# Patient Record
Sex: MALE | Race: WHITE | Employment: OTHER
[De-identification: names, ages, dates, MRNs, and addresses within clinical notes are randomized per-mention and may not be internally consistent; named-entity substitution may affect disease eponyms.]

---

## 2017-01-09 ENCOUNTER — OFFICE VISIT (OUTPATIENT)
Dept: FAMILY MEDICINE CLINIC | Facility: CLINIC | Age: 72
End: 2017-01-09

## 2017-01-09 ENCOUNTER — ANTI-COAG VISIT (OUTPATIENT)
Dept: FAMILY MEDICINE CLINIC | Facility: CLINIC | Age: 72
End: 2017-01-09

## 2017-01-09 VITALS
TEMPERATURE: 97.5 F | HEART RATE: 87 BPM | BODY MASS INDEX: 34.45 KG/M2 | DIASTOLIC BLOOD PRESSURE: 80 MMHG | WEIGHT: 254 LBS | SYSTOLIC BLOOD PRESSURE: 140 MMHG | OXYGEN SATURATION: 98 %

## 2017-01-09 DIAGNOSIS — F41.9 ANXIETY: ICD-10-CM

## 2017-01-09 DIAGNOSIS — M54.50 CHRONIC BILATERAL LOW BACK PAIN WITHOUT SCIATICA: ICD-10-CM

## 2017-01-09 DIAGNOSIS — R73.9 HYPERGLYCEMIA: ICD-10-CM

## 2017-01-09 DIAGNOSIS — G89.29 CHRONIC BILATERAL LOW BACK PAIN WITHOUT SCIATICA: ICD-10-CM

## 2017-01-09 DIAGNOSIS — Z11.59 NEED FOR HEPATITIS C SCREENING TEST: ICD-10-CM

## 2017-01-09 DIAGNOSIS — I10 ESSENTIAL HYPERTENSION: Primary | ICD-10-CM

## 2017-01-09 DIAGNOSIS — Z79.01 ANTICOAGULATED ON COUMADIN: ICD-10-CM

## 2017-01-09 DIAGNOSIS — I48.20 CHRONIC ATRIAL FIBRILLATION (HCC): ICD-10-CM

## 2017-01-09 DIAGNOSIS — Z12.11 COLON CANCER SCREENING: ICD-10-CM

## 2017-01-09 DIAGNOSIS — Z13.31 POSITIVE DEPRESSION SCREENING: ICD-10-CM

## 2017-01-09 DIAGNOSIS — Z00.00 HEALTH CARE MAINTENANCE: ICD-10-CM

## 2017-01-09 PROCEDURE — 96160 PT-FOCUSED HLTH RISK ASSMT: CPT | Performed by: NURSE PRACTITIONER

## 2017-01-09 PROCEDURE — G8431 POS CLIN DEPRES SCRN F/U DOC: HCPCS | Performed by: NURSE PRACTITIONER

## 2017-01-09 PROCEDURE — 99214 OFFICE O/P EST MOD 30 MIN: CPT | Performed by: NURSE PRACTITIONER

## 2017-01-09 RX ORDER — LORAZEPAM 1 MG/1
TABLET ORAL
Qty: 90 TABLET | Refills: 3 | Status: SHIPPED | OUTPATIENT
Start: 2017-01-09 | End: 2017-05-15 | Stop reason: SDUPTHER

## 2017-01-09 RX ORDER — ACETAMINOPHEN AND CODEINE PHOSPHATE 300; 30 MG/1; MG/1
1 TABLET ORAL DAILY PRN
Qty: 30 TABLET | Refills: 0 | Status: SHIPPED | OUTPATIENT
Start: 2017-01-09 | End: 2018-05-14 | Stop reason: ALTCHOICE

## 2017-01-09 RX ORDER — VALSARTAN 320 MG/1
320 TABLET ORAL DAILY
Qty: 30 TABLET | Refills: 5 | Status: SHIPPED | OUTPATIENT
Start: 2017-01-09 | End: 2017-05-15 | Stop reason: SDUPTHER

## 2017-01-09 RX ORDER — WARFARIN SODIUM 6 MG/1
TABLET ORAL
Qty: 30 TABLET | Refills: 5 | Status: SHIPPED | OUTPATIENT
Start: 2017-01-09 | End: 2017-05-15 | Stop reason: SDUPTHER

## 2017-01-09 RX ORDER — DILTIAZEM HYDROCHLORIDE 240 MG/1
CAPSULE, COATED, EXTENDED RELEASE ORAL
Qty: 30 CAPSULE | Refills: 5 | Status: SHIPPED | OUTPATIENT
Start: 2017-01-09 | End: 2017-05-15 | Stop reason: SDUPTHER

## 2017-01-09 ASSESSMENT — PATIENT HEALTH QUESTIONNAIRE - PHQ9
9. THOUGHTS THAT YOU WOULD BE BETTER OFF DEAD, OR OF HURTING YOURSELF: 0
SUM OF ALL RESPONSES TO PHQ QUESTIONS 1-9: 14
7. TROUBLE CONCENTRATING ON THINGS, SUCH AS READING THE NEWSPAPER OR WATCHING TELEVISION: 1
1. LITTLE INTEREST OR PLEASURE IN DOING THINGS: 3
SUM OF ALL RESPONSES TO PHQ9 QUESTIONS 1 & 2: 5
2. FEELING DOWN, DEPRESSED OR HOPELESS: 2
8. MOVING OR SPEAKING SO SLOWLY THAT OTHER PEOPLE COULD HAVE NOTICED. OR THE OPPOSITE, BEING SO FIGETY OR RESTLESS THAT YOU HAVE BEEN MOVING AROUND A LOT MORE THAN USUAL: 0
4. FEELING TIRED OR HAVING LITTLE ENERGY: 2
3. TROUBLE FALLING OR STAYING ASLEEP: 3
10. IF YOU CHECKED OFF ANY PROBLEMS, HOW DIFFICULT HAVE THESE PROBLEMS MADE IT FOR YOU TO DO YOUR WORK, TAKE CARE OF THINGS AT HOME, OR GET ALONG WITH OTHER PEOPLE: 0
5. POOR APPETITE OR OVEREATING: 2
6. FEELING BAD ABOUT YOURSELF - OR THAT YOU ARE A FAILURE OR HAVE LET YOURSELF OR YOUR FAMILY DOWN: 1

## 2017-01-09 ASSESSMENT — ENCOUNTER SYMPTOMS
RHINORRHEA: 0
CONSTIPATION: 0
BLOOD IN STOOL: 0
CHEST TIGHTNESS: 0
ABDOMINAL PAIN: 0
COUGH: 0
GASTROINTESTINAL NEGATIVE: 1
WHEEZING: 0
SHORTNESS OF BREATH: 0
RESPIRATORY NEGATIVE: 1
ALLERGIC/IMMUNOLOGIC NEGATIVE: 1
SORE THROAT: 0
ANAL BLEEDING: 0
EYES NEGATIVE: 1
DIARRHEA: 0

## 2017-02-14 RX ORDER — ACETAMINOPHEN AND CODEINE PHOSPHATE 300; 30 MG/1; MG/1
TABLET ORAL
Qty: 30 TABLET | Refills: 0 | OUTPATIENT
Start: 2017-02-14

## 2017-02-20 ENCOUNTER — TELEPHONE (OUTPATIENT)
Dept: FAMILY MEDICINE CLINIC | Facility: CLINIC | Age: 72
End: 2017-02-20

## 2017-05-15 ENCOUNTER — OFFICE VISIT (OUTPATIENT)
Dept: FAMILY MEDICINE CLINIC | Age: 72
End: 2017-05-15
Payer: COMMERCIAL

## 2017-05-15 VITALS
HEART RATE: 100 BPM | BODY MASS INDEX: 33.05 KG/M2 | SYSTOLIC BLOOD PRESSURE: 142 MMHG | OXYGEN SATURATION: 98 % | HEIGHT: 72 IN | DIASTOLIC BLOOD PRESSURE: 80 MMHG | TEMPERATURE: 97.7 F | WEIGHT: 244 LBS

## 2017-05-15 DIAGNOSIS — K59.09 OTHER CONSTIPATION: ICD-10-CM

## 2017-05-15 DIAGNOSIS — J44.9 CHRONIC OBSTRUCTIVE PULMONARY DISEASE, UNSPECIFIED COPD TYPE (HCC): Primary | ICD-10-CM

## 2017-05-15 DIAGNOSIS — I48.20 CHRONIC ATRIAL FIBRILLATION (HCC): ICD-10-CM

## 2017-05-15 DIAGNOSIS — F41.9 ANXIETY: ICD-10-CM

## 2017-05-15 DIAGNOSIS — I10 ESSENTIAL HYPERTENSION: ICD-10-CM

## 2017-05-15 PROBLEM — K59.00 CONSTIPATION: Status: ACTIVE | Noted: 2017-05-15

## 2017-05-15 LAB
INTERNATIONAL NORMALIZATION RATIO, POC: 2.7
PROTHROMBIN TIME, POC: 32.6

## 2017-05-15 PROCEDURE — 99214 OFFICE O/P EST MOD 30 MIN: CPT | Performed by: NURSE PRACTITIONER

## 2017-05-15 PROCEDURE — 85610 PROTHROMBIN TIME: CPT | Performed by: NURSE PRACTITIONER

## 2017-05-15 RX ORDER — WARFARIN SODIUM 6 MG/1
TABLET ORAL
Qty: 30 TABLET | Refills: 5 | Status: SHIPPED | OUTPATIENT
Start: 2017-05-15 | End: 2017-09-11 | Stop reason: SDUPTHER

## 2017-05-15 RX ORDER — ALBUTEROL SULFATE 90 UG/1
2 AEROSOL, METERED RESPIRATORY (INHALATION) EVERY 6 HOURS PRN
Qty: 1 INHALER | Refills: 5 | Status: SHIPPED | OUTPATIENT
Start: 2017-05-15 | End: 2018-09-17 | Stop reason: SDUPTHER

## 2017-05-15 RX ORDER — LORAZEPAM 1 MG/1
TABLET ORAL
Qty: 90 TABLET | Refills: 3 | Status: SHIPPED | OUTPATIENT
Start: 2017-05-15 | End: 2017-09-11 | Stop reason: SDUPTHER

## 2017-05-15 RX ORDER — DILTIAZEM HYDROCHLORIDE 240 MG/1
CAPSULE, COATED, EXTENDED RELEASE ORAL
Qty: 30 CAPSULE | Refills: 5 | Status: SHIPPED | OUTPATIENT
Start: 2017-05-15 | End: 2017-09-11 | Stop reason: SDUPTHER

## 2017-05-15 RX ORDER — VALSARTAN 320 MG/1
320 TABLET ORAL DAILY
Qty: 30 TABLET | Refills: 5 | Status: SHIPPED | OUTPATIENT
Start: 2017-05-15 | End: 2017-09-11 | Stop reason: SDUPTHER

## 2017-05-15 RX ORDER — NITROGLYCERIN 0.4 MG/1
TABLET SUBLINGUAL
Qty: 25 TABLET | Refills: 3 | Status: SHIPPED | OUTPATIENT
Start: 2017-05-15 | End: 2017-09-11 | Stop reason: SDUPTHER

## 2017-05-15 RX ORDER — DOCUSATE SODIUM 100 MG/1
100 CAPSULE, LIQUID FILLED ORAL 2 TIMES DAILY
Qty: 60 CAPSULE | Refills: 11 | Status: SHIPPED | OUTPATIENT
Start: 2017-05-15 | End: 2017-09-11 | Stop reason: SDUPTHER

## 2017-05-15 RX ORDER — TAMSULOSIN HYDROCHLORIDE 0.4 MG/1
CAPSULE ORAL
Qty: 30 CAPSULE | Refills: 5 | Status: SHIPPED | OUTPATIENT
Start: 2017-05-15 | End: 2018-05-14

## 2017-05-15 ASSESSMENT — ENCOUNTER SYMPTOMS
RESPIRATORY NEGATIVE: 1
WHEEZING: 0
CONSTIPATION: 0
SORE THROAT: 0
EYES NEGATIVE: 1
SHORTNESS OF BREATH: 0
RHINORRHEA: 0
BLOOD IN STOOL: 0
CHEST TIGHTNESS: 0
COUGH: 0
DIARRHEA: 0
ABDOMINAL PAIN: 0
ANAL BLEEDING: 0
ALLERGIC/IMMUNOLOGIC NEGATIVE: 1
GASTROINTESTINAL NEGATIVE: 1

## 2017-06-12 ENCOUNTER — TELEPHONE (OUTPATIENT)
Dept: FAMILY MEDICINE CLINIC | Age: 72
End: 2017-06-12

## 2017-09-11 ENCOUNTER — HOSPITAL ENCOUNTER (OUTPATIENT)
Age: 72
Setting detail: SPECIMEN
Discharge: HOME | End: 2017-09-11
Payer: COMMERCIAL

## 2017-09-11 ENCOUNTER — OFFICE VISIT (OUTPATIENT)
Dept: FAMILY MEDICINE CLINIC | Age: 72
End: 2017-09-11
Payer: COMMERCIAL

## 2017-09-11 ENCOUNTER — TELEPHONE (OUTPATIENT)
Dept: FAMILY MEDICINE CLINIC | Age: 72
End: 2017-09-11

## 2017-09-11 VITALS
OXYGEN SATURATION: 97 % | HEART RATE: 85 BPM | SYSTOLIC BLOOD PRESSURE: 140 MMHG | DIASTOLIC BLOOD PRESSURE: 82 MMHG | BODY MASS INDEX: 33.36 KG/M2 | WEIGHT: 246 LBS | TEMPERATURE: 97.4 F

## 2017-09-11 DIAGNOSIS — Z79.899 HIGH RISK MEDICATION USE: ICD-10-CM

## 2017-09-11 DIAGNOSIS — I48.20 CHRONIC ATRIAL FIBRILLATION (HCC): ICD-10-CM

## 2017-09-11 DIAGNOSIS — I10 ESSENTIAL HYPERTENSION: ICD-10-CM

## 2017-09-11 DIAGNOSIS — I10 ESSENTIAL HYPERTENSION: Primary | ICD-10-CM

## 2017-09-11 DIAGNOSIS — F41.9 ANXIETY: ICD-10-CM

## 2017-09-11 DIAGNOSIS — E78.2 MIXED HYPERLIPIDEMIA: ICD-10-CM

## 2017-09-11 DIAGNOSIS — Z12.5 PROSTATE CANCER SCREENING: ICD-10-CM

## 2017-09-11 DIAGNOSIS — Z79.01 ANTICOAGULATED ON COUMADIN: ICD-10-CM

## 2017-09-11 LAB
ALBUMIN SERPL-MCNC: 4.2 G/DL (ref 3.5–5.2)
ALBUMIN/GLOBULIN RATIO: 1.1 (ref 1–2.5)
ALP BLD-CCNC: 124 U/L (ref 40–129)
ALT SERPL-CCNC: 12 U/L (ref 5–41)
ANION GAP SERPL CALCULATED.3IONS-SCNC: 17 MMOL/L (ref 9–17)
AST SERPL-CCNC: 16 U/L
BILIRUB SERPL-MCNC: 0.19 MG/DL (ref 0.3–1.2)
BUN BLDV-MCNC: 22 MG/DL (ref 8–23)
BUN/CREAT BLD: ABNORMAL (ref 9–20)
CALCIUM SERPL-MCNC: 9.6 MG/DL (ref 8.6–10.4)
CHLORIDE BLD-SCNC: 100 MMOL/L (ref 98–107)
CHOLESTEROL/HDL RATIO: 4.5
CHOLESTEROL: 198 MG/DL
CO2: 25 MMOL/L (ref 20–31)
CREAT SERPL-MCNC: 1.31 MG/DL (ref 0.7–1.2)
GFR AFRICAN AMERICAN: >60 ML/MIN
GFR NON-AFRICAN AMERICAN: 54 ML/MIN
GFR SERPL CREATININE-BSD FRML MDRD: ABNORMAL ML/MIN/{1.73_M2}
GFR SERPL CREATININE-BSD FRML MDRD: ABNORMAL ML/MIN/{1.73_M2}
GLUCOSE BLD-MCNC: 118 MG/DL (ref 70–99)
HCT VFR BLD CALC: 44.5 % (ref 41–53)
HDLC SERPL-MCNC: 44 MG/DL
HEMOGLOBIN: 15 G/DL (ref 13.5–17.5)
INTERNATIONAL NORMALIZATION RATIO, POC: 2.3
LDL CHOLESTEROL: 126 MG/DL (ref 0–130)
MCH RBC QN AUTO: 31.9 PG (ref 26–34)
MCHC RBC AUTO-ENTMCNC: 33.7 G/DL (ref 31–37)
MCV RBC AUTO: 94.8 FL (ref 80–100)
PDW BLD-RTO: 15 % (ref 12.5–15.4)
PLATELET # BLD: 239 K/UL (ref 140–450)
PMV BLD AUTO: 9.6 FL (ref 6–12)
POTASSIUM SERPL-SCNC: 4.3 MMOL/L (ref 3.7–5.3)
PROSTATE SPECIFIC ANTIGEN: 0.84 UG/L
PROTHROMBIN TIME, POC: 27.4
RBC # BLD: 4.7 M/UL (ref 4.5–5.9)
SODIUM BLD-SCNC: 142 MMOL/L (ref 135–144)
TOTAL PROTEIN: 7.9 G/DL (ref 6.4–8.3)
TRIGL SERPL-MCNC: 139 MG/DL
VLDLC SERPL CALC-MCNC: NORMAL MG/DL (ref 1–30)
WBC # BLD: 8.2 K/UL (ref 3.5–11)

## 2017-09-11 PROCEDURE — 36415 COLL VENOUS BLD VENIPUNCTURE: CPT | Performed by: NURSE PRACTITIONER

## 2017-09-11 PROCEDURE — 85610 PROTHROMBIN TIME: CPT | Performed by: NURSE PRACTITIONER

## 2017-09-11 PROCEDURE — 99214 OFFICE O/P EST MOD 30 MIN: CPT | Performed by: NURSE PRACTITIONER

## 2017-09-11 RX ORDER — WARFARIN SODIUM 6 MG/1
TABLET ORAL
Qty: 30 TABLET | Refills: 5 | Status: SHIPPED | OUTPATIENT
Start: 2017-09-11 | End: 2018-01-15 | Stop reason: SDUPTHER

## 2017-09-11 RX ORDER — LORAZEPAM 1 MG/1
TABLET ORAL
Qty: 90 TABLET | Refills: 3 | Status: SHIPPED | OUTPATIENT
Start: 2017-09-11 | End: 2018-01-15 | Stop reason: SDUPTHER

## 2017-09-11 RX ORDER — NITROGLYCERIN 0.4 MG/1
0.4 TABLET SUBLINGUAL EVERY 5 MIN PRN
Qty: 25 TABLET | Refills: 5 | Status: SHIPPED | OUTPATIENT
Start: 2017-09-11 | End: 2018-01-15 | Stop reason: SDUPTHER

## 2017-09-11 RX ORDER — VALSARTAN 320 MG/1
320 TABLET ORAL DAILY
Qty: 30 TABLET | Refills: 5 | Status: SHIPPED | OUTPATIENT
Start: 2017-09-11 | End: 2018-05-07 | Stop reason: SDUPTHER

## 2017-09-11 RX ORDER — DOCUSATE SODIUM 100 MG/1
100 CAPSULE, LIQUID FILLED ORAL 2 TIMES DAILY
Qty: 60 CAPSULE | Refills: 11 | Status: SHIPPED | OUTPATIENT
Start: 2017-09-11 | End: 2018-09-17 | Stop reason: SDUPTHER

## 2017-09-11 RX ORDER — DILTIAZEM HYDROCHLORIDE 240 MG/1
CAPSULE, COATED, EXTENDED RELEASE ORAL
Qty: 30 CAPSULE | Refills: 5 | Status: SHIPPED | OUTPATIENT
Start: 2017-09-11 | End: 2018-01-15 | Stop reason: SDUPTHER

## 2017-09-11 ASSESSMENT — ENCOUNTER SYMPTOMS
RESPIRATORY NEGATIVE: 1
WHEEZING: 0
RHINORRHEA: 0
COUGH: 0
ANAL BLEEDING: 0
DIARRHEA: 0
CHEST TIGHTNESS: 0
EYES NEGATIVE: 1
CONSTIPATION: 0
GASTROINTESTINAL NEGATIVE: 1
SORE THROAT: 0
ABDOMINAL PAIN: 0
ALLERGIC/IMMUNOLOGIC NEGATIVE: 1
BLOOD IN STOOL: 0
SHORTNESS OF BREATH: 0

## 2017-09-13 ENCOUNTER — TELEPHONE (OUTPATIENT)
Dept: FAMILY MEDICINE CLINIC | Age: 72
End: 2017-09-13

## 2017-09-14 LAB

## 2017-11-14 ENCOUNTER — ANTI-COAG VISIT (OUTPATIENT)
Dept: FAMILY MEDICINE CLINIC | Age: 72
End: 2017-11-14

## 2017-11-14 DIAGNOSIS — Z79.01 ANTICOAGULATED ON COUMADIN: ICD-10-CM

## 2018-01-15 ENCOUNTER — OFFICE VISIT (OUTPATIENT)
Dept: FAMILY MEDICINE CLINIC | Age: 73
End: 2018-01-15
Payer: COMMERCIAL

## 2018-01-15 VITALS
HEIGHT: 72 IN | DIASTOLIC BLOOD PRESSURE: 44 MMHG | SYSTOLIC BLOOD PRESSURE: 143 MMHG | HEART RATE: 88 BPM | TEMPERATURE: 97.1 F | OXYGEN SATURATION: 98 % | WEIGHT: 253 LBS | BODY MASS INDEX: 34.27 KG/M2

## 2018-01-15 DIAGNOSIS — I10 ESSENTIAL HYPERTENSION: ICD-10-CM

## 2018-01-15 DIAGNOSIS — R07.9 CHEST PAIN, UNSPECIFIED TYPE: ICD-10-CM

## 2018-01-15 DIAGNOSIS — K59.00 CONSTIPATION, UNSPECIFIED CONSTIPATION TYPE: ICD-10-CM

## 2018-01-15 DIAGNOSIS — Z79.899 MEDICATION MANAGEMENT: ICD-10-CM

## 2018-01-15 DIAGNOSIS — R73.9 HYPERGLYCEMIA: Primary | ICD-10-CM

## 2018-01-15 DIAGNOSIS — I48.20 CHRONIC ATRIAL FIBRILLATION (HCC): ICD-10-CM

## 2018-01-15 DIAGNOSIS — F41.9 ANXIETY: ICD-10-CM

## 2018-01-15 DIAGNOSIS — J44.9 CHRONIC OBSTRUCTIVE PULMONARY DISEASE, UNSPECIFIED COPD TYPE (HCC): ICD-10-CM

## 2018-01-15 DIAGNOSIS — M67.432 GANGLION CYST OF WRIST, LEFT: ICD-10-CM

## 2018-01-15 LAB
6-ACETYLMORPHINE, UR: NORMAL
AMPHETAMINE SCREEN, URINE: NORMAL
BARBITURATE SCREEN, URINE: NORMAL
BENZODIAZEPINE SCREEN, URINE: POSITIVE
CANNABINOID SCREEN URINE: NORMAL
COCAINE METABOLITE, URINE: NORMAL
CREATININE URINE: NORMAL MG/DL
EDDP, URINE: NORMAL
ETHANOL URINE: NORMAL
HBA1C MFR BLD: 6 %
INTERNATIONAL NORMALIZATION RATIO, POC: 2.4
MDMA URINE: NORMAL
METHADONE SCREEN, URINE: NORMAL
METHAMPHETAMINE, URINE: NORMAL
OPIATES, URINE: NORMAL
OXYCODONE: NORMAL
PCP: NORMAL
PH, URINE: NORMAL
PHENCYCLIDINE, URINE: NORMAL
PROPOXYPHENE, URINE: NORMAL
PROTHROMBIN TIME, POC: 28.7
TRICYCLIC ANTIDEPRESSANTS, UR: NORMAL

## 2018-01-15 PROCEDURE — 1036F TOBACCO NON-USER: CPT | Performed by: NURSE PRACTITIONER

## 2018-01-15 PROCEDURE — G8484 FLU IMMUNIZE NO ADMIN: HCPCS | Performed by: NURSE PRACTITIONER

## 2018-01-15 PROCEDURE — 99214 OFFICE O/P EST MOD 30 MIN: CPT | Performed by: NURSE PRACTITIONER

## 2018-01-15 PROCEDURE — G8417 CALC BMI ABV UP PARAM F/U: HCPCS | Performed by: NURSE PRACTITIONER

## 2018-01-15 PROCEDURE — 4040F PNEUMOC VAC/ADMIN/RCVD: CPT | Performed by: NURSE PRACTITIONER

## 2018-01-15 PROCEDURE — G8926 SPIRO NO PERF OR DOC: HCPCS | Performed by: NURSE PRACTITIONER

## 2018-01-15 PROCEDURE — 85610 PROTHROMBIN TIME: CPT | Performed by: NURSE PRACTITIONER

## 2018-01-15 PROCEDURE — 3023F SPIROM DOC REV: CPT | Performed by: NURSE PRACTITIONER

## 2018-01-15 PROCEDURE — 3017F COLORECTAL CA SCREEN DOC REV: CPT | Performed by: NURSE PRACTITIONER

## 2018-01-15 PROCEDURE — 83036 HEMOGLOBIN GLYCOSYLATED A1C: CPT | Performed by: NURSE PRACTITIONER

## 2018-01-15 PROCEDURE — 1123F ACP DISCUSS/DSCN MKR DOCD: CPT | Performed by: NURSE PRACTITIONER

## 2018-01-15 PROCEDURE — G8427 DOCREV CUR MEDS BY ELIG CLIN: HCPCS | Performed by: NURSE PRACTITIONER

## 2018-01-15 RX ORDER — VALSARTAN 320 MG/1
320 TABLET ORAL DAILY
Qty: 90 TABLET | Refills: 1 | Status: CANCELLED | OUTPATIENT
Start: 2018-01-15

## 2018-01-15 RX ORDER — DILTIAZEM HYDROCHLORIDE 240 MG/1
CAPSULE, COATED, EXTENDED RELEASE ORAL
Qty: 30 CAPSULE | Refills: 11 | Status: SHIPPED | OUTPATIENT
Start: 2018-01-15 | End: 2018-09-17 | Stop reason: SDUPTHER

## 2018-01-15 RX ORDER — WARFARIN SODIUM 6 MG/1
TABLET ORAL
Qty: 30 TABLET | Refills: 11 | Status: SHIPPED | OUTPATIENT
Start: 2018-01-15 | End: 2019-01-10 | Stop reason: SDUPTHER

## 2018-01-15 RX ORDER — NITROGLYCERIN 0.4 MG/1
0.4 TABLET SUBLINGUAL EVERY 5 MIN PRN
Qty: 25 TABLET | Refills: 11 | Status: SHIPPED | OUTPATIENT
Start: 2018-01-15 | End: 2019-09-13 | Stop reason: SDUPTHER

## 2018-01-15 RX ORDER — LORAZEPAM 1 MG/1
TABLET ORAL
Qty: 90 TABLET | Refills: 3 | Status: SHIPPED | OUTPATIENT
Start: 2018-01-15 | End: 2018-05-14 | Stop reason: SDUPTHER

## 2018-01-15 ASSESSMENT — ENCOUNTER SYMPTOMS
ALLERGIC/IMMUNOLOGIC NEGATIVE: 1
CONSTIPATION: 1
ANAL BLEEDING: 0
WHEEZING: 0
CHEST TIGHTNESS: 0
DIARRHEA: 0
EYES NEGATIVE: 1
BLOOD IN STOOL: 0
COUGH: 1
SHORTNESS OF BREATH: 1
ABDOMINAL PAIN: 0

## 2018-01-15 NOTE — PROGRESS NOTES
lungs every 6 hours as needed for Wheezing 1 Inhaler 5    tamsulosin (FLOMAX) 0.4 MG capsule take 1 capsule by mouth once daily 30 capsule 5    acetaminophen-codeine (TYLENOL #3) 300-30 MG per tablet Take 1 tablet by mouth daily as needed for Pain 30 tablet 0     No current facility-administered medications for this visit. Allergies   Allergen Reactions    Lisinopril      cough    Neurontin [Gabapentin] Other (See Comments)     Took 3 nights and felt awful    Pcn [Penicillins] Swelling    Statins Support Therapy Other (See Comments)     Leg pains, tried pravachol, crestor, lipitor, simvastatin    Hydrochlorothiazide Rash     Breaks out       Health Maintenance   Topic Date Due    AAA screen  1945    Colon cancer screen colonoscopy  10/25/1996    Flu vaccine (1) 01/15/2019 (Originally 9/1/2017)    Pneumococcal low/med risk (1 of 2 - PCV13) 01/15/2019 (Originally 10/25/2011)    Potassium monitoring  09/11/2018    Creatinine monitoring  09/11/2018    A1C test (Diabetic or Prediabetic)  01/15/2019    Lipid screen  09/11/2022    DTaP/Tdap/Td vaccine (2 - Td) 07/10/2023    Zostavax vaccine  Addressed    Hepatitis C screen  Completed          Review of Systems   Constitutional: Negative. Negative for appetite change and fatigue. HENT: Negative for tinnitus. Eyes: Negative. Respiratory: Positive for cough and shortness of breath. Negative for chest tightness and wheezing. Cardiovascular: Positive for chest pain ( Using nitro PRN). Negative for palpitations and leg swelling. Gastrointestinal: Positive for constipation. Negative for abdominal pain, anal bleeding, blood in stool and diarrhea. Endocrine: Negative. Negative for cold intolerance and heat intolerance. Genitourinary: Negative. Negative for difficulty urinating and urgency. Musculoskeletal: Negative. Negative for arthralgias. Skin: Negative. Negative for pallor and rash. Allergic/Immunologic: Negative. Neurological: Negative. Negative for headaches. Hematological: Negative. Psychiatric/Behavioral: Positive for dysphoric mood. Negative for sleep disturbance. The patient is nervous/anxious. Objective:     BP (!) 143/44 (Site: Right Arm, Position: Sitting, Cuff Size: Large Adult)   Pulse 88   Temp 97.1 °F (36.2 °C) (Oral)   Ht 5' 11.5\" (1.816 m)   Wt 253 lb (114.8 kg)   SpO2 98%   BMI 34.79 kg/m²   Physical Exam   Constitutional: He is oriented to person, place, and time. Vital signs are normal. He appears well-developed and well-nourished. obese   HENT:   Head: Normocephalic and atraumatic. Eyes: Conjunctivae are normal. Right eye exhibits no discharge. Left eye exhibits no discharge. Neck: Normal range of motion. Cardiovascular: Normal rate, regular rhythm and normal heart sounds. Exam reveals no gallop and no friction rub. No murmur heard. Pulmonary/Chest: Effort normal. No respiratory distress. He has wheezes ( Expiratory wheeze RLQ. Cleared with cough. Lungs otherwise clear throughout. ). He has no rales. Musculoskeletal: Normal range of motion. He exhibits no edema. Neurological: He is alert and oriented to person, place, and time. Skin: Skin is warm and dry. No rash noted. No erythema. Psychiatric: He has a normal mood and affect. His behavior is normal. Judgment and thought content normal.   Vitals reviewed. Assessment:        1. Hyperglycemia    2. Chronic atrial fibrillation (HCC)    3. Anxiety    4. Medication management    5. Ganglion cyst of wrist, left    6. Essential hypertension    7. Chronic obstructive pulmonary disease, unspecified COPD type ([address]    8. Chest pain, unspecified type    9. Constipation, unspecified constipation type                     Plan:      1. Hyperglycemia  - POCT glycosylated hemoglobin (Hb A1C)-6.0 well controlled, continue plan of care.     2. Chronic atrial fibrillation (HCC)  - warfarin (COUMADIN) 6 MG tablet; take 1

## 2018-05-07 RX ORDER — VALSARTAN 320 MG/1
TABLET ORAL
Qty: 90 TABLET | Refills: 1 | Status: SHIPPED | OUTPATIENT
Start: 2018-05-07 | End: 2018-11-09 | Stop reason: SDUPTHER

## 2018-05-14 ENCOUNTER — OFFICE VISIT (OUTPATIENT)
Dept: FAMILY MEDICINE CLINIC | Age: 73
End: 2018-05-14
Payer: COMMERCIAL

## 2018-05-14 ENCOUNTER — HOSPITAL ENCOUNTER (OUTPATIENT)
Age: 73
Setting detail: SPECIMEN
Discharge: HOME | End: 2018-05-14
Payer: COMMERCIAL

## 2018-05-14 VITALS
SYSTOLIC BLOOD PRESSURE: 138 MMHG | OXYGEN SATURATION: 99 % | DIASTOLIC BLOOD PRESSURE: 70 MMHG | BODY MASS INDEX: 33.69 KG/M2 | TEMPERATURE: 98.2 F | HEART RATE: 72 BPM | WEIGHT: 245 LBS

## 2018-05-14 DIAGNOSIS — I10 ESSENTIAL HYPERTENSION: Primary | ICD-10-CM

## 2018-05-14 DIAGNOSIS — Z79.899 HIGH RISK MEDICATION USE: ICD-10-CM

## 2018-05-14 DIAGNOSIS — M54.50 CHRONIC MIDLINE LOW BACK PAIN WITHOUT SCIATICA: ICD-10-CM

## 2018-05-14 DIAGNOSIS — F41.9 ANXIETY: ICD-10-CM

## 2018-05-14 DIAGNOSIS — Z79.899 MEDICATION MANAGEMENT: ICD-10-CM

## 2018-05-14 DIAGNOSIS — G89.29 CHRONIC MIDLINE LOW BACK PAIN WITHOUT SCIATICA: ICD-10-CM

## 2018-05-14 DIAGNOSIS — K59.00 CONSTIPATION, UNSPECIFIED CONSTIPATION TYPE: ICD-10-CM

## 2018-05-14 DIAGNOSIS — J41.1 MUCOPURULENT CHRONIC BRONCHITIS (HCC): ICD-10-CM

## 2018-05-14 LAB
INTERNATIONAL NORMALIZATION RATIO, POC: 3.4
PROTHROMBIN TIME, POC: 41

## 2018-05-14 PROCEDURE — 3023F SPIROM DOC REV: CPT | Performed by: NURSE PRACTITIONER

## 2018-05-14 PROCEDURE — G8417 CALC BMI ABV UP PARAM F/U: HCPCS | Performed by: NURSE PRACTITIONER

## 2018-05-14 PROCEDURE — 4040F PNEUMOC VAC/ADMIN/RCVD: CPT | Performed by: NURSE PRACTITIONER

## 2018-05-14 PROCEDURE — G8926 SPIRO NO PERF OR DOC: HCPCS | Performed by: NURSE PRACTITIONER

## 2018-05-14 PROCEDURE — 99214 OFFICE O/P EST MOD 30 MIN: CPT | Performed by: NURSE PRACTITIONER

## 2018-05-14 PROCEDURE — 85610 PROTHROMBIN TIME: CPT | Performed by: NURSE PRACTITIONER

## 2018-05-14 PROCEDURE — 3017F COLORECTAL CA SCREEN DOC REV: CPT | Performed by: NURSE PRACTITIONER

## 2018-05-14 PROCEDURE — 1123F ACP DISCUSS/DSCN MKR DOCD: CPT | Performed by: NURSE PRACTITIONER

## 2018-05-14 PROCEDURE — G8427 DOCREV CUR MEDS BY ELIG CLIN: HCPCS | Performed by: NURSE PRACTITIONER

## 2018-05-14 PROCEDURE — 1036F TOBACCO NON-USER: CPT | Performed by: NURSE PRACTITIONER

## 2018-05-14 RX ORDER — IBUPROFEN 800 MG/1
800 TABLET ORAL EVERY 12 HOURS PRN
Qty: 60 TABLET | Refills: 1 | Status: SHIPPED | OUTPATIENT
Start: 2018-05-14 | End: 2018-07-11 | Stop reason: SDUPTHER

## 2018-05-14 RX ORDER — LORAZEPAM 1 MG/1
TABLET ORAL
Qty: 90 TABLET | Refills: 3 | Status: SHIPPED | OUTPATIENT
Start: 2018-05-14 | End: 2018-09-17 | Stop reason: SDUPTHER

## 2018-05-14 ASSESSMENT — ENCOUNTER SYMPTOMS
CHEST TIGHTNESS: 0
CONSTIPATION: 1
SHORTNESS OF BREATH: 1
COUGH: 1

## 2018-05-14 ASSESSMENT — PATIENT HEALTH QUESTIONNAIRE - PHQ9
SUM OF ALL RESPONSES TO PHQ9 QUESTIONS 1 & 2: 0
2. FEELING DOWN, DEPRESSED OR HOPELESS: 0
SUM OF ALL RESPONSES TO PHQ QUESTIONS 1-9: 0
1. LITTLE INTEREST OR PLEASURE IN DOING THINGS: 0

## 2018-05-16 LAB

## 2018-07-11 DIAGNOSIS — M54.50 CHRONIC MIDLINE LOW BACK PAIN WITHOUT SCIATICA: ICD-10-CM

## 2018-07-11 DIAGNOSIS — G89.29 CHRONIC MIDLINE LOW BACK PAIN WITHOUT SCIATICA: ICD-10-CM

## 2018-07-11 RX ORDER — IBUPROFEN 800 MG/1
TABLET ORAL
Qty: 60 TABLET | Refills: 1 | Status: SHIPPED | OUTPATIENT
Start: 2018-07-11 | End: 2018-09-17 | Stop reason: SDUPTHER

## 2018-09-17 ENCOUNTER — HOSPITAL ENCOUNTER (OUTPATIENT)
Age: 73
Setting detail: SPECIMEN
Discharge: HOME | End: 2018-09-17
Payer: COMMERCIAL

## 2018-09-17 ENCOUNTER — OFFICE VISIT (OUTPATIENT)
Dept: FAMILY MEDICINE CLINIC | Age: 73
End: 2018-09-17
Payer: COMMERCIAL

## 2018-09-17 VITALS
BODY MASS INDEX: 32.87 KG/M2 | OXYGEN SATURATION: 98 % | DIASTOLIC BLOOD PRESSURE: 80 MMHG | SYSTOLIC BLOOD PRESSURE: 126 MMHG | HEART RATE: 71 BPM | TEMPERATURE: 97.9 F | WEIGHT: 239 LBS

## 2018-09-17 DIAGNOSIS — E55.9 VITAMIN D DEFICIENCY: ICD-10-CM

## 2018-09-17 DIAGNOSIS — G89.29 CHRONIC MIDLINE LOW BACK PAIN WITHOUT SCIATICA: ICD-10-CM

## 2018-09-17 DIAGNOSIS — E78.2 MIXED HYPERLIPIDEMIA: ICD-10-CM

## 2018-09-17 DIAGNOSIS — F41.9 ANXIETY: ICD-10-CM

## 2018-09-17 DIAGNOSIS — Z12.5 PROSTATE CANCER SCREENING: ICD-10-CM

## 2018-09-17 DIAGNOSIS — I10 ESSENTIAL HYPERTENSION: Primary | ICD-10-CM

## 2018-09-17 DIAGNOSIS — M54.50 CHRONIC MIDLINE LOW BACK PAIN WITHOUT SCIATICA: ICD-10-CM

## 2018-09-17 DIAGNOSIS — I10 ESSENTIAL HYPERTENSION: ICD-10-CM

## 2018-09-17 DIAGNOSIS — Z79.899 MEDICATION MANAGEMENT: ICD-10-CM

## 2018-09-17 DIAGNOSIS — I48.20 CHRONIC ATRIAL FIBRILLATION (HCC): ICD-10-CM

## 2018-09-17 DIAGNOSIS — K59.00 CONSTIPATION, UNSPECIFIED CONSTIPATION TYPE: ICD-10-CM

## 2018-09-17 LAB
ALBUMIN SERPL-MCNC: 4.3 G/DL (ref 3.5–5.2)
ALBUMIN/GLOBULIN RATIO: 1.1 (ref 1–2.5)
ALP BLD-CCNC: 138 U/L (ref 40–129)
ALT SERPL-CCNC: 23 U/L (ref 5–41)
ANION GAP SERPL CALCULATED.3IONS-SCNC: 16 MMOL/L (ref 9–17)
AST SERPL-CCNC: 24 U/L
BILIRUB SERPL-MCNC: 0.31 MG/DL (ref 0.3–1.2)
BUN BLDV-MCNC: 20 MG/DL (ref 8–23)
BUN/CREAT BLD: ABNORMAL (ref 9–20)
CALCIUM SERPL-MCNC: 9.9 MG/DL (ref 8.6–10.4)
CHLORIDE BLD-SCNC: 102 MMOL/L (ref 98–107)
CHOLESTEROL/HDL RATIO: 3.4
CHOLESTEROL: 162 MG/DL
CO2: 25 MMOL/L (ref 20–31)
CREAT SERPL-MCNC: 1.04 MG/DL (ref 0.7–1.2)
GFR AFRICAN AMERICAN: >60 ML/MIN
GFR NON-AFRICAN AMERICAN: >60 ML/MIN
GFR SERPL CREATININE-BSD FRML MDRD: ABNORMAL ML/MIN/{1.73_M2}
GFR SERPL CREATININE-BSD FRML MDRD: ABNORMAL ML/MIN/{1.73_M2}
GLUCOSE BLD-MCNC: 79 MG/DL (ref 70–99)
HCT VFR BLD CALC: 48.8 % (ref 40.7–50.3)
HDLC SERPL-MCNC: 48 MG/DL
HEMOGLOBIN: 15.2 G/DL (ref 13–17)
LDL CHOLESTEROL: 98 MG/DL (ref 0–130)
MCH RBC QN AUTO: 30.4 PG (ref 25.2–33.5)
MCHC RBC AUTO-ENTMCNC: 31.1 G/DL (ref 28.4–34.8)
MCV RBC AUTO: 97.6 FL (ref 82.6–102.9)
NRBC AUTOMATED: 0 PER 100 WBC
PDW BLD-RTO: 14.5 % (ref 11.8–14.4)
PLATELET # BLD: 208 K/UL (ref 138–453)
PMV BLD AUTO: 11.6 FL (ref 8.1–13.5)
POTASSIUM SERPL-SCNC: 4.2 MMOL/L (ref 3.7–5.3)
PROSTATE SPECIFIC ANTIGEN: 0.88 UG/L
RBC # BLD: 5 M/UL (ref 4.21–5.77)
SODIUM BLD-SCNC: 143 MMOL/L (ref 135–144)
TOTAL PROTEIN: 8.1 G/DL (ref 6.4–8.3)
TRIGL SERPL-MCNC: 80 MG/DL
VITAMIN D 25-HYDROXY: 17.3 NG/ML (ref 30–100)
VLDLC SERPL CALC-MCNC: NORMAL MG/DL (ref 1–30)
WBC # BLD: 7.3 K/UL (ref 3.5–11.3)

## 2018-09-17 PROCEDURE — 1123F ACP DISCUSS/DSCN MKR DOCD: CPT | Performed by: NURSE PRACTITIONER

## 2018-09-17 PROCEDURE — 4040F PNEUMOC VAC/ADMIN/RCVD: CPT | Performed by: NURSE PRACTITIONER

## 2018-09-17 PROCEDURE — G8417 CALC BMI ABV UP PARAM F/U: HCPCS | Performed by: NURSE PRACTITIONER

## 2018-09-17 PROCEDURE — 1036F TOBACCO NON-USER: CPT | Performed by: NURSE PRACTITIONER

## 2018-09-17 PROCEDURE — 3017F COLORECTAL CA SCREEN DOC REV: CPT | Performed by: NURSE PRACTITIONER

## 2018-09-17 PROCEDURE — G8427 DOCREV CUR MEDS BY ELIG CLIN: HCPCS | Performed by: NURSE PRACTITIONER

## 2018-09-17 PROCEDURE — 1101F PT FALLS ASSESS-DOCD LE1/YR: CPT | Performed by: NURSE PRACTITIONER

## 2018-09-17 PROCEDURE — 99214 OFFICE O/P EST MOD 30 MIN: CPT | Performed by: NURSE PRACTITIONER

## 2018-09-17 RX ORDER — LORAZEPAM 1 MG/1
TABLET ORAL
Qty: 90 TABLET | Refills: 3 | Status: SHIPPED | OUTPATIENT
Start: 2018-09-17 | End: 2019-01-03 | Stop reason: SDUPTHER

## 2018-09-17 RX ORDER — DOCUSATE SODIUM 100 MG/1
100 CAPSULE, LIQUID FILLED ORAL 2 TIMES DAILY
Qty: 60 CAPSULE | Refills: 11 | Status: SHIPPED | OUTPATIENT
Start: 2018-09-17 | End: 2019-01-03 | Stop reason: SDUPTHER

## 2018-09-17 RX ORDER — ALBUTEROL SULFATE 90 UG/1
2 AEROSOL, METERED RESPIRATORY (INHALATION) EVERY 6 HOURS PRN
Qty: 1 INHALER | Refills: 5 | Status: SHIPPED | OUTPATIENT
Start: 2018-09-17 | End: 2019-01-03 | Stop reason: SDUPTHER

## 2018-09-17 RX ORDER — IBUPROFEN 800 MG/1
TABLET ORAL
Qty: 60 TABLET | Refills: 1 | Status: SHIPPED | OUTPATIENT
Start: 2018-09-17 | End: 2019-01-10 | Stop reason: SINTOL

## 2018-09-17 RX ORDER — LACTOBACIL 2/BIFIDO 1/S.THERMO 450B CELL
1 PACKET (EA) ORAL 2 TIMES DAILY PRN
Qty: 60 CAPSULE | Refills: 11 | Status: SHIPPED | OUTPATIENT
Start: 2018-09-17 | End: 2018-10-01

## 2018-09-17 RX ORDER — DILTIAZEM HYDROCHLORIDE 240 MG/1
CAPSULE, COATED, EXTENDED RELEASE ORAL
Qty: 30 CAPSULE | Refills: 3 | Status: SHIPPED | OUTPATIENT
Start: 2018-09-17 | End: 2019-01-03 | Stop reason: SDUPTHER

## 2018-09-17 ASSESSMENT — ENCOUNTER SYMPTOMS
CONSTIPATION: 1
COUGH: 1
SHORTNESS OF BREATH: 1
CHEST TIGHTNESS: 0

## 2018-09-17 ASSESSMENT — PATIENT HEALTH QUESTIONNAIRE - PHQ9
1. LITTLE INTEREST OR PLEASURE IN DOING THINGS: 1
SUM OF ALL RESPONSES TO PHQ9 QUESTIONS 1 & 2: 2
SUM OF ALL RESPONSES TO PHQ QUESTIONS 1-9: 2
SUM OF ALL RESPONSES TO PHQ QUESTIONS 1-9: 2
2. FEELING DOWN, DEPRESSED OR HOPELESS: 1

## 2018-09-17 NOTE — PROGRESS NOTES
[address] [facility] [address]  Dept: [phone]  Dept Fax: [phone]      Rusty Moore is a 71 y.o. male who presents today for his medical conditions/complaints as noted below. Rusty Moore is c/o of Hypertension            HPI:     HPI:  Pt came to office for follow up for HTN and anxiety. HTN- BP is well controlled today. Compliant with taking medications. Denies chest pain, dyspnea, edema, or HA. Pt states having a pacemaker makes him anxious. He is using lorazapam as needed to control anxiety. Pt states if he gets chest pain he will take nitro SL and pain is alleviated. Pt states exercise can sometimes cause pain. Pt has no pain at this moment. Pt comes in every 4 months. Constipation-he is struggling with worsening constipation. He is using his stool softener and metamucil. He is using this regularly. He will have the urge to go and will go and sit and not be able to have a BM. Hemoglobin A1C (%)   Date Value   01/15/2018 6.0   01/09/2017 5.8   03/16/2015 6.2 (H)             ( goal A1C is < 7)   Microalb/Crt.  Ratio (mcg/mg creat)   Date Value   08/15/2013 6     LDL Cholesterol (mg/dL)   Date Value   09/11/2017 126   09/06/2016 125   03/16/2015 143 (H)       (goal LDL is <100)   AST (U/L)   Date Value   09/11/2017 16     ALT (U/L)   Date Value   09/11/2017 12     BUN (mg/dL)   Date Value   09/11/2017 22     BP Readings from Last 3 Encounters:   09/17/18 126/80   05/14/18 138/70   01/15/18 (!) 143/44          (goal 120/80)    Past Medical History:   Diagnosis Date    A-fib ([address]     Anticoagulated on Coumadin     for A-Fib, monitoring by PCP    Anxiety     Back pain     COPD (chronic obstructive pulmonary disease) ([address]     HTN (hypertension)     Hyperglycemia     Hyperlipidemia     can't take statins, due to muscle aches    Insomnia     Lung nodule     Pacemaker     cardiologist, dr. Fran Gutierrez exhibits no discharge. Left eye exhibits no discharge. Neck: Normal range of motion. Cardiovascular: Normal rate, regular rhythm and normal heart sounds. Exam reveals no gallop and no friction rub. No murmur heard. Pulmonary/Chest: Effort normal. No respiratory distress. He has no rales. Abdominal: He exhibits distension. Bloated feeling and distention. No guarding    Musculoskeletal: Normal range of motion. He exhibits no edema. Neurological: He is alert and oriented to person, place, and time. Skin: Skin is warm and dry. No rash noted. No erythema. Psychiatric: He has a normal mood and affect. His behavior is normal. Judgment and thought content normal.   Vitals reviewed. Assessment:      1. Essential hypertension    2. Anxiety    3. Vitamin D deficiency    4. Prostate cancer screening    5. Chronic midline low back pain without sciatica    6. Medication management    7. Constipation, unspecified constipation type    8. Mixed hyperlipidemia             Plan:      Orders Placed This Encounter   Procedures    Comprehensive Metabolic Panel     Standing Status:   Future     Standing Expiration Date:   9/17/2019    Lipid Panel     Standing Status:   Future     Standing Expiration Date:   9/17/2019     Order Specific Question:   Is Patient Fasting?/# of Hours     Answer:   8/10    CBC     Standing Status:   Future     Standing Expiration Date:   9/17/2019    PSA Screening     Standing Status:   Future     Standing Expiration Date:   9/17/2019    Vitamin D 25 Hydroxy     Standing Status:   Future     Standing Expiration Date:   9/17/2019     1. Essential hypertension    - Comprehensive Metabolic Panel; Future  - CBC; Future    2. Anxiety  6. Medication management  - LORazepam (ATIVAN) 1 MG tablet; TID as needed. .  Dispense: 90 tablet; Refill: 3    3. Vitamin D deficiency    - Vitamin D 25 Hydroxy; Future  - ergocalciferol (DRISDOL) 8000 UNIT/ML drops;  Take 1 mL by mouth daily  Dispense: 30

## 2018-10-01 ENCOUNTER — TELEPHONE (OUTPATIENT)
Dept: FAMILY MEDICINE CLINIC | Age: 73
End: 2018-10-01

## 2018-10-01 RX ORDER — CHOLECALCIFEROL (VITAMIN D3) 125 MCG
1 CAPSULE ORAL 2 TIMES DAILY
Qty: 60 CAPSULE | Refills: 5 | Status: SHIPPED | OUTPATIENT
Start: 2018-10-01 | End: 2019-01-03 | Stop reason: SDUPTHER

## 2018-11-12 RX ORDER — VALSARTAN 320 MG/1
TABLET ORAL
Qty: 90 TABLET | Refills: 1 | Status: SHIPPED | OUTPATIENT
Start: 2018-11-12 | End: 2019-02-05 | Stop reason: SDUPTHER

## 2019-01-03 DIAGNOSIS — F41.9 ANXIETY: ICD-10-CM

## 2019-01-03 DIAGNOSIS — E55.9 VITAMIN D DEFICIENCY: ICD-10-CM

## 2019-01-03 DIAGNOSIS — J41.1 MUCOPURULENT CHRONIC BRONCHITIS (HCC): ICD-10-CM

## 2019-01-03 DIAGNOSIS — Z79.899 MEDICATION MANAGEMENT: ICD-10-CM

## 2019-01-03 DIAGNOSIS — I10 ESSENTIAL HYPERTENSION: ICD-10-CM

## 2019-01-03 DIAGNOSIS — K59.00 CONSTIPATION, UNSPECIFIED CONSTIPATION TYPE: ICD-10-CM

## 2019-01-03 DIAGNOSIS — M54.50 CHRONIC MIDLINE LOW BACK PAIN WITHOUT SCIATICA: ICD-10-CM

## 2019-01-03 DIAGNOSIS — I48.20 CHRONIC ATRIAL FIBRILLATION (HCC): ICD-10-CM

## 2019-01-03 DIAGNOSIS — G89.29 CHRONIC MIDLINE LOW BACK PAIN WITHOUT SCIATICA: ICD-10-CM

## 2019-01-04 RX ORDER — IBUPROFEN 800 MG/1
TABLET ORAL
Qty: 60 TABLET | Refills: 1 | OUTPATIENT
Start: 2019-01-04

## 2019-01-04 RX ORDER — CHOLECALCIFEROL (VITAMIN D3) 125 MCG
1 CAPSULE ORAL 2 TIMES DAILY
Qty: 180 CAPSULE | Refills: 1 | Status: SHIPPED | OUTPATIENT
Start: 2019-01-04 | End: 2019-04-29 | Stop reason: SDUPTHER

## 2019-01-04 RX ORDER — DILTIAZEM HYDROCHLORIDE 240 MG/1
CAPSULE, COATED, EXTENDED RELEASE ORAL
Qty: 90 CAPSULE | Refills: 1 | Status: SHIPPED | OUTPATIENT
Start: 2019-01-04 | End: 2019-04-29 | Stop reason: SDUPTHER

## 2019-01-04 RX ORDER — DOCUSATE SODIUM 100 MG/1
100 CAPSULE, LIQUID FILLED ORAL 2 TIMES DAILY
Qty: 60 CAPSULE | Refills: 11 | Status: SHIPPED | OUTPATIENT
Start: 2019-01-04 | End: 2019-04-29 | Stop reason: SDUPTHER

## 2019-01-04 RX ORDER — ALBUTEROL SULFATE 90 UG/1
2 AEROSOL, METERED RESPIRATORY (INHALATION) EVERY 6 HOURS PRN
Qty: 1 INHALER | Refills: 5 | Status: SHIPPED | OUTPATIENT
Start: 2019-01-04 | End: 2021-03-23 | Stop reason: SDUPTHER

## 2019-01-04 RX ORDER — LORAZEPAM 1 MG/1
TABLET ORAL
Qty: 90 TABLET | Refills: 1 | Status: SHIPPED | OUTPATIENT
Start: 2019-01-04 | End: 2019-02-05

## 2019-01-10 DIAGNOSIS — I48.20 CHRONIC ATRIAL FIBRILLATION (HCC): ICD-10-CM

## 2019-01-10 RX ORDER — WARFARIN SODIUM 6 MG/1
TABLET ORAL
Qty: 30 TABLET | Refills: 0 | Status: SHIPPED | OUTPATIENT
Start: 2019-01-10 | End: 2019-02-06

## 2019-02-05 ENCOUNTER — HOSPITAL ENCOUNTER (OUTPATIENT)
Age: 74
Setting detail: SPECIMEN
Discharge: HOME | End: 2019-02-05
Payer: COMMERCIAL

## 2019-02-05 ENCOUNTER — OFFICE VISIT (OUTPATIENT)
Dept: FAMILY MEDICINE CLINIC | Age: 74
End: 2019-02-05
Payer: COMMERCIAL

## 2019-02-05 VITALS
HEIGHT: 72 IN | TEMPERATURE: 97.5 F | OXYGEN SATURATION: 96 % | HEART RATE: 71 BPM | WEIGHT: 241 LBS | BODY MASS INDEX: 32.64 KG/M2 | DIASTOLIC BLOOD PRESSURE: 70 MMHG | SYSTOLIC BLOOD PRESSURE: 120 MMHG

## 2019-02-05 DIAGNOSIS — I10 ESSENTIAL HYPERTENSION: ICD-10-CM

## 2019-02-05 DIAGNOSIS — F41.9 ANXIETY: ICD-10-CM

## 2019-02-05 DIAGNOSIS — Z79.899 HIGH RISK MEDICATION USE: ICD-10-CM

## 2019-02-05 DIAGNOSIS — Z79.01 CHRONIC ANTICOAGULATION: ICD-10-CM

## 2019-02-05 DIAGNOSIS — Z79.899 MEDICATION MANAGEMENT: ICD-10-CM

## 2019-02-05 DIAGNOSIS — I48.20 CHRONIC ATRIAL FIBRILLATION (HCC): Primary | ICD-10-CM

## 2019-02-05 DIAGNOSIS — E11.9 DIET-CONTROLLED DIABETES MELLITUS (HCC): ICD-10-CM

## 2019-02-05 DIAGNOSIS — I48.20 CHRONIC ATRIAL FIBRILLATION (HCC): ICD-10-CM

## 2019-02-05 DIAGNOSIS — R73.9 HYPERGLYCEMIA: ICD-10-CM

## 2019-02-05 LAB
HBA1C MFR BLD: 6 %
INR BLD: 3.9
PROTHROMBIN TIME: 37.1 SEC (ref 9–12)

## 2019-02-05 PROCEDURE — G8427 DOCREV CUR MEDS BY ELIG CLIN: HCPCS | Performed by: NURSE PRACTITIONER

## 2019-02-05 PROCEDURE — 83036 HEMOGLOBIN GLYCOSYLATED A1C: CPT | Performed by: NURSE PRACTITIONER

## 2019-02-05 PROCEDURE — 1123F ACP DISCUSS/DSCN MKR DOCD: CPT | Performed by: NURSE PRACTITIONER

## 2019-02-05 PROCEDURE — 3017F COLORECTAL CA SCREEN DOC REV: CPT | Performed by: NURSE PRACTITIONER

## 2019-02-05 PROCEDURE — 1036F TOBACCO NON-USER: CPT | Performed by: NURSE PRACTITIONER

## 2019-02-05 PROCEDURE — G8484 FLU IMMUNIZE NO ADMIN: HCPCS | Performed by: NURSE PRACTITIONER

## 2019-02-05 PROCEDURE — 2022F DILAT RTA XM EVC RTNOPTHY: CPT | Performed by: NURSE PRACTITIONER

## 2019-02-05 PROCEDURE — 1101F PT FALLS ASSESS-DOCD LE1/YR: CPT | Performed by: NURSE PRACTITIONER

## 2019-02-05 PROCEDURE — 4040F PNEUMOC VAC/ADMIN/RCVD: CPT | Performed by: NURSE PRACTITIONER

## 2019-02-05 PROCEDURE — 3044F HG A1C LEVEL LT 7.0%: CPT | Performed by: NURSE PRACTITIONER

## 2019-02-05 PROCEDURE — 99214 OFFICE O/P EST MOD 30 MIN: CPT | Performed by: NURSE PRACTITIONER

## 2019-02-05 PROCEDURE — G8417 CALC BMI ABV UP PARAM F/U: HCPCS | Performed by: NURSE PRACTITIONER

## 2019-02-05 RX ORDER — VALSARTAN 320 MG/1
TABLET ORAL
Qty: 90 TABLET | Refills: 1 | Status: SHIPPED | OUTPATIENT
Start: 2019-02-05 | End: 2019-04-29 | Stop reason: SDUPTHER

## 2019-02-05 RX ORDER — WARFARIN SODIUM 6 MG/1
TABLET ORAL
Qty: 90 TABLET | Refills: 1 | Status: CANCELLED | OUTPATIENT
Start: 2019-02-05

## 2019-02-05 RX ORDER — LORAZEPAM 1 MG/1
TABLET ORAL
Qty: 90 TABLET | Refills: 2 | Status: SHIPPED | OUTPATIENT
Start: 2019-02-05 | End: 2019-04-29 | Stop reason: SDUPTHER

## 2019-02-05 ASSESSMENT — PATIENT HEALTH QUESTIONNAIRE - PHQ9
1. LITTLE INTEREST OR PLEASURE IN DOING THINGS: 0
SUM OF ALL RESPONSES TO PHQ9 QUESTIONS 1 & 2: 0
2. FEELING DOWN, DEPRESSED OR HOPELESS: 0
SUM OF ALL RESPONSES TO PHQ QUESTIONS 1-9: 0
SUM OF ALL RESPONSES TO PHQ QUESTIONS 1-9: 0

## 2019-02-05 ASSESSMENT — ENCOUNTER SYMPTOMS
COUGH: 1
CONSTIPATION: 1
CHEST TIGHTNESS: 0
SHORTNESS OF BREATH: 0

## 2019-02-09 LAB

## 2019-04-29 ENCOUNTER — OFFICE VISIT (OUTPATIENT)
Dept: FAMILY MEDICINE CLINIC | Age: 74
End: 2019-04-29
Payer: COMMERCIAL

## 2019-04-29 VITALS
DIASTOLIC BLOOD PRESSURE: 82 MMHG | OXYGEN SATURATION: 98 % | HEART RATE: 69 BPM | BODY MASS INDEX: 32.26 KG/M2 | SYSTOLIC BLOOD PRESSURE: 136 MMHG | WEIGHT: 234.6 LBS

## 2019-04-29 DIAGNOSIS — F41.9 ANXIETY: ICD-10-CM

## 2019-04-29 DIAGNOSIS — I48.20 CHRONIC ATRIAL FIBRILLATION (HCC): ICD-10-CM

## 2019-04-29 DIAGNOSIS — K59.00 CONSTIPATION, UNSPECIFIED CONSTIPATION TYPE: ICD-10-CM

## 2019-04-29 DIAGNOSIS — Z79.899 MEDICATION MANAGEMENT: ICD-10-CM

## 2019-04-29 DIAGNOSIS — E55.9 VITAMIN D DEFICIENCY: ICD-10-CM

## 2019-04-29 DIAGNOSIS — I10 ESSENTIAL HYPERTENSION: Primary | ICD-10-CM

## 2019-04-29 LAB
INTERNATIONAL NORMALIZATION RATIO, POC: 1.9
PROTHROMBIN TIME, POC: 21.4

## 2019-04-29 PROCEDURE — 99215 OFFICE O/P EST HI 40 MIN: CPT | Performed by: NURSE PRACTITIONER

## 2019-04-29 PROCEDURE — 4040F PNEUMOC VAC/ADMIN/RCVD: CPT | Performed by: NURSE PRACTITIONER

## 2019-04-29 PROCEDURE — 3017F COLORECTAL CA SCREEN DOC REV: CPT | Performed by: NURSE PRACTITIONER

## 2019-04-29 PROCEDURE — 1036F TOBACCO NON-USER: CPT | Performed by: NURSE PRACTITIONER

## 2019-04-29 PROCEDURE — G8417 CALC BMI ABV UP PARAM F/U: HCPCS | Performed by: NURSE PRACTITIONER

## 2019-04-29 PROCEDURE — G8427 DOCREV CUR MEDS BY ELIG CLIN: HCPCS | Performed by: NURSE PRACTITIONER

## 2019-04-29 PROCEDURE — 1123F ACP DISCUSS/DSCN MKR DOCD: CPT | Performed by: NURSE PRACTITIONER

## 2019-04-29 PROCEDURE — 85610 PROTHROMBIN TIME: CPT | Performed by: NURSE PRACTITIONER

## 2019-04-29 RX ORDER — VALSARTAN 320 MG/1
TABLET ORAL
Qty: 90 TABLET | Refills: 1 | Status: SHIPPED | OUTPATIENT
Start: 2019-04-29 | End: 2019-11-15 | Stop reason: SDUPTHER

## 2019-04-29 RX ORDER — DOCUSATE SODIUM 100 MG/1
100 CAPSULE, LIQUID FILLED ORAL 2 TIMES DAILY
Qty: 60 CAPSULE | Refills: 11 | Status: SHIPPED | OUTPATIENT
Start: 2019-04-29 | End: 2020-04-15

## 2019-04-29 RX ORDER — DILTIAZEM HYDROCHLORIDE 240 MG/1
CAPSULE, COATED, EXTENDED RELEASE ORAL
Qty: 90 CAPSULE | Refills: 1 | Status: SHIPPED | OUTPATIENT
Start: 2019-04-29 | End: 2019-11-15 | Stop reason: SDUPTHER

## 2019-04-29 RX ORDER — ERGOCALCIFEROL 1.25 MG/1
50000 CAPSULE ORAL WEEKLY
Qty: 12 CAPSULE | Refills: 1 | Status: SHIPPED | OUTPATIENT
Start: 2019-04-29 | End: 2019-10-20 | Stop reason: SDUPTHER

## 2019-04-29 RX ORDER — CHOLECALCIFEROL (VITAMIN D3) 125 MCG
1 CAPSULE ORAL 2 TIMES DAILY
Qty: 180 CAPSULE | Refills: 1 | Status: SHIPPED | OUTPATIENT
Start: 2019-04-29 | End: 2020-02-18

## 2019-04-29 RX ORDER — WARFARIN SODIUM 5 MG/1
5 TABLET ORAL DAILY
Qty: 30 TABLET | Refills: 3 | Status: SHIPPED | OUTPATIENT
Start: 2019-04-29 | End: 2019-09-24 | Stop reason: SDUPTHER

## 2019-04-29 RX ORDER — LORAZEPAM 1 MG/1
TABLET ORAL
Qty: 90 TABLET | Refills: 2 | Status: SHIPPED | OUTPATIENT
Start: 2019-04-29 | End: 2019-08-02 | Stop reason: SDUPTHER

## 2019-04-29 ASSESSMENT — ENCOUNTER SYMPTOMS
CHEST TIGHTNESS: 0
SHORTNESS OF BREATH: 0
COUGH: 1
CONSTIPATION: 1

## 2019-04-29 NOTE — PROGRESS NOTES
Pt here for med refills     Visit Information      Have you changed or started any medications since your last visit including any over-the-counter medicines, vitamins, or herbal medicines? no   Have you stopped taking any of your medications? Is so, why? -  no  Are you having any side effects from any of your medications? - no    Have you seen any other physician or provider since your last visit?  no   Have you had any other diagnostic tests since your last visit?  no   Have you been seen in the emergency room and/or had an admission in a hospital since we last saw you?  no   Have you had your routine dental cleaning in the past 6 months?  no     Do you have an active MyChart account? If no, what is the barrier?   Yes    Patient Care Team:  CARMITA Meredith CNP as PCP - General (Nurse Practitioner)  CARMITA Meredith CNP as PCP - S Attributed Provider  Caryle Najjar, MD as Consulting Physician (Cardiology)    Medical History Review  Past Medical, Family, and Social History reviewed and does not contribute to the patient presenting condition    Health Maintenance   Topic Date Due    Pneumococcal 65+ years Vaccine (1 of 2 - PCV13) 10/25/2011    Shingles Vaccine (1 of 2) 05/14/2019 (Originally 10/25/1996)    Colon cancer screen colonoscopy  05/14/2019 (Originally 10/25/1996)    Flu vaccine (Season Ended) 02/05/2020 (Originally 9/1/2019)    Potassium monitoring  09/17/2019    Creatinine monitoring  09/17/2019    A1C test (Diabetic or Prediabetic)  02/05/2020    DTaP/Tdap/Td vaccine (2 - Td) 07/10/2023    Lipid screen  09/17/2023    Hepatitis C screen  Completed    AAA screen  Discontinued

## 2019-04-29 NOTE — PROGRESS NOTES
[address] [facility] [address]  Dept: [phone]  Dept Fax: [phone]      Abigail Johnson is a 68 y.o. male who presents today for hismedical conditions/complaints as noted below. Abigail Johnson is c/o of Medication Refill            HPI:      MARIAM Chan is here today for medication refills. He is doing well. HTN- BP is well controlled today. Compliant with taking medications. Denies chest pain, dyspnea, edema, or HA. Mood-is good. He is using lorazepam for his anxiety. This is effective for him. No issues. COPD-breathing is good. using inhalers as needed. No shortness of breath or coughing. Hemoglobin A1C (%)   Date Value   02/05/2019 6.0   01/15/2018 6.0   01/09/2017 5.8             ( goal A1Cis < 7)   Microalb/Crt.  Ratio (mcg/mg creat)   Date Value   08/15/2013 6     LDL Cholesterol (mg/dL)   Date Value   09/17/2018 98   09/11/2017 126   09/06/2016 125       (goal LDL is <100)   AST (U/L)   Date Value   09/17/2018 24     ALT (U/L)   Date Value   09/17/2018 23     BUN (mg/dL)   Date Value   09/17/2018 20     BP Readings from Last 3 Encounters:   04/29/19 136/82   02/05/19 120/70   09/17/18 126/80          (goal 120/80)    Past Medical History:   Diagnosis Date    A-[facility]     Anticoagulated on Coumadin     for A-Fib, monitoring by PCP    Anxiety     Back pain     COPD (chronic obstructive pulmonary disease) ([facility]     HTN (hypertension)     Hyperglycemia     Hyperlipidemia     can't take statins, due to muscle aches    Insomnia     Lung nodule     Pacemaker     cardiologist, dr. Rehana Stanley      Past Surgical History:   Procedure Laterality Date    PACEMAKER PLACEMENT         Family History   Problem Relation Age of Onset    Heart Disease Mother     Heart Disease Father           Social History     Tobacco Use    Smoking status: Former Smoker     Packs/day: 0.50     Years: 25.00     Pack years: 12.50 Types: Cigarettes     Last attempt to quit: 2000     Years since quittin.5    Smokeless tobacco: Never Used   Substance Use Topics    Alcohol use: No     Alcohol/week: 0.0 oz         Current Outpatient Medications   Medication Sig Dispense Refill    diltiazem (CARTIA XT) 240 MG extended release capsule take 1 capsule by mouth once daily 90 capsule 1    docusate sodium (COLACE) 100 MG capsule Take 1 capsule by mouth 2 times daily 60 capsule 11    Lactobacillus (PROBIOTIC ACIDOPHILUS) CAPS Take 1 capsule by mouth 2 times daily 180 capsule 1    LORazepam (ATIVAN) 1 MG tablet TID as needed. 90 tablet 2    valsartan (DIOVAN) 320 MG tablet take 1 tablet by mouth once daily 90 tablet 1    warfarin (COUMADIN) 5 MG tablet Take 1 tablet by mouth daily 30 tablet 3    vitamin D (ERGOCALCIFEROL) 21018 units CAPS capsule Take 1 capsule by mouth once a week 12 capsule 1    linaclotide (LINZESS) 145 MCG capsule Take 1 capsule by mouth every morning (before breakfast) 90 capsule 1    albuterol sulfate HFA (VENTOLIN HFA) 108 (90 Base) MCG/ACT inhaler Inhale 2 puffs into the lungs every 6 hours as needed for Wheezing 1 Inhaler 5    nitroGLYCERIN (NITROSTAT) 0.4 MG SL tablet Place 1 tablet under the tongue every 5 minutes as needed for Chest pain 3 doses CALL PRESCRIBER  25 tablet 11     No current facility-administered medications for this visit.       Allergies   Allergen Reactions    Lisinopril      cough    Neurontin [Gabapentin] Other (See Comments)     Took 3 nights and felt awful    Pcn [Penicillins] Swelling    Statins Support Therapy Other (See Comments)     Leg pains, tried pravachol, crestor, lipitor, simvastatin    Hydrochlorothiazide Rash     Breaks out       Health Maintenance   Topic Date Due    Pneumococcal 65+ years Vaccine (1 of 2 - PCV13) 10/25/2011    Shingles Vaccine (1 of 2) 2019 (Originally 10/25/1996)    Colon cancer screen colonoscopy  2019 (Originally 10/25/1996)  Flu vaccine (Season Ended) 02/05/2020 (Originally 9/1/2019)    Potassium monitoring  09/17/2019    Creatinine monitoring  09/17/2019    A1C test (Diabetic or Prediabetic)  02/05/2020    DTaP/Tdap/Td vaccine (2 - Td) 07/10/2023    Lipid screen  09/17/2023    Hepatitis C screen  Completed    AAA screen  Discontinued          Review of Systems   Constitutional: Negative. Negative for appetite change and fatigue. HENT: Negative for tinnitus. Respiratory: Positive for cough. Negative for chest tightness and shortness of breath. Cardiovascular: Negative for chest pain and leg swelling. Gastrointestinal: Positive for constipation (improving minimally probiotic ). Genitourinary: Negative. Negative for difficulty urinating and urgency. Musculoskeletal: Negative. Negative for arthralgias. Skin: Negative. Negative for pallor and rash. Neurological: Negative. Negative for headaches. Psychiatric/Behavioral: Positive for dysphoric mood. Negative for sleep disturbance. The patient is nervous/anxious. Objective:     /82 (Site: Left Upper Arm, Position: Sitting, Cuff Size: Medium Adult)   Pulse 69   Wt 234 lb 9.6 oz (106.4 kg)   SpO2 98%   BMI 32.26 kg/m²   Physical Exam   Constitutional: He is oriented to person, place, and time. Vital signs are normal. He appears well-developed and well-nourished. obese   HENT:   Head: Normocephalic and atraumatic. Eyes: Conjunctivae are normal. Right eye exhibits no discharge. Left eye exhibits no discharge. Neck: Normal range of motion. Cardiovascular: Normal rate and normal heart sounds. An irregularly irregular rhythm present. Exam reveals no gallop and no friction rub. No murmur heard. Pulmonary/Chest: Effort normal. No respiratory distress. He has no rales. Abdominal: Soft. Bowel sounds are normal. He exhibits no distension. Musculoskeletal: Normal range of motion. He exhibits no edema.    Neurological: He is alert and oriented to person, place, and time. Skin: Skin is warm and dry. No rash noted. No erythema. Psychiatric: He has a normal mood and affect. His behavior is normal. Judgment and thought content normal.   Vitals reviewed. Assessment:      1. Essential hypertension    2. Anxiety    3. Chronic atrial fibrillation ([address]    4. Vitamin D deficiency    5. Medication management    6. Constipation, unspecified constipation type          1. Essential hypertension    - diltiazem (CARTIA XT) 240 MG extended release capsule; take 1 capsule by mouth once daily  Dispense: 90 capsule; Refill: 1  - valsartan (DIOVAN) 320 MG tablet; take 1 tablet by mouth once daily  Dispense: 90 tablet; Refill: 1    2. Anxiety    - LORazepam (ATIVAN) 1 MG tablet; TID as needed. Dispense: 90 tablet; Refill: 2  Controlled Substances Monitoring:     RX Monitoring 4/29/2019   Attestation The Prescription Monitoring Report for this patient was reviewed today. Chronic Pain Routine Monitoring No signs of potential drug abuse or diversion identified: otherwise, see note documentation   Chronic Pain > 80 MEDD Obtained or confirmed a written medication contract was on file. 3. Chronic atrial fibrillation (HCC)    - diltiazem (CARTIA XT) 240 MG extended release capsule; take 1 capsule by mouth once daily  Dispense: 90 capsule; Refill: 1  - warfarin (COUMADIN) 5 MG tablet; Take 1 tablet by mouth daily  Dispense: 30 tablet; Refill: 3    INR-1.9, will take 10 mg once weekly and 6 mg on all other days. 4. Vitamin D deficiency    - vitamin D (ERGOCALCIFEROL) 30747 units CAPS capsule; Take 1 capsule by mouth once a week  Dispense: 12 capsule; Refill: 1    6. Constipation, unspecified constipation type  Continue with probiotic and add:  - linaclotide (LINZESS) 145 MCG capsule; Take 1 capsule by mouth every morning (before breakfast)  Dispense: 90 capsule;  Refill: 1          Quality & Risk Score Accuracy    Last edited 04/29/19 10:54 EDT by [facility] CARMITA COCHRAN CNP           Plan:      No orders of the defined types were placed in this encounter. Return in about 3 months (around 7/29/2019). Patient given educational materials - see patientinstructions. Discussed use, benefit, and side effects of prescribed medications. All patient questions answered. Pt voiced understanding. Reviewed health maintenance. Instructed to continue current medications, diet and exercise. Patient agreedwith treatment plan. Follow up as directed.      Electronically signed by CARMITA Diaz CNP on 4/29/2019

## 2019-07-31 DIAGNOSIS — F41.9 ANXIETY: ICD-10-CM

## 2019-07-31 DIAGNOSIS — Z79.899 MEDICATION MANAGEMENT: ICD-10-CM

## 2019-07-31 RX ORDER — LORAZEPAM 1 MG/1
TABLET ORAL
Qty: 90 TABLET | Refills: 2 | OUTPATIENT
Start: 2019-07-31 | End: 2020-07-31

## 2019-08-01 DIAGNOSIS — Z79.899 MEDICATION MANAGEMENT: ICD-10-CM

## 2019-08-01 DIAGNOSIS — F41.9 ANXIETY: ICD-10-CM

## 2019-08-01 RX ORDER — LORAZEPAM 1 MG/1
TABLET ORAL
Qty: 90 TABLET | Refills: 2 | OUTPATIENT
Start: 2019-08-01

## 2019-08-02 ENCOUNTER — OFFICE VISIT (OUTPATIENT)
Dept: FAMILY MEDICINE CLINIC | Age: 74
End: 2019-08-02
Payer: COMMERCIAL

## 2019-08-02 ENCOUNTER — TELEPHONE (OUTPATIENT)
Dept: FAMILY MEDICINE CLINIC | Age: 74
End: 2019-08-02

## 2019-08-02 VITALS
OXYGEN SATURATION: 98 % | SYSTOLIC BLOOD PRESSURE: 138 MMHG | HEART RATE: 78 BPM | WEIGHT: 234 LBS | DIASTOLIC BLOOD PRESSURE: 84 MMHG | BODY MASS INDEX: 32.76 KG/M2 | HEIGHT: 71 IN

## 2019-08-02 DIAGNOSIS — Z79.899 MEDICATION MANAGEMENT: ICD-10-CM

## 2019-08-02 DIAGNOSIS — E78.2 MIXED HYPERLIPIDEMIA: ICD-10-CM

## 2019-08-02 DIAGNOSIS — Z79.01 LONG TERM CURRENT USE OF ANTICOAGULANTS WITH INR GOAL OF 2.0-3.0: ICD-10-CM

## 2019-08-02 DIAGNOSIS — E11.9 DIET-CONTROLLED DIABETES MELLITUS (HCC): ICD-10-CM

## 2019-08-02 DIAGNOSIS — H43.392 VITREOUS FLOATERS OF LEFT EYE: Primary | ICD-10-CM

## 2019-08-02 DIAGNOSIS — Z12.5 SCREENING FOR MALIGNANT NEOPLASM OF PROSTATE: ICD-10-CM

## 2019-08-02 DIAGNOSIS — K59.00 CONSTIPATION, UNSPECIFIED CONSTIPATION TYPE: ICD-10-CM

## 2019-08-02 DIAGNOSIS — F41.9 ANXIETY: ICD-10-CM

## 2019-08-02 DIAGNOSIS — I48.20 CHRONIC ATRIAL FIBRILLATION (HCC): ICD-10-CM

## 2019-08-02 DIAGNOSIS — J44.9 CHRONIC OBSTRUCTIVE PULMONARY DISEASE, UNSPECIFIED COPD TYPE (HCC): Primary | ICD-10-CM

## 2019-08-02 LAB
HBA1C MFR BLD: 6 %
INTERNATIONAL NORMALIZATION RATIO, POC: 2.6
PROTHROMBIN TIME, POC: 31.6

## 2019-08-02 PROCEDURE — 2022F DILAT RTA XM EVC RTNOPTHY: CPT | Performed by: INTERNAL MEDICINE

## 2019-08-02 PROCEDURE — G8417 CALC BMI ABV UP PARAM F/U: HCPCS | Performed by: INTERNAL MEDICINE

## 2019-08-02 PROCEDURE — G8926 SPIRO NO PERF OR DOC: HCPCS | Performed by: INTERNAL MEDICINE

## 2019-08-02 PROCEDURE — 1036F TOBACCO NON-USER: CPT | Performed by: INTERNAL MEDICINE

## 2019-08-02 PROCEDURE — 85610 PROTHROMBIN TIME: CPT | Performed by: INTERNAL MEDICINE

## 2019-08-02 PROCEDURE — 1123F ACP DISCUSS/DSCN MKR DOCD: CPT | Performed by: INTERNAL MEDICINE

## 2019-08-02 PROCEDURE — G8427 DOCREV CUR MEDS BY ELIG CLIN: HCPCS | Performed by: INTERNAL MEDICINE

## 2019-08-02 PROCEDURE — 99214 OFFICE O/P EST MOD 30 MIN: CPT | Performed by: INTERNAL MEDICINE

## 2019-08-02 PROCEDURE — 3023F SPIROM DOC REV: CPT | Performed by: INTERNAL MEDICINE

## 2019-08-02 PROCEDURE — 3044F HG A1C LEVEL LT 7.0%: CPT | Performed by: INTERNAL MEDICINE

## 2019-08-02 PROCEDURE — 4040F PNEUMOC VAC/ADMIN/RCVD: CPT | Performed by: INTERNAL MEDICINE

## 2019-08-02 PROCEDURE — 3017F COLORECTAL CA SCREEN DOC REV: CPT | Performed by: INTERNAL MEDICINE

## 2019-08-02 PROCEDURE — 83036 HEMOGLOBIN GLYCOSYLATED A1C: CPT | Performed by: INTERNAL MEDICINE

## 2019-08-02 RX ORDER — LORAZEPAM 1 MG/1
1 TABLET ORAL EVERY 8 HOURS PRN
Qty: 90 TABLET | Refills: 1 | Status: SHIPPED | OUTPATIENT
Start: 2019-08-02 | End: 2019-09-24 | Stop reason: SDUPTHER

## 2019-08-02 NOTE — PROGRESS NOTES
HYPERTENSION visit     BP Readings from Last 3 Encounters:   04/29/19 136/82   02/05/19 120/70   09/17/18 126/80       LDL Cholesterol (mg/dL)   Date Value   09/17/2018 98     HDL (mg/dL)   Date Value   09/17/2018 48     BUN (mg/dL)   Date Value   09/17/2018 20     CREATININE (mg/dL)   Date Value   09/17/2018 1.04     Glucose (mg/dL)   Date Value   09/17/2018 79   02/13/2012 98              Have you changed or started any medications since your last visit including any over-the-counter medicines, vitamins, or herbal medicines? no   Have you stopped taking any of your medications? Is so, why? -  no  Are you having any side effects from any of your medications? - no  How often do you miss doses of your medication? rare      Have you seen any other physician or provider since your last visit?  no   Have you had any other diagnostic tests since your last visit?  no   Have you been seen in the emergency room and/or had an admission in a hospital since we last saw you?  no   Have you had your routine dental cleaning in the past 6 months?  no     Do you have an active [employer] account? If no, what is the barrier?   Yes    Patient Care Team:  CARMITA Franco CNP as PCP - General (Nurse Practitioner)  CARMITA Franco CNP as PCP - [facility]  Jarrod Martinez MD as Consulting Physician (Cardiology)    Medical History Review  Past Medical, Family, and Social History reviewed and does contribute to the patient presenting condition    Health Maintenance   Topic Date Due    Diabetic foot exam  10/25/1955    Diabetic retinal exam  10/25/1956    Shingles Vaccine (1 of 2) 10/25/1996    Colon cancer screen colonoscopy  10/25/1996    Pneumococcal 65+ years Vaccine (1 of 2 - PCV13) 10/25/2011    Diabetic microalbuminuria test  08/15/2014    Flu vaccine (1) 02/05/2020 (Originally 9/1/2019)    Lipid screen  09/17/2019    Potassium monitoring  09/17/2019    Creatinine monitoring  09/17/2019    A1C
been normal without constipation or diarrhea         Objective:        Physical Exam:  /84 (Site: Right Upper Arm, Position: Sitting, Cuff Size: Large Adult)   Pulse 78   Ht 5' 11.5\" (1.816 m)   Wt 234 lb (106.1 kg)   SpO2 98%   BMI 32.19 kg/m²     General: Alert and oriented, in no distress. Patient ambulating with normal gait. Normal body habitus. Chest: clear with no wheezes or rales. No retractions, or use of accessory muscles noted. Cardiovascular: PMI is not displaced, and no thrill noted. Regular rate and rhythm with no rub, murmur or gallop. There is no peripheral edema. Pedal pulses are normal.   Abdomen: Abdomen is soft and nontender. The bowel sounds are normal.   Musculoskeletal: There are no deformities of the the extremities. Patient has all ten fingers intact. The patient has full range of motion on all 4 extremities without pain. Skin: The skin is warm and dry. There are no rashes noted. Prior to Visit Medications    Medication Sig Taking? Authorizing Provider   diltiazem (CARTIA XT) 240 MG extended release capsule take 1 capsule by mouth once daily Yes CARMITA Blank CNP   docusate sodium (COLACE) 100 MG capsule Take 1 capsule by mouth 2 times daily Yes CARMITA Blank - CNP   Lactobacillus (PROBIOTIC ACIDOPHILUS) CAPS Take 1 capsule by mouth 2 times daily Yes CARMITA Blank CNP   LORazepam (ATIVAN) 1 MG tablet TID as needed.  Yes CARMITA Blank CNP   valsartan (DIOVAN) 320 MG tablet take 1 tablet by mouth once daily Yes CARMITA Blank CNP   warfarin (COUMADIN) 5 MG tablet Take 1 tablet by mouth daily Yes CARMITA Blank CNP   vitamin D (ERGOCALCIFEROL) 73158 units CAPS capsule Take 1 capsule by mouth once a week Yes CARMITA Blank - CNP   linaclotide (LINZESS) 145 MCG capsule Take 1 capsule by mouth every morning (before breakfast) Yes CARMITA Blank CNP   albuterol sulfate HFA (VENTOLIN HFA) 108 (90 Base)

## 2019-09-13 DIAGNOSIS — R07.9 CHEST PAIN, UNSPECIFIED TYPE: ICD-10-CM

## 2019-09-13 NOTE — TELEPHONE ENCOUNTER
Hyperlipidemia     Colonoscopy refused     Atrial fibrillation (HCC)     Anxiety     Insomnia     Pacemaker battery depletion     Pacemaker     Bradycardia     Constipation     Diet-controlled diabetes mellitus ([facility] [address]

## 2019-09-16 RX ORDER — NITROGLYCERIN 0.4 MG/1
TABLET SUBLINGUAL
Qty: 25 TABLET | Refills: 11 | Status: ON HOLD | OUTPATIENT
Start: 2019-09-16 | End: 2020-05-20 | Stop reason: SDUPTHER

## 2019-09-24 DIAGNOSIS — I48.20 CHRONIC ATRIAL FIBRILLATION (HCC): ICD-10-CM

## 2019-09-24 RX ORDER — WARFARIN SODIUM 5 MG/1
TABLET ORAL
Qty: 30 TABLET | Refills: 3 | Status: SHIPPED | OUTPATIENT
Start: 2019-09-24 | End: 2020-01-14

## 2019-10-20 DIAGNOSIS — E55.9 VITAMIN D DEFICIENCY: ICD-10-CM

## 2019-10-21 RX ORDER — ERGOCALCIFEROL 1.25 MG/1
CAPSULE ORAL
Qty: 12 CAPSULE | Refills: 1 | Status: SHIPPED | OUTPATIENT
Start: 2019-10-21 | End: 2020-03-18

## 2019-10-22 DIAGNOSIS — K59.00 CONSTIPATION, UNSPECIFIED CONSTIPATION TYPE: ICD-10-CM

## 2019-10-22 RX ORDER — LINACLOTIDE 145 UG/1
CAPSULE, GELATIN COATED ORAL
Qty: 90 CAPSULE | Refills: 1 | Status: SHIPPED | OUTPATIENT
Start: 2019-10-22 | End: 2019-11-15

## 2019-11-15 ENCOUNTER — OFFICE VISIT (OUTPATIENT)
Dept: FAMILY MEDICINE CLINIC | Age: 74
End: 2019-11-15
Payer: COMMERCIAL

## 2019-11-15 VITALS
HEART RATE: 58 BPM | WEIGHT: 235.8 LBS | OXYGEN SATURATION: 99 % | BODY MASS INDEX: 33.01 KG/M2 | TEMPERATURE: 96.7 F | DIASTOLIC BLOOD PRESSURE: 82 MMHG | HEIGHT: 71 IN | SYSTOLIC BLOOD PRESSURE: 124 MMHG

## 2019-11-15 DIAGNOSIS — J44.9 CHRONIC OBSTRUCTIVE PULMONARY DISEASE, UNSPECIFIED COPD TYPE (HCC): ICD-10-CM

## 2019-11-15 DIAGNOSIS — Z12.11 SCREENING FOR COLON CANCER: ICD-10-CM

## 2019-11-15 DIAGNOSIS — F41.9 ANXIETY: ICD-10-CM

## 2019-11-15 DIAGNOSIS — I48.20 CHRONIC ATRIAL FIBRILLATION (HCC): ICD-10-CM

## 2019-11-15 DIAGNOSIS — Z79.01 CHRONIC ANTICOAGULATION: Primary | ICD-10-CM

## 2019-11-15 DIAGNOSIS — I10 ESSENTIAL HYPERTENSION: ICD-10-CM

## 2019-11-15 DIAGNOSIS — Z23 FLU VACCINE NEED: ICD-10-CM

## 2019-11-15 DIAGNOSIS — K59.00 CONSTIPATION, UNSPECIFIED CONSTIPATION TYPE: ICD-10-CM

## 2019-11-15 LAB
INTERNATIONAL NORMALIZATION RATIO, POC: 2.2
PROTHROMBIN TIME, POC: 26.9

## 2019-11-15 PROCEDURE — 1123F ACP DISCUSS/DSCN MKR DOCD: CPT | Performed by: INTERNAL MEDICINE

## 2019-11-15 PROCEDURE — G8417 CALC BMI ABV UP PARAM F/U: HCPCS | Performed by: INTERNAL MEDICINE

## 2019-11-15 PROCEDURE — G8427 DOCREV CUR MEDS BY ELIG CLIN: HCPCS | Performed by: INTERNAL MEDICINE

## 2019-11-15 PROCEDURE — 3017F COLORECTAL CA SCREEN DOC REV: CPT | Performed by: INTERNAL MEDICINE

## 2019-11-15 PROCEDURE — 3023F SPIROM DOC REV: CPT | Performed by: INTERNAL MEDICINE

## 2019-11-15 PROCEDURE — 90471 IMMUNIZATION ADMIN: CPT | Performed by: INTERNAL MEDICINE

## 2019-11-15 PROCEDURE — G8926 SPIRO NO PERF OR DOC: HCPCS | Performed by: INTERNAL MEDICINE

## 2019-11-15 PROCEDURE — 4040F PNEUMOC VAC/ADMIN/RCVD: CPT | Performed by: INTERNAL MEDICINE

## 2019-11-15 PROCEDURE — 1036F TOBACCO NON-USER: CPT | Performed by: INTERNAL MEDICINE

## 2019-11-15 PROCEDURE — 85610 PROTHROMBIN TIME: CPT | Performed by: INTERNAL MEDICINE

## 2019-11-15 PROCEDURE — 99214 OFFICE O/P EST MOD 30 MIN: CPT | Performed by: INTERNAL MEDICINE

## 2019-11-15 PROCEDURE — 90653 IIV ADJUVANT VACCINE IM: CPT | Performed by: INTERNAL MEDICINE

## 2019-11-15 PROCEDURE — G8482 FLU IMMUNIZE ORDER/ADMIN: HCPCS | Performed by: INTERNAL MEDICINE

## 2019-11-15 RX ORDER — DILTIAZEM HYDROCHLORIDE 240 MG/1
CAPSULE, COATED, EXTENDED RELEASE ORAL
Qty: 90 CAPSULE | Refills: 1 | Status: SHIPPED | OUTPATIENT
Start: 2019-11-15 | End: 2020-04-15

## 2019-11-15 RX ORDER — VALSARTAN 320 MG/1
TABLET ORAL
Qty: 90 TABLET | Refills: 1 | Status: SHIPPED | OUTPATIENT
Start: 2019-11-15 | End: 2020-03-17

## 2019-11-15 RX ORDER — LORAZEPAM 1 MG/1
1 TABLET ORAL EVERY 8 HOURS PRN
Qty: 90 TABLET | Refills: 1 | Status: SHIPPED | OUTPATIENT
Start: 2019-11-15 | End: 2020-01-14

## 2020-01-14 RX ORDER — WARFARIN SODIUM 5 MG/1
TABLET ORAL
Qty: 30 TABLET | Refills: 5 | Status: ON HOLD | OUTPATIENT
Start: 2020-01-14 | End: 2020-05-20 | Stop reason: HOSPADM

## 2020-02-17 RX ORDER — LORAZEPAM 1 MG/1
TABLET ORAL
Qty: 90 TABLET | OUTPATIENT
Start: 2020-02-17 | End: 2020-03-18

## 2020-02-18 ENCOUNTER — OFFICE VISIT (OUTPATIENT)
Dept: FAMILY MEDICINE CLINIC | Age: 75
End: 2020-02-18
Payer: COMMERCIAL

## 2020-02-18 ENCOUNTER — HOSPITAL ENCOUNTER (OUTPATIENT)
Age: 75
Setting detail: SPECIMEN
Discharge: HOME | End: 2020-02-18
Payer: COMMERCIAL

## 2020-02-18 VITALS
HEART RATE: 76 BPM | BODY MASS INDEX: 33.01 KG/M2 | SYSTOLIC BLOOD PRESSURE: 150 MMHG | OXYGEN SATURATION: 98 % | DIASTOLIC BLOOD PRESSURE: 86 MMHG | WEIGHT: 240 LBS

## 2020-02-18 LAB
ANION GAP SERPL CALCULATED.3IONS-SCNC: 15 MMOL/L (ref 9–17)
BUN BLDV-MCNC: 17 MG/DL (ref 8–23)
BUN/CREAT BLD: ABNORMAL (ref 9–20)
CALCIUM SERPL-MCNC: 9.8 MG/DL (ref 8.6–10.4)
CHLORIDE BLD-SCNC: 104 MMOL/L (ref 98–107)
CHOLESTEROL, FASTING: 184 MG/DL
CHOLESTEROL/HDL RATIO: 3.8
CO2: 25 MMOL/L (ref 20–31)
CREAT SERPL-MCNC: 1.09 MG/DL (ref 0.7–1.2)
ESTIMATED AVERAGE GLUCOSE: 126 MG/DL
GFR AFRICAN AMERICAN: >60 ML/MIN
GFR NON-AFRICAN AMERICAN: >60 ML/MIN
GFR SERPL CREATININE-BSD FRML MDRD: ABNORMAL ML/MIN/{1.73_M2}
GFR SERPL CREATININE-BSD FRML MDRD: ABNORMAL ML/MIN/{1.73_M2}
GLUCOSE BLD-MCNC: 105 MG/DL (ref 70–99)
HBA1C MFR BLD: 6 % (ref 4–6)
HDLC SERPL-MCNC: 48 MG/DL
INR BLD: 1.6
LDL CHOLESTEROL: 121 MG/DL (ref 0–130)
POTASSIUM SERPL-SCNC: 4.2 MMOL/L (ref 3.7–5.3)
PROTHROMBIN TIME: 15.9 SEC (ref 9–12)
SODIUM BLD-SCNC: 144 MMOL/L (ref 135–144)
TRIGLYCERIDE, FASTING: 73 MG/DL
VLDLC SERPL CALC-MCNC: NORMAL MG/DL (ref 1–30)

## 2020-02-18 PROCEDURE — 2022F DILAT RTA XM EVC RTNOPTHY: CPT | Performed by: NURSE PRACTITIONER

## 2020-02-18 PROCEDURE — 3046F HEMOGLOBIN A1C LEVEL >9.0%: CPT | Performed by: NURSE PRACTITIONER

## 2020-02-18 PROCEDURE — 1036F TOBACCO NON-USER: CPT | Performed by: NURSE PRACTITIONER

## 2020-02-18 PROCEDURE — G8427 DOCREV CUR MEDS BY ELIG CLIN: HCPCS | Performed by: NURSE PRACTITIONER

## 2020-02-18 PROCEDURE — G8482 FLU IMMUNIZE ORDER/ADMIN: HCPCS | Performed by: NURSE PRACTITIONER

## 2020-02-18 PROCEDURE — 4040F PNEUMOC VAC/ADMIN/RCVD: CPT | Performed by: NURSE PRACTITIONER

## 2020-02-18 PROCEDURE — 3017F COLORECTAL CA SCREEN DOC REV: CPT | Performed by: NURSE PRACTITIONER

## 2020-02-18 PROCEDURE — G8417 CALC BMI ABV UP PARAM F/U: HCPCS | Performed by: NURSE PRACTITIONER

## 2020-02-18 PROCEDURE — 99214 OFFICE O/P EST MOD 30 MIN: CPT | Performed by: NURSE PRACTITIONER

## 2020-02-18 PROCEDURE — 1123F ACP DISCUSS/DSCN MKR DOCD: CPT | Performed by: NURSE PRACTITIONER

## 2020-02-18 RX ORDER — LORAZEPAM 1 MG/1
TABLET ORAL
Qty: 90 TABLET | Refills: 2 | Status: ON HOLD | OUTPATIENT
Start: 2020-02-18 | End: 2020-05-20

## 2020-02-18 ASSESSMENT — PATIENT HEALTH QUESTIONNAIRE - PHQ9
SUM OF ALL RESPONSES TO PHQ QUESTIONS 1-9: 0
1. LITTLE INTEREST OR PLEASURE IN DOING THINGS: 0
SUM OF ALL RESPONSES TO PHQ QUESTIONS 1-9: 0
SUM OF ALL RESPONSES TO PHQ9 QUESTIONS 1 & 2: 0
2. FEELING DOWN, DEPRESSED OR HOPELESS: 0

## 2020-02-18 ASSESSMENT — ENCOUNTER SYMPTOMS
RESPIRATORY NEGATIVE: 1
COUGH: 0
SHORTNESS OF BREATH: 0
WHEEZING: 0
CONSTIPATION: 1

## 2020-02-18 NOTE — PROGRESS NOTES
[facility]  [facility] PHYS POINT [address] [facility] [address]  Dept: [phone]  Dept Fax: [phone]      Javy Collier is a 69 y.o. male who presents today for hismedical conditions/complaints as noted below. Javy Collier is c/o of Hypertension and COPD            HPI:      HPI  Chino Mckay is here today for medication refills. HTN- BP is slightly elevated today. Compliant with taking medications. Denies chest pain, dyspnea, edema, or HA. Shoulder strain-Moved his mattress a month ago and has had pain in his shoulder and arm since. Aching pain in the scapular area. Upper arm is sore. He has tried some OTC therapies. The pain is resolving slowly. He has a bruise on his right forearm and is worried that his arm is broken in this area. Feels there is a \"deformity\". Anxiety-using lorazepam for many years. This is working well for his anxiety. He is taking it as prescribed. Will sign new med agreement today. Maribeth Alvarenga in his living situation. Lives with son. Hemoglobin A1C (%)   Date Value   08/02/2019 6.0   02/05/2019 6.0   01/15/2018 6.0             ( goal A1Cis < 7)   Microalb/Crt.  Ratio (mcg/mg creat)   Date Value   08/15/2013 6     LDL Cholesterol (mg/dL)   Date Value   09/17/2018 98   09/11/2017 126   09/06/2016 125       (goal LDL is <100)   AST (U/L)   Date Value   09/17/2018 24     ALT (U/L)   Date Value   09/17/2018 23     BUN (mg/dL)   Date Value   09/17/2018 20     BP Readings from Last 3 Encounters:   02/18/20 (!) 150/86   11/15/19 124/82   08/02/19 138/84          (goal 120/80)    Past Medical History:   Diagnosis Date    A-fib ([address]     Anticoagulated on Coumadin     for A-Fib, monitoring by PCP    Anxiety     Back pain     COPD (chronic obstructive pulmonary disease) (HCC)     HTN (hypertension)     Hyperglycemia     Hyperlipidemia     can't take statins, due to muscle aches    Insomnia     Lung nodule     Pacemaker     cardiologist,  thai an      Past Surgical History:   Procedure Laterality Date    PACEMAKER PLACEMENT         Family History   Problem Relation Age of Onset    Heart Disease Mother     Heart Disease Father           Social History     Tobacco Use    Smoking status: Former Smoker     Packs/day: 0.50     Years: 25.00     Pack years: 12.50     Types: Cigarettes     Last attempt to quit: 2000     Years since quittin.4    Smokeless tobacco: Never Used   Substance Use Topics    Alcohol use: No     Alcohol/week: 0.0 standard drinks         Current Outpatient Medications   Medication Sig Dispense Refill    LORazepam (ATIVAN) 1 MG tablet take 1 tablet by mouth every 8 hours if needed for anxiety 90 tablet 2    warfarin (COUMADIN) 5 MG tablet take 1 tablet by mouth once daily 30 tablet 5    linaclotide (LINZESS) 290 MCG CAPS capsule Take 1 capsule by mouth every morning (before breakfast) 90 capsule 1    Psyllium (METAMUCIL FREE & NATURAL) 43 % POWD Take 1 Dose by mouth 3 times daily 1 Bottle 5    valsartan (DIOVAN) 320 MG tablet take 1 tablet by mouth once daily 90 tablet 1    diltiazem (CARTIA XT) 240 MG extended release capsule take 1 capsule by mouth once daily 90 capsule 1    vitamin D (ERGOCALCIFEROL) 73656 units CAPS capsule take 1 capsule by mouth every week 12 capsule 1    nitroGLYCERIN (NITROSTAT) 0.4 MG SL tablet place 1 tablet under the tongue if needed every 5 minutes for chest pain for 3 doses IF NO RELIEF AFTER FIRST DOSE CALL PRESCRIBER . 25 tablet 11    docusate sodium (COLACE) 100 MG capsule Take 1 capsule by mouth 2 times daily 60 capsule 11    Lactobacillus (PROBIOTIC ACIDOPHILUS) CAPS Take 1 capsule by mouth 2 times daily 180 capsule 1    albuterol sulfate HFA (VENTOLIN HFA) 108 (90 Base) MCG/ACT inhaler Inhale 2 puffs into the lungs every 6 hours as needed for Wheezing 1 Inhaler 5     No current facility-administered medications for this visit.       Allergies   Allergen Reactions 2/17/2021     DIABETES FOOT EXAM     1. Diet-controlled diabetes mellitus ([address]  Wt stable. Not always making good food choices. -  DIABETES FOOT EXAM  - Basic Metabolic Panel; Future  - Hemoglobin A1C; Future    2. Screening for hyperlipidemia    - Lipid, Fasting; Future    3. Chronic anticoagulation    - Protime-INR; Future    4. Anxiety    - LORazepam (ATIVAN) 1 MG tablet; take 1 tablet by mouth every 8 hours if needed for anxiety  Dispense: 90 tablet; Refill: 2    5. Strain of right shoulder, initial encounter  Discussion of PT, pt declines. Return in about 3 months (around 5/18/2020) for MED REFILL. Patient given educational materials - see patientinstructions. Discussed use, benefit, and side effects of prescribed medications. All patient questions answered. Pt voiced understanding. Reviewed health maintenance. Instructed to continue current medications, diet and exercise. Patient agreedwith treatment plan. Follow up as directed.      Electronically signed by CARMITA Gupta CNP on 2/18/2020

## 2020-02-18 NOTE — LETTER
include depressed mood, loss of interest, suicidal thoughts, anxiety, fatigue, appetite changes and agitation. Testosterone replacement therapy:  Potential side effects include increased risk of stroke and heart attack, blood clots, increased blood pressure, increased cholesterol, enlarged prostate, sleep apnea, irritability/aggression and other mood disorders, and decreased fertility. Other:     1. I understand that I have the following responsibilities:  · I will take medications at the dose and frequency prescribed. · I will not increase or change how I take my medications without the approval of the health care provider who signs this Medication Agreement. · I will arrange for refills at the prescribed interval ONLY during regular office hours. I will not ask for refills earlier than agreed, after-hours, on holidays or on weekends. · I will obtain all refills for these medications at  ·  ____________________________________  pharmacy (phone number  ·  ________________________), with full consent for my provider and pharmacist to exchange information in writing or verbally. · I will not request any pain medications or controlled substances from other providers and will inform this provider of all other medications I am taking. · I will inform my other health care providers that I am taking these medications and of the existence of this [id number]. In the event of an emergency, I will provide the same information to the emergency department providers. · I will protect my prescriptions and medications. I understand that lost or misplaced prescriptions will not be replaced. · I will keep medications only for my own use and will not share them with others. I will keep all medications away from children. · I agree to participate in any medical, psychological or psychiatric assessments recommended by my provider.   · I will actively participate in any program designed to improve function, including social, physical, psychological and daily or work activities. 2. I will not use illegal or street drugs or another person's prescription. If I have an addiction problem with drugs or alcohol and my provider asks me to enter a program to address this issue, I agree to follow through. Such programs may include:  · 12-Step program and securing a sponsor  · Individual counseling   · Inpatient or outpatient treatment  · Other:_____________________________________________________________________________________________________________________________________________    If in treatment, I will request that a copy of the programs initial evaluation and treatment recommendations be sent to this provider and will not expect refills until that is received. I will also request written monthly updates be sent to this provider to verify my continuing treatment. 3. I will consent to drug screening upon my providers request to assure I am only taking the prescribed drugs, described in this MEDICATION AGREEMENT. I understand that a drug screen is a laboratory test in which a sample of my urine, blood or saliva is checked to see what drugs I have been taking. 4. I agree that I will treat the providers and staff at this office with respect at all times. I will keep all of my scheduled appointments, but if I need to cancel my appointment, I will do so a minimum of 24 hours before it is scheduled. 5. I understand that this provider may stop prescribing the medications listed if:  · I do not show any improvement in pain, or my activity has not improved. · I develop rapid tolerance or loss of improvement, as described in my treatment plan. · I develop significant side effects from the medication. · My behavior is inconsistent with the responsibilities outlined above, which may also result in my being prevented from receiving further care from this office.   ·

## 2020-02-18 NOTE — TELEPHONE ENCOUNTER
Last visit: 2/18/20  Last Med refill: 4/29/19  Does patient have enough medication for 72 hours: No:     Next Visit Date:  Future Appointments   Date Time Provider Jose Cruz Scruggs   5/19/2020  7:50 AM Rendon Meline, APRN - CNP [facility] [address] Maintenance   Topic Date Due    Diabetic foot exam  10/25/1955    Colon cancer screen colonoscopy  10/25/1996    Lipid screen  09/17/2019    Potassium monitoring  09/17/2019    Creatinine monitoring  09/17/2019    Diabetic microalbuminuria test  05/18/2020 (Originally 8/15/2014)    Diabetic retinal exam  05/18/2020 (Originally 10/25/1955)    Shingles Vaccine (1 of 2) 11/15/2020 (Originally 10/25/1996)    Pneumococcal 65+ years Vaccine (1 of 1 - PPSV23) 11/15/2020 (Originally 10/25/2011)    Hepatitis B vaccine (1 of 3 - Risk 3-dose series) 02/18/2021 (Originally 10/25/1965)    A1C test (Diabetic or Prediabetic)  08/02/2020    DTaP/Tdap/Td vaccine (2 - Td) 07/10/2023    Flu vaccine  Completed    Hepatitis C screen  Completed    Hepatitis A vaccine  Aged Out    Hib vaccine  Aged Out    Meningococcal (ACWY) vaccine  Aged Out    AAA screen  Discontinued       Hemoglobin A1C (%)   Date Value   08/02/2019 6.0   02/05/2019 6.0   01/15/2018 6.0             ( goal A1C is < 7)   Microalb/Crt.  Ratio (mcg/mg creat)   Date Value   08/15/2013 6     LDL Cholesterol (mg/dL)   Date Value   02/18/2020 121   09/17/2018 98       (goal LDL is <100)   AST (U/L)   Date Value   09/17/2018 24     ALT (U/L)   Date Value   09/17/2018 23     BUN (mg/dL)   Date Value   02/18/2020 17     BP Readings from Last 3 Encounters:   02/18/20 (!) 150/86   11/15/19 124/82   08/02/19 138/84          (goal 120/80)    All Future Testing planned in [facility]  Lab Frequency Next Occurrence   POCT FECAL IMMUNOCHEMICAL TEST (FIT) Once 01/17/2020   ENROLLMENT FOR ANTICOAGULATION MONITORING Every 4 Weeks 3/6/2020, 4/3/2020, 5/1/2020, 5/29/2020, 6/26/2020, 7/24/2020, 8/21/2020, 9/18/2020, 10/16/2020               Patient Active Problem List:     Back pain     HTN (hypertension)     COPD (chronic obstructive pulmonary disease) (HCC)     Lung nodule     Procedure refused     Hyperlipidemia     Colonoscopy refused     Atrial fibrillation ([facility] [address]     Anxiety     Insomnia     Pacemaker battery depletion     Pacemaker     Bradycardia     Constipation     Diet-controlled diabetes mellitus ([facility] [address] [facility]

## 2020-02-19 RX ORDER — ACETAMINOPHEN, DEXTROMETHORPHAN HYDROBROMIDE, DOXYLAMINE SUCCINATE, PHENYLEPHRINE HYDROCHLORIDE 650; 20; 12.5; 1 MG/30ML; MG/30ML; MG/30ML; MG/30ML
SOLUTION ORAL
Qty: 180 CAPSULE | Refills: 1 | Status: SHIPPED | OUTPATIENT
Start: 2020-02-19 | End: 2020-09-03

## 2020-03-17 ENCOUNTER — TELEPHONE (OUTPATIENT)
Dept: FAMILY MEDICINE CLINIC | Age: 75
End: 2020-03-17

## 2020-03-17 RX ORDER — CANDESARTAN 32 MG/1
32 TABLET ORAL DAILY
Qty: 30 TABLET | Refills: 5 | Status: ON HOLD | OUTPATIENT
Start: 2020-03-17 | End: 2020-05-20 | Stop reason: HOSPADM

## 2020-03-17 NOTE — TELEPHONE ENCOUNTER
Per RA pharmacist none of the valsartan medications are available at this time.  Will need to send in something different at this time please

## 2020-03-18 RX ORDER — ERGOCALCIFEROL 1.25 MG/1
CAPSULE ORAL
Qty: 12 CAPSULE | Refills: 1 | Status: SHIPPED | OUTPATIENT
Start: 2020-03-18 | End: 2020-09-03 | Stop reason: ALTCHOICE

## 2020-04-09 RX ORDER — ERGOCALCIFEROL 1.25 MG/1
CAPSULE ORAL
Qty: 12 CAPSULE | Refills: 1 | OUTPATIENT
Start: 2020-04-09

## 2020-04-15 RX ORDER — DILTIAZEM HYDROCHLORIDE 240 MG/1
CAPSULE, COATED, EXTENDED RELEASE ORAL
Qty: 90 CAPSULE | Refills: 1 | Status: ON HOLD | OUTPATIENT
Start: 2020-04-15 | End: 2020-05-20 | Stop reason: HOSPADM

## 2020-04-15 RX ORDER — DOCUSATE SODIUM 100 MG/1
CAPSULE, LIQUID FILLED ORAL
Qty: 60 CAPSULE | Refills: 5 | Status: SHIPPED | OUTPATIENT
Start: 2020-04-15 | End: 2020-09-03

## 2020-05-11 ENCOUNTER — HOSPITAL ENCOUNTER (INPATIENT)
Age: 75
LOS: 9 days | Discharge: HOME | DRG: 182 | End: 2020-05-20
Attending: EMERGENCY MEDICINE | Admitting: HOSPITALIST
Payer: COMMERCIAL

## 2020-05-11 ENCOUNTER — APPOINTMENT (OUTPATIENT)
Dept: CT IMAGING | Age: 75
DRG: 182 | End: 2020-05-11
Payer: COMMERCIAL

## 2020-05-11 PROBLEM — I71.43 ANEURYSM OF INFRARENAL ABDOMINAL AORTA (HCC): Status: ACTIVE | Noted: 2020-05-11

## 2020-05-11 LAB
ABO/RH: NORMAL
ABSOLUTE EOS #: 0.18 K/UL (ref 0–0.44)
ABSOLUTE IMMATURE GRANULOCYTE: 0.05 K/UL (ref 0–0.3)
ABSOLUTE LYMPH #: 1.54 K/UL (ref 1.1–3.7)
ABSOLUTE MONO #: 1.04 K/UL (ref 0.1–1.2)
ALBUMIN SERPL-MCNC: 4.1 G/DL (ref 3.5–5.2)
ALBUMIN/GLOBULIN RATIO: 1.1 (ref 1–2.5)
ALP BLD-CCNC: 136 U/L (ref 40–129)
ALT SERPL-CCNC: 21 U/L (ref 5–41)
ANION GAP SERPL CALCULATED.3IONS-SCNC: 15 MMOL/L (ref 9–17)
ANTIBODY SCREEN: NEGATIVE
ARM BAND NUMBER: NORMAL
AST SERPL-CCNC: 23 U/L
BASOPHILS # BLD: 1 % (ref 0–2)
BASOPHILS ABSOLUTE: 0.1 K/UL (ref 0–0.2)
BILIRUB SERPL-MCNC: 0.4 MG/DL (ref 0.3–1.2)
BILIRUBIN URINE: NEGATIVE
BUN BLDV-MCNC: 22 MG/DL (ref 8–23)
BUN/CREAT BLD: ABNORMAL (ref 9–20)
CALCIUM SERPL-MCNC: 9.6 MG/DL (ref 8.6–10.4)
CHLORIDE BLD-SCNC: 104 MMOL/L (ref 98–107)
CO2: 18 MMOL/L (ref 20–31)
COLOR: YELLOW
COMMENT UA: ABNORMAL
CREAT SERPL-MCNC: 1.3 MG/DL (ref 0.7–1.2)
DIFFERENTIAL TYPE: ABNORMAL
EOSINOPHILS RELATIVE PERCENT: 2 % (ref 1–4)
EXPIRATION DATE: NORMAL
GFR AFRICAN AMERICAN: >60 ML/MIN
GFR NON-AFRICAN AMERICAN: 54 ML/MIN
GFR SERPL CREATININE-BSD FRML MDRD: ABNORMAL ML/MIN/{1.73_M2}
GFR SERPL CREATININE-BSD FRML MDRD: ABNORMAL ML/MIN/{1.73_M2}
GLUCOSE BLD-MCNC: 101 MG/DL (ref 70–99)
GLUCOSE URINE: NEGATIVE
HCT VFR BLD CALC: 53.9 % (ref 40.7–50.3)
HEMOGLOBIN: 17.4 G/DL (ref 13–17)
IMMATURE GRANULOCYTES: 1 %
INR BLD: 1.2
KETONES, URINE: NEGATIVE
LEUKOCYTE ESTERASE, URINE: NEGATIVE
LIPASE: 22 U/L (ref 13–60)
LYMPHOCYTES # BLD: 15 % (ref 24–43)
MCH RBC QN AUTO: 31.7 PG (ref 25.2–33.5)
MCHC RBC AUTO-ENTMCNC: 32.3 G/DL (ref 28.4–34.8)
MCV RBC AUTO: 98.2 FL (ref 82.6–102.9)
MONOCYTES # BLD: 10 % (ref 3–12)
NITRITE, URINE: NEGATIVE
NRBC AUTOMATED: 0 PER 100 WBC
PARTIAL THROMBOPLASTIN TIME: 32 SEC (ref 20.5–30.5)
PDW BLD-RTO: 13.1 % (ref 11.8–14.4)
PH UA: 5 (ref 5–8)
PLATELET # BLD: 195 K/UL (ref 138–453)
PLATELET ESTIMATE: ABNORMAL
PMV BLD AUTO: 11.7 FL (ref 8.1–13.5)
POTASSIUM SERPL-SCNC: 4.7 MMOL/L (ref 3.7–5.3)
PROTEIN UA: NEGATIVE
PROTHROMBIN TIME: 13 SEC (ref 9–12)
RBC # BLD: 5.49 M/UL (ref 4.21–5.77)
RBC # BLD: ABNORMAL 10*6/UL
SEG NEUTROPHILS: 71 % (ref 36–65)
SEGMENTED NEUTROPHILS ABSOLUTE COUNT: 7.1 K/UL (ref 1.5–8.1)
SODIUM BLD-SCNC: 137 MMOL/L (ref 135–144)
SPECIFIC GRAVITY UA: 1.06 (ref 1–1.03)
TOTAL PROTEIN: 7.8 G/DL (ref 6.4–8.3)
TROPONIN INTERP: NORMAL
TROPONIN T: NORMAL NG/ML
TROPONIN, HIGH SENSITIVITY: 12 NG/L (ref 0–22)
TURBIDITY: CLEAR
URINE HGB: NEGATIVE
UROBILINOGEN, URINE: NORMAL
WBC # BLD: 10 K/UL (ref 3.5–11.3)
WBC # BLD: ABNORMAL 10*3/UL

## 2020-05-11 PROCEDURE — 6360000004 HC RX CONTRAST MEDICATION: Performed by: EMERGENCY MEDICINE

## 2020-05-11 PROCEDURE — 85025 COMPLETE CBC W/AUTO DIFF WBC: CPT

## 2020-05-11 PROCEDURE — 85730 THROMBOPLASTIN TIME PARTIAL: CPT

## 2020-05-11 PROCEDURE — 81003 URINALYSIS AUTO W/O SCOPE: CPT

## 2020-05-11 PROCEDURE — 71275 CT ANGIOGRAPHY CHEST: CPT

## 2020-05-11 PROCEDURE — 86900 BLOOD TYPING SEROLOGIC ABO: CPT

## 2020-05-11 PROCEDURE — 2000000000 HC ICU R&B

## 2020-05-11 PROCEDURE — 86901 BLOOD TYPING SEROLOGIC RH(D): CPT

## 2020-05-11 PROCEDURE — 99285 EMERGENCY DEPT VISIT HI MDM: CPT

## 2020-05-11 PROCEDURE — 6360000002 HC RX W HCPCS: Performed by: STUDENT IN AN ORGANIZED HEALTH CARE EDUCATION/TRAINING PROGRAM

## 2020-05-11 PROCEDURE — 96374 THER/PROPH/DIAG INJ IV PUSH: CPT

## 2020-05-11 PROCEDURE — 80053 COMPREHEN METABOLIC PANEL: CPT

## 2020-05-11 PROCEDURE — 93005 ELECTROCARDIOGRAM TRACING: CPT | Performed by: STUDENT IN AN ORGANIZED HEALTH CARE EDUCATION/TRAINING PROGRAM

## 2020-05-11 PROCEDURE — 96376 TX/PRO/DX INJ SAME DRUG ADON: CPT

## 2020-05-11 PROCEDURE — 83690 ASSAY OF LIPASE: CPT

## 2020-05-11 PROCEDURE — 84484 ASSAY OF TROPONIN QUANT: CPT

## 2020-05-11 PROCEDURE — 86850 RBC ANTIBODY SCREEN: CPT

## 2020-05-11 PROCEDURE — C9248 INJ, CLEVIDIPINE BUTYRATE: HCPCS | Performed by: STUDENT IN AN ORGANIZED HEALTH CARE EDUCATION/TRAINING PROGRAM

## 2020-05-11 PROCEDURE — 85610 PROTHROMBIN TIME: CPT

## 2020-05-11 PROCEDURE — 74174 CTA ABD&PLVS W/CONTRAST: CPT

## 2020-05-11 RX ORDER — DOCUSATE SODIUM 100 MG/1
CAPSULE, LIQUID FILLED ORAL
Qty: 180 CAPSULE | Refills: 1 | OUTPATIENT
Start: 2020-05-11

## 2020-05-11 RX ORDER — DILTIAZEM HYDROCHLORIDE 240 MG/1
CAPSULE, COATED, EXTENDED RELEASE ORAL
Qty: 90 CAPSULE | Refills: 1 | OUTPATIENT
Start: 2020-05-11

## 2020-05-11 RX ORDER — FENTANYL CITRATE 50 UG/ML
50 INJECTION, SOLUTION INTRAMUSCULAR; INTRAVENOUS ONCE
Status: COMPLETED | OUTPATIENT
Start: 2020-05-11 | End: 2020-05-11

## 2020-05-11 RX ADMIN — FENTANYL CITRATE 50 MCG: 50 INJECTION, SOLUTION INTRAMUSCULAR; INTRAVENOUS at 23:37

## 2020-05-11 RX ADMIN — IOHEXOL 100 ML: 350 INJECTION, SOLUTION INTRAVENOUS at 21:27

## 2020-05-11 RX ADMIN — FENTANYL CITRATE 50 MCG: 50 INJECTION, SOLUTION INTRAMUSCULAR; INTRAVENOUS at 21:18

## 2020-05-11 RX ADMIN — CLEVIPIDINE 4 MG/HR: 0.5 EMULSION INTRAVENOUS at 22:31

## 2020-05-11 RX ADMIN — CLEVIPIDINE 2 MG/HR: 0.5 EMULSION INTRAVENOUS at 22:24

## 2020-05-11 ASSESSMENT — PAIN DESCRIPTION - DESCRIPTORS: DESCRIPTORS: CONSTANT;SHARP;BURNING

## 2020-05-11 ASSESSMENT — PAIN DESCRIPTION - LOCATION: LOCATION: ABDOMEN;BACK

## 2020-05-11 ASSESSMENT — PAIN SCALES - GENERAL
PAINLEVEL_OUTOF10: 7
PAINLEVEL_OUTOF10: 10
PAINLEVEL_OUTOF10: 6

## 2020-05-11 ASSESSMENT — PAIN DESCRIPTION - PAIN TYPE: TYPE: ACUTE PAIN

## 2020-05-11 ASSESSMENT — PAIN DESCRIPTION - FREQUENCY: FREQUENCY: CONTINUOUS

## 2020-05-11 ASSESSMENT — PAIN DESCRIPTION - ORIENTATION: ORIENTATION: RIGHT

## 2020-05-11 ASSESSMENT — PAIN DESCRIPTION - ONSET: ONSET: ON-GOING

## 2020-05-11 ASSESSMENT — PAIN DESCRIPTION - PROGRESSION: CLINICAL_PROGRESSION: GRADUALLY WORSENING

## 2020-05-12 LAB
ABSOLUTE EOS #: 0.1 K/UL (ref 0–0.44)
ABSOLUTE IMMATURE GRANULOCYTE: 0.03 K/UL (ref 0–0.3)
ABSOLUTE LYMPH #: 1.36 K/UL (ref 1.1–3.7)
ABSOLUTE MONO #: 0.96 K/UL (ref 0.1–1.2)
ANION GAP SERPL CALCULATED.3IONS-SCNC: 17 MMOL/L (ref 9–17)
BASOPHILS # BLD: 1 % (ref 0–2)
BASOPHILS ABSOLUTE: 0.08 K/UL (ref 0–0.2)
BUN BLDV-MCNC: 18 MG/DL (ref 8–23)
BUN/CREAT BLD: ABNORMAL (ref 9–20)
CALCIUM SERPL-MCNC: 9 MG/DL (ref 8.6–10.4)
CHLORIDE BLD-SCNC: 101 MMOL/L (ref 98–107)
CO2: 18 MMOL/L (ref 20–31)
CREAT SERPL-MCNC: 1.06 MG/DL (ref 0.7–1.2)
DIFFERENTIAL TYPE: ABNORMAL
EKG ATRIAL RATE: 125 BPM
EKG Q-T INTERVAL: 356 MS
EKG QRS DURATION: 130 MS
EKG QTC CALCULATION (BAZETT): 459 MS
EKG R AXIS: -8 DEGREES
EKG T AXIS: -35 DEGREES
EKG VENTRICULAR RATE: 100 BPM
EOSINOPHILS RELATIVE PERCENT: 1 % (ref 1–4)
GFR AFRICAN AMERICAN: >60 ML/MIN
GFR NON-AFRICAN AMERICAN: >60 ML/MIN
GFR SERPL CREATININE-BSD FRML MDRD: ABNORMAL ML/MIN/{1.73_M2}
GFR SERPL CREATININE-BSD FRML MDRD: ABNORMAL ML/MIN/{1.73_M2}
GLUCOSE BLD-MCNC: 105 MG/DL (ref 75–110)
GLUCOSE BLD-MCNC: 106 MG/DL (ref 75–110)
GLUCOSE BLD-MCNC: 108 MG/DL (ref 70–99)
GLUCOSE BLD-MCNC: 114 MG/DL (ref 75–110)
GLUCOSE BLD-MCNC: 116 MG/DL (ref 75–110)
HCT VFR BLD CALC: 49.6 % (ref 40.7–50.3)
HEMOGLOBIN: 16.4 G/DL (ref 13–17)
IMMATURE GRANULOCYTES: 0 %
INR BLD: 1.3
LYMPHOCYTES # BLD: 15 % (ref 24–43)
MCH RBC QN AUTO: 32.3 PG (ref 25.2–33.5)
MCHC RBC AUTO-ENTMCNC: 33.1 G/DL (ref 28.4–34.8)
MCV RBC AUTO: 97.6 FL (ref 82.6–102.9)
MONOCYTES # BLD: 10 % (ref 3–12)
MRSA, DNA, NASAL: NORMAL
NRBC AUTOMATED: 0 PER 100 WBC
PARTIAL THROMBOPLASTIN TIME: 31.1 SEC (ref 20.5–30.5)
PARTIAL THROMBOPLASTIN TIME: 58.3 SEC (ref 20.5–30.5)
PARTIAL THROMBOPLASTIN TIME: 58.6 SEC (ref 20.5–30.5)
PDW BLD-RTO: 13.3 % (ref 11.8–14.4)
PLATELET # BLD: 192 K/UL (ref 138–453)
PLATELET ESTIMATE: ABNORMAL
PMV BLD AUTO: 11.9 FL (ref 8.1–13.5)
POTASSIUM SERPL-SCNC: 4.8 MMOL/L (ref 3.7–5.3)
POTASSIUM SERPL-SCNC: 5.2 MMOL/L (ref 3.7–5.3)
PROTHROMBIN TIME: 13.3 SEC (ref 9–12)
RBC # BLD: 5.08 M/UL (ref 4.21–5.77)
RBC # BLD: ABNORMAL 10*6/UL
SARS-COV-2, PCR: NORMAL
SARS-COV-2, RAPID: NOT DETECTED
SARS-COV-2: NORMAL
SEG NEUTROPHILS: 73 % (ref 36–65)
SEGMENTED NEUTROPHILS ABSOLUTE COUNT: 6.77 K/UL (ref 1.5–8.1)
SODIUM BLD-SCNC: 136 MMOL/L (ref 135–144)
SOURCE: NORMAL
SPECIMEN DESCRIPTION: NORMAL
TROPONIN INTERP: NORMAL
TROPONIN T: NORMAL NG/ML
TROPONIN, HIGH SENSITIVITY: 11 NG/L (ref 0–22)
TROPONIN, HIGH SENSITIVITY: 12 NG/L (ref 0–22)
TROPONIN, HIGH SENSITIVITY: 12 NG/L (ref 0–22)
WBC # BLD: 9.3 K/UL (ref 3.5–11.3)
WBC # BLD: ABNORMAL 10*3/UL

## 2020-05-12 PROCEDURE — 84484 ASSAY OF TROPONIN QUANT: CPT

## 2020-05-12 PROCEDURE — 80048 BASIC METABOLIC PNL TOTAL CA: CPT

## 2020-05-12 PROCEDURE — 6360000002 HC RX W HCPCS: Performed by: STUDENT IN AN ORGANIZED HEALTH CARE EDUCATION/TRAINING PROGRAM

## 2020-05-12 PROCEDURE — 87641 MR-STAPH DNA AMP PROBE: CPT

## 2020-05-12 PROCEDURE — 99291 CRITICAL CARE FIRST HOUR: CPT | Performed by: INTERNAL MEDICINE

## 2020-05-12 PROCEDURE — 85025 COMPLETE CBC W/AUTO DIFF WBC: CPT

## 2020-05-12 PROCEDURE — 2580000003 HC RX 258: Performed by: STUDENT IN AN ORGANIZED HEALTH CARE EDUCATION/TRAINING PROGRAM

## 2020-05-12 PROCEDURE — U0002 COVID-19 LAB TEST NON-CDC: HCPCS

## 2020-05-12 PROCEDURE — 6370000000 HC RX 637 (ALT 250 FOR IP): Performed by: STUDENT IN AN ORGANIZED HEALTH CARE EDUCATION/TRAINING PROGRAM

## 2020-05-12 PROCEDURE — 2000000000 HC ICU R&B

## 2020-05-12 PROCEDURE — 85730 THROMBOPLASTIN TIME PARTIAL: CPT

## 2020-05-12 PROCEDURE — 84132 ASSAY OF SERUM POTASSIUM: CPT

## 2020-05-12 PROCEDURE — 82947 ASSAY GLUCOSE BLOOD QUANT: CPT

## 2020-05-12 PROCEDURE — 36415 COLL VENOUS BLD VENIPUNCTURE: CPT

## 2020-05-12 PROCEDURE — 85610 PROTHROMBIN TIME: CPT

## 2020-05-12 RX ORDER — OXYCODONE HYDROCHLORIDE 5 MG/1
10 TABLET ORAL EVERY 4 HOURS PRN
Status: DISCONTINUED | OUTPATIENT
Start: 2020-05-12 | End: 2020-05-20 | Stop reason: HOSPADM

## 2020-05-12 RX ORDER — LORAZEPAM 1 MG/1
1 TABLET ORAL EVERY 8 HOURS PRN
Status: DISCONTINUED | OUTPATIENT
Start: 2020-05-12 | End: 2020-05-20 | Stop reason: HOSPADM

## 2020-05-12 RX ORDER — SODIUM CHLORIDE 9 MG/ML
INJECTION, SOLUTION INTRAVENOUS CONTINUOUS
Status: DISCONTINUED | OUTPATIENT
Start: 2020-05-12 | End: 2020-05-16

## 2020-05-12 RX ORDER — HEPARIN SODIUM 1000 [USP'U]/ML
4000 INJECTION, SOLUTION INTRAVENOUS; SUBCUTANEOUS PRN
Status: DISCONTINUED | OUTPATIENT
Start: 2020-05-12 | End: 2020-05-20

## 2020-05-12 RX ORDER — SODIUM CHLORIDE 0.9 % (FLUSH) 0.9 %
10 SYRINGE (ML) INJECTION EVERY 12 HOURS SCHEDULED
Status: DISCONTINUED | OUTPATIENT
Start: 2020-05-12 | End: 2020-05-20 | Stop reason: HOSPADM

## 2020-05-12 RX ORDER — HEPARIN SODIUM 1000 [USP'U]/ML
4000 INJECTION, SOLUTION INTRAVENOUS; SUBCUTANEOUS ONCE
Status: COMPLETED | OUTPATIENT
Start: 2020-05-12 | End: 2020-05-12

## 2020-05-12 RX ORDER — OXYCODONE HYDROCHLORIDE 5 MG/1
5 TABLET ORAL EVERY 4 HOURS PRN
Status: DISCONTINUED | OUTPATIENT
Start: 2020-05-12 | End: 2020-05-20 | Stop reason: HOSPADM

## 2020-05-12 RX ORDER — ONDANSETRON 2 MG/ML
4 INJECTION INTRAMUSCULAR; INTRAVENOUS EVERY 6 HOURS PRN
Status: DISCONTINUED | OUTPATIENT
Start: 2020-05-12 | End: 2020-05-20 | Stop reason: HOSPADM

## 2020-05-12 RX ORDER — CANDESARTAN 16 MG/1
32 TABLET ORAL DAILY
Status: DISCONTINUED | OUTPATIENT
Start: 2020-05-12 | End: 2020-05-15

## 2020-05-12 RX ORDER — DEXTROSE MONOHYDRATE 25 G/50ML
12.5 INJECTION, SOLUTION INTRAVENOUS PRN
Status: DISCONTINUED | OUTPATIENT
Start: 2020-05-12 | End: 2020-05-20 | Stop reason: HOSPADM

## 2020-05-12 RX ORDER — LACTOBACILLUS ACIDOPHILUS 500MM CELL
1 CAPSULE ORAL 2 TIMES DAILY
COMMUNITY
Start: 2019-04-29 | End: 2020-09-03

## 2020-05-12 RX ORDER — DILTIAZEM HYDROCHLORIDE 240 MG/1
240 CAPSULE, COATED, EXTENDED RELEASE ORAL DAILY
Status: DISCONTINUED | OUTPATIENT
Start: 2020-05-12 | End: 2020-05-14

## 2020-05-12 RX ORDER — ALBUTEROL SULFATE 90 UG/1
2 AEROSOL, METERED RESPIRATORY (INHALATION) EVERY 6 HOURS PRN
Status: DISCONTINUED | OUTPATIENT
Start: 2020-05-12 | End: 2020-05-15

## 2020-05-12 RX ORDER — ACETAMINOPHEN 325 MG/1
650 TABLET ORAL EVERY 6 HOURS PRN
Status: DISCONTINUED | OUTPATIENT
Start: 2020-05-12 | End: 2020-05-20 | Stop reason: HOSPADM

## 2020-05-12 RX ORDER — SODIUM CHLORIDE 0.9 % (FLUSH) 0.9 %
10 SYRINGE (ML) INJECTION PRN
Status: DISCONTINUED | OUTPATIENT
Start: 2020-05-12 | End: 2020-05-20 | Stop reason: HOSPADM

## 2020-05-12 RX ORDER — ALBUTEROL SULFATE 2.5 MG/3ML
2.5 SOLUTION RESPIRATORY (INHALATION) EVERY 6 HOURS PRN
Status: DISCONTINUED | OUTPATIENT
Start: 2020-05-12 | End: 2020-05-20 | Stop reason: HOSPADM

## 2020-05-12 RX ORDER — POLYETHYLENE GLYCOL 3350 17 G/17G
17 POWDER, FOR SOLUTION ORAL DAILY PRN
Status: DISCONTINUED | OUTPATIENT
Start: 2020-05-12 | End: 2020-05-20 | Stop reason: HOSPADM

## 2020-05-12 RX ORDER — HEPARIN SODIUM 10000 [USP'U]/100ML
9.4 INJECTION, SOLUTION INTRAVENOUS CONTINUOUS
Status: DISCONTINUED | OUTPATIENT
Start: 2020-05-12 | End: 2020-05-19

## 2020-05-12 RX ORDER — MORPHINE SULFATE 4 MG/ML
4 INJECTION, SOLUTION INTRAMUSCULAR; INTRAVENOUS ONCE
Status: COMPLETED | OUTPATIENT
Start: 2020-05-12 | End: 2020-05-12

## 2020-05-12 RX ORDER — DEXTROSE MONOHYDRATE 50 MG/ML
100 INJECTION, SOLUTION INTRAVENOUS PRN
Status: DISCONTINUED | OUTPATIENT
Start: 2020-05-12 | End: 2020-05-20 | Stop reason: HOSPADM

## 2020-05-12 RX ORDER — ACETAMINOPHEN 650 MG/1
650 SUPPOSITORY RECTAL EVERY 6 HOURS PRN
Status: DISCONTINUED | OUTPATIENT
Start: 2020-05-12 | End: 2020-05-20 | Stop reason: HOSPADM

## 2020-05-12 RX ORDER — LORAZEPAM 1 MG/1
1 TABLET ORAL DAILY
Status: DISCONTINUED | OUTPATIENT
Start: 2020-05-12 | End: 2020-05-12

## 2020-05-12 RX ORDER — PROMETHAZINE HYDROCHLORIDE 25 MG/1
12.5 TABLET ORAL EVERY 6 HOURS PRN
Status: DISCONTINUED | OUTPATIENT
Start: 2020-05-12 | End: 2020-05-20 | Stop reason: HOSPADM

## 2020-05-12 RX ORDER — HEPARIN SODIUM 1000 [USP'U]/ML
2000 INJECTION, SOLUTION INTRAVENOUS; SUBCUTANEOUS PRN
Status: DISCONTINUED | OUTPATIENT
Start: 2020-05-12 | End: 2020-05-20

## 2020-05-12 RX ADMIN — OXYCODONE HYDROCHLORIDE 5 MG: 5 TABLET ORAL at 03:28

## 2020-05-12 RX ADMIN — SODIUM CHLORIDE: 9 INJECTION, SOLUTION INTRAVENOUS at 22:17

## 2020-05-12 RX ADMIN — DILTIAZEM HYDROCHLORIDE 240 MG: 240 CAPSULE, COATED, EXTENDED RELEASE ORAL at 08:27

## 2020-05-12 RX ADMIN — LORAZEPAM 1 MG: 1 TABLET ORAL at 20:46

## 2020-05-12 RX ADMIN — ACETAMINOPHEN 650 MG: 325 TABLET ORAL at 05:22

## 2020-05-12 RX ADMIN — HEPARIN SODIUM 4000 UNITS: 1000 INJECTION INTRAVENOUS; SUBCUTANEOUS at 03:29

## 2020-05-12 RX ADMIN — OXYCODONE HYDROCHLORIDE 10 MG: 5 TABLET ORAL at 20:47

## 2020-05-12 RX ADMIN — OXYCODONE HYDROCHLORIDE 10 MG: 5 TABLET ORAL at 16:58

## 2020-05-12 RX ADMIN — LORAZEPAM 1 MG: 1 TABLET ORAL at 12:57

## 2020-05-12 RX ADMIN — MORPHINE SULFATE 4 MG: 4 INJECTION INTRAVENOUS at 23:20

## 2020-05-12 RX ADMIN — CANDESARTAN CILEXETIL 32 MG: 16 TABLET ORAL at 08:27

## 2020-05-12 RX ADMIN — OXYCODONE HYDROCHLORIDE 10 MG: 5 TABLET ORAL at 07:23

## 2020-05-12 RX ADMIN — Medication 10 ML: at 20:13

## 2020-05-12 RX ADMIN — SODIUM CHLORIDE: 9 INJECTION, SOLUTION INTRAVENOUS at 17:09

## 2020-05-12 RX ADMIN — LORAZEPAM 1 MG: 1 TABLET ORAL at 02:37

## 2020-05-12 RX ADMIN — HEPARIN SODIUM 9.4 UNITS/KG/HR: 10000 INJECTION, SOLUTION INTRAVENOUS at 03:28

## 2020-05-12 ASSESSMENT — ENCOUNTER SYMPTOMS
NAUSEA: 1
SORE THROAT: 0
CONSTIPATION: 0
RHINORRHEA: 0
DIARRHEA: 0
ABDOMINAL PAIN: 1
SHORTNESS OF BREATH: 0
BACK PAIN: 1
VOMITING: 0

## 2020-05-12 ASSESSMENT — PAIN DESCRIPTION - LOCATION
LOCATION: ABDOMEN;BACK

## 2020-05-12 ASSESSMENT — PAIN DESCRIPTION - ORIENTATION
ORIENTATION: RIGHT

## 2020-05-12 ASSESSMENT — PAIN DESCRIPTION - PAIN TYPE
TYPE: ACUTE PAIN

## 2020-05-12 ASSESSMENT — PAIN SCALES - GENERAL
PAINLEVEL_OUTOF10: 8
PAINLEVEL_OUTOF10: 6
PAINLEVEL_OUTOF10: 9
PAINLEVEL_OUTOF10: 5
PAINLEVEL_OUTOF10: 8
PAINLEVEL_OUTOF10: 8

## 2020-05-12 ASSESSMENT — PAIN DESCRIPTION - DESCRIPTORS
DESCRIPTORS: CONSTANT;SORE
DESCRIPTORS: CONSTANT;SHARP
DESCRIPTORS: CONSTANT;DISCOMFORT

## 2020-05-12 ASSESSMENT — PAIN DESCRIPTION - FREQUENCY
FREQUENCY: CONTINUOUS
FREQUENCY: CONTINUOUS

## 2020-05-12 ASSESSMENT — PAIN DESCRIPTION - PROGRESSION: CLINICAL_PROGRESSION: GRADUALLY WORSENING

## 2020-05-12 ASSESSMENT — PAIN DESCRIPTION - ONSET
ONSET: ON-GOING

## 2020-05-12 NOTE — ED PROVIDER NOTES
[facility]     Emergency Department     Faculty Attestation    I performed a history and physical examination of the patient and discussed management with the resident. I reviewed the residents note and agree with the documented findings including all diagnostic interpretations and plan of care. Any areas of disagreement are noted on the chart. I was personally present for the key portions of any procedures. I have documented in the chart those procedures where I was not present during the key portions. I have reviewed the emergency nurses triage note. I agree with the chief complaint, past medical history, past surgical history, allergies, medications, social and family history as documented unless otherwise noted below. Documentation of the HPI, Physical Exam and Medical Decision Making performed by scribes is based on my personal performance of the HPI, PE and MDM. For Physician Assistant/ Nurse Practitioner cases/documentation I have personally evaluated this patient and have completed at least one if not all key elements of the E/M (history, physical exam, and MDM). Additional findings are as noted. This patient was evaluated in the Emergency Department for symptoms described in the history of present illness. He/she was evaluated in the context of the global COVID-19 pandemic, which necessitated consideration that the patient might be at risk for infection with the SARS-CoV-2 virus that causes COVID-19. Institutional protocols and algorithms that pertain to the evaluation of patients at risk for COVID-19 are in a state of rapid change based on information released by regulatory bodies including the CDC and federal and state organizations. These policies and algorithms were followed during the patient's care in the ED. Primary Care Physician: Doyal Meckel, APRN - CNP    History:  This is a 69 y.o. male who presents to the Emergency Department with complaint of back pain, right lower quadrant abdominal pain. Worsening over the past several days. Decrease in appetite. No fevers. No vomiting. History of atrial fibrillation, pacemaker in place, on Coumadin    Physical:     weight is 239 lb (108.4 kg). His oral temperature is 98.2 °F (36.8 °C). His blood pressure is 160/126 (abnormal) and his pulse is 108. His respiration is 21.    69 y.o. male appears uncomfortable but not acutely distressed, cardiac exam borderline tachycardic, pulmonary clear bilaterally abdomen soft, tender between right lower quadrant and suprapubic region there is no obvious mass to palpation. Bedside ultrasound performed by resident under my direct supervision shows concern for 7 cm AAA. Intact femoral pulses    Impression: abdom/back pain. Concern for AAA    Plan: Stat CTA C/A/P, abdom labs, EKG, type and screen, INR. Likely admit    EKG Interpretation  EKG Interpretation    Interpreted by emergency department physician    Rhythm: atrial fibrillation - controlled  Rate: 100  Axis: normal  Ectopy: none  Conduction: right bundle branch block (complete)  ST Segments: no acute change  T Waves: inversion in  anterior leads - secondary to RBBB? Q Waves: none    EKG  Impression: Atrial fibrillation, abnormal T waves, not seen in prior EKG however last EKG on record is 2010, may be secondary to right bundle branch block    Reshma Coburn MD      Interpreted by me      CRITICAL CARE: There was a high probability of clinically significant/life threatening deterioration in this patient's condition which required my urgent intervention. Total critical care time was 33 minutes. This excludes any time for separately reportable procedures.      Ana Connors MD, [facility]  Attending Emergency Physician         Reshma Coburn MD  05/11/20 [id number]

## 2020-05-12 NOTE — CONSULTS
[address]    Patient's Name/ Date of Birth/ Gender: Theresa Judge / [birth year] (69 y.o.) / male     Referring Physician: Lucio Roper MD    Consulting Physician: Dr. Radha Bowman     History of present Illness: Pt is a 69 y.o. male with a history of atrial fibrillation on coumadin, pacemaker, COPD, previous smoker, HTN who presents with a 4-5 day history of low back and bilateral posterior hip pain and bilateral lower quadrant abdominal pain (R>L). Patient states he has had episodes of a similar pain in the past, but this episode is more severe in nature and did not resolve in 1-2 days like it has in the past. Patient also admits to decreased appetite, nausea, and a feeling of fatigue. Denies fevers, chills, changes in bowel habits. Denies issues with leg pain with exercise. On arrival to the ED, patient was hypertensive and tachycardic and in afib. On exam, patient is in no acute distress. Abdomen is minimally tender bilateral lower quadrants. 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 Patient was unaware that he had a AAA. Denies family history of vascular diseases, denies family history of AAA. Patient is not a current smoker. He states he quit smoking approximately 15-20 years ago. Past Medical History:  has a past medical history of A-fib ([address] Anticoagulated on Coumadin, Anxiety, Back pain, COPD (chronic obstructive pulmonary disease) ([address] HTN (hypertension), Hyperglycemia, Hyperlipidemia, Insomnia, Lung nodule, and Pacemaker. Past Surgical History:   Past Surgical History:   Procedure Laterality Date    PACEMAKER PLACEMENT         Social History:  reports that he quit smoking about 19 years ago. His smoking use included cigarettes. He has a 12.50 pack-year smoking history. He has never used smokeless tobacco. He reports that he does not drink alcohol or use drugs.     Family History: family CAPS capsule, Take 1 capsule by mouth every morning (before breakfast), Disp: 90 capsule, Rfl: 1    Psyllium (METAMUCIL FREE & NATURAL) 43 % POWD, Take 1 Dose by mouth 3 times daily, Disp: 1 Bottle, Rfl: 5    nitroGLYCERIN (NITROSTAT) 0.4 MG SL tablet, place 1 tablet under the tongue if needed every 5 minutes for chest pain for 3 doses IF NO RELIEF AFTER FIRST DOSE CALL PRESCRIBER ., Disp: 25 tablet, Rfl: 11    albuterol sulfate HFA (VENTOLIN HFA) 108 (90 Base) MCG/ACT inhaler, Inhale 2 puffs into the lungs every 6 hours as needed for Wheezing, Disp: 1 Inhaler, Rfl: 5    Vital Signs:  Vitals:    05/11/20 2156   BP: (!) 146/108   Pulse: 86   Resp: 20   Temp:    SpO2: 97%     Physical Exam:  Gen:  A&Ox3, NAD  HEENT: PERRLA, EOMI, no scleral icterus, oral mucosa moist  Neck: Supple  Chest: Symmetric rise with inhalation, no evidence of trauma  CVS:  Irregular rate and irregular rhythm (afib)  Resp: Effort normal, no respiratory distress, no accessory muscle use   Abd: soft, nondistended, minimal TTP periumbilical and bilateral lower quadrants, no guarding or peritoneal signs, no palpable masses  Ext: No clubbing, cyanosis, palpable dp pulses bilaterally, bilateral pt signals   CNS: Moves all extremities, no gross focal motor deficits  Skin: No erythema or ulcerations     Labs:   Lab Results   Component Value Date    WBC 10.0 05/11/2020    HGB 17.4 05/11/2020    HCT 53.9 05/11/2020    MCV 98.2 05/11/2020     05/11/2020     02/13/2012     Lab Results   Component Value Date     05/11/2020    K 4.7 05/11/2020     05/11/2020    CO2 18 05/11/2020    BUN 22 05/11/2020    CREATININE 1.30 05/11/2020    GLUCOSE 101 05/11/2020    GLUCOSE 98 02/13/2012    CALCIUM 9.6 05/11/2020     Lab Results   Component Value Date    INR 1.2 05/11/2020       Imaging:  CTA chest, a/p   Aorta: No evidence of thoracic aortic aneurysm or dissection.  No acute   abnormality of the aorta.       Mediastinum: There is

## 2020-05-12 NOTE — PROGRESS NOTES
Physical Therapy  DATE: 2020    NAME: Robbie Thompson  MRN: [id number]   : 1945    Patient not seen this date for Physical Therapy due to:  [] Blood transfusion in progress  [] Hemodialysis  []  Patient Declined  [] Spine Precautions   [x] Strict Bedrest per RN d/t AAA requiring surgery. Will see post op.  [] Surgery/ Procedure  [] Testing      [] Other        [] PT being discontinued at this time. Patient independent. No further needs. [] PT being discontinued at this time as the patient has been transferred to palliative care. No further needs.     Vickie Jimenez, PT

## 2020-05-12 NOTE — H&P
Critical Care - History and Physical Examination    Patient's name:  Theresa Judge  Medical Record Number: [id number]  Patient's account/billing number: [de-identified]  Patient's YOB: 1945  Age: 69 y.o. Date of Admission: 5/11/2020  8:26 PM  Date of History and Physical Examination: 5/12/2020      Primary Care Physician: CARMITA Samuel CNP  Attending Physician: Dr. Trista Cuello    Code Status: Prior    Chief complaint: Lower right abdominal pain    HISTORY OF PRESENT ILLNESS:   History was obtained from chart review and the patient. Theresa Judge is a 69 y.o., COPD, hypertension, hyperlipidemia, atrial fibrillation on Coumadin at home, who presented with right-sided abdominal pain. The patient reports 3 to 5 days of lower abdominal pain. He denies any nausea/vomiting, chest pain or shortness of breath. The patient reports compliance with his medications at home. He last took his warfarin last night. The patient reports a history of smoking 1 pack/day for 20 years and he stopped in 2000. Denies any other drug use or alcohol use. In the emergency department, the patient had a CT chest abdomen that showed 8.2 cm infrarenal AAA. He was hypertensive to the 170s and started on a Cleviprex drip. Vascular surgery evaluated the patient. His INR was 1.2. Past Medical History:        Diagnosis Date    A-fib ([address]     Anticoagulated on Coumadin     for A-Fib, monitoring by PCP    Anxiety     Back pain     [facility]ch[facility] obstructive [facility] disease) [facility] [address] [facility]     HTN (hypertension)     Hyperglycemia     Hyperlipidemia     can't take statins, due to muscle aches    Insomnia     Lung nodule     Pacemaker     cardiologist, dr. Sylvia Alvarez       Past Surgical History:        Procedure Laterality Date    PACEMAKER PLACEMENT         Allergies:     Allergies   Allergen Reactions    Lisinopril      cough    Neurontin [Gabapentin] Other (See Comments)     Took 3 nights and felt awful    Pcn [Penicillins] Swelling    Statins Support Therapy Other (See Comments)     Leg pains, tried pravachol, crestor, lipitor, simvastatin    Hydrochlorothiazide Rash     Breaks out         Home Meds:   Prior to Admission medications    Medication Sig Start Date End Date Taking?  Authorizing Provider   Acidophilus Lactobacillus CAPS Take 1 capsule by mouth 2 times daily 4/29/19  Yes Historical Provider, MD    MG capsule take 1 capsule by mouth twice a day 4/15/20   Noris Rash, APRN - CNP   dilTIAZem (CARTIA XT) 240 MG extended release capsule take 1 capsule by mouth once daily 4/15/20   [facility], APRN - CNP   vitamin D (ERGOCALCIFEROL) 1.25 MG (21218 UT) CAPS capsule take 1 capsule by mouth every week 3/18/20   Noris Rash, APRN - CNP   candesartan (ATACAND) 32 MG tablet Take 1 tablet by mouth daily 3/17/20   Noris Rash, APRN - CNP   Probiotic Product (RA PROBIOTIC COMPLEX) CAPS take 1 capsule by mouth twice a day 2/19/20   Noris Govea, APRN - CNP   LORazepam (ATIVAN) 1 MG tablet take 1 tablet by mouth every 8 hours if needed for anxiety 2/18/20 5/18/20  [facility], APRN - CNP   warfarin (COUMADIN) 5 MG tablet take 1 tablet by mouth once daily 1/14/20   [facility], APRN - CNP   linaclotide (LINZESS) 290 MCG CAPS capsule Take 1 capsule by mouth every morning (before breakfast) 11/15/19   Jaylene Fontana MD   Psyllium (METAMUCIL FREE & NATURAL) 43 % POWD Take 1 Dose by mouth 3 times daily 11/15/19   Jaylene Fontana MD   nitroGLYCERIN (NITROSTAT) 0.4 MG SL tablet place 1 tablet under the tongue if needed every 5 minutes for chest pain for 3 doses IF NO RELIEF AFTER FIRST DOSE CALL PRESCRIBER . 9/16/19   CARMITA Judge CNP   albuterol sulfate HFA (VENTOLIN HFA) 108 (90 Base) MCG/ACT inhaler Inhale 2 puffs into the lungs every 6 hours as needed for Wheezing 1/4/19   Jaylene Fontana MD       Social History:   TOBACCO:   reports that he quit smoking about 19 years ago. His smoking use included cigarettes. He has a 12.50 pack-year smoking history. He has never used smokeless tobacco.  ETOH:   reports no history of alcohol use. DRUGS:  reports no history of drug use. OCCUPATION:  Unknown     Family History:       Problem Relation Age of Onset    Heart Disease Mother     Heart Disease Father        REVIEW OF SYSTEMS (ROS):  Review of Systems   General ROS: negative  Psychological ROS: negative  Ophthalmic ROS: negative  ENT ROS: negative  Allergy and Immunology ROS: negative  Hematological and Lymphatic ROS: negative  Endocrine ROS: negative  Breast ROS: negative  Respiratory ROS: no cough, shortness of breath, or wheezing  Cardiovascular ROS: no chest pain or dyspnea on exertion  Gastrointestinal ROS:negative  Genito-Urinary ROS: negative  Musculoskeletal ROS: negative  Neurological ROS: negative  Dermatological ROS: negative      Physical Exam:    Vitals: /63   Pulse 98   Temp 98.1 °F (36.7 °C) (Oral)   Resp 18   Ht 5' 11\" (1.803 m)   Wt 235 lb 3.2 oz (106.7 kg)   SpO2 97%   BMI 32.80 kg/m²     Last Body weight:   Wt Readings from Last 3 Encounters:   05/12/20 235 lb 3.2 oz (106.7 kg)   02/18/20 240 lb (108.9 kg)   11/15/19 235 lb 12.8 oz (107 kg)       Body Mass Index : Body mass index is 32.8 kg/m².         PHYSICAL EXAMINATION :  General appearance - alert, well appearing, and in no distress  Mental status - alert, oriented to person, place, and time    Chest - clear to auscultation, no wheezes, rales or rhonchi, symmetric air entry  Heart - normal rate, irregular rhythm, normal S1, S2, no murmurs, rubs, clicks or gallops  Abdomen - soft, mild tenderness to palpation, nondistended, no masses or organomegaly  Neurological - alert, oriented, normal speech, no focal findings or movement disorder noted  Extremities - peripheral pulses normal, no pedal edema, no clubbing or cyanosis  Skin - normal coloration and turgor, no rashes, no suspicious skin lesions with the resident. I have reviewed the key elements of all parts of the encounter with the resident. I have seen and examined the patient with the resident. I agree with the assessment and plan and status of the problem list as documented. I reviewed the chart, events noted, I have reviewed the imaging studies including CT scan of the abdomen, key findings confirmed. History of hypertension, chronic atrial fibrillation, diabetes mellitus, on chronic anticoagulation for atrial fibrillation, on Cardizem and Atacand presented with right-sided abdominal pain and flank pain, on presentation to emergency room CT scan of the abdomen showed 8 cm infrarenal aortic aneurysm, seen by vascular surgery he was hypertensive started on clevidipine drip in the emergency room and no emergent surgery per vascular surgery admitted to medical ICU, cardiology was also contacted by vascular surgery. His heart rate is under control around 100 and he started on calcium channel blocker and also on Atacand, this morning when I saw him he was off clevidipine drip. Although he is on Coumadin follow-up is not known he had not seen his cardiologist for more than 5 years his INR is 1.2, he also has a history of pacemaker. He does have history of COPD he is on not on home oxygen he use Ventolin as needed he stopped smoking 16 years ago and apparently history of smoking more than 20 years. Labs shows potassium of 5.2 initial creatinine was 1.30 which is 1.06 today. CT scan of the chest shows left lower lobe nodule there was a CT scan of the chest done in 2007 as compared to 2007 this nodule has enlarged but it is 13 years, according to patient he is known to have left lung nodule but had not had any follow-up. Continue to keep off clevidipine drip. Continue with calcium channel blocker and Atacand. Monitor urine output renal function. Heparin drip was started and will continue as okay with vascular surgery.   Bronchodilator to be used

## 2020-05-12 NOTE — ED PROVIDER NOTES
[facility]  Emergency Department Encounter  EmergencyMedicine Resident     Pt Vishal Chavez  MRN: [id number]  [place] 1945  Date of evaluation: 5/11/20  PCP:  CARMITA Murillo - [facility] [id number]       Chief Complaint   Patient presents with    Back Pain    Abdominal Pain       HISTORY OF PRESENT ILLNESS  (Location/Symptom, Timing/Onset, Context/Setting, Quality, Duration, Modifying Factors, Severity.)      Warden Chapa is a 69 y.o. male who presents with right hip and right lower quadrant abdominal pain which has been ongoing for the last 3 to 4 days. States that he used aspirin without significant improvement of symptoms. He has a past history of A. fib, COPD, hypertension, hyperlipidemia. He has a pacemaker in place. He is anticoagulated with Coumadin. He admits to some nausea but denies vomiting. He denies any shortness of breath, chest pain, changes in bowel or bladder habits, leg weakness or numbness, or saddle anesthesia. He admits to intermittent back pain but states it has never radiated to his abdomen. PAST MEDICAL / SURGICAL / SOCIAL / FAMILY HISTORY      has a past medical history of A-fib ([facility] [address] Anticoagulated on Coumadin, Anxiety, Back pain, COPD (chronic obstructive pulmonary disease) ([facility] [address] HTN (hypertension), Hyperglycemia, Hyperlipidemia, Insomnia, Lung nodule, and Pacemaker. has a past surgical history that includes pacemaker placement.     Social History     Socioeconomic History    Marital status:      Spouse name: Not on file    Number of children: Not on file    Years of education: Not on file    Highest education level: Not on file   Occupational History    Not on file   Social Needs    Financial resource strain: Not on file    Food insecurity     Worry: Not on file     Inability: Not on file    Transportation needs     Medical: Not on file     Non-medical: Not on file   Tobacco Use    Smoking status: Former Smoker     Packs/day: There are descending and sigmoid colon diverticula without evidence for diverticulitis. Pelvis: The bladder is grossly negative. Peritoneum/Retroperitoneum: No adenopathy, mesenteric stranding or free fluid. An infrarenal aortic aneurysm has a maximal dimension of 8.2 cm. Incidentally noted are 2 left and 2 right renal arteries. 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 Bones/Soft Tissues: No acute findings. There are bilateral L5 pars defects with anterolisthesis, disc space narrowing and vacuum disc phenomenon at L5-S1.     1. Infrarenal aortic aneurysm measures up to 8.2 cm without evidence for rupture. Vascular surgery consultation is recommended. 2. 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 3. Enlarging left lower lobe pulmonary nodule. PET-CT or biopsy is recommended. 4. Clustered middle lobe pulmonary nodules are probably infectious or inflammatory. 5. Indeterminate low-density lesion in the liver. 6. Intrarenal calculus on the left without obstructive uropathy. 7. Diverticulosis without scan evidence for diverticulitis. Cta Abdomen Pelvis W Contrast    Result Date: 5/11/2020  EXAMINATION: CTA OF THE CHEST WITH AND WITHOUT CONTRAST; CTA OF THE ABDOMEN AND PELVIS WITH CONTRAST 5/11/2020 9:10 pm TECHNIQUE: CTA of the chest was performed before and after the administration of intravenous contrast.  Multiplanar reformatted images are provided for review. MIP images are provided for review. Dose modulation, iterative reconstruction, and/or weight based adjustment of the mA/kV was utilized to reduce the radiation dose to as low as reasonably achievable.; CTA of the abdomen and pelvis was performed with the administration of intravenous contrast. Multiplanar reformatted images are provided for review. MIP images are provided for review.  Dose modulation, iterative reconstruction, and/or weight based adjustment of the mA/kV was utilized to reduce the radiation dose to as low as reasonably achievable. COMPARISON: 05/13/2010 HISTORY: ORDERING SYSTEM PROVIDED HISTORY: abdominal pain, concern for AAA on bedside ultrasound TECHNOLOGIST PROVIDED HISTORY: abdominal pain, concern for AAA on bedside ultrasound Reason for Exam: Concern for AAA on bedside US Acuity: Acute Type of Exam: Initial FINDINGS: Aorta: No evidence of thoracic aortic aneurysm or dissection. No acute abnormality of the aorta. Mediastinum: There is mild stenosis at the origin of the left subclavian artery. Scattered mediastinal lymph nodes are unchanged, likely reactive. The heart and pericardium demonstrate no acute abnormality. Coronary artery calcifications are noted. There is no evidence for pulmonary embolus. Lungs/Pleura: Stenosis at the origin of the middle lobe bronchus is again seen. There are secretions in the central left upper lobe airways. There is no pneumothorax or pleural effusion. Emphysema is noted. There is a cluster of tiny centrilobular nodules in the middle lobe. Dominant nodule in the posterior left lower lobe has enlarged, now measuring 1.7 x 2.8 cm, previously 1.1 x 1.8 cm. Medial to this nodule is a tubular branching structure which likely represents impacted mucus in a peripheral airway. Organs: Ill-defined 1 cm low-density lesion is seen in the lateral segment of the left hepatic lobe. This is not identified on the prior exam.  The spleen, pancreas, gallbladder and adrenal glands are unremarkable. There are cysts in both kidneys. There is a calculus in the lower pole of the left kidney without hydronephrosis. GI/Bowel: Small bowel caliber is normal.  The appendix is normal.  There are descending and sigmoid colon diverticula without evidence for diverticulitis. Pelvis: The bladder is grossly negative. Peritoneum/Retroperitoneum: No adenopathy, mesenteric stranding or free fluid.   An infrarenal aortic aneurysm has a maximal dimension of 8.2 cm. Incidentally noted are 2 left and 2 right renal arteries. 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 Bones/Soft Tissues: No acute findings. There are bilateral L5 pars defects with anterolisthesis, disc space narrowing and vacuum disc phenomenon at L5-S1.     1. Infrarenal aortic aneurysm measures up to 8.2 cm without evidence for rupture. Vascular surgery consultation is recommended. 2. 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 3. Enlarging left lower lobe pulmonary nodule. PET-CT or biopsy is recommended. 4. Clustered middle lobe pulmonary nodules are probably infectious or inflammatory. 5. Indeterminate low-density lesion in the liver. 6. Intrarenal calculus on the left without obstructive uropathy. 7. Diverticulosis without scan evidence for diverticulitis. EKG  EKG Interpretation    Interpreted by me    Rhythm: Atrial fibrillation  Rate: Tachycardic, 100  Axis: normal  Ectopy: none  Conduction: Right bundle branch block, , QTc 459  ST Segments: no acute change  T Waves: no acute change  Q Waves: none    Clinical Impression: Nonspecific EKG    All EKG's are interpreted by the Emergency Department Physician who either signs or Co-signs this chart in the absence of a cardiologist.    EMERGENCY DEPARTMENT COURSE:  Patient alert and oriented, appears uncomfortable. Hypertensive. Tachycardic with irregularly irregular rhythm. Physical exam is grossly unremarkable with the exception of suprapubic and right-sided paraspinal tenderness. 9:05 PM-bedside ultrasound performed with attending present, concern for approximately 7 cm AAA. Will obtain abdominal labs as well as CTA abdomen/pelvis    9:27 PM EDT-accompanied patient to CT along with attending, apparent infrarenal AAA    9:37 PM EDT-patient discussed with vascular surgery, will plan to evaluate.   Recommends blood pressure control

## 2020-05-12 NOTE — CONSULTS
Subcutaneous, TID WC  insulin lispro (HUMALOG) injection vial 0-6 Units, 0-6 Units, Subcutaneous, Nightly  dextrose 50 % IV solution, 12.5 g, Intravenous, PRN  glucagon (rDNA) injection 1 mg, 1 mg, Intramuscular, PRN  dextrose 5 % solution, 100 mL/hr, Intravenous, PRN  psyllium (METAMUCIL) 58.12 % packet 1 packet, 1 packet, Oral, TID  albuterol (PROVENTIL) nebulizer solution 2.5 mg, 2.5 mg, Nebulization, Q6H PRN  albuterol sulfate  (90 Base) MCG/ACT inhaler 2 puff, 2 puff, Inhalation, Q6H PRN  clevidipine (CLEVIPREX) infusion, 2 mg/hr, Intravenous, Continuous    Allergies:  Lisinopril; Neurontin [gabapentin]; Pcn [penicillins]; Statins support therapy; and Hydrochlorothiazide    Social History:   reports that he quit smoking about 19 years ago. His smoking use included cigarettes. He has a 12.50 pack-year smoking history. He has never used smokeless tobacco. He reports that he does not drink alcohol or use drugs. Family History: family history includes Heart Disease in his father and mother. No h/o sudden cardiac death. No for premature CAD    REVIEW OF SYSTEMS:    · Constitutional: tired  · Eyes: No visual changes or diplopia. No scleral icterus. · ENT: No Headaches  · Cardiovascular: mentioned above  · Respiratory: No previous pulmonary problems, No cough  · Gastrointestinal: No abdominal pain. No change in bowel or bladder habits. · Genitourinary: No dysuria, trouble voiding, or hematuria. · Musculoskeletal:  No gait disturbance, No weakness or joint complaints. · Integumentary: No rash or pruritis. · Neurological: No headache, diplopia, change in muscle strength, numbness or tingling. No change in gait, balance, coordination, mood, affect, memory, mentation, behavior. · Psychiatric: No anxiety, or depression. · Endocrine: No temperature intolerance. No excessive thirst, fluid intake, or urination. No tremor.   · Hematologic/Lymphatic: No abnormal bruising or bleeding, blood clots or swollen

## 2020-05-13 LAB
ABSOLUTE EOS #: 0.14 K/UL (ref 0–0.44)
ABSOLUTE IMMATURE GRANULOCYTE: 0.03 K/UL (ref 0–0.3)
ABSOLUTE LYMPH #: 1.24 K/UL (ref 1.1–3.7)
ABSOLUTE MONO #: 0.85 K/UL (ref 0.1–1.2)
ANION GAP SERPL CALCULATED.3IONS-SCNC: 13 MMOL/L (ref 9–17)
BASOPHILS # BLD: 1 % (ref 0–2)
BASOPHILS ABSOLUTE: 0.06 K/UL (ref 0–0.2)
BUN BLDV-MCNC: 18 MG/DL (ref 8–23)
BUN/CREAT BLD: ABNORMAL (ref 9–20)
CALCIUM SERPL-MCNC: 8.8 MG/DL (ref 8.6–10.4)
CHLORIDE BLD-SCNC: 106 MMOL/L (ref 98–107)
CO2: 19 MMOL/L (ref 20–31)
CREAT SERPL-MCNC: 1.1 MG/DL (ref 0.7–1.2)
DIFFERENTIAL TYPE: ABNORMAL
EOSINOPHILS RELATIVE PERCENT: 2 % (ref 1–4)
GFR AFRICAN AMERICAN: >60 ML/MIN
GFR NON-AFRICAN AMERICAN: >60 ML/MIN
GFR SERPL CREATININE-BSD FRML MDRD: ABNORMAL ML/MIN/{1.73_M2}
GFR SERPL CREATININE-BSD FRML MDRD: ABNORMAL ML/MIN/{1.73_M2}
GLUCOSE BLD-MCNC: 107 MG/DL (ref 70–99)
GLUCOSE BLD-MCNC: 108 MG/DL (ref 75–110)
GLUCOSE BLD-MCNC: 110 MG/DL (ref 75–110)
GLUCOSE BLD-MCNC: 113 MG/DL (ref 75–110)
GLUCOSE BLD-MCNC: 116 MG/DL (ref 75–110)
HCT VFR BLD CALC: 50.4 % (ref 40.7–50.3)
HEMOGLOBIN: 15.7 G/DL (ref 13–17)
IMMATURE GRANULOCYTES: 0 %
LV EF: 33 %
LVEF MODALITY: NORMAL
LYMPHOCYTES # BLD: 16 % (ref 24–43)
MCH RBC QN AUTO: 32.8 PG (ref 25.2–33.5)
MCHC RBC AUTO-ENTMCNC: 31.2 G/DL (ref 28.4–34.8)
MCV RBC AUTO: 105.2 FL (ref 82.6–102.9)
MONOCYTES # BLD: 11 % (ref 3–12)
NRBC AUTOMATED: 0 PER 100 WBC
PARTIAL THROMBOPLASTIN TIME: 47.2 SEC (ref 20.5–30.5)
PARTIAL THROMBOPLASTIN TIME: 59.9 SEC (ref 20.5–30.5)
PARTIAL THROMBOPLASTIN TIME: 61.4 SEC (ref 20.5–30.5)
PDW BLD-RTO: 13.2 % (ref 11.8–14.4)
PLATELET # BLD: 189 K/UL (ref 138–453)
PLATELET ESTIMATE: ABNORMAL
PMV BLD AUTO: 11.3 FL (ref 8.1–13.5)
POTASSIUM SERPL-SCNC: 5.1 MMOL/L (ref 3.7–5.3)
RBC # BLD: 4.79 M/UL (ref 4.21–5.77)
RBC # BLD: ABNORMAL 10*6/UL
SEG NEUTROPHILS: 71 % (ref 36–65)
SEGMENTED NEUTROPHILS ABSOLUTE COUNT: 5.6 K/UL (ref 1.5–8.1)
SODIUM BLD-SCNC: 138 MMOL/L (ref 135–144)
WBC # BLD: 7.9 K/UL (ref 3.5–11.3)
WBC # BLD: ABNORMAL 10*3/UL

## 2020-05-13 PROCEDURE — 85025 COMPLETE CBC W/AUTO DIFF WBC: CPT

## 2020-05-13 PROCEDURE — 82947 ASSAY GLUCOSE BLOOD QUANT: CPT

## 2020-05-13 PROCEDURE — 2580000003 HC RX 258: Performed by: STUDENT IN AN ORGANIZED HEALTH CARE EDUCATION/TRAINING PROGRAM

## 2020-05-13 PROCEDURE — 6370000000 HC RX 637 (ALT 250 FOR IP): Performed by: STUDENT IN AN ORGANIZED HEALTH CARE EDUCATION/TRAINING PROGRAM

## 2020-05-13 PROCEDURE — 6360000002 HC RX W HCPCS: Performed by: STUDENT IN AN ORGANIZED HEALTH CARE EDUCATION/TRAINING PROGRAM

## 2020-05-13 PROCEDURE — 80048 BASIC METABOLIC PNL TOTAL CA: CPT

## 2020-05-13 PROCEDURE — 2000000000 HC ICU R&B

## 2020-05-13 PROCEDURE — 99291 CRITICAL CARE FIRST HOUR: CPT | Performed by: INTERNAL MEDICINE

## 2020-05-13 PROCEDURE — 6370000000 HC RX 637 (ALT 250 FOR IP): Performed by: INTERNAL MEDICINE

## 2020-05-13 PROCEDURE — 36415 COLL VENOUS BLD VENIPUNCTURE: CPT

## 2020-05-13 PROCEDURE — 85730 THROMBOPLASTIN TIME PARTIAL: CPT

## 2020-05-13 RX ORDER — CALCIUM CARBONATE 200(500)MG
500 TABLET,CHEWABLE ORAL ONCE
Status: COMPLETED | OUTPATIENT
Start: 2020-05-13 | End: 2020-05-13

## 2020-05-13 RX ORDER — MORPHINE SULFATE 2 MG/ML
2 INJECTION, SOLUTION INTRAMUSCULAR; INTRAVENOUS EVERY 4 HOURS PRN
Status: DISCONTINUED | OUTPATIENT
Start: 2020-05-13 | End: 2020-05-20 | Stop reason: HOSPADM

## 2020-05-13 RX ORDER — CARVEDILOL 6.25 MG/1
6.25 TABLET ORAL 2 TIMES DAILY WITH MEALS
Status: DISCONTINUED | OUTPATIENT
Start: 2020-05-13 | End: 2020-05-14

## 2020-05-13 RX ADMIN — CARVEDILOL 6.25 MG: 6.25 TABLET, FILM COATED ORAL at 18:00

## 2020-05-13 RX ADMIN — CANDESARTAN CILEXETIL 32 MG: 16 TABLET ORAL at 09:40

## 2020-05-13 RX ADMIN — OXYCODONE HYDROCHLORIDE 10 MG: 5 TABLET ORAL at 06:03

## 2020-05-13 RX ADMIN — SODIUM CHLORIDE: 9 INJECTION, SOLUTION INTRAVENOUS at 11:02

## 2020-05-13 RX ADMIN — OXYCODONE HYDROCHLORIDE 10 MG: 5 TABLET ORAL at 14:41

## 2020-05-13 RX ADMIN — LORAZEPAM 1 MG: 1 TABLET ORAL at 06:03

## 2020-05-13 RX ADMIN — LORAZEPAM 1 MG: 1 TABLET ORAL at 18:00

## 2020-05-13 RX ADMIN — OXYCODONE HYDROCHLORIDE 10 MG: 5 TABLET ORAL at 10:34

## 2020-05-13 RX ADMIN — HEPARIN SODIUM 2000 UNITS: 1000 INJECTION INTRAVENOUS; SUBCUTANEOUS at 05:31

## 2020-05-13 RX ADMIN — CARVEDILOL 6.25 MG: 6.25 TABLET, FILM COATED ORAL at 11:02

## 2020-05-13 RX ADMIN — ANTACID TABLETS 500 MG: 500 TABLET, CHEWABLE ORAL at 14:36

## 2020-05-13 RX ADMIN — HEPARIN SODIUM 9.4 UNITS/KG/HR: 10000 INJECTION, SOLUTION INTRAVENOUS at 04:40

## 2020-05-13 RX ADMIN — Medication 10 ML: at 21:02

## 2020-05-13 RX ADMIN — Medication 10 ML: at 09:41

## 2020-05-13 RX ADMIN — DILTIAZEM HYDROCHLORIDE 240 MG: 240 CAPSULE, COATED, EXTENDED RELEASE ORAL at 09:40

## 2020-05-13 RX ADMIN — MORPHINE SULFATE 2 MG: 2 INJECTION, SOLUTION INTRAMUSCULAR; INTRAVENOUS at 11:15

## 2020-05-13 ASSESSMENT — PAIN SCALES - GENERAL
PAINLEVEL_OUTOF10: 7
PAINLEVEL_OUTOF10: 0
PAINLEVEL_OUTOF10: 7

## 2020-05-13 ASSESSMENT — PAIN DESCRIPTION - PAIN TYPE: TYPE: ACUTE PAIN

## 2020-05-13 ASSESSMENT — PAIN DESCRIPTION - ORIENTATION: ORIENTATION: RIGHT

## 2020-05-13 ASSESSMENT — PAIN DESCRIPTION - LOCATION: LOCATION: ABDOMEN;BACK

## 2020-05-13 NOTE — ADT AUTH CERT
Utilization Reviews         covid negative. by Prudence Li, JENNA         Review Status Review Entered   In Primary 5/13/2020 10:43       Criteria Review   The illness suspected to be related to the Coronavirus (COVID-19)? Yes,    Has the member been tested for the COVID-19? Yes    If Yes, what are the results of the COVID-19? Negative    What is the severity of the members condition  Isolation,          SARS-CoV-2, Rapid Not Detected  Not Detected Final 05/12/2020 12:59  [address]           [facility] - Care Day 3 (5/13/2020) by Prudence Li RN         Review Status Review Entered   Completed 5/13/2020 10:31       Criteria Review      Care Day: 3 Care Date: 5/13/2020 Level of Care: ICU    Guideline Day 3    Level Of Care    ( ) Floor to discharge    5/13/2020 10:29 AM EDT by [address] [place] [address], [address] [place] [address] [facility] ICU. Clinical Status    (X) * Renal function at baseline or stable and acceptable    (X) * Pulmonary edema absent or acceptable for next level of care    (X) * Mental status at baseline    5/13/2020 10:29 AM EDT by Boby Isbell      GCS    (X) * Blood pressure under acceptable control    5/13/2020 10:29 AM EDT by [address] [place] [address], [address] [place] [address].    (X) * No evidence of active cerebral or myocardial ischemia    ( ) * Discharge plans and education understood    Activity    (X) * Ambulatory [F]    5/13/2020 10:29 AM EDT by [address] [place] [address], [address] [facility]. Routes    (X) * Oral hydration, medications, and diet    5/13/2020 10:30 AM EDT by Boby Isbell      IV FLUIDS AT 75 ML PER HOUR. IV CLEVEPREX STOPPED ON 5/12    Medications    (X) * Oral antihypertensive regimen established    5/13/2020 10:30 AM EDT by Melvi James  MG PO DAILY   ATACARD 32 MG PO DAILY  IV HEPARIN DRIP.     * Milestone   Additional Notes                                                                      Continued Stay Review Note      20   [facility])   Clinical Case Management Department   Written by: Jaime Ernst RN      Patient Name: Eunice Dowling   Attending Provider: Charisse Johnson MD       Admit Date: 2020   MRN: [id number]                            : 1945      Patient Visit Status:  INPATIENT    Level of Care: ICU       Last set of vitals:   Temp: 97.6 °F (36.4 °C) (20 0800)    Pulse: 97 (20 0930)   Resp: 11 (20 0930)   BP: 128/66 (20 0930)   SpO2: 98 % (20)      TX : No acute events overnight. BP remains controlled off cleviprex. Off Cleviprex since . The pt remains in the ICU with hourly bps.  Patient states he has no pain. No SOB on 3 liters per nc. Rafael Maffucci No edema of lower legs. Abdomen is minimally tender bilateral lower quadrants.  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        Vitals:    20 0830 - 88 14 119/80 98 % - MB    20 0800 97.6 °F (36.4 °C) 85 18 147/86Abnormal 98 % - MB    20 0730 - 87 16 112/89 99 % - MB    20 0715 - 86 16 91/75 98 % - MB    20 0700 - 81 12 118/83 96 % - KR    20 0645 - 84 14 134/77 99 % - MB    20 0630 - 87 18 122/90Abnormal 98 % - MB    20 0615 - 78 17 129/74 98 % - MB    20 0600 - 82 16 124/64                Results/Findings:    Labs:     Lab Results        Component                Value               Date                        NA                       138                 2020                  K                        5.1                 2020                  CL                       106                 2020                  CO2                      19                  2020                  BUN                      18                  2020                  CREATININE               1.10                2020                  GLUCOSE                  107                 2020     with a fib, infrarenal AAA       PLAN   1. Continue medical management per primary   2. Continue BP control with cleviprex as needed   3. Follow up pacer interrogation and echo for Cardiac evaluation   4. Tentative plan for repair of AAA Thursday 5/14       From cardiology consult on 5/12   P A fib( [id number]) 3   2 Newly discovered Infra renal AAA( vascular on board)   3 T 2 DM   4 HTN   RECOMMENDATIONS:   1. Can switch to lovenox if ok with vascular surgery. cardizem 120 mg daily   2. Will do device interrogation for the patient before the surgery   3.  Will recommend 2 D ECHO for the patient          Anticipated Discharge Plan: following       Keyonna Pierre

## 2020-05-13 NOTE — PROGRESS NOTES
Vascular Surgery Progress Note            PATIENT NAME: Mily Mendiola     TODAY'S DATE: 5/13/2020, 8:51 AM    SUBJECTIVE:    Pt seen and examined. No acute events overnight. BP remains controlled off cleviprex. Patient states he has no pain. OBJECTIVE:   Vitals:  /89   Pulse 87   Temp 98.1 °F (36.7 °C) (Oral)   Resp 16   Ht 5' 11\" (1.803 m)   Wt 240 lb 14.4 oz (109.3 kg)   SpO2 99%   BMI 33.60 kg/m²      INTAKE/OUTPUT:      Intake/Output Summary (Last 24 hours) at 5/13/2020 0851  Last data filed at 5/13/2020 0600  Gross per 24 hour   Intake 1172.9 ml   Output 695 ml   Net 477.9 ml                 General: AOx3, NAD  Lungs: Unlabored respirations  Heart: regular rate, a fib  Abdomen: Soft, ND  Extremity: moves all extremities x4, No edema    Data:  CBC with Differential:    Lab Results   Component Value Date    WBC 7.9 05/13/2020    RBC 4.79 05/13/2020    RBC 4.71 02/13/2012    HGB 15.7 05/13/2020    HCT 50.4 05/13/2020     05/13/2020     02/13/2012    .2 05/13/2020    MCH 32.8 05/13/2020    MCHC 31.2 05/13/2020    RDW 13.2 05/13/2020    LYMPHOPCT 16 05/13/2020    MONOPCT 11 05/13/2020    BASOPCT 1 05/13/2020    MONOSABS 0.85 05/13/2020    LYMPHSABS 1.24 05/13/2020    EOSABS 0.14 05/13/2020    BASOSABS 0.06 05/13/2020    DIFFTYPE NOT REPORTED 05/13/2020     BMP:    Lab Results   Component Value Date     05/13/2020    K 5.1 05/13/2020     05/13/2020    CO2 19 05/13/2020    BUN 18 05/13/2020    LABALBU 4.1 05/11/2020    LABALBU 4.5 02/13/2012    CREATININE 1.10 05/13/2020    CALCIUM 8.8 05/13/2020    GFRAA >60 05/13/2020    LABGLOM >60 05/13/2020    GLUCOSE 107 05/13/2020    GLUCOSE 98 02/13/2012         ASSESSMENT   3 69year old male with a fib, infrarenal AAA    PLAN  1. Continue medical management per primary  2. Continue BP control with cleviprex as needed  3. Follow up pacer interrogation and echo for Cardiac evaluation  4.  Tentative plan for repair of AAA Thursday 5/14      Electronically signed by Otto Raya MD  on 5/13/2020 at 8:51 AM

## 2020-05-13 NOTE — PROGRESS NOTES
Insertion:   )     URINE OUTPUT:            [x] Good   [] Low              [] Anuric    Review of Systems:  · Constitutional: Negative for Fever, chills  · Eyes: Negative for visual changes, diplopia  · ENT: Negative for mouth sores, sore throat. · Cardiovascular: Negative for lightheadedness ,chest pain, palpitations   · Respiratory:Negative for Shortness of breath,cough or wheezing. · Gastrointestinal: Positive for abdominal pain. Negative for nausea/vomiting, change in bowel habits  · Genitourinary:Negative for change in bladder habits, dysuria, hematuria.   · Musculoskeletal: Negative for joint pain   · Neurological: Negative for headache, change in muscle strength numbness/tingling    OBJECTIVE:     VITAL SIGNS:  /89   Pulse 87   Temp 98.1 °F (36.7 °C) (Oral)   Resp 16   Ht 5' 11\" (1.803 m)   Wt 240 lb 14.4 oz (109.3 kg)   SpO2 99%   BMI 33.60 kg/m²   Tmax over 24 hours:  Temp (24hrs), Av.8 °F (36.6 °C), Min:97.4 °F (36.3 °C), Max:98.4 °F (36.9 °C)      Patient Vitals for the past 8 hrs:   BP Temp Temp src Pulse Resp SpO2 Weight   20 0730 112/89 -- -- 87 16 99 % --   20 0715 91/75 -- -- 86 16 98 % --   20 0700 118/83 -- -- 81 12 96 % --   20 0645 134/77 -- -- 84 14 99 % --   20 0630 (!) 122/90 -- -- 87 18 98 % --   20 0615 129/74 -- -- 78 17 98 % --   20 0600 124/64 -- -- 82 16 99 % --   20 0500 109/68 -- -- 74 9 97 % --   20 0400 110/61 98.1 °F (36.7 °C) Oral 81 12 96 % 240 lb 14.4 oz (109.3 kg)   20 0300 117/68 -- -- 77 12 91 % --   20 0200 110/75 -- -- 81 10 97 % --   20 0100 115/77 -- -- 82 12 97 % --         Intake/Output Summary (Last 24 hours) at 2020 0829  Last data filed at 2020 0600  Gross per 24 hour   Intake 1172.9 ml   Output 695 ml   Net 477.9 ml     Date 20 0000 - 20 2359   Shift 4575-7260 5640-6927 9047-8879 24 Hour Total   INTAKE   I.V.(mL/kg) 469.3(4.3)   469.3(4.3)   Shift Total(mL/kg) 469.3(4.3)   469.3(4.3)   OUTPUT   Urine(mL/kg/hr) 270(0.3)   270   Shift Total(mL/kg) 270(2.5)   270(2.5)   Weight (kg) 109.3 109.3 109.3 109.3     Wt Readings from Last 3 Encounters:   05/13/20 240 lb 14.4 oz (109.3 kg)   02/18/20 240 lb (108.9 kg)   11/15/19 235 lb 12.8 oz (107 kg)     Body mass index is 33.6 kg/m². PHYSICAL EXAM:  Constitutional: Awake, alert  HEENT: PERRLA, EOMI, sclera clear, anicteric  Respiratory: clear to auscultation, no wheezes or rales and unlabored breathing. Cardiovascular: regular rate and rhythm, normal S1, S2, no murmur noted and 2+ pulses throughout  Abdomen: soft, nontender, nondistended, no masses or organomegaly  NEUROLOGIC: Awake, alert, oriented to name, place and time. Cranial nerves II-XII are grossly intact. Motor is 5 out of 5 bilaterally. Sensory is intact. Extremities:  peripheral pulses normal, no pedal edema,.       Any additional physical findings:      MEDICATIONS:  Scheduled Meds:   dilTIAZem  240 mg Oral Daily    candesartan  32 mg Oral Daily    linaclotide  290 mcg Oral QAM AC    sodium chloride flush  10 mL Intravenous 2 times per day    insulin lispro  0-12 Units Subcutaneous TID WC    insulin lispro  0-6 Units Subcutaneous Nightly    psyllium  1 packet Oral TID     Continuous Infusions:   heparin (porcine) 11.4 Units/kg/hr (05/13/20 0530)    dextrose      sodium chloride 75 mL/hr at 05/12/20 2217    clevidipine Stopped (05/12/20 0823)     PRN Meds:   sodium chloride flush, 10 mL, PRN  acetaminophen, 650 mg, Q6H PRN    Or  acetaminophen, 650 mg, Q6H PRN  polyethylene glycol, 17 g, Daily PRN  promethazine, 12.5 mg, Q6H PRN    Or  ondansetron, 4 mg, Q6H PRN  LORazepam, 1 mg, Q8H PRN  heparin (porcine), 4,000 Units, PRN  heparin (porcine), 2,000 Units, PRN  oxyCODONE, 5 mg, Q4H PRN    Or  oxyCODONE, 10 mg, Q4H PRN  dextrose, 12.5 g, PRN  glucagon (rDNA), 1 mg, PRN  dextrose, 100 mL/hr, PRN  albuterol, 2.5 mg, Q6H PRN  albuterol sulfate HFA, 2 puff, Q6H PRN        SUPPORT DEVICES: [] Ventilator [] BIPAP  [x] Nasal Cannula [] Room Air    VENT SETTINGS (Comprehensive) (if applicable):  Vent Information  SpO2: 99 %  Additional Respiratory  Assessments  Pulse: 87  Resp: 16  SpO2: 99 %    ABGs:     No results found for: PHART, PH, FJA4EGX, PCO2, PO2ART, PO2, TOZ4AHQ, HCO3, BEART, BE, THGBART, THB, YFX7ROF, U5KSOZHW, O2SAT, FIO2  Lactic Acid: No results found for: LACTA      DATA:  Complete Blood Count:   Recent Labs     05/11/20 2111 05/12/20  0325 05/13/20  0450   WBC 10.0 9.3 7.9   HGB 17.4* 16.4 15.7   MCV 98.2 97.6 105.2*    192 189   RBC 5.49 5.08 4.79   HCT 53.9* 49.6 50.4*   MCH 31.7 32.3 32.8   MCHC 32.3 33.1 31.2   RDW 13.1 13.3 13.2   MPV 11.7 11.9 11.3        PT/INR:    Lab Results   Component Value Date    PROTIME 13.3 05/12/2020    PROTIME 26.9 11/15/2019    INR 1.3 05/12/2020     PTT:    Lab Results   Component Value Date    APTT 47.2 05/13/2020       Basal Metabolic Profile:   Recent Labs     05/11/20 2111 05/12/20  0325 05/12/20  1411 05/13/20  0450    136  --  138   K 4.7 5.2 4.8 5.1   BUN 22 18  --  18   CREATININE 1.30* 1.06  --  1.10    101  --  106   CO2 18* 18*  --  19*      Magnesium: No results found for: MG  Phosphorus: No results found for: PHOS  S. Calcium:  Recent Labs     05/13/20  0450   CALCIUM 8.8     S. Ionized Calcium:No results for input(s): IONCA in the last 72 hours.       Urinalysis:   Lab Results   Component Value Date    NITRU NEGATIVE 05/11/2020    COLORU YELLOW 05/11/2020    PHUR 5.0 05/11/2020    WBCUA None 08/06/2012    RBCUA None 08/06/2012    MUCUS 2+ 08/06/2012    TRICHOMONAS NOT REPORTED 08/06/2012    YEAST NOT REPORTED 08/06/2012    BACTERIA FEW 08/06/2012    CLARITYU cloudy 08/21/2013    SPECGRAV 1.058 05/11/2020    LEUKOCYTESUR NEGATIVE 05/11/2020    UROBILINOGEN Normal 05/11/2020    BILIRUBINUR NEGATIVE 05/11/2020    BILIRUBINUR neg 08/21/2013    BILIRUBINUR NEGATIVE

## 2020-05-13 NOTE — PROGRESS NOTES
without obvious abnormality  Neck: no JVD  Lungs: clear to auscultation bilaterally, no basilar rales, no wheezing   Heart: ir regular rate and rhythm, S1, S2 normal, no murmur, click, rub or gallop  Extremities: No LE edema  Neurologic: Mental status: Alert, oriented. Motor and sensory not done. EKG: A fib on tele monitor. Echocardiogram:pending    Assessment:   1 P A fib( [id number]) 3  2 Newly discovered Infra renal AAA 8.2 cm ( vascular on board)  3 T 2 DM  4 HTN    Treatment Plan:   1. Device interrogation showed poor atrial impedence. Switched to VVI mode. Will continue with heparin gtt. Device interrogation is reviewed. Will need VOO mode during the surgery. EP to follow. 2. Will continue with cardizem 240 mg daily. Heparin gtt. 3. Will wait for the 2 D ECHO. Discussed with patient and nursing. Zina Clifton MD  Fellow cardiology    Attending Cardiologist Addendum: I have reviewed and performed the history, physical, subjective, objective, assessment, and plan with the resident/fellow and agree with the note. I performed the history and physical personally. I have made changes to the note above as needed. Device interrogation showed poor atrial lead function- turned off, now in VVI which is reasonable given Perm Afib. Will add coreg for better HR and BP control  Continue cardizem  Await Echo- if LVEF is preserved can proceed with AAA repair at moderate risk  Continue heparin drip perioperatively with plan to change to eliquis on d/c  EP will follow as outpatient regarding atrial lead    Thank you for allowing me to participate in the care of this patient, please do not hesitate to call if you have any questions. Elise Adames DO, [facility], [address] [facility]  [facility]. [employer]  [phone]

## 2020-05-14 LAB
ABSOLUTE EOS #: 0.13 K/UL (ref 0–0.44)
ABSOLUTE IMMATURE GRANULOCYTE: <0.03 K/UL (ref 0–0.3)
ABSOLUTE LYMPH #: 1.02 K/UL (ref 1.1–3.7)
ABSOLUTE MONO #: 0.97 K/UL (ref 0.1–1.2)
ANION GAP SERPL CALCULATED.3IONS-SCNC: 12 MMOL/L (ref 9–17)
BASOPHILS # BLD: 1 % (ref 0–2)
BASOPHILS ABSOLUTE: 0.06 K/UL (ref 0–0.2)
BUN BLDV-MCNC: 18 MG/DL (ref 8–23)
BUN/CREAT BLD: NORMAL (ref 9–20)
CALCIUM SERPL-MCNC: 9.4 MG/DL (ref 8.6–10.4)
CHLORIDE BLD-SCNC: 102 MMOL/L (ref 98–107)
CO2: 23 MMOL/L (ref 20–31)
CREAT SERPL-MCNC: 1.18 MG/DL (ref 0.7–1.2)
DIFFERENTIAL TYPE: ABNORMAL
EOSINOPHILS RELATIVE PERCENT: 2 % (ref 1–4)
GFR AFRICAN AMERICAN: >60 ML/MIN
GFR NON-AFRICAN AMERICAN: >60 ML/MIN
GFR SERPL CREATININE-BSD FRML MDRD: NORMAL ML/MIN/{1.73_M2}
GFR SERPL CREATININE-BSD FRML MDRD: NORMAL ML/MIN/{1.73_M2}
GLUCOSE BLD-MCNC: 79 MG/DL (ref 75–110)
GLUCOSE BLD-MCNC: 91 MG/DL (ref 75–110)
GLUCOSE BLD-MCNC: 94 MG/DL (ref 75–110)
GLUCOSE BLD-MCNC: 96 MG/DL (ref 70–99)
GLUCOSE BLD-MCNC: 99 MG/DL (ref 75–110)
HCT VFR BLD CALC: 51.5 % (ref 40.7–50.3)
HEMOGLOBIN: 15.9 G/DL (ref 13–17)
IMMATURE GRANULOCYTES: 0 %
LYMPHOCYTES # BLD: 13 % (ref 24–43)
MCH RBC QN AUTO: 32.9 PG (ref 25.2–33.5)
MCHC RBC AUTO-ENTMCNC: 30.9 G/DL (ref 28.4–34.8)
MCV RBC AUTO: 106.4 FL (ref 82.6–102.9)
MONOCYTES # BLD: 13 % (ref 3–12)
NRBC AUTOMATED: 0 PER 100 WBC
PARTIAL THROMBOPLASTIN TIME: 47.8 SEC (ref 20.5–30.5)
PARTIAL THROMBOPLASTIN TIME: 55.4 SEC (ref 20.5–30.5)
PDW BLD-RTO: 13.2 % (ref 11.8–14.4)
PLATELET # BLD: 153 K/UL (ref 138–453)
PLATELET ESTIMATE: ABNORMAL
PMV BLD AUTO: 11.4 FL (ref 8.1–13.5)
POTASSIUM SERPL-SCNC: 4.8 MMOL/L (ref 3.7–5.3)
RBC # BLD: 4.84 M/UL (ref 4.21–5.77)
RBC # BLD: ABNORMAL 10*6/UL
SEG NEUTROPHILS: 71 % (ref 36–65)
SEGMENTED NEUTROPHILS ABSOLUTE COUNT: 5.5 K/UL (ref 1.5–8.1)
SODIUM BLD-SCNC: 137 MMOL/L (ref 135–144)
WBC # BLD: 7.7 K/UL (ref 3.5–11.3)
WBC # BLD: ABNORMAL 10*3/UL

## 2020-05-14 PROCEDURE — 2500000003 HC RX 250 WO HCPCS

## 2020-05-14 PROCEDURE — 99233 SBSQ HOSP IP/OBS HIGH 50: CPT | Performed by: INTERNAL MEDICINE

## 2020-05-14 PROCEDURE — 80048 BASIC METABOLIC PNL TOTAL CA: CPT

## 2020-05-14 PROCEDURE — 2580000003 HC RX 258: Performed by: STUDENT IN AN ORGANIZED HEALTH CARE EDUCATION/TRAINING PROGRAM

## 2020-05-14 PROCEDURE — 6360000004 HC RX CONTRAST MEDICATION

## 2020-05-14 PROCEDURE — 85025 COMPLETE CBC W/AUTO DIFF WBC: CPT

## 2020-05-14 PROCEDURE — C1769 GUIDE WIRE: HCPCS

## 2020-05-14 PROCEDURE — 6360000002 HC RX W HCPCS: Performed by: STUDENT IN AN ORGANIZED HEALTH CARE EDUCATION/TRAINING PROGRAM

## 2020-05-14 PROCEDURE — 93458 L HRT ARTERY/VENTRICLE ANGIO: CPT | Performed by: INTERNAL MEDICINE

## 2020-05-14 PROCEDURE — C1894 INTRO/SHEATH, NON-LASER: HCPCS

## 2020-05-14 PROCEDURE — B2111ZZ FLUOROSCOPY OF MULTIPLE CORONARY ARTERIES USING LOW OSMOLAR CONTRAST: ICD-10-PCS | Performed by: INTERNAL MEDICINE

## 2020-05-14 PROCEDURE — 85730 THROMBOPLASTIN TIME PARTIAL: CPT

## 2020-05-14 PROCEDURE — 6370000000 HC RX 637 (ALT 250 FOR IP): Performed by: STUDENT IN AN ORGANIZED HEALTH CARE EDUCATION/TRAINING PROGRAM

## 2020-05-14 PROCEDURE — 2709999900 HC NON-CHARGEABLE SUPPLY

## 2020-05-14 PROCEDURE — 36415 COLL VENOUS BLD VENIPUNCTURE: CPT

## 2020-05-14 PROCEDURE — 6360000002 HC RX W HCPCS

## 2020-05-14 PROCEDURE — 2000000000 HC ICU R&B

## 2020-05-14 PROCEDURE — 82947 ASSAY GLUCOSE BLOOD QUANT: CPT

## 2020-05-14 PROCEDURE — 4A023N7 MEASUREMENT OF CARDIAC SAMPLING AND PRESSURE, LEFT HEART, PERCUTANEOUS APPROACH: ICD-10-PCS | Performed by: INTERNAL MEDICINE

## 2020-05-14 PROCEDURE — B2151ZZ FLUOROSCOPY OF LEFT HEART USING LOW OSMOLAR CONTRAST: ICD-10-PCS | Performed by: INTERNAL MEDICINE

## 2020-05-14 PROCEDURE — C1725 CATH, TRANSLUMIN NON-LASER: HCPCS

## 2020-05-14 PROCEDURE — 6370000000 HC RX 637 (ALT 250 FOR IP): Performed by: INTERNAL MEDICINE

## 2020-05-14 RX ORDER — SODIUM CHLORIDE 0.9 % (FLUSH) 0.9 %
10 SYRINGE (ML) INJECTION PRN
Status: DISCONTINUED | OUTPATIENT
Start: 2020-05-14 | End: 2020-05-20 | Stop reason: HOSPADM

## 2020-05-14 RX ORDER — CARVEDILOL 12.5 MG/1
12.5 TABLET ORAL 2 TIMES DAILY WITH MEALS
Status: DISCONTINUED | OUTPATIENT
Start: 2020-05-14 | End: 2020-05-15

## 2020-05-14 RX ORDER — PANTOPRAZOLE SODIUM 40 MG/1
40 TABLET, DELAYED RELEASE ORAL
Status: DISCONTINUED | OUTPATIENT
Start: 2020-05-14 | End: 2020-05-20 | Stop reason: HOSPADM

## 2020-05-14 RX ORDER — ACETAMINOPHEN 325 MG/1
650 TABLET ORAL EVERY 4 HOURS PRN
Status: DISCONTINUED | OUTPATIENT
Start: 2020-05-14 | End: 2020-05-20 | Stop reason: HOSPADM

## 2020-05-14 RX ORDER — SODIUM CHLORIDE 0.9 % (FLUSH) 0.9 %
10 SYRINGE (ML) INJECTION EVERY 12 HOURS SCHEDULED
Status: DISCONTINUED | OUTPATIENT
Start: 2020-05-14 | End: 2020-05-20 | Stop reason: HOSPADM

## 2020-05-14 RX ORDER — CALCIUM CARBONATE 200(500)MG
500 TABLET,CHEWABLE ORAL 3 TIMES DAILY PRN
Status: DISCONTINUED | OUTPATIENT
Start: 2020-05-14 | End: 2020-05-20 | Stop reason: HOSPADM

## 2020-05-14 RX ADMIN — MORPHINE SULFATE 2 MG: 2 INJECTION, SOLUTION INTRAMUSCULAR; INTRAVENOUS at 09:32

## 2020-05-14 RX ADMIN — LORAZEPAM 1 MG: 1 TABLET ORAL at 20:41

## 2020-05-14 RX ADMIN — OXYCODONE HYDROCHLORIDE 10 MG: 5 TABLET ORAL at 08:07

## 2020-05-14 RX ADMIN — PANTOPRAZOLE SODIUM 40 MG: 40 TABLET, DELAYED RELEASE ORAL at 21:37

## 2020-05-14 RX ADMIN — LORAZEPAM 1 MG: 1 TABLET ORAL at 04:26

## 2020-05-14 RX ADMIN — Medication 10 ML: at 08:13

## 2020-05-14 RX ADMIN — SODIUM CHLORIDE: 9 INJECTION, SOLUTION INTRAVENOUS at 15:15

## 2020-05-14 RX ADMIN — OXYCODONE HYDROCHLORIDE 10 MG: 5 TABLET ORAL at 13:36

## 2020-05-14 RX ADMIN — CANDESARTAN CILEXETIL 32 MG: 16 TABLET ORAL at 08:09

## 2020-05-14 RX ADMIN — CARVEDILOL 6.25 MG: 6.25 TABLET, FILM COATED ORAL at 08:09

## 2020-05-14 RX ADMIN — HEPARIN SODIUM 2000 UNITS: 1000 INJECTION INTRAVENOUS; SUBCUTANEOUS at 05:49

## 2020-05-14 RX ADMIN — HEPARIN SODIUM 13.4 UNITS/KG/HR: 10000 INJECTION, SOLUTION INTRAVENOUS at 05:49

## 2020-05-14 RX ADMIN — Medication 10 ML: at 21:38

## 2020-05-14 RX ADMIN — ANTACID TABLETS 500 MG: 500 TABLET, CHEWABLE ORAL at 21:37

## 2020-05-14 RX ADMIN — SODIUM CHLORIDE: 9 INJECTION, SOLUTION INTRAVENOUS at 02:18

## 2020-05-14 ASSESSMENT — PAIN SCALES - GENERAL
PAINLEVEL_OUTOF10: 9
PAINLEVEL_OUTOF10: 4
PAINLEVEL_OUTOF10: 5
PAINLEVEL_OUTOF10: 9
PAINLEVEL_OUTOF10: 10
PAINLEVEL_OUTOF10: 4
PAINLEVEL_OUTOF10: 8
PAINLEVEL_OUTOF10: 9
PAINLEVEL_OUTOF10: 0

## 2020-05-14 ASSESSMENT — PAIN DESCRIPTION - FREQUENCY
FREQUENCY: CONTINUOUS

## 2020-05-14 ASSESSMENT — PAIN DESCRIPTION - ONSET
ONSET: ON-GOING
ONSET: SUDDEN
ONSET: ON-GOING

## 2020-05-14 ASSESSMENT — PAIN DESCRIPTION - DESCRIPTORS
DESCRIPTORS: CONSTANT;THROBBING
DESCRIPTORS: DULL
DESCRIPTORS: DULL

## 2020-05-14 ASSESSMENT — PAIN DESCRIPTION - LOCATION
LOCATION: BACK;ABDOMEN
LOCATION: BACK;ABDOMEN
LOCATION: BACK

## 2020-05-14 ASSESSMENT — PAIN DESCRIPTION - ORIENTATION
ORIENTATION: RIGHT;LOWER
ORIENTATION: RIGHT;LOWER
ORIENTATION: LOWER;RIGHT

## 2020-05-14 ASSESSMENT — PAIN DESCRIPTION - PAIN TYPE
TYPE: ACUTE PAIN

## 2020-05-14 ASSESSMENT — PAIN DESCRIPTION - DIRECTION: RADIATING_TOWARDS: LEFT BACK

## 2020-05-14 NOTE — FLOWSHEET NOTE
TR Band intact to right wrist.  2 cc of air deflated from device as ordered.  No bleeding noted.  Will continue to monitor.

## 2020-05-14 NOTE — FLOWSHEET NOTE
TR Band removed from right wrist.  No bleeding, hematoma, or ischemia. No complaints of pain at site. Gauze/opsite dressing applied. Will continue to monitor.

## 2020-05-14 NOTE — PROGRESS NOTES
4463-2150 7380-7477 24 Hour Total   INTAKE   I.V.(mL/kg) 638(5.8) 10(0.1)  648(5.9)   Shift Total(mL/kg) 638(5.8) 10(0.1)  648(5.9)   OUTPUT   Urine(mL/kg/hr) 100(0.1)   100   Shift Total(mL/kg) 100(0.9)   100(0.9)   Weight (kg) 109.3 109.3 109.3 109.3     Wt Readings from Last 3 Encounters:   05/13/20 240 lb 14.4 oz (109.3 kg)   02/18/20 240 lb (108.9 kg)   11/15/19 235 lb 12.8 oz (107 kg)     Body mass index is 33.6 kg/m². PHYSICAL EXAM:  Constitutional: Awake, alert  HEENT: PERRLA, EOMI, sclera clear, anicteric  Respiratory: clear to auscultation, no wheezes or rales and unlabored breathing. Cardiovascular: regular rate and rhythm, normal S1, S2, no murmur noted and 2+ pulses throughout  Abdomen: soft, nontender, nondistended, no masses or organomegaly  NEUROLOGIC: Awake, alert, oriented to name, place and time. Cranial nerves II-XII are grossly intact. Motor is 5 out of 5 bilaterally. Sensory is intact. Extremities:  peripheral pulses normal, no pedal edema,.       Any additional physical findings:      MEDICATIONS:  Scheduled Meds:   carvedilol  12.5 mg Oral BID WC    candesartan  32 mg Oral Daily    linaclotide  290 mcg Oral QAM AC    sodium chloride flush  10 mL Intravenous 2 times per day    insulin lispro  0-12 Units Subcutaneous TID     insulin lispro  0-6 Units Subcutaneous Nightly    psyllium  1 packet Oral TID     Continuous Infusions:   heparin (porcine) 13.4 Units/kg/hr (05/14/20 0549)    dextrose      sodium chloride 75 mL/hr at 05/14/20 0218     PRN Meds:   morphine, 2 mg, Q4H PRN  sodium chloride flush, 10 mL, PRN  acetaminophen, 650 mg, Q6H PRN    Or  acetaminophen, 650 mg, Q6H PRN  polyethylene glycol, 17 g, Daily PRN  promethazine, 12.5 mg, Q6H PRN    Or  ondansetron, 4 mg, Q6H PRN  LORazepam, 1 mg, Q8H PRN  heparin (porcine), 4,000 Units, PRN  heparin (porcine), 2,000 Units, PRN  oxyCODONE, 5 mg, Q4H PRN    Or  oxyCODONE, 10 mg, Q4H PRN  dextrose, 12.5 g, PRN  glucagon infusion. Continue Cardizem 240 daily. 3. Hypertension. Blood pressure controlled. Continue candesartan 32 mg daily. Will use clevidipine drip if necessary. 4. Diabetes type 2. Diet controlled. Will start medium dose insulin correction scale. 5. Constipation on home Linzess and psyllium. Patient refuses at this time. We will continue to monitor. 6. Left lower lobe lung nodule. Will need outpatient PET scan/CT-guided needle biopsy once acute issues resolve. 7. DVT prophylaxis. On heparin infusion  8. GI prophylaxis. Not indicated  9. Diet. Carb controlled disposition. Remain in ICU until AAA repair  8. Disposition. Remain in ICU       Ravi Escalante MD  PGY-2, Internal medicine resident  [facility], [place], [place]  5/14/2020 8:51 AM     Attending Physician Statement  I have discussed the care of Brock Neumann, including pertinent history and exam findings with the resident. I have reviewed the key elements of all parts of the encounter with the resident. I have seen and examined the patient with the resident. I agree with the assessment and plan and status of the problem list as documented. I have seen the patient during my round today, events noted chart reviewed. Hemodynamically he remained stable blood pressure is under controlled on Cardizem and Atacand. Echocardiogram showed ejection fraction of 33% and he was seen by cardiology at this time patient is going to go for cardiac catheterization because of low ejection fraction depending upon the cardiac catheterization results likely AAA repair tomorrow. He is on heparin drip for atrial fibrillation remained in atrial fibrillation with rate control. He did not use BiPAP overnight although BiPAP is in the room he remained on nasal cannula maintaining saturation. Please note that this chart was generated using voice recognition Dragon dictation software.  Although every effort was made to ensure the accuracy of this automated transcription, some errors in transcription may have occurred.           Tessa Jenkins MD  5/14/2020 11:43 AM

## 2020-05-14 NOTE — PROGRESS NOTES
Physical Therapy  DATE: 2020    NAME: Aris Valle  MRN: [id number]   : 1945    Patient not seen this date for Physical Therapy due to:  [] Blood transfusion in progress  [] Hemodialysis  []  Patient Declined  [] Spine Precautions   [] Strict Bedrest  [] Surgery/ Procedure  [] Testing      [x] Other: Plan for surgery for AAA 5/15. Ck post op. [] PT being discontinued at this time. Patient independent. No further needs. [] PT being discontinued at this time as the patient has been transferred to palliative care. No further needs.     Kath Rojas, PT

## 2020-05-14 NOTE — FLOWSHEET NOTE
Writer phones patient's son, Sergio Finnegan, & informs him of Cardiac Cath procedure & that the Cardiologist will call him after the procedure. Questions answered.

## 2020-05-15 ENCOUNTER — APPOINTMENT (OUTPATIENT)
Dept: CARDIAC CATH/INVASIVE PROCEDURES | Age: 75
DRG: 182 | End: 2020-05-15
Payer: COMMERCIAL

## 2020-05-15 ENCOUNTER — ANESTHESIA (OUTPATIENT)
Dept: CARDIAC CATH/INVASIVE PROCEDURES | Age: 75
DRG: 182 | End: 2020-05-15
Payer: COMMERCIAL

## 2020-05-15 ENCOUNTER — ANESTHESIA EVENT (OUTPATIENT)
Dept: CARDIAC CATH/INVASIVE PROCEDURES | Age: 75
DRG: 182 | End: 2020-05-15
Payer: COMMERCIAL

## 2020-05-15 VITALS — DIASTOLIC BLOOD PRESSURE: 64 MMHG | TEMPERATURE: 97.6 F | SYSTOLIC BLOOD PRESSURE: 119 MMHG | OXYGEN SATURATION: 95 %

## 2020-05-15 LAB
ABSOLUTE EOS #: 0.22 K/UL (ref 0–0.44)
ABSOLUTE IMMATURE GRANULOCYTE: <0.03 K/UL (ref 0–0.3)
ABSOLUTE LYMPH #: 1.11 K/UL (ref 1.1–3.7)
ABSOLUTE MONO #: 0.74 K/UL (ref 0.1–1.2)
ANION GAP SERPL CALCULATED.3IONS-SCNC: 15 MMOL/L (ref 9–17)
BASOPHILS # BLD: 1 % (ref 0–2)
BASOPHILS ABSOLUTE: 0.07 K/UL (ref 0–0.2)
BUN BLDV-MCNC: 14 MG/DL (ref 8–23)
BUN/CREAT BLD: ABNORMAL (ref 9–20)
CALCIUM SERPL-MCNC: 9.4 MG/DL (ref 8.6–10.4)
CHLORIDE BLD-SCNC: 101 MMOL/L (ref 98–107)
CO2: 21 MMOL/L (ref 20–31)
CREAT SERPL-MCNC: 1.09 MG/DL (ref 0.7–1.2)
DIFFERENTIAL TYPE: ABNORMAL
EOSINOPHILS RELATIVE PERCENT: 4 % (ref 1–4)
GFR AFRICAN AMERICAN: >60 ML/MIN
GFR NON-AFRICAN AMERICAN: >60 ML/MIN
GFR SERPL CREATININE-BSD FRML MDRD: ABNORMAL ML/MIN/{1.73_M2}
GFR SERPL CREATININE-BSD FRML MDRD: ABNORMAL ML/MIN/{1.73_M2}
GLUCOSE BLD-MCNC: 100 MG/DL (ref 70–99)
GLUCOSE BLD-MCNC: 85 MG/DL (ref 75–110)
HCT VFR BLD CALC: 52.6 % (ref 40.7–50.3)
HEMOGLOBIN: 16.5 G/DL (ref 13–17)
IMMATURE GRANULOCYTES: 0 %
LYMPHOCYTES # BLD: 19 % (ref 24–43)
MCH RBC QN AUTO: 31.9 PG (ref 25.2–33.5)
MCHC RBC AUTO-ENTMCNC: 31.4 G/DL (ref 28.4–34.8)
MCV RBC AUTO: 101.7 FL (ref 82.6–102.9)
MONOCYTES # BLD: 13 % (ref 3–12)
NRBC AUTOMATED: 0 PER 100 WBC
PARTIAL THROMBOPLASTIN TIME: 69.7 SEC (ref 20.5–30.5)
PDW BLD-RTO: 13 % (ref 11.8–14.4)
PLATELET # BLD: 166 K/UL (ref 138–453)
PLATELET ESTIMATE: ABNORMAL
PMV BLD AUTO: 11.5 FL (ref 8.1–13.5)
POTASSIUM SERPL-SCNC: 4.5 MMOL/L (ref 3.7–5.3)
RBC # BLD: 5.17 M/UL (ref 4.21–5.77)
RBC # BLD: ABNORMAL 10*6/UL
SEG NEUTROPHILS: 63 % (ref 36–65)
SEGMENTED NEUTROPHILS ABSOLUTE COUNT: 3.56 K/UL (ref 1.5–8.1)
SODIUM BLD-SCNC: 137 MMOL/L (ref 135–144)
WBC # BLD: 5.7 K/UL (ref 3.5–11.3)
WBC # BLD: ABNORMAL 10*3/UL

## 2020-05-15 PROCEDURE — 7100000001 HC PACU RECOVERY - ADDTL 15 MIN

## 2020-05-15 PROCEDURE — 3700000000 HC ANESTHESIA ATTENDED CARE

## 2020-05-15 PROCEDURE — 85730 THROMBOPLASTIN TIME PARTIAL: CPT

## 2020-05-15 PROCEDURE — 2709999900 HC NON-CHARGEABLE SUPPLY

## 2020-05-15 PROCEDURE — 2500000003 HC RX 250 WO HCPCS: Performed by: SPECIALIST

## 2020-05-15 PROCEDURE — 6370000000 HC RX 637 (ALT 250 FOR IP): Performed by: STUDENT IN AN ORGANIZED HEALTH CARE EDUCATION/TRAINING PROGRAM

## 2020-05-15 PROCEDURE — 2500000003 HC RX 250 WO HCPCS

## 2020-05-15 PROCEDURE — 2580000003 HC RX 258: Performed by: STUDENT IN AN ORGANIZED HEALTH CARE EDUCATION/TRAINING PROGRAM

## 2020-05-15 PROCEDURE — 04V03DZ RESTRICTION OF ABDOMINAL AORTA WITH INTRALUMINAL DEVICE, PERCUTANEOUS APPROACH: ICD-10-PCS | Performed by: SURGERY

## 2020-05-15 PROCEDURE — 7100000000 HC PACU RECOVERY - FIRST 15 MIN

## 2020-05-15 PROCEDURE — 3700000001 HC ADD 15 MINUTES (ANESTHESIA)

## 2020-05-15 PROCEDURE — 6360000002 HC RX W HCPCS: Performed by: STUDENT IN AN ORGANIZED HEALTH CARE EDUCATION/TRAINING PROGRAM

## 2020-05-15 PROCEDURE — 6360000002 HC RX W HCPCS: Performed by: ANESTHESIOLOGY

## 2020-05-15 PROCEDURE — 2000000000 HC ICU R&B

## 2020-05-15 PROCEDURE — 82947 ASSAY GLUCOSE BLOOD QUANT: CPT

## 2020-05-15 PROCEDURE — 2580000003 HC RX 258: Performed by: SPECIALIST

## 2020-05-15 PROCEDURE — 6360000002 HC RX W HCPCS

## 2020-05-15 PROCEDURE — 2500000003 HC RX 250 WO HCPCS: Performed by: ANESTHESIOLOGY

## 2020-05-15 PROCEDURE — C1894 INTRO/SHEATH, NON-LASER: HCPCS

## 2020-05-15 PROCEDURE — C1725 CATH, TRANSLUMIN NON-LASER: HCPCS

## 2020-05-15 PROCEDURE — 6360000002 HC RX W HCPCS: Performed by: SPECIALIST

## 2020-05-15 PROCEDURE — 34705 EVAC RPR A-BIILIAC NDGFT: CPT | Performed by: SURGERY

## 2020-05-15 PROCEDURE — 36415 COLL VENOUS BLD VENIPUNCTURE: CPT

## 2020-05-15 PROCEDURE — 80048 BASIC METABOLIC PNL TOTAL CA: CPT

## 2020-05-15 PROCEDURE — C1769 GUIDE WIRE: HCPCS

## 2020-05-15 PROCEDURE — G0269 OCCLUSIVE DEVICE IN VEIN ART: HCPCS | Performed by: SURGERY

## 2020-05-15 PROCEDURE — 85025 COMPLETE CBC W/AUTO DIFF WBC: CPT

## 2020-05-15 PROCEDURE — 99291 CRITICAL CARE FIRST HOUR: CPT | Performed by: INTERNAL MEDICINE

## 2020-05-15 PROCEDURE — 34713 PERQ ACCESS & CLSR FEM ART: CPT | Performed by: SURGERY

## 2020-05-15 PROCEDURE — C1887 CATHETER, GUIDING: HCPCS

## 2020-05-15 PROCEDURE — C1760 CLOSURE DEV, VASC: HCPCS

## 2020-05-15 PROCEDURE — 6360000004 HC RX CONTRAST MEDICATION

## 2020-05-15 PROCEDURE — 2780000010 HC IMPLANT OTHER

## 2020-05-15 RX ORDER — CARVEDILOL 3.12 MG/1
3.12 TABLET ORAL 2 TIMES DAILY WITH MEALS
Status: DISCONTINUED | OUTPATIENT
Start: 2020-05-15 | End: 2020-05-16

## 2020-05-15 RX ORDER — HEPARIN SODIUM 10000 [USP'U]/100ML
9.4 INJECTION, SOLUTION INTRAVENOUS CONTINUOUS
Status: CANCELLED | OUTPATIENT
Start: 2020-05-15

## 2020-05-15 RX ORDER — HYDRALAZINE HYDROCHLORIDE 20 MG/ML
10 INJECTION INTRAMUSCULAR; INTRAVENOUS EVERY 6 HOURS PRN
Status: DISCONTINUED | OUTPATIENT
Start: 2020-05-15 | End: 2020-05-20 | Stop reason: HOSPADM

## 2020-05-15 RX ORDER — FENTANYL CITRATE 50 UG/ML
25 INJECTION, SOLUTION INTRAMUSCULAR; INTRAVENOUS EVERY 5 MIN PRN
Status: COMPLETED | OUTPATIENT
Start: 2020-05-15 | End: 2020-05-15

## 2020-05-15 RX ORDER — LIDOCAINE HYDROCHLORIDE 10 MG/ML
INJECTION, SOLUTION EPIDURAL; INFILTRATION; INTRACAUDAL; PERINEURAL PRN
Status: DISCONTINUED | OUTPATIENT
Start: 2020-05-15 | End: 2020-05-15 | Stop reason: SDUPTHER

## 2020-05-15 RX ORDER — HEPARIN SODIUM 1000 [USP'U]/ML
4000 INJECTION, SOLUTION INTRAVENOUS; SUBCUTANEOUS PRN
Status: CANCELLED | OUTPATIENT
Start: 2020-05-15

## 2020-05-15 RX ORDER — ONDANSETRON 2 MG/ML
4 INJECTION INTRAMUSCULAR; INTRAVENOUS
Status: ACTIVE | OUTPATIENT
Start: 2020-05-15 | End: 2020-05-15

## 2020-05-15 RX ORDER — LABETALOL HYDROCHLORIDE 5 MG/ML
5 INJECTION, SOLUTION INTRAVENOUS EVERY 10 MIN PRN
Status: DISCONTINUED | OUTPATIENT
Start: 2020-05-15 | End: 2020-05-20 | Stop reason: HOSPADM

## 2020-05-15 RX ORDER — HYDROCODONE BITARTRATE AND ACETAMINOPHEN 5; 325 MG/1; MG/1
1 TABLET ORAL EVERY 4 HOURS PRN
Status: CANCELLED | OUTPATIENT
Start: 2020-05-15

## 2020-05-15 RX ORDER — ACETAMINOPHEN 325 MG/1
650 TABLET ORAL EVERY 4 HOURS PRN
Status: CANCELLED | OUTPATIENT
Start: 2020-05-15

## 2020-05-15 RX ORDER — SODIUM CHLORIDE 0.9 % (FLUSH) 0.9 %
10 SYRINGE (ML) INJECTION PRN
Status: CANCELLED | OUTPATIENT
Start: 2020-05-15

## 2020-05-15 RX ORDER — HYDRALAZINE HYDROCHLORIDE 20 MG/ML
INJECTION INTRAMUSCULAR; INTRAVENOUS
Status: COMPLETED
Start: 2020-05-15 | End: 2020-05-15

## 2020-05-15 RX ORDER — HEPARIN SODIUM 1000 [USP'U]/ML
INJECTION, SOLUTION INTRAVENOUS; SUBCUTANEOUS PRN
Status: DISCONTINUED | OUTPATIENT
Start: 2020-05-15 | End: 2020-05-15 | Stop reason: SDUPTHER

## 2020-05-15 RX ORDER — EPHEDRINE SULFATE/0.9% NACL/PF 50 MG/5 ML
SYRINGE (ML) INTRAVENOUS PRN
Status: DISCONTINUED | OUTPATIENT
Start: 2020-05-15 | End: 2020-05-15 | Stop reason: SDUPTHER

## 2020-05-15 RX ORDER — FENTANYL CITRATE 50 UG/ML
25 INJECTION, SOLUTION INTRAMUSCULAR; INTRAVENOUS ONCE
Status: COMPLETED | OUTPATIENT
Start: 2020-05-15 | End: 2020-05-15

## 2020-05-15 RX ORDER — FENTANYL CITRATE 50 UG/ML
25 INJECTION, SOLUTION INTRAMUSCULAR; INTRAVENOUS EVERY 5 MIN PRN
Status: DISCONTINUED | OUTPATIENT
Start: 2020-05-15 | End: 2020-05-15

## 2020-05-15 RX ORDER — MORPHINE SULFATE 2 MG/ML
2 INJECTION, SOLUTION INTRAMUSCULAR; INTRAVENOUS EVERY 5 MIN PRN
Status: DISCONTINUED | OUTPATIENT
Start: 2020-05-15 | End: 2020-05-15

## 2020-05-15 RX ORDER — HYDROCODONE BITARTRATE AND ACETAMINOPHEN 5; 325 MG/1; MG/1
2 TABLET ORAL EVERY 4 HOURS PRN
Status: CANCELLED | OUTPATIENT
Start: 2020-05-15

## 2020-05-15 RX ORDER — OXYCODONE HYDROCHLORIDE AND ACETAMINOPHEN 5; 325 MG/1; MG/1
2 TABLET ORAL PRN
Status: DISCONTINUED | OUTPATIENT
Start: 2020-05-15 | End: 2020-05-15

## 2020-05-15 RX ORDER — DIPHENHYDRAMINE HYDROCHLORIDE 50 MG/ML
12.5 INJECTION INTRAMUSCULAR; INTRAVENOUS
Status: ACTIVE | OUTPATIENT
Start: 2020-05-15 | End: 2020-05-15

## 2020-05-15 RX ORDER — PHENYLEPHRINE HYDROCHLORIDE 10 MG/ML
INJECTION INTRAVENOUS PRN
Status: DISCONTINUED | OUTPATIENT
Start: 2020-05-15 | End: 2020-05-15 | Stop reason: SDUPTHER

## 2020-05-15 RX ORDER — ROCURONIUM BROMIDE 10 MG/ML
INJECTION, SOLUTION INTRAVENOUS PRN
Status: DISCONTINUED | OUTPATIENT
Start: 2020-05-15 | End: 2020-05-15 | Stop reason: SDUPTHER

## 2020-05-15 RX ORDER — SODIUM CHLORIDE 0.9 % (FLUSH) 0.9 %
10 SYRINGE (ML) INJECTION EVERY 12 HOURS SCHEDULED
Status: CANCELLED | OUTPATIENT
Start: 2020-05-15

## 2020-05-15 RX ORDER — FENTANYL CITRATE 50 UG/ML
INJECTION, SOLUTION INTRAMUSCULAR; INTRAVENOUS PRN
Status: DISCONTINUED | OUTPATIENT
Start: 2020-05-15 | End: 2020-05-15 | Stop reason: SDUPTHER

## 2020-05-15 RX ORDER — SODIUM CHLORIDE 9 MG/ML
INJECTION, SOLUTION INTRAVENOUS CONTINUOUS
Status: CANCELLED | OUTPATIENT
Start: 2020-05-15

## 2020-05-15 RX ORDER — OXYCODONE HYDROCHLORIDE AND ACETAMINOPHEN 5; 325 MG/1; MG/1
1 TABLET ORAL PRN
Status: DISCONTINUED | OUTPATIENT
Start: 2020-05-15 | End: 2020-05-15

## 2020-05-15 RX ORDER — PROPOFOL 10 MG/ML
INJECTION, EMULSION INTRAVENOUS PRN
Status: DISCONTINUED | OUTPATIENT
Start: 2020-05-15 | End: 2020-05-15 | Stop reason: SDUPTHER

## 2020-05-15 RX ORDER — CANDESARTAN 4 MG/1
4 TABLET ORAL DAILY
Status: DISCONTINUED | OUTPATIENT
Start: 2020-05-16 | End: 2020-05-15

## 2020-05-15 RX ORDER — CEFAZOLIN SODIUM 2 G/50ML
SOLUTION INTRAVENOUS PRN
Status: DISCONTINUED | OUTPATIENT
Start: 2020-05-15 | End: 2020-05-15 | Stop reason: SDUPTHER

## 2020-05-15 RX ORDER — HEPARIN SODIUM 1000 [USP'U]/ML
2000 INJECTION, SOLUTION INTRAVENOUS; SUBCUTANEOUS PRN
Status: CANCELLED | OUTPATIENT
Start: 2020-05-15

## 2020-05-15 RX ADMIN — FENTANYL CITRATE 50 MCG: 50 INJECTION INTRAMUSCULAR; INTRAVENOUS at 12:56

## 2020-05-15 RX ADMIN — PSYLLIUM HUSK 1 PACKET: 3.4 POWDER ORAL at 20:05

## 2020-05-15 RX ADMIN — FENTANYL CITRATE 25 MCG: 50 INJECTION, SOLUTION INTRAMUSCULAR; INTRAVENOUS at 15:00

## 2020-05-15 RX ADMIN — MORPHINE SULFATE 2 MG: 2 INJECTION, SOLUTION INTRAMUSCULAR; INTRAVENOUS at 01:23

## 2020-05-15 RX ADMIN — FENTANYL CITRATE 25 MCG: 50 INJECTION, SOLUTION INTRAMUSCULAR; INTRAVENOUS at 14:40

## 2020-05-15 RX ADMIN — OXYCODONE HYDROCHLORIDE 10 MG: 5 TABLET ORAL at 09:56

## 2020-05-15 RX ADMIN — PHENYLEPHRINE HYDROCHLORIDE 200 MCG: 10 INJECTION INTRAVENOUS at 12:25

## 2020-05-15 RX ADMIN — LIDOCAINE HYDROCHLORIDE 50 MG: 10 INJECTION, SOLUTION EPIDURAL; INFILTRATION; INTRACAUDAL; PERINEURAL at 12:15

## 2020-05-15 RX ADMIN — Medication 10 ML: at 19:58

## 2020-05-15 RX ADMIN — Medication 5 MG: at 15:55

## 2020-05-15 RX ADMIN — ANTACID TABLETS 500 MG: 500 TABLET, CHEWABLE ORAL at 03:44

## 2020-05-15 RX ADMIN — MORPHINE SULFATE 2 MG: 2 INJECTION, SOLUTION INTRAMUSCULAR; INTRAVENOUS at 08:03

## 2020-05-15 RX ADMIN — HEPARIN SODIUM 13.4 UNITS/KG/HR: 10000 INJECTION, SOLUTION INTRAVENOUS at 21:49

## 2020-05-15 RX ADMIN — SODIUM CHLORIDE: 9 INJECTION, SOLUTION INTRAVENOUS at 02:33

## 2020-05-15 RX ADMIN — CARVEDILOL 12.5 MG: 12.5 TABLET, FILM COATED ORAL at 00:37

## 2020-05-15 RX ADMIN — OXYCODONE HYDROCHLORIDE 10 MG: 5 TABLET ORAL at 02:32

## 2020-05-15 RX ADMIN — Medication 5 MG: at 20:51

## 2020-05-15 RX ADMIN — ROCURONIUM BROMIDE 50 MG: 10 INJECTION INTRAVENOUS at 12:15

## 2020-05-15 RX ADMIN — SODIUM CHLORIDE: 9 INJECTION, SOLUTION INTRAVENOUS at 12:05

## 2020-05-15 RX ADMIN — PROPOFOL 150 MG: 10 INJECTION, EMULSION INTRAVENOUS at 12:15

## 2020-05-15 RX ADMIN — Medication 10 MG: at 12:23

## 2020-05-15 RX ADMIN — FENTANYL CITRATE 25 MCG: 50 INJECTION, SOLUTION INTRAMUSCULAR; INTRAVENOUS at 21:48

## 2020-05-15 RX ADMIN — ROCURONIUM BROMIDE 20 MG: 10 INJECTION INTRAVENOUS at 12:53

## 2020-05-15 RX ADMIN — Medication 10 MG: at 12:29

## 2020-05-15 RX ADMIN — Medication 10 MG: at 12:20

## 2020-05-15 RX ADMIN — HEPARIN SODIUM 7000 UNITS: 1000 INJECTION INTRAVENOUS; SUBCUTANEOUS at 13:01

## 2020-05-15 RX ADMIN — CEFAZOLIN SODIUM 2000 G: 2 SOLUTION INTRAVENOUS at 12:25

## 2020-05-15 RX ADMIN — FENTANYL CITRATE 50 MCG: 50 INJECTION INTRAMUSCULAR; INTRAVENOUS at 12:11

## 2020-05-15 RX ADMIN — OXYCODONE HYDROCHLORIDE 10 MG: 5 TABLET ORAL at 21:17

## 2020-05-15 RX ADMIN — SODIUM CHLORIDE: 9 INJECTION, SOLUTION INTRAVENOUS at 14:13

## 2020-05-15 RX ADMIN — MORPHINE SULFATE 2 MG: 2 INJECTION, SOLUTION INTRAMUSCULAR; INTRAVENOUS at 19:58

## 2020-05-15 RX ADMIN — FENTANYL CITRATE 25 MCG: 50 INJECTION, SOLUTION INTRAMUSCULAR; INTRAVENOUS at 14:45

## 2020-05-15 RX ADMIN — HYDRALAZINE HYDROCHLORIDE 10 MG: 20 INJECTION INTRAMUSCULAR; INTRAVENOUS at 21:47

## 2020-05-15 RX ADMIN — MORPHINE SULFATE 2 MG: 2 INJECTION, SOLUTION INTRAMUSCULAR; INTRAVENOUS at 15:58

## 2020-05-15 RX ADMIN — CARVEDILOL 3.12 MG: 3.12 TABLET, FILM COATED ORAL at 18:02

## 2020-05-15 RX ADMIN — SUGAMMADEX 437 MG: 100 INJECTION, SOLUTION INTRAVENOUS at 13:56

## 2020-05-15 RX ADMIN — PHENYLEPHRINE HYDROCHLORIDE 50 MCG/MIN: 10 INJECTION INTRAVENOUS at 12:50

## 2020-05-15 RX ADMIN — FENTANYL CITRATE 100 MCG: 50 INJECTION, SOLUTION INTRAMUSCULAR; INTRAVENOUS at 15:50

## 2020-05-15 RX ADMIN — HEPARIN SODIUM 13.4 UNITS/KG/HR: 10000 INJECTION, SOLUTION INTRAVENOUS at 01:23

## 2020-05-15 RX ADMIN — LORAZEPAM 1 MG: 1 TABLET ORAL at 15:58

## 2020-05-15 RX ADMIN — LORAZEPAM 1 MG: 1 TABLET ORAL at 08:03

## 2020-05-15 ASSESSMENT — PULMONARY FUNCTION TESTS
PIF_VALUE: 22
PIF_VALUE: 22
PIF_VALUE: 21
PIF_VALUE: 23
PIF_VALUE: 21
PIF_VALUE: 24
PIF_VALUE: 24
PIF_VALUE: 22
PIF_VALUE: 22
PIF_VALUE: 23
PIF_VALUE: 21
PIF_VALUE: 23
PIF_VALUE: 22
PIF_VALUE: 21
PIF_VALUE: 2
PIF_VALUE: 21
PIF_VALUE: 21
PIF_VALUE: 1
PIF_VALUE: 21
PIF_VALUE: 21
PIF_VALUE: 23
PIF_VALUE: 24
PIF_VALUE: 22
PIF_VALUE: 23
PIF_VALUE: 21
PIF_VALUE: 23
PIF_VALUE: 25
PIF_VALUE: 22
PIF_VALUE: 21
PIF_VALUE: 22
PIF_VALUE: 21
PIF_VALUE: 22
PIF_VALUE: 21
PIF_VALUE: 21
PIF_VALUE: 22
PIF_VALUE: 5
PIF_VALUE: 21
PIF_VALUE: 23
PIF_VALUE: 21
PIF_VALUE: 21
PIF_VALUE: 22
PIF_VALUE: 21
PIF_VALUE: 21
PIF_VALUE: 24
PIF_VALUE: 22
PIF_VALUE: 23
PIF_VALUE: 21
PIF_VALUE: 21
PIF_VALUE: 22
PIF_VALUE: 21
PIF_VALUE: 5
PIF_VALUE: 22
PIF_VALUE: 21
PIF_VALUE: 21
PIF_VALUE: 24
PIF_VALUE: 23
PIF_VALUE: 21
PIF_VALUE: 21
PIF_VALUE: 22
PIF_VALUE: 23
PIF_VALUE: 23
PIF_VALUE: 22
PIF_VALUE: 9
PIF_VALUE: 22
PIF_VALUE: 21
PIF_VALUE: 23
PIF_VALUE: 13
PIF_VALUE: 23
PIF_VALUE: 22
PIF_VALUE: 1
PIF_VALUE: 12
PIF_VALUE: 22
PIF_VALUE: 21
PIF_VALUE: 21
PIF_VALUE: 24
PIF_VALUE: 22
PIF_VALUE: 21
PIF_VALUE: 22
PIF_VALUE: 23
PIF_VALUE: 21
PIF_VALUE: 34
PIF_VALUE: 3
PIF_VALUE: 22
PIF_VALUE: 21
PIF_VALUE: 23
PIF_VALUE: 22
PIF_VALUE: 21
PIF_VALUE: 21
PIF_VALUE: 6
PIF_VALUE: 24
PIF_VALUE: 6

## 2020-05-15 ASSESSMENT — PAIN SCALES - GENERAL
PAINLEVEL_OUTOF10: 10
PAINLEVEL_OUTOF10: 8
PAINLEVEL_OUTOF10: 4
PAINLEVEL_OUTOF10: 10
PAINLEVEL_OUTOF10: 10
PAINLEVEL_OUTOF10: 4
PAINLEVEL_OUTOF10: 0
PAINLEVEL_OUTOF10: 10
PAINLEVEL_OUTOF10: 5
PAINLEVEL_OUTOF10: 10
PAINLEVEL_OUTOF10: 10
PAINLEVEL_OUTOF10: 5
PAINLEVEL_OUTOF10: 4
PAINLEVEL_OUTOF10: 9
PAINLEVEL_OUTOF10: 7
PAINLEVEL_OUTOF10: 10

## 2020-05-15 ASSESSMENT — PAIN DESCRIPTION - PROGRESSION: CLINICAL_PROGRESSION: GRADUALLY WORSENING

## 2020-05-15 ASSESSMENT — PAIN DESCRIPTION - PAIN TYPE
TYPE: ACUTE PAIN

## 2020-05-15 ASSESSMENT — PAIN DESCRIPTION - FREQUENCY
FREQUENCY: INTERMITTENT
FREQUENCY: CONTINUOUS

## 2020-05-15 ASSESSMENT — PAIN DESCRIPTION - DIRECTION
RADIATING_TOWARDS: RIGHT FLANK
RADIATING_TOWARDS: ABDOMEN
RADIATING_TOWARDS: ABDOMEN

## 2020-05-15 ASSESSMENT — PAIN DESCRIPTION - ORIENTATION
ORIENTATION: POSTERIOR
ORIENTATION: RIGHT
ORIENTATION: POSTERIOR
ORIENTATION: POSTERIOR

## 2020-05-15 ASSESSMENT — PAIN DESCRIPTION - LOCATION
LOCATION: ABDOMEN
LOCATION: BACK
LOCATION: BACK
LOCATION: ABDOMEN;BACK
LOCATION: BACK

## 2020-05-15 ASSESSMENT — PAIN DESCRIPTION - DESCRIPTORS
DESCRIPTORS: OTHER (COMMENT)
DESCRIPTORS: DULL
DESCRIPTORS: OTHER (COMMENT)

## 2020-05-15 ASSESSMENT — LIFESTYLE VARIABLES: SMOKING_STATUS: 0

## 2020-05-15 ASSESSMENT — PAIN DESCRIPTION - ONSET
ONSET: ON-GOING

## 2020-05-15 NOTE — PROGRESS NOTES
Temp 98.5 °F (36.9 °C) (Oral)   Resp 19   Ht 5' 11\" (1.803 m)   Wt 240 lb 14.4 oz (109.3 kg)   SpO2 100%   BMI 33.60 kg/m²   Tmax over 24 hours:  Temp (24hrs), Av.4 °F (36.9 °C), Min:97.9 °F (36.6 °C), Max:99.1 °F (37.3 °C)      Patient Vitals for the past 8 hrs:   BP Temp Temp src Pulse Resp SpO2   05/15/20 0630 122/68 -- -- 88 19 100 %   05/15/20 0600 117/61 -- -- 88 11 100 %   05/15/20 0530 108/64 -- -- 93 11 100 %   05/15/20 0500 (!) 103/59 -- -- 94 13 90 %   05/15/20 0442 (!) 82/54 -- -- 88 11 92 %   05/15/20 0430 (!) 81/38 -- -- 92 13 92 %   05/15/20 0400 105/74 98.5 °F (36.9 °C) Oral 94 16 92 %   05/15/20 0330 (!) 95/50 -- -- 97 21 96 %   05/15/20 0300 125/89 -- -- 95 15 95 %   05/15/20 0230 (!) 145/98 -- -- 93 12 96 %   05/15/20 0200 133/79 -- -- 92 17 94 %   05/15/20 0130 (!) 134/93 -- -- 92 17 95 %   05/15/20 0100 (!) 147/80 -- -- 92 12 93 %   05/15/20 0037 (!) 158/72 -- -- -- -- --   05/15/20 0030 (!) 157/100 -- -- 94 14 --   05/15/20 0000 -- 99.1 °F (37.3 °C) Axillary 91 24 (!) 88 %   20 2330 (!) 93/54 -- -- 93 16 93 %         Intake/Output Summary (Last 24 hours) at 5/15/2020 0724  Last data filed at 5/15/2020 0700  Gross per 24 hour   Intake 1736.67 ml   Output 1400 ml   Net 336.67 ml     Date 05/15/20 0000 - 05/15/20 2359   Shift 4757-4506 8979-0026 9009-5513 24 Hour Total   INTAKE   I.V.(mL/kg) 318(2.9)   318(2.9)   Shift Total(mL/kg) 318(2.9)   318(2.9)   OUTPUT   Urine(mL/kg/hr) 650   650   Shift Total(mL/kg) 650(5.9)   650(5.9)   Weight (kg) 109.3 109.3 109.3 109.3     Wt Readings from Last 3 Encounters:   20 240 lb 14.4 oz (109.3 kg)   20 240 lb (108.9 kg)   11/15/19 235 lb 12.8 oz (107 kg)     Body mass index is 33.6 kg/m². PHYSICAL EXAM:  Constitutional: Awake, alert  HEENT: sclera clear, anicteric  Respiratory: clear to auscultation, no wheezes or rales.  Speaking in full sentences and paragraphs  Cardiovascular: regular rate and rhythm, normal S1, S2, no disposition. Remain in ICU until AAA repair  8. Disposition. Remain in ICU      Eli Mitchell DO  5/15/2020 7:24 AM      Attending Physician Statement  I have discussed the care of Yen Acosta, including pertinent history and exam findings with the resident. I have reviewed the key elements of all parts of the encounter with the resident. I have seen and examined the patient with the resident. I agree with the assessment and plan and status of the problem list as documented. I seen the patient during my rounds this morning, I have reviewed the chart, results of cardiac catheterization seen in cardiology note seen. Patient is alert and awake when I saw him he was on room air intermittently he is on nasal cannula did not use noninvasive ventilation overnight, according to the nursing staff overnight he was slightly agitated he is currently alert and awake follows command. Plan noted by vascular surgery for endovascular repair of AAA today. He is currently on Coreg blood pressure is controlled Coreg was decreased from 6.25-3.125 twice daily his heart rate is around 100 to 110 and calcium channel blocker was DC'd by cardiology and he is currently off ARB   Although echocardiogram shows ejection fraction of 33% but cardiac catheterization shows LV function is better 45%. Total critical care time caring for this patient with life threatening, unstable organ failure, including direct patient contact, management of life support systems, review of data including imaging and labs, discussions with other team members and physicians at least 27  Min so far today, excluding procedures. Please note that this chart was generated using voice recognition Dragon dictation software. Although every effort was made to ensure the accuracy of this automated transcription, some errors in transcription may have occurred.         Karri Hinds MD  5/15/2020 11:04 AM

## 2020-05-15 NOTE — PROGRESS NOTES
[address]  Occupational Therapy Not Seen Note     Patient not available for Occupational Therapy due to:     [] Testing:     [] Hemodialysis     [] Blood Transfusion in Progress     []Refusal by Patient:       [x] Surgery/Procedure: AAA repair today per chart      [] Strict Bedrest     [] Sedation     [] Spine Precautions      [] Pt being transferred to palliative care at this time. Spoke with pt/family and OT services to be defered.     [] Pt independent with functional mobility and functional tasks.  Pt with no OT acute care needs at this time, will defer OT eval.     [] Other    Next Scheduled Treatment: 5/16/2020     Signature: JESSICA Pelaez/L

## 2020-05-15 NOTE — PROGRESS NOTES
Physical Therapy  DATE: 5/15/2020    NAME: Mandy Carballo  MRN: [id number]   : 1945    Patient not seen this date for Physical Therapy due to:  [] Blood transfusion in progress  [] Hemodialysis  []  Patient Declined  [] Spine Precautions   [] Strict Bedrest  [x] Surgery/ Procedure: Plan for OR today for AAA repair. PT will check back post-op on 20. [] Testing      [] Other        [] PT being discontinued at this time. Patient independent. No further needs. [] PT being discontinued at this time as the patient has been transferred to palliative care. No further needs.     Bushra Sal, PT

## 2020-05-15 NOTE — OP NOTE
Operative Note      Patient: Tremayne Smiley  YOB: 1945  MRN: [id number]    Date of Procedure: 5/15/2020    Pre-Op Diagnosis:  8cm infrarenal AAA    Post-Op Diagnosis: Same         Procedure:  1) ultrasound-guided access bilateral common femoral arteries  2) endovascular repair of abdominal aortic aneurysm with endurance aortic stent graft    Surgeon:  Lakisha Carreon MD      Assistant:  None    Anesthesia: General    Estimated Blood Loss (mL): 25 mL    Complications: None    Specimens:   * Cannot find log *    Implants:  * No surgery found *      Drains: * No LDAs found *    Findings: Abdominal aortic aneurysm was successfully excluded with no evidence of endoleak at the conclusion of the case    Indications and description of operative procedure: This is a 61-year-old male who presented with back pain and no known history of aneurysm. CT scan of the abdomen pelvis showed a 8 cm infrarenal aneurysm. He underwent preoperative cardiac risk stratification. After the risks benefits alternatives were discussed informed consent was obtained. The patient was brought to the angios suite and placed supine the abdomen and groins were cleaned and prepped in usual fashion sterile drapes were applied. Ultrasound was used to identify the bilateral common femoral arteries. The right side was addressed first.  A micropuncture needle and catheter were used to access the right common femoral artery. 2 percutaneous closure devices were deployed at 10:00 and 2:00. At this point an 8 Western Zayda sheath was placed through the right femoral access. This was then repeated via the left femoral access site. Once again ultrasound was used to identify the common femoral artery and a micropuncture needle catheter were used to gain access. 2 additional percutaneous closure devices were used to pre-close the left femoral arteriotomy and an 8 Macedonian sheath was placed.   A Lunderquist wire was placed through the right femoral

## 2020-05-15 NOTE — CARE COORDINATION
Care Transition  Met with patient, he denies any skilled needs. Plan is to go home independent with son.

## 2020-05-15 NOTE — PROGRESS NOTES
Pt argumentative overnight, at one point threatening to leave AMA as he was having difficulty sleeping. Complains about feeling constipated but refuses Linzess, Metamucil and PRNs. Complains that he \"hasn't eaten for 5 days\" but refuses to eat anything even after vigorous encouragement to take in protein. Had complaints of back pain overnight \"from lying in this terrible bed\" but denied any abd pain, SOA, CP, etc.     Spoke with his son at length on the phone for about 45 minutes, which seemed to help him feel better. NPO for procedure at noon. Can have sips with meds. BP increased overnight to SBP 150s-160s. Gave his Coreg at 0100, as his evening dose had been held d/t lower BP at that time. BP within ordered parameters since that time.

## 2020-05-15 NOTE — PROGRESS NOTES
[facility]   Progress Note                   Date:   5/15/2020  Patient name: Kasia Precise  Date of admission:  5/11/2020  8:26 PM  MRN:   [id number]  YOB: 1945  PCP: CARMITA Gonzales CNP    Reason for Admission:      Subjective:      No acute events overnight  Going for surgery today  Blood pressure on the lower side  Cath was done yesterday  Non-obstructive CAD. Mildly impaired ventricular function       Medications:   Scheduled Meds:   carvedilol  12.5 mg Oral BID WC    sodium chloride flush  10 mL Intravenous 2 times per day    pantoprazole  40 mg Oral QAM AC    candesartan  32 mg Oral Daily    linaclotide  290 mcg Oral QAM AC    sodium chloride flush  10 mL Intravenous 2 times per day    insulin lispro  0-12 Units Subcutaneous TID WC    insulin lispro  0-6 Units Subcutaneous Nightly    psyllium  1 packet Oral TID       Continuous Infusions:   heparin (porcine) 13.4 Units/kg/hr (05/15/20 0438)    dextrose      sodium chloride 75 mL/hr at 05/15/20 0233       CBC:   Recent Labs     05/13/20  0450 05/14/20  0422 05/15/20  0309   WBC 7.9 7.7 5.7   HGB 15.7 15.9 16.5    153 166     BMP:    Recent Labs     05/13/20  0450 05/14/20  0422 05/15/20  0309    137 137   K 5.1 4.8 4.5    102 101   CO2 19* 23 21   BUN 18 18 14   CREATININE 1.10 1.18 1.09   GLUCOSE 107* 96 100*     Hepatic:   No results for input(s): AST, ALT, ALB, BILITOT, ALKPHOS in the last 72 hours. Troponin: No results for input(s): TROPONINI in the last 72 hours. BNP: No results for input(s): BNP in the last 72 hours. Lipids: No results for input(s): CHOL, HDL in the last 72 hours. Invalid input(s): LDLCALCU  INR:   No results for input(s): INR in the last 72 hours.     Objective:   Vitals: /61   Pulse 88   Temp 99.1 °F (37.3 °C) (Axillary)   Resp 11   Ht 5' 11\" (1.803 m)   Wt 240 lb 14.4 oz (109.3 kg)   SpO2 100%   BMI 33.60 kg/m²     General appearance: awake, alert, in no apparent respiratory distress   HEENT: Head: Normocephalic, no lesions, without obvious abnormality  Neck: no JVD  Lungs: clear to auscultation bilaterally, no basilar rales, no wheezing   Heart: ir regular rate and rhythm, S1, S2 normal, no murmur, click, rub or gallop  Extremities: No LE edema  Neurologic: Mental status: Alert, oriented. Motor and sensory not done. EKG: A fib on tele monitor. Echocardiogram:  Left ventricle is normal in size. Global left ventricular systolic function  is moderately reduced. Calculated ejection fraction is 33 % by heart Model . Evidence of diastolic dysfunction. Left atrium is moderately dilated. Right atrium is severely dilated . Severely dilated right ventricular cavity. Normal right ventricular  function. Pacemaker / ICD lead seen in right ventricle. Mild mitral regurgitation. Moderate tricuspid regurgitation. Mild pulmonary hypertension. Estimated right ventricular systolic pressure  is 28JMKE. Mild pulmonic insufficiency. Cath  5/14/2020  Findings:      LMCA: Normal 0% stenosis.     LAD: Diffuse irregularities 30-40%.      Lesion on Prox LAD: 40% stenosis.      Lesion on Mid LAD: 40% stenosis.     LCx: Mild irregularities 20-30%.     RCA: Single stenosis.      Lesion on Prox RCA: Ostial.40% stenosis.       LV Analysis  LV function assessed as:Abnormal.  Ejection Fraction: 45%           Conclusions:  Non-obstructive CAD.   Mildly impaired ventricular function. 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 New onset drop in ejection fraction. Cath showed non obstructive CAD     Treatment Plan:   1. Device interrogation showed poor atrial impedence. Continue VVI mode. 2. EP will follow as outpatient regarding atrial lead  3. Decrease coreg dose to 3.125. Monitor BP and heart rate   4.  Hold ARB for today and we will titrate the BP medications after surgery  5. Ok to proceed with intermediate risk  6. On heparin gtt but on hold due to surgery     Discussed with patient and nursing. Lee Ely MD  Fellow cardiology      Attending Cardiologist Addendum: I have reviewed and performed the history, physical, subjective, objective, assessment, and plan with the resident/fellow and agree with the note. I performed the history and physical personally. I have made changes to the note above as needed. Intermediate risk for Vascular surgery- plan for repair today noted  Plan for NOAC once okay with Vascular    Thank you for allowing me to participate in the care of this patient, please do not hesitate to call if you have any questions. Severa Solum, DO, [address] [facility], [address] [facility]  Merged with [facility]. [place] [facility]  [phone]

## 2020-05-15 NOTE — ANESTHESIA POSTPROCEDURE EVALUATION
Department of Anesthesiology  Postprocedure Note    Patient: Betzaida La  MRN: [id number]  YOB: 1945  Date of evaluation: 5/15/2020  Time:  2:32 PM     Procedure Summary     Date:  05/15/20 Room / Location:  [facility] Cath Lab    Anesthesia Start:  5177 Anesthesia Stop:  4962    Procedure:  AAA ENDOGRAFT W/ ANESTHESIA Diagnosis:      Scheduled Providers:   Responsible Provider:  Barbra Bellamy MD    Anesthesia Type:  general ASA Status:  4          Anesthesia Type: general    Lala Phase I:      Lala Phase II:      Last vitals: Reviewed and per EMR flowsheets.        Anesthesia Post Evaluation    Patient location during evaluation: PACU  Patient participation: complete - patient participated  Level of consciousness: awake and alert  Pain score: 3  Airway patency: patent  Nausea & Vomiting: no vomiting and no nausea  Complications: no  Cardiovascular status: hemodynamically stable  Respiratory status: acceptable  Hydration status: stable

## 2020-05-15 NOTE — ANESTHESIA PRE PROCEDURE
Department of Anesthesiology  Preprocedure Note       Name:  Sona Richards   Age:  69 y.o.  :  1945                                          MRN:  [id number]         Date:  5/15/2020      Surgeon: * Surgery not found *    Procedure:     Department of Anesthesiology  Pre-Anesthesia Evaluation/Consultation         Name:  Sona Richards                                         Age:  69 y.o.   MRN:  [id number]             Medications  Current Facility-Administered Medications   Medication Dose Route Frequency Provider Last Rate Last Dose    carvedilol (COREG) tablet 3.125 mg  3.125 mg Oral BID  Lyudmila Sarmiento MD        sodium chloride flush 0.9 % injection 10 mL  10 mL Intravenous 2 times per day Zina Clifton MD   10 mL at 20 2138    sodium chloride flush 0.9 % injection 10 mL  10 mL Intravenous PRN Zina Clifton MD        acetaminophen (TYLENOL) tablet 650 mg  650 mg Oral Q4H PRN Zina Clifton MD        magnesium hydroxide (MILK OF MAGNESIA) 400 MG/5ML suspension 30 mL  30 mL Oral Daily PRN Zina Clifton MD        pantoprazole (PROTONIX) tablet 40 mg  40 mg Oral QAM AC Shayan Chan MD   40 mg at 20 213    calcium carbonate (TUMS) chewable tablet 500 mg  500 mg Oral TID PRN Shayan Chan MD   500 mg at 05/15/20 0344    morphine (PF) injection 2 mg  2 mg Intravenous Q4H PRN Zina Clifton MD   2 mg at 05/15/20 0803    linaclotide (LINZESS) capsule 290 mcg  290 mcg Oral QAM AC Zina Clifton MD        sodium chloride flush 0.9 % injection 10 mL  10 mL Intravenous 2 times per day Zina Clifton MD   10 mL at 20 0813    sodium chloride flush 0.9 % injection 10 mL  10 mL Intravenous PRN Zina Clifton MD        acetaminophen (TYLENOL) tablet 650 mg  650 mg Oral Q6H PRN Zina Clifton MD   650 mg at 20 0522    Or    acetaminophen (TYLENOL) suppository 650 mg  650 mg Rectal Q6H PRN Zina Clifton MD        polyethylene glycol (GLYCOLAX) packet 17 g  17 g Oral Daily PRN oz (109.3 kg)   02/18/20 240 lb (108.9 kg)   11/15/19 235 lb 12.8 oz (107 kg)     Body mass index is 33.6 kg/m².     CBC:   Lab Results   Component Value Date    WBC 5.7 05/15/2020    RBC 5.17 05/15/2020    RBC 4.71 02/13/2012    HGB 16.5 05/15/2020    HCT 52.6 05/15/2020    .7 05/15/2020    RDW 13.0 05/15/2020     05/15/2020     02/13/2012       CMP:   Lab Results   Component Value Date     05/15/2020    K 4.5 05/15/2020     05/15/2020    CO2 21 05/15/2020    BUN 14 05/15/2020    CREATININE 1.09 05/15/2020    GFRAA >60 05/15/2020    LABGLOM >60 05/15/2020    GLUCOSE 100 05/15/2020    GLUCOSE 98 02/13/2012    PROT 7.8 05/11/2020    CALCIUM 9.4 05/15/2020    BILITOT 0.40 05/11/2020    ALKPHOS 136 05/11/2020    AST 23 05/11/2020    ALT 21 05/11/2020       POC Tests:   Recent Labs     05/14/20 2057   POCGLU 91       Coags:   Lab Results   Component Value Date    PROTIME 13.3 05/12/2020    PROTIME 26.9 11/15/2019    INR 1.3 05/12/2020    APTT 69.7 05/15/2020       HCG (If Applicable): No results found for: PREGTESTUR, PREGSERUM, HCG, HCGQUANT     ABGs: No results found for: PHART, PO2ART, CUE3JOE, YCU0XZD, BEART, V7GOMUKR     Type & Screen (If Applicable):  No results found for: LABABO, LABRH    Drug/Infectious Status (If Applicable):  Lab Results   Component Value Date    HEPCAB NONREACTIVE 01/09/2017       COVID-19 Screening (If Applicable):   Lab Results   Component Value Date    COVID19 Not Detected 05/12/2020         Anesthesia Evaluation   no history of anesthetic complications:   Airway: Mallampati: II     Neck ROM: full   Dental:          Pulmonary:   (+) COPD:      (-) recent URI and not a current smoker                          ROS comment: 20 pk trs   Cardiovascular:    (+) hypertension:, pacemaker: pacemaker, CAD: non-obstructive, dysrhythmias: atrial fibrillation,                ROS comment: Pacer poor atrial impedance     Neuro/Psych:   (+) neuromuscular disease:,    (-) seizures and CVA           GI/Hepatic/Renal:             Endo/Other:    (+) Diabetes, . Abdominal:           Vascular:   + PVD, aortic or cerebral, . Anesthesia Plan      general     ASA 4     (Asa 4)  Induction: intravenous.   arterial line                        Aubrey Meza MD   5/15/2020

## 2020-05-16 ENCOUNTER — APPOINTMENT (OUTPATIENT)
Dept: GENERAL RADIOLOGY | Age: 75
DRG: 182 | End: 2020-05-16
Payer: COMMERCIAL

## 2020-05-16 LAB
ABSOLUTE EOS #: 0 K/UL (ref 0–0.44)
ABSOLUTE IMMATURE GRANULOCYTE: 0.14 K/UL (ref 0–0.3)
ABSOLUTE LYMPH #: 0.56 K/UL (ref 1.1–3.7)
ABSOLUTE MONO #: 1.55 K/UL (ref 0.1–1.2)
ANION GAP SERPL CALCULATED.3IONS-SCNC: 20 MMOL/L (ref 9–17)
BASOPHILS # BLD: 1 % (ref 0–2)
BASOPHILS ABSOLUTE: 0.14 K/UL (ref 0–0.2)
BUN BLDV-MCNC: 11 MG/DL (ref 8–23)
BUN/CREAT BLD: ABNORMAL (ref 9–20)
CALCIUM SERPL-MCNC: 8.5 MG/DL (ref 8.6–10.4)
CHLORIDE BLD-SCNC: 98 MMOL/L (ref 98–107)
CO2: 18 MMOL/L (ref 20–31)
CREAT SERPL-MCNC: 0.88 MG/DL (ref 0.7–1.2)
DIFFERENTIAL TYPE: ABNORMAL
EOSINOPHILS RELATIVE PERCENT: 0 % (ref 1–4)
GFR AFRICAN AMERICAN: >60 ML/MIN
GFR NON-AFRICAN AMERICAN: >60 ML/MIN
GFR SERPL CREATININE-BSD FRML MDRD: ABNORMAL ML/MIN/{1.73_M2}
GFR SERPL CREATININE-BSD FRML MDRD: ABNORMAL ML/MIN/{1.73_M2}
GLUCOSE BLD-MCNC: 105 MG/DL (ref 70–99)
GLUCOSE BLD-MCNC: 130 MG/DL (ref 75–110)
GLUCOSE BLD-MCNC: 131 MG/DL (ref 75–110)
GLUCOSE BLD-MCNC: 145 MG/DL (ref 75–110)
HCT VFR BLD CALC: 47.2 % (ref 40.7–50.3)
HEMOGLOBIN: 14.8 G/DL (ref 13–17)
IMMATURE GRANULOCYTES: 1 %
LYMPHOCYTES # BLD: 4 % (ref 24–43)
MCH RBC QN AUTO: 32.2 PG (ref 25.2–33.5)
MCHC RBC AUTO-ENTMCNC: 31.4 G/DL (ref 28.4–34.8)
MCV RBC AUTO: 102.6 FL (ref 82.6–102.9)
MONOCYTES # BLD: 11 % (ref 3–12)
MORPHOLOGY: NORMAL
NRBC AUTOMATED: 0 PER 100 WBC
PARTIAL THROMBOPLASTIN TIME: 52.7 SEC (ref 20.5–30.5)
PARTIAL THROMBOPLASTIN TIME: 64.9 SEC (ref 20.5–30.5)
PDW BLD-RTO: 12.9 % (ref 11.8–14.4)
PLATELET # BLD: 158 K/UL (ref 138–453)
PLATELET ESTIMATE: ABNORMAL
PMV BLD AUTO: 11.8 FL (ref 8.1–13.5)
POTASSIUM SERPL-SCNC: 4.5 MMOL/L (ref 3.7–5.3)
RBC # BLD: 4.6 M/UL (ref 4.21–5.77)
RBC # BLD: ABNORMAL 10*6/UL
SEG NEUTROPHILS: 83 % (ref 36–65)
SEGMENTED NEUTROPHILS ABSOLUTE COUNT: 11.71 K/UL (ref 1.5–8.1)
SODIUM BLD-SCNC: 136 MMOL/L (ref 135–144)
WBC # BLD: 14.1 K/UL (ref 3.5–11.3)
WBC # BLD: ABNORMAL 10*3/UL

## 2020-05-16 PROCEDURE — 6360000002 HC RX W HCPCS: Performed by: STUDENT IN AN ORGANIZED HEALTH CARE EDUCATION/TRAINING PROGRAM

## 2020-05-16 PROCEDURE — 82947 ASSAY GLUCOSE BLOOD QUANT: CPT

## 2020-05-16 PROCEDURE — 6370000000 HC RX 637 (ALT 250 FOR IP): Performed by: STUDENT IN AN ORGANIZED HEALTH CARE EDUCATION/TRAINING PROGRAM

## 2020-05-16 PROCEDURE — 99291 CRITICAL CARE FIRST HOUR: CPT | Performed by: INTERNAL MEDICINE

## 2020-05-16 PROCEDURE — C9248 INJ, CLEVIDIPINE BUTYRATE: HCPCS | Performed by: STUDENT IN AN ORGANIZED HEALTH CARE EDUCATION/TRAINING PROGRAM

## 2020-05-16 PROCEDURE — 71045 X-RAY EXAM CHEST 1 VIEW: CPT

## 2020-05-16 PROCEDURE — 6370000000 HC RX 637 (ALT 250 FOR IP): Performed by: INTERNAL MEDICINE

## 2020-05-16 PROCEDURE — 85025 COMPLETE CBC W/AUTO DIFF WBC: CPT

## 2020-05-16 PROCEDURE — 85730 THROMBOPLASTIN TIME PARTIAL: CPT

## 2020-05-16 PROCEDURE — 97162 PT EVAL MOD COMPLEX 30 MIN: CPT

## 2020-05-16 PROCEDURE — 2580000003 HC RX 258: Performed by: STUDENT IN AN ORGANIZED HEALTH CARE EDUCATION/TRAINING PROGRAM

## 2020-05-16 PROCEDURE — 2000000000 HC ICU R&B

## 2020-05-16 PROCEDURE — 36415 COLL VENOUS BLD VENIPUNCTURE: CPT

## 2020-05-16 PROCEDURE — 80048 BASIC METABOLIC PNL TOTAL CA: CPT

## 2020-05-16 PROCEDURE — 97530 THERAPEUTIC ACTIVITIES: CPT

## 2020-05-16 PROCEDURE — 6360000002 HC RX W HCPCS: Performed by: INTERNAL MEDICINE

## 2020-05-16 PROCEDURE — 2500000003 HC RX 250 WO HCPCS: Performed by: ANESTHESIOLOGY

## 2020-05-16 RX ORDER — METOPROLOL TARTRATE 50 MG/1
50 TABLET, FILM COATED ORAL 2 TIMES DAILY
Status: DISCONTINUED | OUTPATIENT
Start: 2020-05-16 | End: 2020-05-18

## 2020-05-16 RX ORDER — UREA 10 %
5 LOTION (ML) TOPICAL ONCE
Status: COMPLETED | OUTPATIENT
Start: 2020-05-16 | End: 2020-05-16

## 2020-05-16 RX ORDER — DIGOXIN 125 MCG
125 TABLET ORAL DAILY
Status: DISCONTINUED | OUTPATIENT
Start: 2020-05-17 | End: 2020-05-20 | Stop reason: HOSPADM

## 2020-05-16 RX ORDER — DOCUSATE SODIUM 100 MG/1
100 CAPSULE, LIQUID FILLED ORAL DAILY
Status: DISCONTINUED | OUTPATIENT
Start: 2020-05-16 | End: 2020-05-17

## 2020-05-16 RX ORDER — DIGOXIN 0.25 MG/ML
250 INJECTION INTRAMUSCULAR; INTRAVENOUS EVERY 6 HOURS
Status: COMPLETED | OUTPATIENT
Start: 2020-05-16 | End: 2020-05-16

## 2020-05-16 RX ADMIN — Medication 10 ML: at 08:05

## 2020-05-16 RX ADMIN — DIGOXIN 250 MCG: 0.25 INJECTION INTRAMUSCULAR; INTRAVENOUS at 10:44

## 2020-05-16 RX ADMIN — MORPHINE SULFATE 2 MG: 2 INJECTION, SOLUTION INTRAMUSCULAR; INTRAVENOUS at 06:12

## 2020-05-16 RX ADMIN — ANTACID TABLETS 500 MG: 500 TABLET, CHEWABLE ORAL at 06:16

## 2020-05-16 RX ADMIN — METOPROLOL TARTRATE 50 MG: 50 TABLET, FILM COATED ORAL at 10:44

## 2020-05-16 RX ADMIN — DOCUSATE SODIUM 100 MG: 100 CAPSULE, LIQUID FILLED ORAL at 17:19

## 2020-05-16 RX ADMIN — PSYLLIUM HUSK 1 PACKET: 3.4 POWDER ORAL at 08:14

## 2020-05-16 RX ADMIN — PSYLLIUM HUSK 1 PACKET: 3.4 POWDER ORAL at 13:55

## 2020-05-16 RX ADMIN — LORAZEPAM 1 MG: 1 TABLET ORAL at 06:12

## 2020-05-16 RX ADMIN — Medication 10 ML: at 20:38

## 2020-05-16 RX ADMIN — PANTOPRAZOLE SODIUM 40 MG: 40 TABLET, DELAYED RELEASE ORAL at 06:13

## 2020-05-16 RX ADMIN — Medication 10 ML: at 20:37

## 2020-05-16 RX ADMIN — DIGOXIN 250 MCG: 0.25 INJECTION INTRAMUSCULAR; INTRAVENOUS at 13:59

## 2020-05-16 RX ADMIN — CLEVIPIDINE 2 MG/HR: 0.5 EMULSION INTRAVENOUS at 00:54

## 2020-05-16 RX ADMIN — MORPHINE SULFATE 2 MG: 2 INJECTION, SOLUTION INTRAMUSCULAR; INTRAVENOUS at 20:05

## 2020-05-16 RX ADMIN — METOPROLOL TARTRATE 50 MG: 50 TABLET, FILM COATED ORAL at 20:39

## 2020-05-16 RX ADMIN — MAGNESIUM HYDROXIDE 30 ML: 400 SUSPENSION ORAL at 13:55

## 2020-05-16 RX ADMIN — OXYCODONE HYDROCHLORIDE 10 MG: 5 TABLET ORAL at 03:57

## 2020-05-16 RX ADMIN — Medication 5 MG: at 02:45

## 2020-05-16 RX ADMIN — DIGOXIN 250 MCG: 0.25 INJECTION INTRAMUSCULAR; INTRAVENOUS at 19:53

## 2020-05-16 RX ADMIN — MORPHINE SULFATE 2 MG: 2 INJECTION, SOLUTION INTRAMUSCULAR; INTRAVENOUS at 02:12

## 2020-05-16 RX ADMIN — Medication 5 MG: at 08:13

## 2020-05-16 RX ADMIN — HEPARIN SODIUM 13.4 UNITS/KG/HR: 10000 INJECTION, SOLUTION INTRAVENOUS at 10:57

## 2020-05-16 RX ADMIN — POLYETHYLENE GLYCOL 3350 17 G: 17 POWDER, FOR SOLUTION ORAL at 08:12

## 2020-05-16 RX ADMIN — Medication 10 ML: at 08:15

## 2020-05-16 ASSESSMENT — PAIN DESCRIPTION - PROGRESSION
CLINICAL_PROGRESSION: GRADUALLY WORSENING

## 2020-05-16 ASSESSMENT — PAIN DESCRIPTION - PAIN TYPE
TYPE: ACUTE PAIN

## 2020-05-16 ASSESSMENT — PAIN SCALES - GENERAL
PAINLEVEL_OUTOF10: 9
PAINLEVEL_OUTOF10: 10
PAINLEVEL_OUTOF10: 3
PAINLEVEL_OUTOF10: 5
PAINLEVEL_OUTOF10: 9
PAINLEVEL_OUTOF10: 4
PAINLEVEL_OUTOF10: 9
PAINLEVEL_OUTOF10: 4
PAINLEVEL_OUTOF10: 10
PAINLEVEL_OUTOF10: 8

## 2020-05-16 ASSESSMENT — PAIN DESCRIPTION - LOCATION
LOCATION: BACK;FLANK
LOCATION: BACK;FLANK
LOCATION: BACK
LOCATION: ABDOMEN;GROIN;FLANK
LOCATION: BACK
LOCATION: BACK

## 2020-05-16 ASSESSMENT — PAIN DESCRIPTION - ORIENTATION
ORIENTATION: POSTERIOR
ORIENTATION: RIGHT

## 2020-05-16 ASSESSMENT — PAIN DESCRIPTION - FREQUENCY
FREQUENCY: CONTINUOUS

## 2020-05-16 ASSESSMENT — PAIN DESCRIPTION - DIRECTION
RADIATING_TOWARDS: ABDOMEN

## 2020-05-16 ASSESSMENT — PAIN DESCRIPTION - ONSET
ONSET: ON-GOING

## 2020-05-16 ASSESSMENT — PAIN DESCRIPTION - DESCRIPTORS
DESCRIPTORS: OTHER (COMMENT)

## 2020-05-16 NOTE — PROGRESS NOTES
PT. Pt alert in bed upon arrivla. Pain Screening  Patient Currently in Pain: Yes  Pain Assessment  Pain Assessment: 0-10  Pain Level: 5  Pain Type: Acute pain  Pain Location: Abdomen;Groin;Flank  Pain Orientation: Right(R lowers abd into groin wrapping around R side to back. From Sx, per pt.)  Non-Pharmaceutical Pain Intervention(s): Ambulation/Increased Activity;Repositioned; Emotional support  Response to Pain Intervention: Patient Satisfied  Vital Signs  Patient Currently in Pain: Yes  Pre Treatment Pain Screening  Intervention List: Patient able to continue with treatment    Orientation  Orientation  Overall Orientation Status: Within Functional Limits  Social/Functional History  Social/Functional History  Lives With: Son  Type of Home: House  Home Layout: One level  Home Access: Stairs to enter with rails  Entrance Stairs - Number of Steps: 3  Entrance Stairs - Rails: Both  Bathroom Shower/Tub: Tub/Shower unit  Bathroom Equipment: Grab bars in shower  Bathroom Accessibility: Accessible  Home Equipment: (none)  ADL Assistance: [address] [place] [address] Avenue: Independent  Homemaking Responsibilities: Yes  Ambulation Assistance: Independent  Transfer Assistance: Independent  Active : No(d/t poor eyesite)  Patient's  Info: son or niece  Additional Comments: [facility] reprots ambulatory at [facility]        Objective          AROM RLE (degrees)  RLE AROM: WFL  AROM LLE (degrees)  LLE AROM : WFL  AROM RUE (degrees)  RUE AROM : WFL  AROM LUE (degrees)  LUE AROM : WFL  Strength RLE  Strength RLE: WFL  Comment: 4+  Strength LLE  Strength LLE: WFL  Comment: 4+  Strength RUE  Strength RUE: WFL  Strength LUE  Strength LUE: WFL     Sensation  Overall Sensation Status: WFL(Pt denies any numbness or tingling)  Bed mobility  Supine to Sit: Minimal assistance  Sit to Supine: (left in chair)  Scooting: Contact guard assistance  Transfers  Sit to Stand: Contact guard assistance  Stand to sit: Contact guard PT

## 2020-05-16 NOTE — PROGRESS NOTES
[facility]   Progress Note                   Date:   5/16/2020  Patient name: Ana Jones  Date of admission:  5/11/2020  8:26 PM  MRN:   [id number]  YOB: 1945  PCP: CARMITA Reynolds CNP    Reason for Admission:      Subjective:      Patient underwent surgery yesterday. Patient has been hypertensive overnight and required Cardene drip. Patient is currently in A. fib with RVR. Medications:   Scheduled Meds:   metoprolol tartrate  50 mg Oral BID    digoxin  250 mcg Intravenous Q6H    [START ON 5/17/2020] digoxin  125 mcg Oral Daily    sodium chloride flush  10 mL Intravenous 2 times per day    pantoprazole  40 mg Oral QAM AC    linaclotide  290 mcg Oral QAM AC    sodium chloride flush  10 mL Intravenous 2 times per day    insulin lispro  0-12 Units Subcutaneous TID WC    insulin lispro  0-6 Units Subcutaneous Nightly    psyllium  1 packet Oral TID       Continuous Infusions:   clevidipine Stopped (05/16/20 0641)    heparin (porcine) 13.4 Units/kg/hr (05/16/20 1057)    dextrose      sodium chloride 75 mL/hr at 05/15/20 0233       CBC:   Recent Labs     05/14/20  0422 05/15/20  0309 05/16/20  0518   WBC 7.7 5.7 14.1*   HGB 15.9 16.5 14.8    166 158     BMP:    Recent Labs     05/14/20  0422 05/15/20  0309 05/16/20  0518    137 136   K 4.8 4.5 4.5    101 98   CO2 23 21 18*   BUN 18 14 11   CREATININE 1.18 1.09 0.88   GLUCOSE 96 100* 105*     Hepatic:   No results for input(s): AST, ALT, ALB, BILITOT, ALKPHOS in the last 72 hours. Troponin: No results for input(s): TROPONINI in the last 72 hours. BNP: No results for input(s): BNP in the last 72 hours. Lipids: No results for input(s): CHOL, HDL in the last 72 hours. Invalid input(s): LDLCALCU  INR:   No results for input(s): INR in the last 72 hours.     Objective:   Vitals: /73   Pulse 119   Temp 98.8 °F (37.1 °C) (Oral)   Resp 17   Ht 5' 11\" (1.803 m)   Wt 244 lb (110.7 Patient Attended Listed Candidate Seminar

## 2020-05-16 NOTE — PROGRESS NOTES
hours.    AMYLASE/LIPASE/AMMONIA  No results for input(s): AMYLASE, LIPASE, AMMONIA in the last 72 hours. Last 3 Blood Glucose:   Recent Labs     05/14/20  0422 05/15/20  0309 05/16/20  0518   GLUCOSE 96 100* 105*      HgBA1c:    Lab Results   Component Value Date    LABA1C 6.0 02/18/2020         TSH:    Lab Results   Component Value Date    TSH 2.27 11/08/2013     ANEMIA STUDIES  No results for input(s): LABIRON, TIBC, FERRITIN, [id number], FOLATE, OCCULTBLD in the last 72 hours. Cultures during this admission:     Blood cultures:                 [x] None drawn      [] Negative             []  Positive (Details:  )  Urine Culture:                   [x] None drawn      [] Negative             []  Positive (Details:  )  Sputum Culture:               [x] None drawn       [] Negative             []  Positive (Details:  )     ASSESSMENT:     Active Problems:    HTN (hypertension)    COPD (chronic obstructive pulmonary disease) ([facility])    Atrial fibrillation, chronic    Pacemaker    Constipation    Aneurysm of infrarenal abdominal aorta ([facility])    CLIFTON (acute kidney injury) ([facility] [address]    Hyperkalemia  Resolved Problems:    * No resolved hospital problems. *        PLAN:     1. Infrarenal abdominal aortic aneurysm. 8 cm. Vascular surgery following, plan for surgery on 5/15/2020. Maintain SBP less than 140. Cardiac cath 5/14 with non-obstructive CAD, EF 45%. Endovascular repair of AAA with stent graft 5/15 by Dr. Gabriel Barajas. 2. Chronic atrial fibrillation. Home warfarin held. We will continue heparin infusion. Coreg 3.125 BID per cardiology. 3. Hypertension. Blood pressure controlled. Hydralazine PRN and cleviprex PRN. 4. Diabetes type 2. Diet controlled. Will start medium dose insulin correction scale. 5. Constipation on home Linzess and psyllium. Patient refuses at this time. We will continue to monitor. 6. Left lower lobe lung nodule.   Will need outpatient PET scan/CT-guided needle biopsy once acute issues

## 2020-05-17 ENCOUNTER — APPOINTMENT (OUTPATIENT)
Dept: GENERAL RADIOLOGY | Age: 75
DRG: 182 | End: 2020-05-17
Payer: COMMERCIAL

## 2020-05-17 LAB
ABSOLUTE EOS #: 0.13 K/UL (ref 0–0.44)
ABSOLUTE IMMATURE GRANULOCYTE: 0.13 K/UL (ref 0–0.3)
ABSOLUTE LYMPH #: 0.8 K/UL (ref 1.1–3.7)
ABSOLUTE MONO #: 1.6 K/UL (ref 0.1–1.2)
ANION GAP SERPL CALCULATED.3IONS-SCNC: 15 MMOL/L (ref 9–17)
BASOPHILS # BLD: 1 % (ref 0–2)
BASOPHILS ABSOLUTE: 0.13 K/UL (ref 0–0.2)
BUN BLDV-MCNC: 11 MG/DL (ref 8–23)
BUN/CREAT BLD: ABNORMAL (ref 9–20)
CALCIUM SERPL-MCNC: 8.7 MG/DL (ref 8.6–10.4)
CHLORIDE BLD-SCNC: 99 MMOL/L (ref 98–107)
CO2: 23 MMOL/L (ref 20–31)
CREAT SERPL-MCNC: 0.77 MG/DL (ref 0.7–1.2)
DIFFERENTIAL TYPE: ABNORMAL
EOSINOPHILS RELATIVE PERCENT: 1 % (ref 1–4)
GFR AFRICAN AMERICAN: >60 ML/MIN
GFR NON-AFRICAN AMERICAN: >60 ML/MIN
GFR SERPL CREATININE-BSD FRML MDRD: ABNORMAL ML/MIN/{1.73_M2}
GFR SERPL CREATININE-BSD FRML MDRD: ABNORMAL ML/MIN/{1.73_M2}
GLUCOSE BLD-MCNC: 120 MG/DL (ref 75–110)
GLUCOSE BLD-MCNC: 126 MG/DL (ref 70–99)
GLUCOSE BLD-MCNC: 126 MG/DL (ref 75–110)
GLUCOSE BLD-MCNC: 127 MG/DL (ref 75–110)
GLUCOSE BLD-MCNC: 145 MG/DL (ref 75–110)
HCT VFR BLD CALC: 47.5 % (ref 40.7–50.3)
HEMOGLOBIN: 14.8 G/DL (ref 13–17)
IMMATURE GRANULOCYTES: 1 %
LYMPHOCYTES # BLD: 6 % (ref 24–43)
MCH RBC QN AUTO: 31.9 PG (ref 25.2–33.5)
MCHC RBC AUTO-ENTMCNC: 31.2 G/DL (ref 28.4–34.8)
MCV RBC AUTO: 102.4 FL (ref 82.6–102.9)
MONOCYTES # BLD: 12 % (ref 3–12)
MORPHOLOGY: NORMAL
NRBC AUTOMATED: 0 PER 100 WBC
PARTIAL THROMBOPLASTIN TIME: 62.5 SEC (ref 20.5–30.5)
PDW BLD-RTO: 13.1 % (ref 11.8–14.4)
PLATELET # BLD: 156 K/UL (ref 138–453)
PLATELET ESTIMATE: ABNORMAL
PMV BLD AUTO: 11.4 FL (ref 8.1–13.5)
POTASSIUM SERPL-SCNC: 4.1 MMOL/L (ref 3.7–5.3)
RBC # BLD: 4.64 M/UL (ref 4.21–5.77)
RBC # BLD: ABNORMAL 10*6/UL
SEG NEUTROPHILS: 79 % (ref 36–65)
SEGMENTED NEUTROPHILS ABSOLUTE COUNT: 10.51 K/UL (ref 1.5–8.1)
SODIUM BLD-SCNC: 137 MMOL/L (ref 135–144)
WBC # BLD: 13.3 K/UL (ref 3.5–11.3)
WBC # BLD: ABNORMAL 10*3/UL

## 2020-05-17 PROCEDURE — 6370000000 HC RX 637 (ALT 250 FOR IP): Performed by: STUDENT IN AN ORGANIZED HEALTH CARE EDUCATION/TRAINING PROGRAM

## 2020-05-17 PROCEDURE — 6360000002 HC RX W HCPCS: Performed by: STUDENT IN AN ORGANIZED HEALTH CARE EDUCATION/TRAINING PROGRAM

## 2020-05-17 PROCEDURE — 80048 BASIC METABOLIC PNL TOTAL CA: CPT

## 2020-05-17 PROCEDURE — 6370000000 HC RX 637 (ALT 250 FOR IP): Performed by: EMERGENCY MEDICINE

## 2020-05-17 PROCEDURE — 2500000003 HC RX 250 WO HCPCS: Performed by: STUDENT IN AN ORGANIZED HEALTH CARE EDUCATION/TRAINING PROGRAM

## 2020-05-17 PROCEDURE — 2580000003 HC RX 258: Performed by: STUDENT IN AN ORGANIZED HEALTH CARE EDUCATION/TRAINING PROGRAM

## 2020-05-17 PROCEDURE — 99291 CRITICAL CARE FIRST HOUR: CPT | Performed by: INTERNAL MEDICINE

## 2020-05-17 PROCEDURE — 6370000000 HC RX 637 (ALT 250 FOR IP): Performed by: INTERNAL MEDICINE

## 2020-05-17 PROCEDURE — 85025 COMPLETE CBC W/AUTO DIFF WBC: CPT

## 2020-05-17 PROCEDURE — 2060000000 HC ICU INTERMEDIATE R&B

## 2020-05-17 PROCEDURE — 36415 COLL VENOUS BLD VENIPUNCTURE: CPT

## 2020-05-17 PROCEDURE — 85730 THROMBOPLASTIN TIME PARTIAL: CPT

## 2020-05-17 PROCEDURE — 82947 ASSAY GLUCOSE BLOOD QUANT: CPT

## 2020-05-17 PROCEDURE — 97530 THERAPEUTIC ACTIVITIES: CPT

## 2020-05-17 PROCEDURE — 97116 GAIT TRAINING THERAPY: CPT

## 2020-05-17 PROCEDURE — 71045 X-RAY EXAM CHEST 1 VIEW: CPT

## 2020-05-17 PROCEDURE — 74018 RADEX ABDOMEN 1 VIEW: CPT

## 2020-05-17 RX ORDER — DILTIAZEM HYDROCHLORIDE 5 MG/ML
10 INJECTION INTRAVENOUS ONCE
Status: COMPLETED | OUTPATIENT
Start: 2020-05-17 | End: 2020-05-17

## 2020-05-17 RX ORDER — DILTIAZEM HYDROCHLORIDE 240 MG/1
240 CAPSULE, COATED, EXTENDED RELEASE ORAL DAILY
Status: DISCONTINUED | OUTPATIENT
Start: 2020-05-17 | End: 2020-05-19

## 2020-05-17 RX ORDER — DOCUSATE SODIUM 100 MG/1
100 CAPSULE, LIQUID FILLED ORAL 2 TIMES DAILY
Status: DISCONTINUED | OUTPATIENT
Start: 2020-05-17 | End: 2020-05-20 | Stop reason: HOSPADM

## 2020-05-17 RX ORDER — METOPROLOL TARTRATE 5 MG/5ML
5 INJECTION INTRAVENOUS ONCE
Status: COMPLETED | OUTPATIENT
Start: 2020-05-17 | End: 2020-05-17

## 2020-05-17 RX ADMIN — DILTIAZEM HYDROCHLORIDE 10 MG: 5 INJECTION INTRAVENOUS at 07:50

## 2020-05-17 RX ADMIN — MORPHINE SULFATE 2 MG: 2 INJECTION, SOLUTION INTRAMUSCULAR; INTRAVENOUS at 00:29

## 2020-05-17 RX ADMIN — HEPARIN SODIUM 13.4 UNITS/KG/HR: 10000 INJECTION, SOLUTION INTRAVENOUS at 18:17

## 2020-05-17 RX ADMIN — METOPROLOL TARTRATE 50 MG: 50 TABLET, FILM COATED ORAL at 07:38

## 2020-05-17 RX ADMIN — MAGNESIUM HYDROXIDE 30 ML: 400 SUSPENSION ORAL at 07:53

## 2020-05-17 RX ADMIN — POLYETHYLENE GLYCOL 3350 17 G: 17 POWDER, FOR SOLUTION ORAL at 07:45

## 2020-05-17 RX ADMIN — MORPHINE SULFATE 2 MG: 2 INJECTION, SOLUTION INTRAMUSCULAR; INTRAVENOUS at 02:55

## 2020-05-17 RX ADMIN — MORPHINE SULFATE 2 MG: 2 INJECTION, SOLUTION INTRAMUSCULAR; INTRAVENOUS at 06:17

## 2020-05-17 RX ADMIN — METOPROLOL TARTRATE 5 MG: 5 INJECTION, SOLUTION INTRAVENOUS at 04:03

## 2020-05-17 RX ADMIN — METOPROLOL TARTRATE 50 MG: 50 TABLET, FILM COATED ORAL at 21:09

## 2020-05-17 RX ADMIN — DILTIAZEM HYDROCHLORIDE 240 MG: 240 CAPSULE, COATED, EXTENDED RELEASE ORAL at 11:26

## 2020-05-17 RX ADMIN — Medication 10 ML: at 08:38

## 2020-05-17 RX ADMIN — DOCUSATE SODIUM 100 MG: 100 CAPSULE, LIQUID FILLED ORAL at 07:37

## 2020-05-17 RX ADMIN — PSYLLIUM HUSK 1 PACKET: 3.4 POWDER ORAL at 08:07

## 2020-05-17 RX ADMIN — HEPARIN SODIUM 13.4 UNITS/KG/HR: 10000 INJECTION, SOLUTION INTRAVENOUS at 01:48

## 2020-05-17 RX ADMIN — LORAZEPAM 1 MG: 1 TABLET ORAL at 21:09

## 2020-05-17 RX ADMIN — DIGOXIN 125 MCG: 125 TABLET ORAL at 07:37

## 2020-05-17 RX ADMIN — PANTOPRAZOLE SODIUM 40 MG: 40 TABLET, DELAYED RELEASE ORAL at 06:17

## 2020-05-17 RX ADMIN — LORAZEPAM 1 MG: 1 TABLET ORAL at 00:29

## 2020-05-17 RX ADMIN — OXYCODONE HYDROCHLORIDE 10 MG: 5 TABLET ORAL at 07:36

## 2020-05-17 RX ADMIN — Medication 10 ML: at 08:08

## 2020-05-17 RX ADMIN — Medication 10 ML: at 21:10

## 2020-05-17 RX ADMIN — LORAZEPAM 1 MG: 1 TABLET ORAL at 07:57

## 2020-05-17 ASSESSMENT — PAIN DESCRIPTION - PAIN TYPE
TYPE: ACUTE PAIN
TYPE: ACUTE PAIN

## 2020-05-17 ASSESSMENT — PAIN SCALES - GENERAL
PAINLEVEL_OUTOF10: 0
PAINLEVEL_OUTOF10: 0
PAINLEVEL_OUTOF10: 8
PAINLEVEL_OUTOF10: 6
PAINLEVEL_OUTOF10: 10
PAINLEVEL_OUTOF10: 10
PAINLEVEL_OUTOF10: 9
PAINLEVEL_OUTOF10: 2

## 2020-05-17 ASSESSMENT — PAIN DESCRIPTION - LOCATION
LOCATION: BACK
LOCATION: BACK

## 2020-05-17 NOTE — PROGRESS NOTES
[] Anuric    OBJECTIVE:     VITAL SIGNS:  BP (!) 176/151   Pulse 114   Temp 97.8 °F (36.6 °C) (Oral)   Resp 17   Ht 5' 11\" (1.803 m)   Wt 244 lb (110.7 kg)   SpO2 98%   BMI 34.03 kg/m²   Tmax over 24 hours:  Temp (24hrs), Av.2 °F (36.8 °C), Min:97.3 °F (36.3 °C), Max:99 °F (37.2 °C)      Patient Vitals for the past 8 hrs:   BP Temp Temp src Pulse Resp SpO2   20 0609 (!) 176/151 -- -- 114 17 98 %   20 0600 (!) 172/113 -- -- 119 17 98 %   20 0524 (!) 148/94 -- -- 118 18 --   20 0500 (!) 182/115 -- -- 115 18 97 %   20 0400 (!) 166/82 97.8 °F (36.6 °C) Oral 114 18 96 %   20 0300 (!) 164/98 -- -- 110 15 97 %   20 0200 (!) 147/64 -- -- 111 22 97 %   20 0113 (!) 149/97 -- -- 105 18 97 %   20 0000 (!) 145/82 97.3 °F (36.3 °C) Oral 107 21 93 %         Intake/Output Summary (Last 24 hours) at 2020 0727  Last data filed at 2020 2739  Gross per 24 hour   Intake 1546 ml   Output 2070 ml   Net -524 ml     Date 20 0000 - 20 2359   Shift 8744-0610 0176-5633 0219-3197 24 Hour Total   INTAKE   P.O.(mL/kg/hr) 120   120   I. V.(mL/kg) 112(1)   112(1)   Shift Total(mL/kg) 232(2.1)   232(2.1)   OUTPUT   Urine(mL/kg/hr) 765   765   Shift Total(mL/kg) 765(6.9)   765(6.9)   Weight (kg) 110.7 110.7 110.7 110.7     Wt Readings from Last 3 Encounters:   20 244 lb (110.7 kg)   20 240 lb (108.9 kg)   11/15/19 235 lb 12.8 oz (107 kg)     Body mass index is 34.03 kg/m². PHYSICAL EXAM:  Constitutional: Awake, alert  HEENT: sclera clear, anicteric  Respiratory: clear to auscultation, no wheezes or rales. Speaking in full sentences and paragraphs  Cardiovascular: tachycardic rate, irregularly irregular rhythm, normal S1, S2, no murmur noted   Abdomen: soft, nontender, nondistended  NEUROLOGIC: Awake, alert.  TORRES  Extremities:  no pedal edema, well perfused and warm  Skin: small amount of ecchymosis to right groin, no

## 2020-05-17 NOTE — PROGRESS NOTES
[facility]   Progress Note                   Date:   5/17/2020  Patient name: Mely Almanza  Date of admission:  5/11/2020  8:26 PM  MRN:   [id number]  YOB: 1945  PCP: CARMITA Conley CNP    Reason for Admission:      Subjective:      Patient underwent surgery yesterday. Patient remains in atrial fibrillation with heart rate varying between 100s-140s, but mostly sustaining in 110s. Medications:   Scheduled Meds:   magnesium hydroxide  30 mL Oral Daily    docusate sodium  100 mg Oral BID    dilTIAZem  240 mg Oral Daily    metoprolol tartrate  50 mg Oral BID    digoxin  125 mcg Oral Daily    sodium chloride flush  10 mL Intravenous 2 times per day    pantoprazole  40 mg Oral QAM AC    linaclotide  290 mcg Oral QAM AC    sodium chloride flush  10 mL Intravenous 2 times per day    insulin lispro  0-12 Units Subcutaneous TID WC    insulin lispro  0-6 Units Subcutaneous Nightly    psyllium  1 packet Oral TID       Continuous Infusions:   heparin (porcine) 13.4 Units/kg/hr (05/17/20 0148)    dextrose         CBC:   Recent Labs     05/15/20  0309 05/16/20  0518 05/17/20  0501   WBC 5.7 14.1* 13.3*   HGB 16.5 14.8 14.8    158 156     BMP:    Recent Labs     05/15/20  0309 05/16/20 0518 05/17/20  0501    136 137   K 4.5 4.5 4.1    98 99   CO2 21 18* 23   BUN 14 11 11   CREATININE 1.09 0.88 0.77   GLUCOSE 100* 105* 126*     Hepatic:   No results for input(s): AST, ALT, ALB, BILITOT, ALKPHOS in the last 72 hours. Troponin: No results for input(s): TROPONINI in the last 72 hours. BNP: No results for input(s): BNP in the last 72 hours. Lipids: No results for input(s): CHOL, HDL in the last 72 hours. Invalid input(s): LDLCALCU  INR:   No results for input(s): INR in the last 72 hours.     Objective:   Vitals: /76   Pulse 130   Temp 98.4 °F (36.9 °C) (Oral)   Resp 19   Ht 5' 11\" (1.803 m)   Wt 244 lb (110.7 kg)   SpO2 98%   BMI 34.03

## 2020-05-18 ENCOUNTER — TELEPHONE (OUTPATIENT)
Dept: PULMONOLOGY | Age: 75
End: 2020-05-18

## 2020-05-18 PROBLEM — E44.0 MODERATE MALNUTRITION (HCC): Status: ACTIVE | Noted: 2020-05-18

## 2020-05-18 LAB
ABSOLUTE EOS #: 0.14 K/UL (ref 0–0.44)
ABSOLUTE IMMATURE GRANULOCYTE: 0.04 K/UL (ref 0–0.3)
ABSOLUTE LYMPH #: 0.92 K/UL (ref 1.1–3.7)
ABSOLUTE MONO #: 1.26 K/UL (ref 0.1–1.2)
ANION GAP SERPL CALCULATED.3IONS-SCNC: 11 MMOL/L (ref 9–17)
BASOPHILS # BLD: 1 % (ref 0–2)
BASOPHILS ABSOLUTE: 0.05 K/UL (ref 0–0.2)
BUN BLDV-MCNC: 19 MG/DL (ref 8–23)
BUN/CREAT BLD: ABNORMAL (ref 9–20)
CALCIUM SERPL-MCNC: 8.8 MG/DL (ref 8.6–10.4)
CHLORIDE BLD-SCNC: 101 MMOL/L (ref 98–107)
CO2: 22 MMOL/L (ref 20–31)
CREAT SERPL-MCNC: 1.05 MG/DL (ref 0.7–1.2)
DIFFERENTIAL TYPE: ABNORMAL
EOSINOPHILS RELATIVE PERCENT: 1 % (ref 1–4)
GFR AFRICAN AMERICAN: >60 ML/MIN
GFR NON-AFRICAN AMERICAN: >60 ML/MIN
GFR SERPL CREATININE-BSD FRML MDRD: ABNORMAL ML/MIN/{1.73_M2}
GFR SERPL CREATININE-BSD FRML MDRD: ABNORMAL ML/MIN/{1.73_M2}
GLUCOSE BLD-MCNC: 102 MG/DL (ref 75–110)
GLUCOSE BLD-MCNC: 105 MG/DL (ref 75–110)
GLUCOSE BLD-MCNC: 108 MG/DL (ref 75–110)
GLUCOSE BLD-MCNC: 118 MG/DL (ref 70–99)
HCT VFR BLD CALC: 47 % (ref 40.7–50.3)
HEMOGLOBIN: 14.9 G/DL (ref 13–17)
IMMATURE GRANULOCYTES: 0 %
LYMPHOCYTES # BLD: 9 % (ref 24–43)
MCH RBC QN AUTO: 32.5 PG (ref 25.2–33.5)
MCHC RBC AUTO-ENTMCNC: 31.7 G/DL (ref 28.4–34.8)
MCV RBC AUTO: 102.4 FL (ref 82.6–102.9)
MONOCYTES # BLD: 12 % (ref 3–12)
NRBC AUTOMATED: 0 PER 100 WBC
PARTIAL THROMBOPLASTIN TIME: 54 SEC (ref 20.5–30.5)
PDW BLD-RTO: 13.2 % (ref 11.8–14.4)
PLATELET # BLD: 141 K/UL (ref 138–453)
PLATELET ESTIMATE: ABNORMAL
PMV BLD AUTO: 11.1 FL (ref 8.1–13.5)
POTASSIUM SERPL-SCNC: 4 MMOL/L (ref 3.7–5.3)
RBC # BLD: 4.59 M/UL (ref 4.21–5.77)
RBC # BLD: ABNORMAL 10*6/UL
SEG NEUTROPHILS: 77 % (ref 36–65)
SEGMENTED NEUTROPHILS ABSOLUTE COUNT: 8.1 K/UL (ref 1.5–8.1)
SODIUM BLD-SCNC: 134 MMOL/L (ref 135–144)
WBC # BLD: 10.5 K/UL (ref 3.5–11.3)
WBC # BLD: ABNORMAL 10*3/UL

## 2020-05-18 PROCEDURE — 99233 SBSQ HOSP IP/OBS HIGH 50: CPT | Performed by: HOSPITALIST

## 2020-05-18 PROCEDURE — 6370000000 HC RX 637 (ALT 250 FOR IP): Performed by: EMERGENCY MEDICINE

## 2020-05-18 PROCEDURE — 6370000000 HC RX 637 (ALT 250 FOR IP): Performed by: STUDENT IN AN ORGANIZED HEALTH CARE EDUCATION/TRAINING PROGRAM

## 2020-05-18 PROCEDURE — 85025 COMPLETE CBC W/AUTO DIFF WBC: CPT

## 2020-05-18 PROCEDURE — 82947 ASSAY GLUCOSE BLOOD QUANT: CPT

## 2020-05-18 PROCEDURE — 99233 SBSQ HOSP IP/OBS HIGH 50: CPT | Performed by: INTERNAL MEDICINE

## 2020-05-18 PROCEDURE — 80048 BASIC METABOLIC PNL TOTAL CA: CPT

## 2020-05-18 PROCEDURE — 2580000003 HC RX 258: Performed by: STUDENT IN AN ORGANIZED HEALTH CARE EDUCATION/TRAINING PROGRAM

## 2020-05-18 PROCEDURE — 6370000000 HC RX 637 (ALT 250 FOR IP): Performed by: NURSE PRACTITIONER

## 2020-05-18 PROCEDURE — 2060000000 HC ICU INTERMEDIATE R&B

## 2020-05-18 PROCEDURE — 6360000002 HC RX W HCPCS: Performed by: STUDENT IN AN ORGANIZED HEALTH CARE EDUCATION/TRAINING PROGRAM

## 2020-05-18 PROCEDURE — 36415 COLL VENOUS BLD VENIPUNCTURE: CPT

## 2020-05-18 PROCEDURE — 85730 THROMBOPLASTIN TIME PARTIAL: CPT

## 2020-05-18 PROCEDURE — 6370000000 HC RX 637 (ALT 250 FOR IP): Performed by: INTERNAL MEDICINE

## 2020-05-18 RX ADMIN — MAGNESIUM HYDROXIDE 30 ML: 400 SUSPENSION ORAL at 08:55

## 2020-05-18 RX ADMIN — Medication 10 ML: at 08:55

## 2020-05-18 RX ADMIN — DIGOXIN 125 MCG: 125 TABLET ORAL at 08:54

## 2020-05-18 RX ADMIN — Medication 10 ML: at 08:54

## 2020-05-18 RX ADMIN — METOPROLOL TARTRATE 25 MG: 25 TABLET ORAL at 11:49

## 2020-05-18 RX ADMIN — OXYCODONE HYDROCHLORIDE 10 MG: 5 TABLET ORAL at 08:54

## 2020-05-18 RX ADMIN — PANTOPRAZOLE SODIUM 40 MG: 40 TABLET, DELAYED RELEASE ORAL at 08:53

## 2020-05-18 RX ADMIN — METOPROLOL TARTRATE 50 MG: 50 TABLET, FILM COATED ORAL at 08:54

## 2020-05-18 RX ADMIN — DOCUSATE SODIUM 100 MG: 100 CAPSULE, LIQUID FILLED ORAL at 08:54

## 2020-05-18 RX ADMIN — HEPARIN SODIUM 13.4 UNITS/KG/HR: 10000 INJECTION, SOLUTION INTRAVENOUS at 11:38

## 2020-05-18 RX ADMIN — DOCUSATE SODIUM 100 MG: 100 CAPSULE, LIQUID FILLED ORAL at 20:18

## 2020-05-18 RX ADMIN — Medication 10 ML: at 20:18

## 2020-05-18 RX ADMIN — PSYLLIUM HUSK 1 PACKET: 3.4 POWDER ORAL at 08:54

## 2020-05-18 RX ADMIN — LORAZEPAM 1 MG: 1 TABLET ORAL at 11:54

## 2020-05-18 RX ADMIN — LORAZEPAM 1 MG: 1 TABLET ORAL at 20:34

## 2020-05-18 RX ADMIN — DILTIAZEM HYDROCHLORIDE 240 MG: 240 CAPSULE, COATED, EXTENDED RELEASE ORAL at 08:54

## 2020-05-18 RX ADMIN — OXYCODONE HYDROCHLORIDE 10 MG: 5 TABLET ORAL at 23:26

## 2020-05-18 RX ADMIN — OXYCODONE HYDROCHLORIDE 10 MG: 5 TABLET ORAL at 19:16

## 2020-05-18 RX ADMIN — METOPROLOL TARTRATE 75 MG: 25 TABLET ORAL at 20:17

## 2020-05-18 ASSESSMENT — PAIN SCALES - GENERAL
PAINLEVEL_OUTOF10: 6
PAINLEVEL_OUTOF10: 6
PAINLEVEL_OUTOF10: 5
PAINLEVEL_OUTOF10: 0

## 2020-05-18 NOTE — PROGRESS NOTES
[facility]  Progress Note                   Date:   5/18/2020  Patient name: Shivam Wills  Date of admission:  5/11/2020  8:26 PM  MRN:   [id number]  YOB: 1945  PCP: CARMITA Johnson CNP    Reason for Admission: Aneurysm of infrarenal abdominal aorta ([address] [I71.4]  Aneurysm of infrarenal abdominal aorta ([address] [I71.4]  Aneurysm of infrarenal abdominal aorta ([address] [I71.4]  Aneurysm of infrarenal abdominal aorta ([address] [I71.4]    Subjective:       Clinical Changes /Abnormalities:  Patient seen and examined in bed in room after discussion with RN. Denies chest pain or SOB. AFib RVR on monitor 100s to 140s  Patient reports he is on coumadin as home med and is currently on heparin drip. Patient follow with Dr. Starr Manuel as outpatient. Review of Systems    Medications:   Scheduled Meds:   magnesium hydroxide  30 mL Oral Daily    docusate sodium  100 mg Oral BID    dilTIAZem  240 mg Oral Daily    metoprolol tartrate  50 mg Oral BID    digoxin  125 mcg Oral Daily    sodium chloride flush  10 mL Intravenous 2 times per day    pantoprazole  40 mg Oral QAM AC    linaclotide  290 mcg Oral QAM AC    sodium chloride flush  10 mL Intravenous 2 times per day    insulin lispro  0-12 Units Subcutaneous TID WC    insulin lispro  0-6 Units Subcutaneous Nightly    psyllium  1 packet Oral TID     Continuous Infusions:   heparin (porcine) 13.4 Units/kg/hr (05/17/20 1817)    dextrose       CBC:   Recent Labs     05/16/20 0518 05/17/20  0501 05/18/20  0547   WBC 14.1* 13.3* 10.5   HGB 14.8 14.8 14.9    156 141     BMP:    Recent Labs     05/16/20  0518 05/17/20  0501 05/18/20  0547    137 134*   K 4.5 4.1 4.0   CL 98 99 101   CO2 18* 23 22   BUN 11 11 19   CREATININE 0.88 0.77 1.05   GLUCOSE 105* 126* 118*     Hepatic:No results for input(s): AST, ALT, ALB, BILITOT, ALKPHOS in the last 72 hours. Troponin: No results for input(s): TROPHS in the last 72 hours.   BNP: No Component Value   C CHF No 0   H HTN Yes 1   A2 Age >= 76 No,  (79 y.o.) 0   D DM Yes 1   S2 Prior Stroke/TIA No 0   V Vascular Disease No 0   A Age 74-69 Yes,  (79 y.o.) 1   Sc Sex male 0    [id number]  Score  3   Score last updated 5/18/20 5:55 AM EDT    Click here for a link to the UpToDate guideline \"Atrial Fibrillation: Anticoagulation therapy to prevent embolization    Disclaimer: Risk Score calculation is dependent on accuracy of patient problem list and past encounter diagnosis. Assessment / Acute Cardiac Problems:   1. PAF with RVR  ([id number] 3) on coumadin at home. On Heparin drip  2. Newly discovered infra renal AAA 8.2- s/p EVAR on 5/15/2020  3. HTN  4. Non obstructive CAD per cath 5/14/2020    Patient Active Problem List:     Back pain     HTN (hypertension)     COPD (chronic obstructive pulmonary disease) ([facility])     Lung nodule     Procedure refused     Hyperlipidemia     Colonoscopy refused     Atrial fibrillation, chronic     Anxiety     Insomnia     Pacemaker battery depletion     Pacemaker     Bradycardia     Constipation     Diet-controlled diabetes mellitus ([address]     Aneurysm of infrarenal abdominal aorta ([facility])     CLIFTON (acute kidney injury) ([facility] [address]     Hyperkalemia      Plan of Treatment:   1. AFib RVR  Currently on digoxin, lopressor, cardizem, and heparin drip   HR 100s to 140s this am prior to am medications. Will increase BB today for HR and BP control. 2. HFrEF. LVEF 33% on echo  Currently on BB, Digoxin. 3. HTN stable. Continue BB and CCB. 4. May need CV as outpatient per Dr. Keeley Frias note.   Patient's cardiologist is Dr. Alphonse Marquez    Electronically signed by CARMITA Gonsales NP on 5/18/2020 at 9:07 [facility].  [phone]

## 2020-05-18 NOTE — PROGRESS NOTES
PULMONARY PROGRESS NOTE      Patient:  Aris Valle  YOB: 1945    MRN: [id number]     Acct: [de-identified]     Admit date: 5/11/2020    REASON FOR INITIAL CONSULT:-   Admitted to ICU with abdominal aortic aneurysm/hypertension  History of COPD. Pt seen and Chart reviewed. Admitted initially to ICU with lower abdominal pain CT scan of the abdomen showed large infrarenal abdominal aortic aneurysm 8 cm, in ICU he was initially treated with clevidipine drip and then his calcium channel blocker with Cardizem and Atacand resumed, he was seen by vascular surgery and the plan for endovascular AAA repair, cardiology was consulted as echo showed EF of 33% and he had cardiac catheterization done showed nonobstructive CAD and mild LV dysfunction. He has history of chronic atrial fibrillation and medication changes made by cardiology he remained in A. fib with RVR and then he was put back on calcium channel blocker and on Lopressor and digoxin. He does have history of COPD he only take albuterol as needed he is an ex-smoker. His CTA chest showed left lower lobe nodule/mass and as compared to CT scan of the chest in 2010 and 2007 this mass had increased in size, according to patient he know about the left lower lobe nodule for 15 years. Last 24 hours:   Remained in atrial fibrillation with heart rate of 110s to 130. Remained hemodynamically stable overnight. He is afebrile and T-max is 99.4 last 24 hours  Labs shows normal WBC of 10.5 hemoglobin hematocrit stable platelet count 217, bicarbonate 22 BUN 19 creatinine 1.05 sodium 134 potassium 4.0. Subjective:     Denies shortness of breath on activity and at rest.  Denies cough, wheezing, sputum production. Denies fever chills hemoptysis weight loss or loss of appetite.   Denies chest pain orthopnea PND and palpitations  Does complain of pain in right hip and groin area and also slight pain in her right abdominal flank claim pain was better but started coming back in last few days. Denies nausea vomiting diarrhea melena and urinary complaint.         Review of Systems -   CONSTITUTIONAL:  negative for  fevers, chills, sweats, fatigue, anorexia, and weight loss  EYES:  negative for  double vision, blurred vision, dry eyes, eye discharge, visual disturbance, irritation, redness, and icterus  HEENT:  negative for  tinnitus, earaches, nasal congestion, epistaxis, sore mouth, sore throat, hoarseness, and voice change  RESPIRATORY:  negative for  dry cough, cough with sputum, dyspnea, wheezing, hemoptysis, chest pain, pleuritic pain, and cyanosis  CARDIOVASCULAR:  negative for  chest pain, dyspnea, palpitations, orthopnea, PND, exertional chest pressure/discomfort, fatigue, early saiety, edema, syncope  GASTROINTESTINAL: Positive for mild right sided abdominal pain, negative for nausea, vomiting, diarrhea, constipation, jaundice, dysphagia, reflux, odynophagia, hematemesis, and hemtochezia  GENITOURINARY:  negative for frequency, dysuria, urinary incontinence, and hematuria  HEMATOLOGIC/LYMPHATIC:  negative for easy bruising, bleeding, lymphadenopathy, and petechiae  ALLERGIC/IMMUNOLOGIC:  negative for recurrent infections, urticaria, hay fever, angioedema, and anaphylaxis  ENDOCRINE:  negative for heat intolerance, cold intolerance, tremor, weight changes, and change in bowel habits  MUSCULOSKELETAL:  negative for  myalgias, arthralgias, joint swelling, stiff joints, decreased range of motion, and muscle weakness  NEUROLOGICAL:  negative for headaches, dizziness, seizures, memory problems, speech problems, visual disturbance, coordination problems, gait problems, tremor, dysphagia, weakness, numbness, syncope, and tingling  BEHAVIOR/PSYCH:  negative for poor appetite, decreased sleep, increased sleep, decreased energy level, increased energy level, poor concentration, increased agitation, and anxiety        Physical Exam:  Vitals: BP (!) 138/91   Pulse 142   Temp Oral Daily    digoxin  125 mcg Oral Daily    sodium chloride flush  10 mL Intravenous 2 times per day    pantoprazole  40 mg Oral QAM AC    linaclotide  290 mcg Oral QAM AC    sodium chloride flush  10 mL Intravenous 2 times per day    insulin lispro  0-12 Units Subcutaneous TID WC    insulin lispro  0-6 Units Subcutaneous Nightly    psyllium  1 packet Oral TID     Continuous Infusions:   heparin (porcine) 13.4 Units/kg/hr (05/17/20 1817)    dextrose       PRN Meds:labetalol, meperidine, hydrALAZINE, sodium chloride flush, acetaminophen, calcium carbonate, morphine, sodium chloride flush, acetaminophen **OR** acetaminophen, polyethylene glycol, promethazine **OR** ondansetron, LORazepam, [Held by provider] heparin (porcine), [Held by provider] heparin (porcine), oxyCODONE **OR** oxyCODONE, dextrose, glucagon (rDNA), dextrose, albuterol    Objective:    CBC:   Recent Labs     05/16/20  0518 05/17/20  0501 05/18/20  0547   WBC 14.1* 13.3* 10.5   HGB 14.8 14.8 14.9    156 141     BMP:    Recent Labs     05/16/20  0518 05/17/20  0501 05/18/20  0547    137 134*   K 4.5 4.1 4.0   CL 98 99 101   CO2 18* 23 22   BUN 11 11 19   CREATININE 0.88 0.77 1.05   GLUCOSE 105* 126* 118*     Calcium:  Recent Labs     05/18/20  0547   CALCIUM 8.8     Ionized Calcium:No results for input(s): IONCA in the last 72 hours. Magnesium:No results for input(s): MG in the last 72 hours. Phosphorus:No results for input(s): PHOS in the last 72 hours. BNP:No results for input(s): BNP in the last 72 hours. Glucose:  Recent Labs     05/17/20  1655 05/17/20 2001 05/18/20  0656   POCGLU 126* 145* 108     HgbA1C: No results for input(s): LABA1C in the last 72 hours. INR: No results for input(s): INR in the last 72 hours. Hepatic: No results for input(s): ALKPHOS, ALT, AST, PROT, BILITOT, BILIDIR, LABALBU in the last 72 hours. Amylase and Lipase:No results for input(s): LACTA, AMYLASE in the last 72 hours.   Lactic Acid: No ventricular  function. Pacemaker / ICD lead seen in right ventricle. Mild mitral regurgitation. Moderate tricuspid regurgitation. Mild pulmonary hypertension. Estimated right ventricular systolic pressure  is 62LNSU. Mild pulmonic insufficiency    Cardiac Angiography: 05/11/2020   Non-obstructive CAD. Mildly impaired ventricular function. Assessment and Plan:    COPD by history severity not known. Left lower lobe nodule/mass present since 2007 had increasing size on current CT chest as compared to CT scan in 2007 in 2010. Infrarenal abdominal aortic aneurysm status post endovascular repair. Mild LV systolic dysfunction. Atrial fibrillation chronic with RVR. History of permanent pacemaker. Plan and recommendation    I had a long discussion with him today about left lower lobe mass that it has increasing in size as compared to CT scan from 2010 I told him that he will need work-up including CT-guided biopsy and PET scan at this time he wants to take care of the acute issue as he has recent procedure done also he is on anticoagulation and with A. fib with RVR, I have told him that he will need follow-up after discharge provided all the information and that he will need outpatient PET scan and biopsy. He will need outpatient pulmonary function test  Albuterol to be used as needed  Continue with heparin drip. Heart rate control for atrial fibrillation per cardiology. Optimization of medication for LV dysfunction per cardiology and primary service. Lalitha Melvin MD      5/18/2020, 10:14 AM    Pulmonary & Critical Care    Please note that this chart was generated using voice recognition Dragon dictation software. Although every effort was made to ensure the accuracy of this automated transcription, some errors in transcription may have occurred.

## 2020-05-18 NOTE — PROGRESS NOTES
labetalol, meperidine, hydrALAZINE, sodium chloride flush, acetaminophen, calcium carbonate, morphine, sodium chloride flush, acetaminophen **OR** acetaminophen, polyethylene glycol, promethazine **OR** ondansetron, LORazepam, [Held by provider] heparin (porcine), [Held by provider] heparin (porcine), oxyCODONE **OR** oxyCODONE, dextrose, glucagon (rDNA), dextrose, albuterol    Data:     Past Medical History:   has a past medical history of A-fib ([facility] [address] Anticoagulated on Coumadin, Anxiety, Back pain, COPD (chronic obstructive pulmonary disease) ([facility] [address] HTN (hypertension), Hyperglycemia, Hyperlipidemia, Insomnia, Lung nodule, and Pacemaker. Social History:   reports that he quit smoking about 19 years ago. His smoking use included cigarettes. He has a 12.50 pack-year smoking history. He has never used smokeless tobacco. He reports that he does not drink alcohol or use drugs. Family History:   Family History   Problem Relation Age of Onset    Heart Disease Mother     Heart Disease Father        Vitals:  /78   Pulse 114   Temp 98.6 °F (37 °C) (Oral)   Resp 16   Ht 5' 11\" (1.803 m)   Wt 239 lb 6.7 oz (108.6 kg)   SpO2 (!) 89%   BMI 33.39 kg/m²   Temp (24hrs), Av.6 °F (37 °C), Min:98.1 °F (36.7 °C), Max:99.1 °F (37.3 °C)    Recent Labs     20  1655 20  0656 20  1718   POCGLU 126* 145* 108 105       I/O (24Hr):     Intake/Output Summary (Last 24 hours) at 2020 1741  Last data filed at 2020 0500  Gross per 24 hour   Intake 620 ml   Output 75 ml   Net 545 ml       Labs:  Hematology:  Recent Labs     20  0518 20  0501 20  0547   WBC 14.1* 13.3* 10.5   RBC 4.60 4.64 4.59   HGB 14.8 14.8 14.9   HCT 47.2 47.5 47.0   .6 102.4 102.4   MCH 32.2 31.9 32.5   MCHC 31.4 31.2 31.7   RDW 12.9 13.1 13.2    156 141   MPV 11.8 11.4 11.1     Chemistry:  Recent Labs     20  0518 20  0501 20  0547    137 134*   K 4.5 4.1 left subclavian artery with severe stenosis at the origin of the celiac artery. 3. Enlarging left lower lobe pulmonary nodule. PET-CT or biopsy is recommended. 4. Clustered middle lobe pulmonary nodules are probably infectious or inflammatory. 5. Indeterminate low-density lesion in the liver. 6. Intrarenal calculus on the left without obstructive uropathy. 7. Diverticulosis without scan evidence for diverticulitis. Physical Examination:        General appearance:  alert, cooperative and no distress  Mental Status:  oriented to person, place and time and normal affect  Lungs:  clear to auscultation bilaterally, normal effort  Heart: Tachycardia, irregular rate and rhythm, no murmur  Abdomen:  soft, nontender, nondistended, normal bowel sounds, no masses, hepatomegaly, splenomegaly  Extremities:  no edema, redness, tenderness in the calves  Skin:  no gross lesions, rashes, induration    Assessment:        Hospital Problems           Last Modified POA    Aneurysm of infrarenal abdominal aorta ([address] 5/18/2020 Yes    CLIFTON (acute kidney injury) ([address] 5/18/2020 Yes    Lung nodule 5/18/2020 Yes    Atrial fibrillation, chronic 5/18/2020 Yes    Overview Signed 10/17/2011  9:01 AM by Mackenzie Doan MD     On coumadin         Hyperkalemia 5/18/2020 Yes    Moderate malnutrition ([address] 5/18/2020 Yes    HTN (hypertension) 5/18/2020 Yes    COPD (chronic obstructive pulmonary disease) ([address] 5/18/2020 Yes    Hyperlipidemia 5/18/2020 Yes    Anxiety 5/18/2020 Yes    Pacemaker 5/18/2020 Yes    Overview Signed 3/16/2015  9:09 AM by CARMITA Garcia - NP Dr. Artelia Kawasaki, replaced in 2013. Constipation 5/18/2020 Yes    Diet-controlled diabetes mellitus ([address] 5/18/2020 Yes          Plan:        1. Aneurysm of infrarenal abdominal aorta- patient presented with right-sided abdominal pain. Patient found to have a infrarenal AAA in the ED. Patient was admitted to the MICU for closer monitoring.   Prior to repair of AAA, hospitalization in order to prepare patient for surgery for AAA. Device interrogation showed poor atrial impedance. Patient will follow-up with electrophysiologist as outpatient in regard to atrial lead. 10. Type 2 diabetes-patient has type 2 diabetes that he controls typically with diet. Patient currently on insulin sliding scale.     Linnette Kenney MD  5/18/2020  5:41 PM

## 2020-05-18 NOTE — PROGRESS NOTES
Physical Therapy  DATE: 2020    NAME: Félix Lay  MRN: [id number]   : 1945    Patient not seen this date for Physical Therapy due to:  [] Blood transfusion in progress  [] Hemodialysis  [x]  Patient Declined  -  Pt sleeping upon arrival. Pt aroused easily. Pt declines treatment stating \"I want to go back to sleep check on me tomorrow. \" RN informed. [] Spine Precautions   [] Strict Bedrest  [] Surgery/ Procedure  [] Testing      [] Other        [] PT being discontinued at this time. Patient independent. No further needs. [] PT being discontinued at this time as the patient has been transferred to palliative care. No further needs.     Cat Bolton, PTA

## 2020-05-18 NOTE — TELEPHONE ENCOUNTER
----- Message from Nish Angel sent at 5/18/2020  7:05 AM EDT -----  Jessica Farris please see message from Dr Roderick Gutierrez and call to schedule. Thank you.   ----- Message -----  From: Naomie Loza MD  Sent: 5/17/2020   5:16 PM EDT  To: [facility] Respiratory Spec Clinical Staff    He is an inpatient and he will need follow-up in the office in 3 to 4 weeks for left lower lung nodule.   Please schedule appointment

## 2020-05-19 ENCOUNTER — TELEPHONE (OUTPATIENT)
Dept: PULMONOLOGY | Age: 75
End: 2020-05-19

## 2020-05-19 LAB
ABSOLUTE EOS #: 0.2 K/UL (ref 0–0.44)
ABSOLUTE IMMATURE GRANULOCYTE: 0.04 K/UL (ref 0–0.3)
ABSOLUTE LYMPH #: 1 K/UL (ref 1.1–3.7)
ABSOLUTE MONO #: 1.1 K/UL (ref 0.1–1.2)
ANION GAP SERPL CALCULATED.3IONS-SCNC: 12 MMOL/L (ref 9–17)
BASOPHILS # BLD: 1 % (ref 0–2)
BASOPHILS ABSOLUTE: 0.05 K/UL (ref 0–0.2)
BUN BLDV-MCNC: 24 MG/DL (ref 8–23)
BUN/CREAT BLD: ABNORMAL (ref 9–20)
CALCIUM SERPL-MCNC: 8.8 MG/DL (ref 8.6–10.4)
CHLORIDE BLD-SCNC: 99 MMOL/L (ref 98–107)
CO2: 28 MMOL/L (ref 20–31)
CREAT SERPL-MCNC: 1.26 MG/DL (ref 0.7–1.2)
DIFFERENTIAL TYPE: ABNORMAL
EOSINOPHILS RELATIVE PERCENT: 3 % (ref 1–4)
GFR AFRICAN AMERICAN: >60 ML/MIN
GFR NON-AFRICAN AMERICAN: 56 ML/MIN
GFR SERPL CREATININE-BSD FRML MDRD: ABNORMAL ML/MIN/{1.73_M2}
GFR SERPL CREATININE-BSD FRML MDRD: ABNORMAL ML/MIN/{1.73_M2}
GLUCOSE BLD-MCNC: 101 MG/DL (ref 75–110)
GLUCOSE BLD-MCNC: 104 MG/DL (ref 75–110)
GLUCOSE BLD-MCNC: 106 MG/DL (ref 75–110)
GLUCOSE BLD-MCNC: 109 MG/DL (ref 75–110)
GLUCOSE BLD-MCNC: 111 MG/DL (ref 75–110)
GLUCOSE BLD-MCNC: 119 MG/DL (ref 70–99)
HCT VFR BLD CALC: 45.3 % (ref 40.7–50.3)
HEMOGLOBIN: 14.4 G/DL (ref 13–17)
IMMATURE GRANULOCYTES: 1 %
LYMPHOCYTES # BLD: 13 % (ref 24–43)
MCH RBC QN AUTO: 32.2 PG (ref 25.2–33.5)
MCHC RBC AUTO-ENTMCNC: 31.8 G/DL (ref 28.4–34.8)
MCV RBC AUTO: 101.3 FL (ref 82.6–102.9)
MONOCYTES # BLD: 15 % (ref 3–12)
NRBC AUTOMATED: 0 PER 100 WBC
PARTIAL THROMBOPLASTIN TIME: 31.7 SEC (ref 20.5–30.5)
PARTIAL THROMBOPLASTIN TIME: 73.9 SEC (ref 20.5–30.5)
PDW BLD-RTO: 13.3 % (ref 11.8–14.4)
PLATELET # BLD: 165 K/UL (ref 138–453)
PLATELET ESTIMATE: ABNORMAL
PMV BLD AUTO: 11.8 FL (ref 8.1–13.5)
POTASSIUM SERPL-SCNC: 3.7 MMOL/L (ref 3.7–5.3)
RBC # BLD: 4.47 M/UL (ref 4.21–5.77)
RBC # BLD: ABNORMAL 10*6/UL
SEG NEUTROPHILS: 67 % (ref 36–65)
SEGMENTED NEUTROPHILS ABSOLUTE COUNT: 5.07 K/UL (ref 1.5–8.1)
SODIUM BLD-SCNC: 139 MMOL/L (ref 135–144)
WBC # BLD: 7.5 K/UL (ref 3.5–11.3)
WBC # BLD: ABNORMAL 10*3/UL

## 2020-05-19 PROCEDURE — 6360000002 HC RX W HCPCS: Performed by: STUDENT IN AN ORGANIZED HEALTH CARE EDUCATION/TRAINING PROGRAM

## 2020-05-19 PROCEDURE — 85025 COMPLETE CBC W/AUTO DIFF WBC: CPT

## 2020-05-19 PROCEDURE — 99232 SBSQ HOSP IP/OBS MODERATE 35: CPT | Performed by: INTERNAL MEDICINE

## 2020-05-19 PROCEDURE — 6370000000 HC RX 637 (ALT 250 FOR IP): Performed by: EMERGENCY MEDICINE

## 2020-05-19 PROCEDURE — 2060000000 HC ICU INTERMEDIATE R&B

## 2020-05-19 PROCEDURE — 97166 OT EVAL MOD COMPLEX 45 MIN: CPT

## 2020-05-19 PROCEDURE — 85730 THROMBOPLASTIN TIME PARTIAL: CPT

## 2020-05-19 PROCEDURE — 6370000000 HC RX 637 (ALT 250 FOR IP): Performed by: STUDENT IN AN ORGANIZED HEALTH CARE EDUCATION/TRAINING PROGRAM

## 2020-05-19 PROCEDURE — 97535 SELF CARE MNGMENT TRAINING: CPT

## 2020-05-19 PROCEDURE — 6370000000 HC RX 637 (ALT 250 FOR IP): Performed by: NURSE PRACTITIONER

## 2020-05-19 PROCEDURE — 6370000000 HC RX 637 (ALT 250 FOR IP): Performed by: INTERNAL MEDICINE

## 2020-05-19 PROCEDURE — 36415 COLL VENOUS BLD VENIPUNCTURE: CPT

## 2020-05-19 PROCEDURE — 80048 BASIC METABOLIC PNL TOTAL CA: CPT

## 2020-05-19 PROCEDURE — 2580000003 HC RX 258: Performed by: STUDENT IN AN ORGANIZED HEALTH CARE EDUCATION/TRAINING PROGRAM

## 2020-05-19 PROCEDURE — 99233 SBSQ HOSP IP/OBS HIGH 50: CPT | Performed by: INTERNAL MEDICINE

## 2020-05-19 RX ORDER — DILTIAZEM HYDROCHLORIDE 180 MG/1
360 CAPSULE, COATED, EXTENDED RELEASE ORAL DAILY
Status: DISCONTINUED | OUTPATIENT
Start: 2020-05-20 | End: 2020-05-20 | Stop reason: HOSPADM

## 2020-05-19 RX ADMIN — LORAZEPAM 1 MG: 1 TABLET ORAL at 11:05

## 2020-05-19 RX ADMIN — OXYCODONE HYDROCHLORIDE 10 MG: 5 TABLET ORAL at 07:53

## 2020-05-19 RX ADMIN — METOPROLOL TARTRATE 75 MG: 25 TABLET ORAL at 07:53

## 2020-05-19 RX ADMIN — DIGOXIN 125 MCG: 125 TABLET ORAL at 07:53

## 2020-05-19 RX ADMIN — Medication 10 ML: at 20:26

## 2020-05-19 RX ADMIN — APIXABAN 5 MG: 5 TABLET, FILM COATED ORAL at 20:26

## 2020-05-19 RX ADMIN — DOCUSATE SODIUM 100 MG: 100 CAPSULE, LIQUID FILLED ORAL at 20:26

## 2020-05-19 RX ADMIN — APIXABAN 5 MG: 5 TABLET, FILM COATED ORAL at 11:33

## 2020-05-19 RX ADMIN — OXYCODONE HYDROCHLORIDE 10 MG: 5 TABLET ORAL at 18:50

## 2020-05-19 RX ADMIN — OXYCODONE HYDROCHLORIDE 10 MG: 5 TABLET ORAL at 14:23

## 2020-05-19 RX ADMIN — PANTOPRAZOLE SODIUM 40 MG: 40 TABLET, DELAYED RELEASE ORAL at 07:53

## 2020-05-19 RX ADMIN — METOPROLOL TARTRATE 75 MG: 25 TABLET ORAL at 20:26

## 2020-05-19 RX ADMIN — HEPARIN SODIUM 13.4 UNITS/KG/HR: 10000 INJECTION, SOLUTION INTRAVENOUS at 01:09

## 2020-05-19 RX ADMIN — DILTIAZEM HYDROCHLORIDE 240 MG: 240 CAPSULE, COATED, EXTENDED RELEASE ORAL at 07:53

## 2020-05-19 RX ADMIN — DOCUSATE SODIUM 100 MG: 100 CAPSULE, LIQUID FILLED ORAL at 07:53

## 2020-05-19 RX ADMIN — MORPHINE SULFATE 2 MG: 2 INJECTION, SOLUTION INTRAMUSCULAR; INTRAVENOUS at 01:09

## 2020-05-19 RX ADMIN — LORAZEPAM 1 MG: 1 TABLET ORAL at 18:50

## 2020-05-19 ASSESSMENT — PAIN SCALES - GENERAL
PAINLEVEL_OUTOF10: 7
PAINLEVEL_OUTOF10: 7
PAINLEVEL_OUTOF10: 5
PAINLEVEL_OUTOF10: 6
PAINLEVEL_OUTOF10: 5

## 2020-05-19 ASSESSMENT — ENCOUNTER SYMPTOMS
ABDOMINAL PAIN: 0
NAUSEA: 0
BLOOD IN STOOL: 0
EYE DISCHARGE: 0
SHORTNESS OF BREATH: 0
BACK PAIN: 0
COLOR CHANGE: 0
WHEEZING: 0
VOMITING: 0

## 2020-05-19 ASSESSMENT — PAIN DESCRIPTION - LOCATION: LOCATION: BACK

## 2020-05-19 ASSESSMENT — PAIN DESCRIPTION - PAIN TYPE
TYPE: ACUTE PAIN

## 2020-05-19 ASSESSMENT — PAIN DESCRIPTION - ORIENTATION: ORIENTATION: LOWER

## 2020-05-19 NOTE — TELEPHONE ENCOUNTER
----- Message from Enriqueta Mock sent at 5/19/2020  1:52 PM EDT -----  Romi Han, please see message from Dr Anupam Caba and davy. Thank you.   ----- Message -----  From: Earnestine Rayo MD  Sent: 5/19/2020   1:47 PM EDT  To: [facility] Respiratory Spec Clinical Staff    Patient is in inpatient at [facility] and he will need follow-up for enlarging left lower lobe massPlease call the patient on Thursday he will be at home by that time any schedule appointment in 3 weeks in the office as he will need PET scan and CT-guided biopsy but he has AAA surgery done at this time and he want to wait until he come to office.

## 2020-05-19 NOTE — DISCHARGE INSTR - COC
Number of days: 3        Elimination:  Continence:   · Bowel: {YES / EV:90926}  · Bladder: {YES / US:67944}  Urinary Catheter: {Urinary Catheter:814345607}   Colostomy/Ileostomy/Ileal Conduit: {YES / FE:43400}       Date of Last BM: ***    Intake/Output Summary (Last 24 hours) at 2020 1037  Last data filed at 2020 0801  Gross per 24 hour   Intake 884 ml   Output 150 ml   Net 734 ml     I/O last 3 completed shifts: In: 124 [P.O.:500;  I.V.:384]  Out: 150 [Urine:150]    Safety Concerns:     [address] [employer] Safety Concerns:542951500}    Impairments/Disabilities:      [address] [employer] Impairments/Disabilities:891423504}    Nutrition Therapy:  Current Nutrition Therapy:   [address] [employer] Diet List:843497492}    Routes of Feeding: {CHP DME Other Feedings:652955633}  Liquids: {Slp liquid thickness:75400}  Daily Fluid Restriction: {CHP DME Yes amt example:745876767}  Last Modified Barium Swallow with Video (Video Swallowing Test): {Done Not Done BCZU:643561521}    Treatments at the Time of Hospital Discharge:   Respiratory Treatments: ***  Oxygen Therapy:  {Therapy; copd oxygen:03548}  Ventilator:    {MH CC Vent SKFK:664881167}    Rehab Therapies: {THERAPEUTIC INTERVENTION:8091811592}  Weight Bearing Status/Restrictions: [facility] Weight Bearin}  Other Medical Equipment (for information only, NOT a DME order):  {EQUIPMENT:901286508}  Other Treatments: ***    Patient's personal belongings (please select all that are sent with patient):  {CHP DME Belongings:599004479}    RN SIGNATURE:  {Esignature:897752269}    CASE MANAGEMENT/SOCIAL WORK SECTION    Inpatient Status Date: ***    Readmission Risk Assessment Score:  Readmission Risk              Risk of Unplanned Readmission:        19           Discharging to Facility/ Agency   · Name:   · Address:  · Phone:  · Fax:    Dialysis Facility (if applicable)   · Name:  · Address:  · Dialysis Schedule:  · Phone:  · Fax:    / signature:

## 2020-05-19 NOTE — PROGRESS NOTES
air)  O2 Flow Rate (L/min): 0 L/min  Social/Functional History  Social/Functional History  Lives With: Son  Type of Home: House  Home Layout: One level, Laundry in basement  Home Access: Stairs to enter with rails  Entrance Stairs - Number of Steps: 3 + 1 step to enter  Entrance Stairs - Rails: Both  Bathroom Shower/Tub: Tub/Shower unit  Bathroom Equipment: Grab bars in shower  Bathroom Accessibility: Accessible  Home Equipment: Grab bars(no medical equipment)  Receives Help From: Family(son and niece)  ADL Assistance: Independent  Homemaking Assistance: Independent  Homemaking Responsibilities: Yes  Meal Prep Responsibility: Primary  Laundry Responsibility: Primary  Cleaning Responsibility: Secondary(shared with son)  Bill Paying/Finance Responsibility: Secondary(shared with son)  Shopping Responsibility: Secondary(son will take pt)  Dependent Care Responsibility: Primary(cat named \"Cat\")  Ambulation Assistance: Independent  Transfer Assistance: Independent  Active : No(due to poor vision)  Patient's  Info: son or niece  Mode of Transportation: Car  Occupation: Retired  Leisure & Hobbies: Fish, duck hunt, watch the [employer]  Additional Comments: Pt reports ambulatory at grocery     Objective   Vision: Impaired  Vision Exceptions: Wears glasses for reading;Wears glasses for distance(2 different pairs of glasses both present in hospital)  Hearing: Within functional limits    Orientation  Overall Orientation Status: Within Functional Limits     Balance  Sitting Balance: Supervision  Standing Balance: Contact guard assistance  Functional Mobility  Functional - Mobility Device: No device(hand held assistance)  Activity: To/from bathroom  Assist Level: Contact guard assistance  Toilet Transfers  Toilet - Technique: Ambulating  Equipment Used: Standard toilet  Toilet Transfer: Contact guard assistance  ADL  Feeding: Independent;Setup  Grooming: Independent;Setup  UE Bathing: Minimal assistance;Setup(assist 3: Dem I with dynamic mobility  Short term goal 4: Dem good safety awareness tech for all transfers/mobility  Short term goal 5: Dem a 10 min dynamic task with sba to increase activity tolerance  Short term goal 6: Identify 2 relaxation tech to decrease anxiety       Therapy Time   Individual Concurrent Group Co-treatment   Time In 1110         Time Out 1158         Minutes 48         Timed Code Treatment Minutes: [address], OTR/L

## 2020-05-19 NOTE — PLAN OF CARE
PATIENT REFUSES TO WEAR BIPAP     [x] Risks and benefits explained to patient   [x] Patient refuses to wear Bipap stating no. [x] Patient verbalizes understanding of information presented.
Plan of care continued.
Problem: Nutrition  Goal: Optimal nutrition therapy  Outcome: Ongoing  Note: Nutrition Problem:  Moderate malnutrition, In context of acute illness or injury  Intervention: Food and/or Nutrient Delivery: Modify current diet, Discontinue ONS  Nutritional Goals: Pt to consume >50% of meals/supplements
Problem: Pain:  Goal: Pain level will decrease  Description: Pain level will decrease  5/12/2020 0806 by Nasra Ibanez RN  Outcome: Ongoing  5/12/2020 0514 by Siomara Smith RN  Outcome: Ongoing  Goal: Control of acute pain  Description: Control of acute pain  5/12/2020 0806 by Nasra Ibanez RN  Outcome: Ongoing  5/12/2020 0514 by Siomara Smith RN  Outcome: Ongoing  Goal: Control of chronic pain  Description: Control of chronic pain  5/12/2020 0806 by Nasra Ibanez RN  Outcome: Ongoing  5/12/2020 0514 by Siomara Smith RN  Outcome: Ongoing     Problem: Falls - Risk of:  Goal: Will remain free from falls  Description: Will remain free from falls  5/12/2020 0806 by Nasra Ibanez RN  Outcome: Ongoing  5/12/2020 0514 by Siomara Smith RN  Outcome: Ongoing  Goal: Absence of physical injury  Description: Absence of physical injury  5/12/2020 0806 by Nasra Ibanez RN  Outcome: Ongoing  5/12/2020 0514 by Siomara Smith RN  Outcome: Ongoing     Problem: Discharge Planning:  Goal: Participates in care planning  Description: Participates in care planning  5/12/2020 0806 by Nasra Ibanez RN  Outcome: Ongoing  5/12/2020 0514 by Siomara Smith RN  Outcome: Ongoing  Goal: Discharged to appropriate level of care  Description: Discharged to appropriate level of care  5/12/2020 0806 by Nasra Ibanez RN  Outcome: Ongoing  5/12/2020 0514 by Siomara Smith RN  Outcome: Ongoing     Problem: Pain:  Goal: Recognizes and communicates pain  Description: Recognizes and communicates pain  5/12/2020 0806 by Nasra Ibanez RN  Outcome: Ongoing  5/12/2020 0514 by Siomara Smith RN  Outcome: Ongoing  Goal: Control of acute pain  Description: Control of acute pain  5/12/2020 0806 by Nasra Ibanez RN  Outcome: Ongoing  5/12/2020 0514 by Siomara Smith RN  Outcome: Ongoing  Goal: Control of chronic pain  Description: Control of chronic pain  5/12/2020 0806 by
Problem: Pain:  Goal: Pain level will decrease  Description: Pain level will decrease  5/19/2020 0729 by Nic Lowe RN  Outcome: Met This Shift     Problem: Falls - Risk of:  Goal: Will remain free from falls  Description: Will remain free from falls  5/19/2020 1840 by Marissa Mccord RN  Outcome: Met This Shift  Note: Pt remains free from falls at this time. Floor free from obstacles, and bed is locked and in lowest position. Adequate lighting provided. Call light within reach; pt encouraged to call before getting OOB for any need. Will continue to monitor needs during hourly rounding.
Problem: Pain:  Goal: Pain level will decrease  Description: Pain level will decrease  Outcome: Met This Shift     Problem: Pain:  Goal: Control of acute pain  Description: Control of acute pain  Outcome: Met This Shift     Problem: Falls - Risk of:  Goal: Will remain free from falls  Description: Will remain free from falls  5/19/2020 0729 by Josh Pineda RN  Outcome: Met This Shift
Problem: Pain:  Goal: Pain level will decrease  Description: Pain level will decrease  Outcome: Ongoing  Goal: Control of acute pain  Description: Control of acute pain  Outcome: Ongoing  Goal: Control of chronic pain  Description: Control of chronic pain  Outcome: Ongoing     Problem: Falls - Risk of:  Goal: Will remain free from falls  Description: Will remain free from falls  Outcome: Ongoing  Goal: Absence of physical injury  Description: Absence of physical injury  Outcome: Ongoing     Problem: Discharge Planning:  Goal: Participates in care planning  Description: Participates in care planning  Outcome: Ongoing  Goal: Discharged to appropriate level of care  Description: Discharged to appropriate level of care  Outcome: Ongoing     Problem: Pain:  Goal: Recognizes and communicates pain  Description: Recognizes and communicates pain  Outcome: Ongoing  Goal: Control of acute pain  Description: Control of acute pain  Outcome: Ongoing  Goal: Control of chronic pain  Description: Control of chronic pain  Outcome: Ongoing     Problem: Sleep Pattern Disturbance:  Goal: Appears well-rested  Description: Appears well-rested  Outcome: Ongoing     Problem: Tissue Perfusion - Cardiopulmonary, Altered:  Goal: Absence of angina  Description: Absence of angina  Outcome: Ongoing  Goal: Hemodynamic stability will improve  Description: Hemodynamic stability will improve  Outcome: Ongoing
Problem: Pain:  Goal: Recognizes and communicates pain  Description: Recognizes and communicates pain  5/18/2020 0449 by Bautista Salas RN  Outcome: Ongoing  5/18/2020 0219 by Bautista Salas RN  Outcome: Met This Shift  Note: Patient is able to correctly use the 0-10 pain rating scale.  The patient is able to   5/17/2020 1548 by Edilberto Brewster RN  Outcome: Ongoing  Goal: Control of acute pain  Description: Control of acute pain  5/18/2020 0449 by Bautista Salas RN  Outcome: Ongoing  5/18/2020 0219 by Bautista Salas RN  Outcome: Ongoing  5/17/2020 1548 by Edilberto Brewster RN  Outcome: Ongoing  Goal: Control of chronic pain  Description: Control of chronic pain  5/18/2020 0449 by Bautista Salas RN  Outcome: Ongoing  5/18/2020 0219 by Bautista Salas RN  Outcome: Ongoing  5/17/2020 1548 by Edilberto Brewster RN  Outcome: Ongoing     Problem: Sleep Pattern Disturbance:  Goal: Appears well-rested  Description: Appears well-rested  5/18/2020 0449 by Bautista Salas RN  Outcome: Ongoing  5/18/2020 0219 by Bautista Salas RN  Outcome: Ongoing  5/17/2020 1548 by Edilberto Brewster RN  Outcome: Ongoing     Problem: Tissue Perfusion - Cardiopulmonary, Altered:  Goal: Absence of angina  Description: Absence of angina  5/18/2020 0449 by Bautista Salas RN  Outcome: Ongoing  5/18/2020 0219 by Bautista Salas RN  Outcome: Ongoing  5/17/2020 1548 by Edilberto Brewster RN  Outcome: Ongoing  Goal: Hemodynamic stability will improve  Description: Hemodynamic stability will improve  5/18/2020 0449 by Bautista Salas RN  Outcome: Ongoing  5/18/2020 0219 by Bautista Salas RN  Outcome: Ongoing  5/17/2020 1548 by Edilberto Brewster RN  Outcome: Ongoing     Problem: Musculor/Skeletal Functional Status  Goal: Highest potential functional level  5/18/2020 0449 by Bautista Salas RN  Outcome: Ongoing  5/18/2020 0219 by Bautista Salas RN  Outcome: Ongoing  5/17/2020 1548 by Edilberto Brewster RN  Outcome: Ongoing

## 2020-05-19 NOTE — PROGRESS NOTES
CLINICAL PHARMACY NOTE: MEDS TO [address] Select Patient?: Yes  Total # of Prescriptions Filled: 1   The following medications were delivered to the patient:  · Eliquis  Total # of Interventions Completed: 0  Time Spent (min): 0    Additional Documentation:meds delivered to the pt room on 5.19.20 at 1:10pm

## 2020-05-19 NOTE — PROGRESS NOTES
Physical Exam  Vitals signs reviewed. Constitutional:       General: He is not in acute distress. Appearance: Normal appearance. He is not ill-appearing or diaphoretic. HENT:      Head: Normocephalic and atraumatic. Nose: No congestion or rhinorrhea. Eyes:      Extraocular Movements: Extraocular movements intact. Conjunctiva/sclera: Conjunctivae normal.      Pupils: Pupils are equal, round, and reactive to light. Neck:      Musculoskeletal: Normal range of motion and neck supple. Cardiovascular:      Rate and Rhythm: Normal rate and regular rhythm. Pulses: Normal pulses. Heart sounds: Normal heart sounds. No murmur. Pulmonary:      Effort: Pulmonary effort is normal.      Breath sounds: Normal breath sounds. No wheezing or rales. Abdominal:      General: Bowel sounds are normal. There is no distension. Palpations: Abdomen is soft. Tenderness: There is no abdominal tenderness. Musculoskeletal: Normal range of motion. General: No swelling or tenderness. Skin:     General: Skin is warm and dry. Findings: No rash. Neurological:      General: No focal deficit present. Mental Status: He is alert and oriented to person, place, and time. Psychiatric:         Mood and Affect: Mood normal.         Thought Content: Thought content normal.         Judgment: Judgment normal.         Assessment:        Hospital Problems           Last Modified POA    HTN (hypertension) 5/18/2020 Yes    COPD (chronic obstructive pulmonary disease) ([address] 5/18/2020 Yes    Lung nodule 5/18/2020 Yes    Hyperlipidemia 5/18/2020 Yes    Atrial fibrillation, chronic 5/18/2020 Yes    Overview Signed 10/17/2011  9:01 AM by Nadine Rowley MD     On coumadin         Anxiety 5/18/2020 Yes    Pacemaker 5/18/2020 Yes    Overview Signed 3/16/2015  9:09 AM by CARMITA Singh - SHANIKA Rajput, replaced in 2013.           Constipation 5/18/2020 Yes    Diet-controlled diabetes mellitus ([facility] [address] 5/18/2020 Yes    Aneurysm of infrarenal abdominal aorta ([facility] [address] 5/18/2020 Yes    CLIFTON (acute kidney injury) ([facility] [address] 5/18/2020 Yes    Hyperkalemia 5/18/2020 Yes    Moderate malnutrition ([facility] [address] 5/18/2020 Yes          Plan:      1. Aneurysm of infrarenal abdominal aorta- s/p endovascular repair of abdominal aortic aneurysm with endurance aortic stent graft. 5/15/20  2. Acute kidney injury-patient admitted with a creatinine of 1.3. Patient's baseline creatinine appears to be between 1 and 1.2.    3. Atrial fibrillation with RVR- HR improved but still above 100. patient has a history of atrial fibrillation. Will continue Digoxin 125 mcg daily. Increase Cardizem to 360. On Heparin drip. Will transition to oral Eliquis  4. Lung nodule- Patient will need outpatient PET scan along with a CT-guided needle biopsy once his acute issues have resolved. Pulmonology on board and has explained this to patient. 5. Constipation- resolved. Patient will be continued on bowel regimen. 6. Essential hypertension-patient with history of hypertension. Patient to be continued on his home blood pressure medication. 7. COPD-patient with history of COPD. 8. Hyperlipidemia-patient with history of hyperlipidemia. Patient be continued on his home statin. 5. Pacemaker-patient has a pacemaker in place secondary to having tachybradycardia syndrome. Pacemaker interrogated in early part of hospitalization in order to prepare patient for surgery for AAA. 10. Type 2 diabetes-patient has type 2 diabetes that he controls typically with diet. Patient currently on insulin sliding scale. 11. Discharge planning -ambulate with physical therapy. Possible home tomorrow.     Nima Berumen MD  5/19/2020  10:06 AM

## 2020-05-20 VITALS
OXYGEN SATURATION: 93 % | DIASTOLIC BLOOD PRESSURE: 100 MMHG | HEART RATE: 103 BPM | SYSTOLIC BLOOD PRESSURE: 128 MMHG | TEMPERATURE: 97.6 F | HEIGHT: 71 IN | BODY MASS INDEX: 33.52 KG/M2 | RESPIRATION RATE: 17 BRPM | WEIGHT: 239.42 LBS

## 2020-05-20 LAB
ABSOLUTE EOS #: 0.13 K/UL (ref 0–0.44)
ABSOLUTE IMMATURE GRANULOCYTE: 0.05 K/UL (ref 0–0.3)
ABSOLUTE LYMPH #: 0.76 K/UL (ref 1.1–3.7)
ABSOLUTE MONO #: 1.04 K/UL (ref 0.1–1.2)
ANION GAP SERPL CALCULATED.3IONS-SCNC: 18 MMOL/L (ref 9–17)
BASOPHILS # BLD: 1 % (ref 0–2)
BASOPHILS ABSOLUTE: 0.05 K/UL (ref 0–0.2)
BUN BLDV-MCNC: 23 MG/DL (ref 8–23)
BUN/CREAT BLD: ABNORMAL (ref 9–20)
CALCIUM SERPL-MCNC: 8.9 MG/DL (ref 8.6–10.4)
CHLORIDE BLD-SCNC: 99 MMOL/L (ref 98–107)
CO2: 23 MMOL/L (ref 20–31)
CREAT SERPL-MCNC: 1.02 MG/DL (ref 0.7–1.2)
DIFFERENTIAL TYPE: ABNORMAL
EOSINOPHILS RELATIVE PERCENT: 2 % (ref 1–4)
GFR AFRICAN AMERICAN: >60 ML/MIN
GFR NON-AFRICAN AMERICAN: >60 ML/MIN
GFR SERPL CREATININE-BSD FRML MDRD: ABNORMAL ML/MIN/{1.73_M2}
GFR SERPL CREATININE-BSD FRML MDRD: ABNORMAL ML/MIN/{1.73_M2}
GLUCOSE BLD-MCNC: 105 MG/DL (ref 75–110)
GLUCOSE BLD-MCNC: 114 MG/DL (ref 70–99)
GLUCOSE BLD-MCNC: 126 MG/DL (ref 75–110)
HCT VFR BLD CALC: 48.9 % (ref 40.7–50.3)
HEMOGLOBIN: 15.4 G/DL (ref 13–17)
IMMATURE GRANULOCYTES: 1 %
LYMPHOCYTES # BLD: 10 % (ref 24–43)
MCH RBC QN AUTO: 32.3 PG (ref 25.2–33.5)
MCHC RBC AUTO-ENTMCNC: 31.5 G/DL (ref 28.4–34.8)
MCV RBC AUTO: 102.5 FL (ref 82.6–102.9)
MONOCYTES # BLD: 14 % (ref 3–12)
NRBC AUTOMATED: 0 PER 100 WBC
PDW BLD-RTO: 13.2 % (ref 11.8–14.4)
PLATELET # BLD: 164 K/UL (ref 138–453)
PLATELET ESTIMATE: ABNORMAL
PMV BLD AUTO: 11.5 FL (ref 8.1–13.5)
POTASSIUM SERPL-SCNC: 4 MMOL/L (ref 3.7–5.3)
RBC # BLD: 4.77 M/UL (ref 4.21–5.77)
RBC # BLD: ABNORMAL 10*6/UL
SEG NEUTROPHILS: 72 % (ref 36–65)
SEGMENTED NEUTROPHILS ABSOLUTE COUNT: 5.39 K/UL (ref 1.5–8.1)
SODIUM BLD-SCNC: 140 MMOL/L (ref 135–144)
WBC # BLD: 7.4 K/UL (ref 3.5–11.3)
WBC # BLD: ABNORMAL 10*3/UL

## 2020-05-20 PROCEDURE — 6370000000 HC RX 637 (ALT 250 FOR IP): Performed by: NURSE PRACTITIONER

## 2020-05-20 PROCEDURE — 6360000002 HC RX W HCPCS: Performed by: NURSE PRACTITIONER

## 2020-05-20 PROCEDURE — 6370000000 HC RX 637 (ALT 250 FOR IP): Performed by: STUDENT IN AN ORGANIZED HEALTH CARE EDUCATION/TRAINING PROGRAM

## 2020-05-20 PROCEDURE — 85025 COMPLETE CBC W/AUTO DIFF WBC: CPT

## 2020-05-20 PROCEDURE — APPNB180 APP NON BILLABLE TIME > 60 MINS: Performed by: NURSE PRACTITIONER

## 2020-05-20 PROCEDURE — 6370000000 HC RX 637 (ALT 250 FOR IP): Performed by: INTERNAL MEDICINE

## 2020-05-20 PROCEDURE — 80048 BASIC METABOLIC PNL TOTAL CA: CPT

## 2020-05-20 PROCEDURE — 6370000000 HC RX 637 (ALT 250 FOR IP): Performed by: EMERGENCY MEDICINE

## 2020-05-20 PROCEDURE — 36415 COLL VENOUS BLD VENIPUNCTURE: CPT

## 2020-05-20 PROCEDURE — 99239 HOSP IP/OBS DSCHRG MGMT >30: CPT | Performed by: INTERNAL MEDICINE

## 2020-05-20 PROCEDURE — 82947 ASSAY GLUCOSE BLOOD QUANT: CPT

## 2020-05-20 PROCEDURE — 97116 GAIT TRAINING THERAPY: CPT

## 2020-05-20 PROCEDURE — 99232 SBSQ HOSP IP/OBS MODERATE 35: CPT | Performed by: INTERNAL MEDICINE

## 2020-05-20 PROCEDURE — 2500000003 HC RX 250 WO HCPCS: Performed by: ANESTHESIOLOGY

## 2020-05-20 PROCEDURE — 6360000002 HC RX W HCPCS: Performed by: STUDENT IN AN ORGANIZED HEALTH CARE EDUCATION/TRAINING PROGRAM

## 2020-05-20 RX ORDER — DILTIAZEM HYDROCHLORIDE 360 MG/1
360 CAPSULE, EXTENDED RELEASE ORAL DAILY
Qty: 30 CAPSULE | Refills: 3 | Status: SHIPPED | OUTPATIENT
Start: 2020-05-21 | End: 2020-09-03 | Stop reason: SDUPTHER

## 2020-05-20 RX ORDER — NITROGLYCERIN 0.4 MG/1
0.4 TABLET SUBLINGUAL EVERY 5 MIN PRN
Qty: 25 TABLET | Refills: 11 | Status: SHIPPED | OUTPATIENT
Start: 2020-05-20 | End: 2020-09-03 | Stop reason: SDUPTHER

## 2020-05-20 RX ORDER — METOPROLOL TARTRATE 75 MG/1
75 TABLET, FILM COATED ORAL 2 TIMES DAILY
Qty: 60 TABLET | Refills: 3 | Status: SHIPPED | OUTPATIENT
Start: 2020-05-20 | End: 2020-09-03

## 2020-05-20 RX ORDER — METHOCARBAMOL 500 MG/1
500 TABLET, FILM COATED ORAL 4 TIMES DAILY PRN
Status: DISCONTINUED | OUTPATIENT
Start: 2020-05-20 | End: 2020-05-20 | Stop reason: HOSPADM

## 2020-05-20 RX ORDER — DIGOXIN 125 MCG
125 TABLET ORAL DAILY
Qty: 30 TABLET | Refills: 3 | Status: SHIPPED | OUTPATIENT
Start: 2020-05-21 | End: 2020-09-03 | Stop reason: SDUPTHER

## 2020-05-20 RX ORDER — LORAZEPAM 1 MG/1
TABLET ORAL
Qty: 90 TABLET | Refills: 2 | Status: SHIPPED | OUTPATIENT
Start: 2020-05-20 | End: 2020-08-25 | Stop reason: SDUPTHER

## 2020-05-20 RX ORDER — OXYCODONE HYDROCHLORIDE AND ACETAMINOPHEN 5; 325 MG/1; MG/1
1 TABLET ORAL EVERY 8 HOURS PRN
Qty: 21 TABLET | Refills: 0 | Status: SHIPPED | OUTPATIENT
Start: 2020-05-20 | End: 2020-05-27

## 2020-05-20 RX ADMIN — OXYCODONE HYDROCHLORIDE 10 MG: 5 TABLET ORAL at 00:02

## 2020-05-20 RX ADMIN — METOPROLOL TARTRATE 75 MG: 25 TABLET ORAL at 09:36

## 2020-05-20 RX ADMIN — OXYCODONE HYDROCHLORIDE 10 MG: 5 TABLET ORAL at 07:19

## 2020-05-20 RX ADMIN — METHOCARBAMOL TABLETS 500 MG: 500 TABLET, COATED ORAL at 09:29

## 2020-05-20 RX ADMIN — PANTOPRAZOLE SODIUM 40 MG: 40 TABLET, DELAYED RELEASE ORAL at 09:37

## 2020-05-20 RX ADMIN — HYDROMORPHONE HYDROCHLORIDE 0.25 MG: 1 INJECTION, SOLUTION INTRAMUSCULAR; INTRAVENOUS; SUBCUTANEOUS at 04:03

## 2020-05-20 RX ADMIN — Medication 5 MG: at 01:44

## 2020-05-20 RX ADMIN — MORPHINE SULFATE 2 MG: 2 INJECTION, SOLUTION INTRAMUSCULAR; INTRAVENOUS at 02:09

## 2020-05-20 RX ADMIN — APIXABAN 5 MG: 5 TABLET, FILM COATED ORAL at 09:37

## 2020-05-20 RX ADMIN — DILTIAZEM HYDROCHLORIDE 360 MG: 180 CAPSULE, COATED, EXTENDED RELEASE ORAL at 09:36

## 2020-05-20 RX ADMIN — LORAZEPAM 1 MG: 1 TABLET ORAL at 09:30

## 2020-05-20 RX ADMIN — DIGOXIN 125 MCG: 125 TABLET ORAL at 09:38

## 2020-05-20 RX ADMIN — DOCUSATE SODIUM 100 MG: 100 CAPSULE, LIQUID FILLED ORAL at 09:38

## 2020-05-20 ASSESSMENT — ENCOUNTER SYMPTOMS
EYE DISCHARGE: 0
BLOOD IN STOOL: 0
BACK PAIN: 1
NAUSEA: 0
SHORTNESS OF BREATH: 0
COLOR CHANGE: 0
WHEEZING: 0
VOMITING: 0
ABDOMINAL PAIN: 0

## 2020-05-20 ASSESSMENT — PAIN DESCRIPTION - ORIENTATION
ORIENTATION: RIGHT;LEFT
ORIENTATION: RIGHT;LEFT

## 2020-05-20 ASSESSMENT — PAIN SCALES - GENERAL
PAINLEVEL_OUTOF10: 9
PAINLEVEL_OUTOF10: 10
PAINLEVEL_OUTOF10: 8

## 2020-05-20 ASSESSMENT — PAIN DESCRIPTION - LOCATION
LOCATION: GROIN
LOCATION: GROIN

## 2020-05-20 ASSESSMENT — PAIN DESCRIPTION - PAIN TYPE: TYPE: ACUTE PAIN

## 2020-05-20 NOTE — PROGRESS NOTES
Net -134 ml       Wt Readings from Last 3 Encounters:   05/18/20 239 lb 6.7 oz (108.6 kg)   02/18/20 240 lb (108.9 kg)   11/15/19 235 lb 12.8 oz (107 kg)               RECENT LABS/ IMAGING:    Hematology:  Recent Labs     05/17/20  0501 05/18/20  0547 05/19/20  0548   WBC 13.3* 10.5 7.5   RBC 4.64 4.59 4.47   HGB 14.8 14.9 14.4   HCT 47.5 47.0 45.3   .4 102.4 101.3   MCH 31.9 32.5 32.2   MCHC 31.2 31.7 31.8   RDW 13.1 13.2 13.3    141 165   MPV 11.4 11.1 11.8     PT/INR:    Lab Results   Component Value Date    PROTIME 13.3 05/12/2020    PROTIME 26.9 11/15/2019    INR 1.3 05/12/2020     PTT:    Lab Results   Component Value Date    APTT 31.7 05/19/2020       Comprehensive Metabolic Profile:   Recent Labs     05/18/20  1718 05/18/20  2024 05/19/20  0651 05/19/20  1130 05/19/20  1628 05/19/20  1939   POCGLU 105 102 106 111* 101 104     Magnesium:   No results found for: MG  Phosphorus:   No results found for: PHOS  Ionized Calcium:   No results found for: CAION     Urinalysis:   Lab Results   Component Value Date    NITRU NEGATIVE 05/11/2020    COLORU YELLOW 05/11/2020    PHUR 5.0 05/11/2020    WBCUA None 08/06/2012    RBCUA None 08/06/2012    MUCUS 2+ 08/06/2012    TRICHOMONAS NOT REPORTED 08/06/2012    YEAST NOT REPORTED 08/06/2012    BACTERIA FEW 08/06/2012    CLARITYU cloudy 08/21/2013    SPECGRAV 1.058 05/11/2020    LEUKOCYTESUR NEGATIVE 05/11/2020    UROBILINOGEN Normal 05/11/2020    BILIRUBINUR NEGATIVE 05/11/2020    BILIRUBINUR neg 08/21/2013    BILIRUBINUR NEGATIVE 09/26/2011    BLOODU large 08/21/2013    GLUCOSEU NEGATIVE 05/11/2020    GLUCOSEU NEGATIVE 09/26/2011    KETUA NEGATIVE 05/11/2020    AMORPHOUS NOT REPORTED 08/06/2012           HgBA1c:    Lab Results   Component Value Date    LABA1C 6.0 02/18/2020     TSH:    Lab Results   Component Value Date    TSH 2.27 11/08/2013       Lactic Acid:   No results found for: LACTA   Troponin:  No results for input(s): TROPONINI in the last 72 hours.    ABGs:     No results found for: PHART, PH, PAD2CVS, PCO2, PO2ART, PO2, WKL7SLP, HCO3, BEART, BE, THGBART, THB, CSF2YEG, L4QOZYYK, O2SAT, FIO2    Lab Results   Component Value Date/Time    SPECIAL NOT REPORTED 08/21/2013 10:10 PM     Lab Results   Component Value Date/Time    CULTURE ESCHERICHIA COLI >811628 CFU/ML (A) 08/21/2013 10:10 PM    CULTURE  08/21/2013 10:10 PM     [facility] [id number] [place] [address] [phone]       Culture and Sensitivities:  No results for input(s): SPECDESC, SPECDESC, SPECIAL, CULTURE, CULTURE, STATUS, ORG, CDIFFTOXPCR, CAMPYLOBPCR, SALMONELLAPC, SHIGAPCR, SHIGELLAPCR, MPNEUG, MPNEUM, LACTOQL in the last 72 hours.                         CURRENT PROBLEMS  Patient Active Problem List    Diagnosis Date Noted    Moderate malnutrition ([address] 05/18/2020    [facility]     Hyperkalemia     Aneurysm of infrarenal abdominal aorta ([address] 05/11/2020    Diet-controlled diabetes mellitus ([address] 02/05/2019    Constipation 05/15/2017    Pacemaker 03/16/2015    Bradycardia 03/16/2015    Pacemaker battery depletion 07/11/2013    Insomnia 05/14/2012    Anxiety 03/19/2012    Colonoscopy refused 10/17/2011    Atrial fibrillation, chronic 10/17/2011    Back pain 04/18/2011    HTN (hypertension) 04/18/2011    COPD (chronic obstructive pulmonary disease) ([facility] 04/18/2011    Lung nodule 04/18/2011    Procedure refused 04/18/2011    Hyperlipidemia 04/18/2011           CURRENT MEDICATIONS:   Scheduled:    HYDROmorphone  0.25 mg Intravenous Once    dilTIAZem  360 mg Oral Daily    apixaban  5 mg Oral BID    metoprolol tartrate  75 mg Oral BID    magnesium hydroxide  30 mL Oral Daily    docusate sodium  100 mg Oral BID    digoxin  125 mcg Oral Daily    sodium chloride flush  10 mL Intravenous 2 times per day    pantoprazole  40 mg Oral QAM AC    linaclotide  290 mcg Oral QAM AC    sodium chloride flush  10 mL Intravenous 2 times per day   

## 2020-05-20 NOTE — PROGRESS NOTES
Pt. c/o constant unrelieved pain to right lower abdomen radiating to right lower back, BP was increasing systolically in 611X and 322X, PRN labetalol given for BP, PRN morphine was given for pain. CNP on called notified of these findings, house CNP up to bedside to evaluate pt. Bilat. Pedal and posterior pulses ascultated both manually and with doppler. CNP Mary reached to vascular surgeon with assessment findings. Dr. Jael Russ up to bedside to evaluate pt. Pt. Was given one time dose of dilaudid for pain, BP improving see vital signs, will continue to monitor.

## 2020-05-20 NOTE — ADT AUTH CERT
to 360. On Heparin drip. Will transition to oral Eliquis   4. Lung nodule- Patient will need outpatient PET scan along with a CT-guided needle biopsy once his acute issues have resolved.  Pulmonology on board and has explained this to patient.     5. Constipation- resolved. [place] will be continued on bowel regimen. 6. Essential hypertension-patient with history of hypertension.  Patient to be continued on his home blood pressure medication. 7. COPD-patient with history of COPD. 8. Hyperlipidemia-patient with history of hyperlipidemia.  Patient be continued on his home statin. 5. Pacemaker-patient has a pacemaker in place secondary to having tachybradycardia syndrome.  Pacemaker interrogated in early part of hospitalization in order to prepare patient for surgery for AAA.     10. Type 2 diabetes-patient has type 2 diabetes that he controls typically with diet.  Patient currently on insulin sliding scale. OT   Pt lying supine in bed upon entrance to room. Pt completed bed mobility with mod I assistance. Pt sat at eob 30 minutes with good unsupported sitting balance with forward flexed posture. Pt reports feeling lightheaded when sitting upright. Sit to stand with contact guard assistance. Pt completed dynamic mob in room with contact guard assistance. Please refer to below assist levels for adl completion. . Pt retired supine in bed with call light and phone in reach. All needs met upon exit. Pt ed on OT POC, safety awareness tech, proper hand placement for transfers, and energy conservation tech with proper breathing tech with fair return. Pulmonary   Last 24 hours:    Remained in atrial fibrillation with heart rate of around 100. Remained hemodynamically stable overnight. He is afebrile and T-max is 99.1 last 24 hours   Labs shows normal WBC of 7.5 hemoglobin 14.4 hematocrit 45.3 platelet 330, sodium 139 potassium 3.7 bicarbonate 28 BUN 24 and creatinine 1.26.       GASTROINTESTINAL: Positive for mild right sided abdominal pain, negative for nausea, vomiting, diarrhea, constipation, jaundice, dysphagia, reflux, odynophagia, hematemesis, and hemtochezia      COPD by history severity not known. Left lower lobe nodule/mass present since 2007 had increasing size on current CT chest as compared to CT scan in 2007 in 2010. Infrarenal abdominal aortic aneurysm status post endovascular repair. Mild LV systolic dysfunction. Atrial fibrillation chronic with RVR. History of permanent pacemaker.  PET scan now and he want to come to office and then schedule PET scan and biopsy. He will need outpatient pulmonary function test   Albuterol to be used as needed   Continue with anticoagulation per. Heart rate control for atrial fibrillation per cardiology.    Optimization of medication for LV dysfunction per cardiology and primary service.      (37.7) 18 106 115/63 - Semi fowlers - 2 96 Nasal cannula         5/19/2020 05:48   BUN: 24 (H)   Creatinine: 1.26 (H)   GFR Non-: 56 (L)   GFR : >60   Glucose: 119 (H)   WBC: 7.5   RBC: 4.47   Hemoglobin Quant: 14.4   Hematocrit: 45.3   Seg Neutrophils: 67 (H)   Segs Absolute: 5.07   Lymphocytes: 13 (L)   Absolute Lymph #: 1.00 (L)   Monocytes: 15 (H)   Immature Granulocytes: 1 (H)   PTT: 73.9 (H)      5/19/2020 06:51   POC Glucose: 106      5/19/2020 11:30   POC Glucose: 111 (H)      Labs daily, fsbs 4xd, cont pulse ox, vs, telemetry, cdb q2h, dialy wt, I nad o,    Eliquis 5 mg 2xd, lanoxin 125mcg daily, cardizem 240 mg daily, colace daily, lopressor 75 mg 2xd, protonix daily, morphine 2 mg iv q4hprn x1  oxycodone 10 mg po q4hprn x2       Dc plan   home with son        Hypertension - Care Day 8 (5/18/2020) by Nithya Tejada RN         Review Status Review Entered   Completed 5/19/2020 16:51       Criteria Review      Care Day: 8 Care Date: 5/18/2020 Level of Care: ICU    Guideline Day 3    Level Of Care    ( ) Floor to discharge 5/19/2020 4:51 PM EDT by Michael Avery      intermediate    Clinical Status    (X) * Renal function at baseline or stable and acceptable    (X) * Pulmonary edema absent or acceptable for next level of care    (X) * Mental status at baseline    ( ) * Blood pressure under acceptable control    (X) * No evidence of active cerebral or myocardial ischemia    ( ) * Discharge plans and education understood    Activity    (X) * Ambulatory [F]    5/19/2020 4:51 PM EDT by Michael Avery      up with pt    Routes    (X) * Oral hydration, medications, and diet    5/19/2020 4:51 PM EDT by Michael Avery      po    ( ) Low-salt diet    5/19/2020 4:51 PM EDT by Michael Avery      carb control diet    Medications    (X) * Oral antihypertensive regimen established    * Milestone   Additional Notes   5/18/20   cardiology   Denies chest pain or SOB.  AFib RVR on monitor 100s to 140s  Patient reports he is on coumadin as home med and is currently on heparin drip.        Assessment / Acute Cardiac Problems:   1. PAF with RVR  ([id number]) on coumadin at home.  On Heparin drip   2. Newly discovered infra renal AAA 8.2- s/p EVAR on 5/15/2020   3. HTN   4. Non obstructive CAD per cath 5/14/2020   1. AFib RVR  Currently on digoxin, lopressor, cardizem, and heparin drip   HR 100s to 140s this am prior to am medications.  Will increase BB today for HR and BP control. 2. HFrEF.   LVEF 33% on echo  Currently on BB, Digoxin. 3. HTN stable.  Continue BB and CCB. Pulmonary   Last 24 hours:    Remained in atrial fibrillation with heart rate of 110s to 130. Remained hemodynamically stable overnight. He is afebrile and T-max is 99.4 last 24 hours   Labs shows normal WBC of 10.5 hemoglobin hematocrit stable platelet count 003, bicarbonate 22 BUN 19 creatinine 1.05 sodium 134 potassium 4.0.      COPD by history severity not known.    Left lower lobe nodule/mass present since 2007 had increasing size on current CT chest as compared to CT scan in 2007 in 2010. Infrarenal abdominal aortic aneurysm status post endovascular repair. Mild LV systolic dysfunction. Atrial fibrillation chronic with RVR. History of permanent pacemaker. left lower lobe mass that it has increasing in size as compared to CT scan from 2010 I told him that he will need work-up including CT-guided biopsy and PET scan at this time he wants to take care of the acute issue as he has recent procedure done also he is on anticoagulation and with A. fib with RVR, I have told him that he will need follow-up after discharge provided all the information and that he will need outpatient PET scan and biopsy. He will need outpatient pulmonary function test   Albuterol to be used as needed   Continue with heparin drip. Heart rate control for atrial fibrillation per cardiology. Optimization of medication for LV dysfunction per cardiology and primary service. Dietician   Nutrition Recommendations:    -Recommend 4 CHO diabetic diet   -Suggest d/c ensure enlive supplements per pt request - pt wishes to not receive any nutritional supplements at this time    -Pt may benefit from an appetite stimulant   -Will monitor po intake and weights           Medicine   Subjective:   Patient states that he is having a lot of pain in his right groin and back.  Patient states that his heart rate is sometimes uncontrolled and he attributes it to anxiety.       1. Aneurysm of infrarenal abdominal aorta- patient presented with right-sided abdominal pain.  Patient found to have a infrarenal AAA in the ED.  Patient was admitted to the MICU for closer monitoring.  Prior to repair of AAA, patient had a cardiac cath on 5/14/2020 which showed nonobstructive CAD with mildly impaired ventricular function.  On 5/15/2020, patient had endovascular repair of abdominal aortic aneurysm with endurance aortic stent graft.    2. Acute kidney injury-patient admitted with a creatinine of 1.3.  Patient's mg 2xd, protonix daily, metamucil 3xd, heparin gtt, ativan 1mg po q8h prn x3, morphine 2 mg iv q4hprn x3, oxycodone 10 mg q4hprn x1  glycolax gerry Edwards plan home with son

## 2020-05-20 NOTE — PROGRESS NOTES
SpO2 97%   BMI 33.39 kg/m²   24 hour intake/output:    Intake/Output Summary (Last 24 hours) at 5/20/2020 1027  Last data filed at 5/19/2020 2026  Gross per 24 hour   Intake 250 ml   Output 400 ml   Net -150 ml     Last 3 weights:   Wt Readings from Last 3 Encounters:   05/18/20 239 lb 6.7 oz (108.6 kg)   02/18/20 240 lb (108.9 kg)   11/15/19 235 lb 12.8 oz (107 kg)       General appearance: alert and cooperative with exam  Physical Examination:   General appearance - alert, well appearing, and in no distress, overweight and acyanotic, in no respiratory distress  Mental status - alert, oriented to person, place, and time  Eyes - pupils equal and reactive, extraocular eye movements intact, negative for icterus  Ears - right ear normal, left ear normal  Nose - normal and patent, no erythema, discharge or polyps  Mouth - mucous membranes moist, pharynx normal without lesions and large tongue, Mallampati 2  Neck - supple, no significant adenopathy, short and thick neck  Chest - clear to auscultation, no wheezes, rales or rhonchi, symmetric air entry, no tachypnea, retractions or cyanosis  Heart - irregularly irregular rhythm with rate 100  Abdomen - soft, nontender, nondistended, no masses or organomegaly  Neurological - alert, oriented, normal speech, no focal findings or movement disorder noted  Extremities - peripheral pulses normal, no pedal edema, no clubbing or cyanosis  Skin - normal coloration and turgor, no rashes, no suspicious skin lesions noted     Diet:  DIET CARB CONTROL; Carb Control: 4 carb choices (60 gms)/meal    Medications:Current Inpatient    Scheduled Meds:   dilTIAZem  360 mg Oral Daily    apixaban  5 mg Oral BID    metoprolol tartrate  75 mg Oral BID    magnesium hydroxide  30 mL Oral Daily    docusate sodium  100 mg Oral BID    digoxin  125 mcg Oral Daily    sodium chloride flush  10 mL Intravenous 2 times per day    pantoprazole  40 mg Oral QAM AC    linaclotide  290 mcg Oral QAM AC input(s): LDL  ABGs: No results found for: PH, PCO2, PO2, HCO3, O2SAT  Thyroid:   Lab Results   Component Value Date    TSH 2.27 11/08/2013      Urinalysis: No results for input(s): BACTERIA, BLOODU, CLARITYU, COLORU, PHUR, PROTEINU, RBCUA, SPECGRAV, BILIRUBINUR, NITRU, WBCUA, LEUKOCYTESUR, GLUCOSEU in the last 72 hours. CULTURES:    CXR  05/17/2020  Mild pulmonary vascular congestion no infiltrate consolidation      CT Scans  CTA chest 05/11/2020. Mediastinum: There is mild stenosis at the origin of the left subclavian   artery.  Scattered mediastinal lymph nodes are unchanged, likely reactive. The heart and pericardium demonstrate no acute abnormality.  Coronary artery   calcifications are noted. Franco Benne is no evidence for pulmonary embolus.       Lungs/Pleura: Stenosis at the origin of the middle lobe bronchus is again   seen. Franco Benne are secretions in the central left upper lobe airways.  There is   no pneumothorax or pleural effusion.  Emphysema is noted. Franco Benne is a cluster   of tiny centrilobular nodules in the middle lobe.  Dominant nodule in the   posterior left lower lobe has enlarged, now measuring 1.7 x 2.8 cm,   previously 1.1 x 1.8 cm.  Medial to this nodule is a tubular branching   structure which likely represents impacted mucus in a peripheral airway.           ECHO: 05/11/2020  Left ventricle is normal in size. Global left ventricular systolic function  is moderately reduced. Calculated ejection fraction is 33 % by heart Model . Evidence of diastolic dysfunction. Left atrium is moderately dilated. Right atrium is severely dilated . Severely dilated right ventricular cavity. Normal right ventricular  function. Pacemaker / ICD lead seen in right ventricle. Mild mitral regurgitation. Moderate tricuspid regurgitation. Mild pulmonary hypertension. Estimated right ventricular systolic pressure  is 17HJUH. Mild pulmonic insufficiency    Cardiac Angiography: 05/11/2020   Non-obstructive CAD.

## 2020-05-20 NOTE — PROGRESS NOTES
Pt evaluated at request of covering NP for pain in the back, supraumbilical region, and right groin. Patient's pain has been intermittent since his EVAR on 5/15/20, states he felt great yesterday but has pain \"all over, in about 5 different spots\" at this time. Pt's VSS, awaiting H/H ordered by primary, good doppler signals in BLE, no pulsatile mass noted over either groin site or the abdomen. Patient's pain in the back can be localized to the right SI joint and is reproducible on palpation. Primary plan to add mild muscle relaxer to hopefully provide pt some relief. Pt instructed to be mobile as much as able and his day time out of bed if at all possible.

## 2020-05-20 NOTE — PROGRESS NOTES
Physical Therapy  Facility/Department: [address] STEPDOWN  Daily Treatment Note  NAME: Mely Almanza  : 1945  MRN: [id number]    Date of Service: 2020    Discharge Recommendations:  Patient would benefit from continued therapy after discharge   PT Equipment Recommendations  Other: CTA, possibly RW    Assessment   Body structures, Functions, Activity limitations: Decreased functional mobility ; Decreased strength;Decreased balance;Decreased endurance  Assessment: Pt able to amb 200' RW CGA, HR increased up to 125 bpm. Pt limited by pain this date. Pt would benefit from continued acute PT to address deficits. Prognosis: Good  Decision Making: Medium Complexity  PT Education: General Safety; Functional Mobility Training;Transfer Training  REQUIRES PT FOLLOW UP: Yes  Activity Tolerance  Activity Tolerance: Patient Tolerated treatment well;Patient limited by pain     Patient Diagnosis(es): The encounter diagnosis was Aneurysm of infrarenal abdominal aorta ([facility] [address] has a past medical history of A-fib ([facility] [address] Anticoagulated on Coumadin, Anxiety, Back pain, COPD (chronic obstructive pulmonary disease) ([facility] [address] HTN (hypertension), Hyperglycemia, Hyperlipidemia, Insomnia, Lung nodule, and Pacemaker. has a past surgical history that includes pacemaker placement. Restrictions  Restrictions/Precautions  Restrictions/Precautions: General Precautions, Fall Risk  Required Braces or Orthoses?: No  Position Activity Restriction  Other position/activity restrictions: up with assist. s/p AAA repair with stent graft 5/15  Subjective   General  Response To Previous Treatment: Patient with no complaints from previous session.   Family / Caregiver Present: No  Subjective  Subjective: RN and pt agreed to PT, pt awake in chair upon arrival and c/o 10/10 back pain  Pain Screening  Patient Currently in Pain: Yes  Vital Signs  Patient Currently in Pain: Yes       Orientation  Orientation  Overall Orientation Status: Within Functional Limits       Objective      Transfers  Sit to Stand: Stand by assistance  Stand to sit: Stand by assistance  Comment: vc's for hand placement and safety  Ambulation  Ambulation?: Yes  Ambulation 1  Surface: level tile  Device: Rolling Walker  Assistance: Contact guard assistance  Gait Deviations: Slow Fatou;Decreased step height  Distance: 200ft  Comments: HR increased up to 125bpm following ambulation  Stairs/Curb  Stairs?: No        Exercises  Knee Long Arc Quad: RLE x 5, LLE x 20   Ankle Pumps: BLE x 20  Comments: Therapeutic exercises limited d/t increased pain in groin                      Goals  Short term goals  Time Frame for Short term goals: 14 visits  Short term goal 1: Pt will be Seven bed mobility  Short term goal 2: Pt will be Seven transfers  Short term goal 3: Pt will be Seven amb 240' RW or least restrictive AD  Short term goal 4: Pt will navigate 6 steps Seven either or both rail use    Plan    Plan  Times per week: 5-6x/wk  Current Treatment Recommendations: Strengthening, Balance Training, Functional Mobility Training, Endurance Training, Transfer Training, Gait Training, Stair training, Home Exercise Program, Safety Education & Training, Patient/Caregiver Education & Training, Equipment Evaluation, Education, & procurement  Safety Devices  Type of devices: Call light within reach, Nurse notified, Gait belt, Patient at risk for falls, Left in chair, All fall risk precautions in place, Chair alarm in place  Restraints  Initially in place: No     Therapy Time   Individual Concurrent Group Co-treatment   Time In 1365         Time Out 0846         Minutes 18         Timed Code Treatment Minutes: 15 Minutes       Graciela Alexis, PTA

## 2020-05-20 NOTE — PROGRESS NOTES
Respiratory: Negative for shortness of breath and wheezing. Cardiovascular: Negative for chest pain, palpitations and leg swelling. Gastrointestinal: Negative for abdominal pain, blood in stool, nausea and vomiting. Endocrine: Negative for cold intolerance and heat intolerance. Genitourinary: Negative for dysuria and frequency. Musculoskeletal: Positive for back pain. Negative for arthralgias and joint swelling. Skin: Negative for color change and rash. Neurological: Negative for dizziness, tremors, seizures and light-headedness. Psychiatric/Behavioral: Negative for agitation and confusion. Medications: Allergies:     Allergies   Allergen Reactions    Lisinopril      cough    Neurontin [Gabapentin] Other (See Comments)     Took 3 nights and felt awful    Pcn [Penicillins] Swelling    Statins Support Therapy Other (See Comments)     Leg pains, tried pravachol, crestor, lipitor, simvastatin    Hydrochlorothiazide Rash     Breaks out       Current Meds:   Scheduled Meds:    dilTIAZem  360 mg Oral Daily    apixaban  5 mg Oral BID    metoprolol tartrate  75 mg Oral BID    magnesium hydroxide  30 mL Oral Daily    docusate sodium  100 mg Oral BID    digoxin  125 mcg Oral Daily    sodium chloride flush  10 mL Intravenous 2 times per day    pantoprazole  40 mg Oral QAM AC    linaclotide  290 mcg Oral QAM AC    sodium chloride flush  10 mL Intravenous 2 times per day    insulin lispro  0-12 Units Subcutaneous TID WC    insulin lispro  0-6 Units Subcutaneous Nightly    psyllium  1 packet Oral TID     Continuous Infusions:    dextrose       PRN Meds: methocarbamol, labetalol, meperidine, hydrALAZINE, sodium chloride flush, acetaminophen, calcium carbonate, morphine, sodium chloride flush, acetaminophen **OR** acetaminophen, polyethylene glycol, promethazine **OR** ondansetron, LORazepam, [Held by provider] heparin (porcine), [Held by provider] heparin (porcine), oxyCODONE **OR** distress. Appearance: Normal appearance. He is not ill-appearing or diaphoretic. HENT:      Head: Normocephalic and atraumatic. Nose: No congestion or rhinorrhea. Eyes:      Extraocular Movements: Extraocular movements intact. Conjunctiva/sclera: Conjunctivae normal.      Pupils: Pupils are equal, round, and reactive to light. Neck:      Musculoskeletal: Normal range of motion and neck supple. Cardiovascular:      Rate and Rhythm: Normal rate and regular rhythm. Pulses: Normal pulses. Heart sounds: Normal heart sounds. No murmur. Pulmonary:      Effort: Pulmonary effort is normal.      Breath sounds: Normal breath sounds. No wheezing or rales. Abdominal:      General: Bowel sounds are normal. There is no distension. Palpations: Abdomen is soft. Tenderness: There is no abdominal tenderness. Musculoskeletal: Normal range of motion. General: No swelling or tenderness. Skin:     General: Skin is warm and dry. Findings: No rash. Neurological:      General: No focal deficit present. Mental Status: He is alert and oriented to person, place, and time. Psychiatric:         Mood and Affect: Mood normal.         Thought Content: Thought content normal.         Judgment: Judgment normal.         Assessment:        Hospital Problems           Last Modified POA    HTN (hypertension) 5/18/2020 Yes    COPD (chronic obstructive pulmonary disease) ([address] 5/18/2020 Yes    Lung nodule 5/18/2020 Yes    Hyperlipidemia 5/18/2020 Yes    Atrial fibrillation, chronic 5/18/2020 Yes    Overview Signed 10/17/2011  9:01 AM by Marvin Arreola MD     On coumadin         Anxiety 5/18/2020 Yes    Pacemaker 5/18/2020 Yes    Overview Signed 3/16/2015  9:09 AM by CARMITA Arteaga - SHANIKA Jacobsen, replaced in 2013.           Constipation 5/18/2020 Yes    Diet-controlled diabetes mellitus ([address] 5/18/2020 Yes    Aneurysm of infrarenal abdominal aorta ([address] 5/18/2020 Yes CLIFTON (acute kidney injury) ([facility] [address] 5/18/2020 Yes    Hyperkalemia 5/18/2020 Yes    Moderate malnutrition ([facility] [address] 5/18/2020 Yes          Plan:      1. Aneurysm of infrarenal abdominal aorta- s/p endovascular repair of abdominal aortic aneurysm with endurance aortic stent graft. 5/15/20  2. Acute kidney injury-patient admitted with a creatinine of 1.3.  Patient's baseline creatinine appears to be between 1 and 1.2.    3. Atrial fibrillation with RVR- HR improved but still above 100. patient has a history of atrial fibrillation. Will continue Digoxin 125 mcg daily. Increase Cardizem to 360. On Heparin drip. Will transition to oral Eliquis  4. Lung nodule- Patient will need outpatient PET scan along with a CT-guided needle biopsy once his acute issues have resolved.  Pulmonology on board and has explained this to patient.    5. Constipation- resolved. Patient will be continued on bowel regimen. 6. Essential hypertension-patient with history of hypertension.  Patient to be continued on his home blood pressure medication. 7. COPD-patient with history of COPD. 8. Hyperlipidemia-patient with history of hyperlipidemia.  Patient be continued on his home statin. 5. Pacemaker-patient has a pacemaker in place secondary to having tachybradycardia syndrome.  Pacemaker interrogated in early part of hospitalization in order to prepare patient for surgery for AAA.    10. Type 2 diabetes-patient has type 2 diabetes that he controls typically with diet.  Patient currently on insulin sliding scale. 11. Discharge planning -home today .       Nima Lagos MD  5/20/2020  9:58 AM

## 2020-05-20 NOTE — DISCHARGE SUMMARY
therapeutic interventions:   1. Aneurysm of infrarenal abdominal aorta- s/p endovascular repair of abdominal aortic aneurysm with endurance aortic stent graft. 5/15/20  2. Acute kidney injury-patient admitted with a creatinine of 1.3.  Patient's baseline creatinine appears to be between 1 and 1.2.    3. Atrial fibrillation with RVR- HR improved but still above 100. patient has a history of atrial fibrillation. Continue Digoxin 125 mcg daily. Continue Cardizem to 360 mg daily. Transitioned to oral Eliquis 5 mg BID  4. Lung nodule- Patient will need outpatient PET scan along with a CT-guided needle biopsy once his acute issues have resolved.  Pulmonology on board and has explained this to patient.    5. Constipation- resolved.  Patient will be continued on bowel regimen. 6. Essential hypertension-patient with history of hypertension.  Patient to be continued on his home blood pressure medication. 7. COPD-patient with history of COPD. 8. Hyperlipidemia-patient with history of hyperlipidemia.  Patient be continued on his home statin. 5. Pacemaker-patient has a pacemaker in place secondary to having tachybradycardia syndrome.  Pacemaker interrogated in early part of hospitalization in order to prepare patient for surgery for AAA.    10. Type 2 diabetes-patient has type 2 diabetes that he controls typically with diet.  Patient currently on insulin sliding scale.     Significant Diagnostic Studies:   Labs / Micro:  CBC:   Lab Results   Component Value Date    WBC 7.4 05/20/2020    RBC 4.77 05/20/2020    RBC 4.71 02/13/2012    HGB 15.4 05/20/2020    HCT 48.9 05/20/2020    .5 05/20/2020    MCH 32.3 05/20/2020    MCHC 31.5 05/20/2020    RDW 13.2 05/20/2020     05/20/2020     02/13/2012     BMP:    Lab Results   Component Value Date    GLUCOSE 114 05/20/2020    GLUCOSE 98 02/13/2012     05/20/2020    K 4.0 05/20/2020    CL 99 05/20/2020    CO2 23 05/20/2020    ANIONGAP 18 05/20/2020    BUN 23 reconciliation, prescriptions for required medications, discharge plan and follow up. Electronically signed by   Radha Cheatham MD  5/20/2020  10:18 AM      Thank you CARMITA Coles - EDGAR for the opportunity to be involved in this patient's care.

## 2020-05-31 ENCOUNTER — APPOINTMENT (OUTPATIENT)
Dept: CT IMAGING | Age: 75
End: 2020-05-31
Payer: COMMERCIAL

## 2020-05-31 ENCOUNTER — HOSPITAL ENCOUNTER (EMERGENCY)
Age: 75
Discharge: HOME | End: 2020-05-31
Attending: EMERGENCY MEDICINE
Payer: COMMERCIAL

## 2020-05-31 VITALS
DIASTOLIC BLOOD PRESSURE: 70 MMHG | TEMPERATURE: 98 F | HEIGHT: 71 IN | WEIGHT: 221 LBS | HEART RATE: 90 BPM | SYSTOLIC BLOOD PRESSURE: 148 MMHG | RESPIRATION RATE: 16 BRPM | OXYGEN SATURATION: 98 % | BODY MASS INDEX: 30.94 KG/M2

## 2020-05-31 LAB
ABSOLUTE EOS #: 0.14 K/UL (ref 0–0.44)
ABSOLUTE IMMATURE GRANULOCYTE: 0.06 K/UL (ref 0–0.3)
ABSOLUTE LYMPH #: 0.96 K/UL (ref 1.1–3.7)
ABSOLUTE MONO #: 0.89 K/UL (ref 0.1–1.2)
ANION GAP SERPL CALCULATED.3IONS-SCNC: 15 MMOL/L (ref 9–17)
BASOPHILS # BLD: 1 % (ref 0–2)
BASOPHILS ABSOLUTE: 0.08 K/UL (ref 0–0.2)
BUN BLDV-MCNC: 23 MG/DL (ref 8–23)
BUN/CREAT BLD: 21 (ref 9–20)
CALCIUM SERPL-MCNC: 9.6 MG/DL (ref 8.6–10.4)
CHLORIDE BLD-SCNC: 98 MMOL/L (ref 98–107)
CO2: 22 MMOL/L (ref 20–31)
CREAT SERPL-MCNC: 1.11 MG/DL (ref 0.7–1.2)
DIFFERENTIAL TYPE: ABNORMAL
EOSINOPHILS RELATIVE PERCENT: 2 % (ref 1–4)
GFR AFRICAN AMERICAN: >60 ML/MIN
GFR NON-AFRICAN AMERICAN: >60 ML/MIN
GFR SERPL CREATININE-BSD FRML MDRD: ABNORMAL ML/MIN/{1.73_M2}
GFR SERPL CREATININE-BSD FRML MDRD: ABNORMAL ML/MIN/{1.73_M2}
GLUCOSE BLD-MCNC: 132 MG/DL (ref 70–99)
HCT VFR BLD CALC: 49.7 % (ref 40.7–50.3)
HEMOGLOBIN: 16 G/DL (ref 13–17)
IMMATURE GRANULOCYTES: 1 %
LYMPHOCYTES # BLD: 10 % (ref 24–43)
MCH RBC QN AUTO: 31.9 PG (ref 25.2–33.5)
MCHC RBC AUTO-ENTMCNC: 32.2 G/DL (ref 28.4–34.8)
MCV RBC AUTO: 99 FL (ref 82.6–102.9)
MONOCYTES # BLD: 9 % (ref 3–12)
NRBC AUTOMATED: 0 PER 100 WBC
PDW BLD-RTO: 13.2 % (ref 11.8–14.4)
PLATELET # BLD: 288 K/UL (ref 138–453)
PLATELET ESTIMATE: ABNORMAL
PMV BLD AUTO: 10.6 FL (ref 8.1–13.5)
POTASSIUM SERPL-SCNC: 4.9 MMOL/L (ref 3.7–5.3)
RBC # BLD: 5.02 M/UL (ref 4.21–5.77)
RBC # BLD: ABNORMAL 10*6/UL
SEG NEUTROPHILS: 77 % (ref 36–65)
SEGMENTED NEUTROPHILS ABSOLUTE COUNT: 7.49 K/UL (ref 1.5–8.1)
SODIUM BLD-SCNC: 135 MMOL/L (ref 135–144)
WBC # BLD: 9.6 K/UL (ref 3.5–11.3)
WBC # BLD: ABNORMAL 10*3/UL

## 2020-05-31 PROCEDURE — 80048 BASIC METABOLIC PNL TOTAL CA: CPT

## 2020-05-31 PROCEDURE — 74177 CT ABD & PELVIS W/CONTRAST: CPT

## 2020-05-31 PROCEDURE — 85025 COMPLETE CBC W/AUTO DIFF WBC: CPT

## 2020-05-31 PROCEDURE — 99283 EMERGENCY DEPT VISIT LOW MDM: CPT

## 2020-05-31 PROCEDURE — 6360000002 HC RX W HCPCS: Performed by: NURSE PRACTITIONER

## 2020-05-31 PROCEDURE — 96374 THER/PROPH/DIAG INJ IV PUSH: CPT

## 2020-05-31 PROCEDURE — 2580000003 HC RX 258: Performed by: NURSE PRACTITIONER

## 2020-05-31 PROCEDURE — 96375 TX/PRO/DX INJ NEW DRUG ADDON: CPT

## 2020-05-31 PROCEDURE — 6360000004 HC RX CONTRAST MEDICATION: Performed by: NURSE PRACTITIONER

## 2020-05-31 RX ORDER — ONDANSETRON 2 MG/ML
4 INJECTION INTRAMUSCULAR; INTRAVENOUS ONCE
Status: COMPLETED | OUTPATIENT
Start: 2020-05-31 | End: 2020-05-31

## 2020-05-31 RX ORDER — NITROFURANTOIN 25; 75 MG/1; MG/1
100 CAPSULE ORAL 2 TIMES DAILY
Qty: 10 CAPSULE | Refills: 0 | Status: SHIPPED | OUTPATIENT
Start: 2020-05-31 | End: 2020-06-05

## 2020-05-31 RX ORDER — HYDROMORPHONE HYDROCHLORIDE 1 MG/ML
0.5 INJECTION, SOLUTION INTRAMUSCULAR; INTRAVENOUS; SUBCUTANEOUS ONCE
Status: COMPLETED | OUTPATIENT
Start: 2020-05-31 | End: 2020-05-31

## 2020-05-31 RX ORDER — 0.9 % SODIUM CHLORIDE 0.9 %
80 INTRAVENOUS SOLUTION INTRAVENOUS ONCE
Status: COMPLETED | OUTPATIENT
Start: 2020-05-31 | End: 2020-05-31

## 2020-05-31 RX ORDER — OXYCODONE HYDROCHLORIDE AND ACETAMINOPHEN 5; 325 MG/1; MG/1
1 TABLET ORAL EVERY 6 HOURS PRN
Qty: 20 TABLET | Refills: 0 | Status: SHIPPED | OUTPATIENT
Start: 2020-05-31 | End: 2020-06-07

## 2020-05-31 RX ORDER — MORPHINE SULFATE 2 MG/ML
2 INJECTION, SOLUTION INTRAMUSCULAR; INTRAVENOUS ONCE
Status: COMPLETED | OUTPATIENT
Start: 2020-05-31 | End: 2020-05-31

## 2020-05-31 RX ORDER — SODIUM CHLORIDE 0.9 % (FLUSH) 0.9 %
10 SYRINGE (ML) INJECTION PRN
Status: DISCONTINUED | OUTPATIENT
Start: 2020-05-31 | End: 2020-06-01 | Stop reason: HOSPADM

## 2020-05-31 RX ADMIN — ONDANSETRON HYDROCHLORIDE 4 MG: 2 INJECTION, SOLUTION INTRAVENOUS at 20:29

## 2020-05-31 RX ADMIN — SODIUM CHLORIDE 80 ML: 0.9 INJECTION, SOLUTION INTRAVENOUS at 20:48

## 2020-05-31 RX ADMIN — IOPAMIDOL 75 ML: 755 INJECTION, SOLUTION INTRAVENOUS at 20:48

## 2020-05-31 RX ADMIN — Medication 0.5 MG: at 21:17

## 2020-05-31 RX ADMIN — Medication 2 MG: at 20:30

## 2020-05-31 RX ADMIN — SODIUM CHLORIDE, PRESERVATIVE FREE 10 ML: 5 INJECTION INTRAVENOUS at 20:49

## 2020-05-31 ASSESSMENT — ENCOUNTER SYMPTOMS
COUGH: 0
SHORTNESS OF BREATH: 0
ABDOMINAL PAIN: 1
DIARRHEA: 0
BACK PAIN: 0
CONSTIPATION: 0
SINUS PRESSURE: 0
NAUSEA: 1
VOMITING: 0

## 2020-05-31 ASSESSMENT — PAIN SCALES - GENERAL
PAINLEVEL_OUTOF10: 9
PAINLEVEL_OUTOF10: 8
PAINLEVEL_OUTOF10: 9

## 2020-05-31 NOTE — ED PROVIDER NOTES
[facility] ED  EMERGENCY DEPARTMENT ENCOUNTER      Pt Name: Marcos Camara  MRN: [id number]  [place] 1945  Date of evaluation: 5/31/20  CHIEF COMPLAINT       Chief Complaint   Patient presents with    Post-op Problem     stomache cramping, groin pain       HISTORY OF PRESENT ILLNESS   78-year-old male presents emergency room with continued postop pain. He underwent vascular repair of the abdominal aorta coronaries him with endurance aortic stent graft via ultrasound access bilateral, femoral arteries on May 15, approximately 2 weeks ago. He was discharged home on the 20th with Percocet which has given him little improvement. He continues to have diffuse lower abdominal and groin pain that he rates at a 9 and describes as cramping. The history is provided by the patient. REVIEW OF SYSTEMS     Review of Systems   Constitutional: Positive for activity change and fatigue. Negative for fever. HENT: Negative for congestion and sinus pressure. Respiratory: Negative for cough and shortness of breath. Cardiovascular: Negative for chest pain and palpitations. Gastrointestinal: Positive for abdominal pain and nausea. Negative for constipation, diarrhea and vomiting. Genitourinary: Negative for difficulty urinating, dysuria and flank pain. Musculoskeletal: Negative for back pain and myalgias. Skin:        Incisional wounds   Neurological: Negative for dizziness and headaches. Psychiatric/Behavioral: Negative for confusion and decreased concentration.      PASTMEDICAL HISTORY     Past Medical History:   Diagnosis Date    A-fib ([address]     Anticoagulated on Coumadin     for A-Fib, monitoring by PCP    Anxiety     Back pain     COPD (chronic obstructive pulmonary disease) (HCC)     HTN (hypertension)     Hyperglycemia     Hyperlipidemia     can't take statins, due to muscle aches    Insomnia     Lung nodule     Pacemaker     cardiologist, dr. Can Connor     SURGICAL HISTORY DISPOSITION/PLAN   DISPOSITION Decision To Discharge 05/31/2020 10:02:13 PM      PATIENT REFERRED TO:   Anel Sun MD  [facility] [address] [phone]    Call in 1 day      [address]  [phone]    Call in 1 day        DISCHARGE MEDICATIONS:     New Prescriptions    NITROFURANTOIN, MACROCRYSTAL-MONOHYDRATE, (MACROBID) 100 MG CAPSULE    Take 1 capsule by mouth 2 times daily for 5 days    OXYCODONE-ACETAMINOPHEN (PERCOCET) 5-325 MG PER TABLET    Take 1 tablet by mouth every 6 hours as needed for Pain for up to 7 days.        CRITICAL CARE TIME       Please note that portions of this note were completed with a voice recognition program.      CARMITA REID CNP, APRN - CNP  05/31/20 2503

## 2020-06-01 RX ORDER — LINACLOTIDE 290 UG/1
CAPSULE, GELATIN COATED ORAL
Qty: 90 CAPSULE | Refills: 1 | Status: SHIPPED | OUTPATIENT
Start: 2020-06-01 | End: 2020-09-03 | Stop reason: SDUPTHER

## 2020-06-01 RX ORDER — LORAZEPAM 1 MG/1
TABLET ORAL
Qty: 90 TABLET | Refills: 2 | OUTPATIENT
Start: 2020-06-01 | End: 2020-08-29

## 2020-08-25 NOTE — TELEPHONE ENCOUNTER
Patient called in stating he will not be following [facility] ann, and is going to be staying at this office. Patient knows he needs an appt, he missed the last because he was in the hospital. Pt does not have a smart phone and truly does not want to come out and about with his COPD, he is very concerned regarding the corona virus. He would like to know if it's possible to do an over the phone appt, and if he could have some refills until said appt.

## 2020-08-26 RX ORDER — LORAZEPAM 1 MG/1
TABLET ORAL
Qty: 30 TABLET | Refills: 0 | Status: SHIPPED | OUTPATIENT
Start: 2020-08-26 | End: 2020-09-03 | Stop reason: SDUPTHER

## 2020-08-26 NOTE — TELEPHONE ENCOUNTER
Please see if he can do a doxy visit using a computer. Otherwise okay to schedule for virtual telephone visit.

## 2020-09-03 ENCOUNTER — VIRTUAL VISIT (OUTPATIENT)
Dept: FAMILY MEDICINE CLINIC | Age: 75
End: 2020-09-03
Payer: COMMERCIAL

## 2020-09-03 PROCEDURE — 3017F COLORECTAL CA SCREEN DOC REV: CPT | Performed by: INTERNAL MEDICINE

## 2020-09-03 PROCEDURE — 3044F HG A1C LEVEL LT 7.0%: CPT | Performed by: INTERNAL MEDICINE

## 2020-09-03 PROCEDURE — 2022F DILAT RTA XM EVC RTNOPTHY: CPT | Performed by: INTERNAL MEDICINE

## 2020-09-03 PROCEDURE — 1036F TOBACCO NON-USER: CPT | Performed by: INTERNAL MEDICINE

## 2020-09-03 PROCEDURE — 4040F PNEUMOC VAC/ADMIN/RCVD: CPT | Performed by: INTERNAL MEDICINE

## 2020-09-03 PROCEDURE — G8417 CALC BMI ABV UP PARAM F/U: HCPCS | Performed by: INTERNAL MEDICINE

## 2020-09-03 PROCEDURE — G8427 DOCREV CUR MEDS BY ELIG CLIN: HCPCS | Performed by: INTERNAL MEDICINE

## 2020-09-03 PROCEDURE — 1123F ACP DISCUSS/DSCN MKR DOCD: CPT | Performed by: INTERNAL MEDICINE

## 2020-09-03 PROCEDURE — 99214 OFFICE O/P EST MOD 30 MIN: CPT | Performed by: INTERNAL MEDICINE

## 2020-09-03 PROCEDURE — G8926 SPIRO NO PERF OR DOC: HCPCS | Performed by: INTERNAL MEDICINE

## 2020-09-03 PROCEDURE — 3023F SPIROM DOC REV: CPT | Performed by: INTERNAL MEDICINE

## 2020-09-03 RX ORDER — LINACLOTIDE 290 UG/1
290 CAPSULE, GELATIN COATED ORAL
Qty: 90 CAPSULE | Refills: 1 | Status: SHIPPED | OUTPATIENT
Start: 2020-09-03 | End: 2021-03-23 | Stop reason: SDUPTHER

## 2020-09-03 RX ORDER — CANDESARTAN 32 MG/1
32 TABLET ORAL DAILY
Qty: 30 TABLET | Refills: 5 | Status: SHIPPED | OUTPATIENT
Start: 2020-09-03 | End: 2021-03-18

## 2020-09-03 RX ORDER — NITROGLYCERIN 0.4 MG/1
0.4 TABLET SUBLINGUAL EVERY 5 MIN PRN
Qty: 25 TABLET | Refills: 1 | Status: SHIPPED | OUTPATIENT
Start: 2020-09-03 | End: 2021-07-13 | Stop reason: SDUPTHER

## 2020-09-03 RX ORDER — DIGOXIN 125 MCG
125 TABLET ORAL DAILY
Qty: 90 TABLET | Refills: 1 | Status: SHIPPED | OUTPATIENT
Start: 2020-09-03 | End: 2021-03-23

## 2020-09-03 RX ORDER — LORAZEPAM 1 MG/1
TABLET ORAL
Qty: 90 TABLET | Refills: 0 | Status: SHIPPED | OUTPATIENT
Start: 2020-09-03 | End: 2020-10-01

## 2020-09-03 RX ORDER — DILTIAZEM HYDROCHLORIDE 360 MG/1
360 CAPSULE, EXTENDED RELEASE ORAL DAILY
Qty: 90 CAPSULE | Refills: 1 | Status: SHIPPED | OUTPATIENT
Start: 2020-09-03 | End: 2021-03-18

## 2020-09-03 NOTE — PROGRESS NOTES
Pt is doing a telephone visit to establish with Dr Edgard Lewis from [place]. He is in need of medication refills. He does not want to take the Metoprolol tartrate but would like to start candesartan. He is no longer on warfarin but is now taking Eliquis.

## 2020-09-03 NOTE — PROGRESS NOTES
Basia Desai is a 69 y.o. male evaluated via telephone on 9/3/2020. Consent:  He and/or health care decision maker is aware that that he may receive a bill for this telephone service, depending on his insurance coverage, and has provided verbal consent to proceed: Yes      Documentation:  I communicated with the patient and/or health care decision maker about multiple medical concerns. Details of this discussion including any medical advice provided:     Transferring care from [place]. He is afraid to go out at all since he was discharged from the hospital in May. He lives with his son who is helping with groceries and care. He is very concerned about not getting a 90 day supply on his lorazepam like he used to. PDMP reviewed with patient, he has been getting 90 pills every 30 days at least for the entirety of 2020. Reviewed that I will not provide him a 90-day supply of lorazepam since that is a lot of pills. He would like to switch from metoprolol to candesartan. Candesartan was apparently discontinued during his hospital admission in May due to acute kidney injury and hyperkalemia,  He does not recall any of this. He was started on metoprolol at discharge. He states he does not like the metoprolol, because it makes him tired and dizzy and breathless and unable to walk. He stopped taking it a couple days after he got home and has been taking leftover valsartan. He has not followed up with any of his doctors or surgeon since discharge from the hospital.  He is extremely concerned that he will be exposed to [place] and get it should he leave the house. He also needs a refill on all his regular medications including his Linzess and his blood pressure meds. He is frustrated that nobody contacted him to sort out the confusion with his medications, but he did not come to any visits including his transitional visit following hospital discharge.       I affirm this is a Patient Initiated Episode with a Patient who has not had a related appointment within my department in the past 7 days or scheduled within the next 24 hours.     Patient identification was verified at the start of the visit: Yes    Total Time: minutes: 21-30 minutes    Note: not billable if this call serves to triage the patient into an appointment for the relevant concern      [facility] [place]

## 2020-09-17 RX ORDER — METOPROLOL TARTRATE 75 MG/1
TABLET, FILM COATED ORAL
Qty: 180 TABLET | Refills: 1 | Status: SHIPPED | OUTPATIENT
Start: 2020-09-17 | End: 2020-09-18

## 2020-09-17 NOTE — TELEPHONE ENCOUNTER
Pt called to let Dr Subha Whitt know he is not taking Metoprolol. He states was sent to pharmacy but he is not picking up or taking. I did get the candesartan approved.     Let me know if okay to DC the Metoprolol

## 2020-10-01 RX ORDER — LORAZEPAM 1 MG/1
1 TABLET ORAL EVERY 8 HOURS PRN
Qty: 90 TABLET | Refills: 1 | Status: SHIPPED | OUTPATIENT
Start: 2020-10-01 | End: 2020-11-23

## 2020-10-01 NOTE — TELEPHONE ENCOUNTER
Last visit: 9/3/20  Last Med refill: 9/3/20  Does patient have enough medication for 72 hours: No:     Next Visit Date:  Future Appointments   Date Time Provider Jose Cruz Scruggs   12/1/2020 10:00 AM Jaylene Garcia  [address] Maintenance   Topic Date Due    Diabetic retinal exam  10/25/1955    Colon cancer screen colonoscopy  10/25/1996    Diabetic microalbuminuria test  08/15/2014    Flu vaccine (1) 09/01/2020    Shingles Vaccine (1 of 2) 11/15/2020 (Originally 10/25/1996)    Pneumococcal 65+ years Vaccine (1 of 1 - PPSV23) 11/15/2020 (Originally 10/25/2011)    Diabetic foot exam  02/18/2021    A1C test (Diabetic or Prediabetic)  02/18/2021    Lipid screen  02/18/2021    Potassium monitoring  05/31/2021    Creatinine monitoring  05/31/2021    DTaP/Tdap/Td vaccine (2 - Td) 07/10/2023    Hepatitis C screen  Completed    Hepatitis A vaccine  Aged Out    Hib vaccine  Aged Out    Meningococcal (ACWY) vaccine  Aged Out       Hemoglobin A1C (%)   Date Value   02/18/2020 6.0   08/02/2019 6.0   02/05/2019 6.0             ( goal A1C is < 7)   Microalb/Crt.  Ratio (mcg/mg creat)   Date Value   08/15/2013 6     LDL Cholesterol (mg/dL)   Date Value   02/18/2020 121   09/17/2018 98       (goal LDL is <100)   AST (U/L)   Date Value   05/11/2020 23     ALT (U/L)   Date Value   05/11/2020 21     BUN (mg/dL)   Date Value   05/31/2020 23     BP Readings from Last 3 Encounters:   05/31/20 (!) 148/70   05/20/20 (!) 128/100   05/15/20 119/64          (goal 120/80)    All Future Testing planned in [facility]  Lab Frequency Next Occurrence   POCT FECAL IMMUNOCHEMICAL TEST (FIT) Once 10/31/2020   CBC With Auto Differential Once 10/17/2020   Comprehensive Metabolic Panel Once 21/05/2710   Hemoglobin A1C Once 10/17/2020   Microalbumin / Creatinine Urine Ratio Once 10/17/2020   ENROLLMENT FOR ANTICOAGULATION MONITORING Every 4 Weeks 10/16/2020               Patient Active Problem List:     Back

## 2020-10-28 ENCOUNTER — OFFICE VISIT (OUTPATIENT)
Dept: FAMILY MEDICINE CLINIC | Age: 75
End: 2020-10-28
Payer: COMMERCIAL

## 2020-10-28 VITALS
TEMPERATURE: 96.6 F | OXYGEN SATURATION: 98 % | WEIGHT: 231.4 LBS | DIASTOLIC BLOOD PRESSURE: 80 MMHG | HEART RATE: 69 BPM | SYSTOLIC BLOOD PRESSURE: 128 MMHG | BODY MASS INDEX: 32.27 KG/M2

## 2020-10-28 PROBLEM — E44.0 MODERATE MALNUTRITION (HCC): Status: RESOLVED | Noted: 2020-05-18 | Resolved: 2020-10-28

## 2020-10-28 LAB — HBA1C MFR BLD: 5.5 %

## 2020-10-28 PROCEDURE — 2022F DILAT RTA XM EVC RTNOPTHY: CPT | Performed by: INTERNAL MEDICINE

## 2020-10-28 PROCEDURE — G8427 DOCREV CUR MEDS BY ELIG CLIN: HCPCS | Performed by: INTERNAL MEDICINE

## 2020-10-28 PROCEDURE — 4040F PNEUMOC VAC/ADMIN/RCVD: CPT | Performed by: INTERNAL MEDICINE

## 2020-10-28 PROCEDURE — G8417 CALC BMI ABV UP PARAM F/U: HCPCS | Performed by: INTERNAL MEDICINE

## 2020-10-28 PROCEDURE — G8484 FLU IMMUNIZE NO ADMIN: HCPCS | Performed by: INTERNAL MEDICINE

## 2020-10-28 PROCEDURE — 83036 HEMOGLOBIN GLYCOSYLATED A1C: CPT | Performed by: INTERNAL MEDICINE

## 2020-10-28 PROCEDURE — 1036F TOBACCO NON-USER: CPT | Performed by: INTERNAL MEDICINE

## 2020-10-28 PROCEDURE — G8926 SPIRO NO PERF OR DOC: HCPCS | Performed by: INTERNAL MEDICINE

## 2020-10-28 PROCEDURE — 3044F HG A1C LEVEL LT 7.0%: CPT | Performed by: INTERNAL MEDICINE

## 2020-10-28 PROCEDURE — 3023F SPIROM DOC REV: CPT | Performed by: INTERNAL MEDICINE

## 2020-10-28 PROCEDURE — 99214 OFFICE O/P EST MOD 30 MIN: CPT | Performed by: INTERNAL MEDICINE

## 2020-10-28 PROCEDURE — 1123F ACP DISCUSS/DSCN MKR DOCD: CPT | Performed by: INTERNAL MEDICINE

## 2020-10-28 PROCEDURE — 3017F COLORECTAL CA SCREEN DOC REV: CPT | Performed by: INTERNAL MEDICINE

## 2020-10-28 ASSESSMENT — ENCOUNTER SYMPTOMS
CONSTIPATION: 0
CHEST TIGHTNESS: 0
VOMITING: 0
BLOOD IN STOOL: 0
ANAL BLEEDING: 0
COUGH: 0
CHOKING: 0
WHEEZING: 0
SHORTNESS OF BREATH: 0
ABDOMINAL PAIN: 0
NAUSEA: 0
DIARRHEA: 0

## 2020-10-28 ASSESSMENT — VISUAL ACUITY: OU: 1

## 2020-10-28 NOTE — PROGRESS NOTES
Pt is here today for a F/U for DM  He needs FMLA forms filled out for his son to be able to bring appts  Pt states is having some eye issues and will call Opth for an appt.

## 2020-10-28 NOTE — PROGRESS NOTES
Subjective:       Patient ID:     Vanna Schulz is a 77 y.o. male who presents for   Chief Complaint   Patient presents with    Diabetes    Other     FMLA  for son       HPI:  Nursing note reviewed and discussed with patient. HPI  Spots in his eyes, cant see. Now L eye has cobwebs, and the right eye is starting. Needs an eye doctor. So needs FMLA paperwork filled out so that he can be bringing him to appointments. Remains on Eliquis, denies gingival or rectal bleeding, epistaxis. Remains on lorazepam 1 mg every 8 hours for his anxiety. Absolutely refuses to try anything else. He has been on this for a long time from his previous PCP. Hypertension-stable candesartan, diltiazem. He has not had his renal function test repeated as discussed during his last visit. Constipation is stable on linzess. Patient's medications, allergies, past medical, surgical, social and family histories were reviewed and updated as appropriate.     Past Medical History:   Diagnosis Date    [facility] ([facility] [address] [facility]     Anticoagulated on Coumadin     for A-Fib, monitoring by PCP    Anxiety     Back pain     COPD (chronic obstructive pulmonary disease) ([facility] [address] [facility]     HTN (hypertension)     Hyperglycemia     Hyperlipidemia     can't take statins, due to muscle aches    Insomnia     Lung nodule     Pacemaker     cardiologist, dr. Bharathi Cunningham     Past Surgical History:   Procedure Laterality Date    PACEMAKER PLACEMENT         Social History     Tobacco Use    Smoking status: Former Smoker     Packs/day: 0.50     Years: 25.00     Pack years: 12.50     Types: Cigarettes     Last attempt to quit: 2000     Years since quittin.0    Smokeless tobacco: Never Used   Substance Use Topics    Alcohol use: No     Alcohol/week: 0.0 standard drinks      Patient Active Problem List   Diagnosis    Back pain    HTN (hypertension)    COPD (chronic obstructive pulmonary disease) ([facility] [address]    Lung nodule    Procedure refused    Hyperlipidemia    Colonoscopy refused    Atrial fibrillation, chronic    Anxiety    Insomnia    Pacemaker battery depletion    Pacemaker    Bradycardia    Constipation    Diet-controlled diabetes mellitus ([facility] [address]    Aneurysm of infrarenal abdominal aorta (HCC)    CLIFTON (acute kidney injury) ([facility] [address]    Hyperkalemia    Moderate malnutrition ([facility] [address]         Prior to Visit Medications    Medication Sig Taking? Authorizing Provider   LORazepam (ATIVAN) 1 MG tablet Take 1 tablet by mouth every 8 hours as needed for Anxiety for up to 60 days. take 1 tablet by mouth every 8 hours if needed for anxiety Yes Arti Sheryle Danas, MD   candesartan (ATACAND) 32 MG tablet Take 1 tablet by mouth daily Yes Shayne Tan MD   dilTIAZem (CARDIZEM CD) 360 MG extended release capsule Take 1 capsule by mouth daily Yes Shayne Tan MD   apixaban (ELIQUIS) 5 MG TABS tablet Take 1 tablet by mouth 2 times daily Yes Arti Sheryle Danas, MD   linaclotide [facility]) 290 MCG CAPS capsule Take 1 capsule by mouth every morning (before breakfast) Yes Arti Sheryle Danas, MD   nitroGLYCERIN (NITROSTAT) 0.4 MG SL tablet Place 1 tablet under the tongue every 5 minutes as needed for Chest pain up to max of 3 total doses. If no relief after 1 dose, call 911. Yes Shayne Tan MD   digoxin (LANOXIN) 125 MCG tablet Take 1 tablet by mouth daily  Patient not taking: Reported on 10/28/2020  Arti Sheryle Danas, MD   albuterol sulfate HFA (VENTOLIN HFA) 108 (90 Base) MCG/ACT inhaler Inhale 2 puffs into the lungs every 6 hours as needed for Wheezing  Patient not taking: Reported on 10/28/2020  Shayne Tan MD     Review of Systems  Review of Systems   Constitutional: Negative for fatigue, fever and unexpected weight change. Respiratory: Negative for cough, choking, chest tightness, shortness of breath and wheezing. Cardiovascular: Negative for chest pain, palpitations and leg swelling.    Gastrointestinal: Negative for abdominal pain, anal

## 2020-11-02 ENCOUNTER — HOSPITAL ENCOUNTER (OUTPATIENT)
Age: 75
Setting detail: SPECIMEN
Discharge: HOME | End: 2020-11-02
Payer: COMMERCIAL

## 2020-11-02 LAB
ABSOLUTE EOS #: 0.27 K/UL (ref 0–0.44)
ABSOLUTE IMMATURE GRANULOCYTE: <0.03 K/UL (ref 0–0.3)
ABSOLUTE LYMPH #: 2.29 K/UL (ref 1.1–3.7)
ABSOLUTE MONO #: 0.87 K/UL (ref 0.1–1.2)
ALBUMIN SERPL-MCNC: 4.2 G/DL (ref 3.5–5.2)
ALBUMIN/GLOBULIN RATIO: 1.3 (ref 1–2.5)
ALP BLD-CCNC: 133 U/L (ref 40–129)
ALT SERPL-CCNC: 12 U/L (ref 5–41)
ANION GAP SERPL CALCULATED.3IONS-SCNC: 11 MMOL/L (ref 9–17)
AST SERPL-CCNC: 17 U/L
BASOPHILS # BLD: 1 % (ref 0–2)
BASOPHILS ABSOLUTE: 0.1 K/UL (ref 0–0.2)
BILIRUB SERPL-MCNC: 0.39 MG/DL (ref 0.3–1.2)
BUN BLDV-MCNC: 32 MG/DL (ref 8–23)
BUN/CREAT BLD: ABNORMAL (ref 9–20)
CALCIUM SERPL-MCNC: 9.6 MG/DL (ref 8.6–10.4)
CHLORIDE BLD-SCNC: 107 MMOL/L (ref 98–107)
CO2: 23 MMOL/L (ref 20–31)
CREAT SERPL-MCNC: 1.6 MG/DL (ref 0.7–1.2)
CREATININE URINE: 165.2 MG/DL (ref 39–259)
DIFFERENTIAL TYPE: ABNORMAL
EOSINOPHILS RELATIVE PERCENT: 3 % (ref 1–4)
GFR AFRICAN AMERICAN: 51 ML/MIN
GFR NON-AFRICAN AMERICAN: 42 ML/MIN
GFR SERPL CREATININE-BSD FRML MDRD: ABNORMAL ML/MIN/{1.73_M2}
GFR SERPL CREATININE-BSD FRML MDRD: ABNORMAL ML/MIN/{1.73_M2}
GLUCOSE BLD-MCNC: 101 MG/DL (ref 70–99)
HCT VFR BLD CALC: 49.4 % (ref 40.7–50.3)
HEMOGLOBIN: 15.9 G/DL (ref 13–17)
IMMATURE GRANULOCYTES: 0 %
LYMPHOCYTES # BLD: 28 % (ref 24–43)
MCH RBC QN AUTO: 33.4 PG (ref 25.2–33.5)
MCHC RBC AUTO-ENTMCNC: 32.2 G/DL (ref 28.4–34.8)
MCV RBC AUTO: 103.8 FL (ref 82.6–102.9)
MICROALBUMIN/CREAT 24H UR: 62 MG/L
MICROALBUMIN/CREAT UR-RTO: 38 MCG/MG CREAT
MONOCYTES # BLD: 11 % (ref 3–12)
NRBC AUTOMATED: 0 PER 100 WBC
PDW BLD-RTO: 13.4 % (ref 11.8–14.4)
PLATELET # BLD: 176 K/UL (ref 138–453)
PLATELET ESTIMATE: ABNORMAL
PMV BLD AUTO: 11.8 FL (ref 8.1–13.5)
POTASSIUM SERPL-SCNC: 4.6 MMOL/L (ref 3.7–5.3)
RBC # BLD: 4.76 M/UL (ref 4.21–5.77)
RBC # BLD: ABNORMAL 10*6/UL
SEG NEUTROPHILS: 57 % (ref 36–65)
SEGMENTED NEUTROPHILS ABSOLUTE COUNT: 4.62 K/UL (ref 1.5–8.1)
SODIUM BLD-SCNC: 141 MMOL/L (ref 135–144)
TOTAL PROTEIN: 7.4 G/DL (ref 6.4–8.3)
WBC # BLD: 8.2 K/UL (ref 3.5–11.3)
WBC # BLD: ABNORMAL 10*3/UL

## 2020-11-03 ENCOUNTER — ANTI-COAG VISIT (OUTPATIENT)
Dept: FAMILY MEDICINE CLINIC | Age: 75
End: 2020-11-03

## 2021-01-12 DIAGNOSIS — F41.9 ANXIETY: ICD-10-CM

## 2021-01-12 NOTE — TELEPHONE ENCOUNTER
Pt will be out of this medication as of 1/24/2021. He would like a short term refill to get him through to his next appt.     Last OV- 10/28/2020  Next OV- 2/2/2021     [address]

## 2021-01-14 RX ORDER — LORAZEPAM 1 MG/1
1 TABLET ORAL EVERY 8 HOURS PRN
Qty: 90 TABLET | Refills: 1 | Status: SHIPPED | OUTPATIENT
Start: 2021-01-14 | End: 2021-03-23 | Stop reason: SDUPTHER

## 2021-01-14 NOTE — TELEPHONE ENCOUNTER
Controlled Substance Monitoring:    Acute and Chronic Pain Monitoring:   RX Monitoring 1/14/2021   Attestation -   Periodic Controlled Substance Monitoring No signs of potential drug abuse or diversion identified.    Chronic Pain > 80 MEDD -       PDMP Monitoring:    Last PDMP Jose Eduardo as Reviewed [facility]):  Review User Review Instant Review Result   [address] [facility] [address], [place] 1/14/2021  4:06 PM Reviewed PDMP [1]     [unfilled]  Urine Drug Screenings (1 yr)     Urine Drug Screen  Collected: 1/15/2018  8:31 AM (Final result)    Complete Results          Pain Management Drug Screen  Collected: 2/5/2019 12:13 PM (Final result)    Complete Results          Pain Management Drug Screen  Collected: 5/14/2018  7:24 PM (Final result)    Complete Results          Pain Management Drug Screen  Collected: 9/11/2017  4:41 PM (Final result)    Complete Results          Urine Drug Screen 9 Panel  Collected: 9/26/2011  9:50 AM (Final result)    Complete Results          Drugs of Abuse 7  Collected: 8/6/2012  3:43 PM (Final result)    Complete Results          Drugs of Abuse 7  Collected: 2/13/2012  9:32 AM (Final result)    Complete Results              Medication Contract and Consent for Opioid Use Documents Filed     Patient Documents       Type of Document Status Date Received Received By Description     Medication Contract Received 2/5/2019 10:18 AM MILDRED PEREZ 2/5/19 controlled medication contract: ativan     Medication Contract Received 10/30/2020  1:36 PM Beverly Li X 10/28/2020-Medication agreement ativan

## 2021-03-18 DIAGNOSIS — I10 ESSENTIAL HYPERTENSION: ICD-10-CM

## 2021-03-18 DIAGNOSIS — I48.20 ATRIAL FIBRILLATION, CHRONIC (HCC): ICD-10-CM

## 2021-03-18 RX ORDER — DILTIAZEM HYDROCHLORIDE 360 MG/1
CAPSULE, EXTENDED RELEASE ORAL
Qty: 90 CAPSULE | Refills: 1 | Status: SHIPPED | OUTPATIENT
Start: 2021-03-18 | End: 2021-09-08

## 2021-03-18 RX ORDER — CANDESARTAN 32 MG/1
TABLET ORAL
Qty: 90 TABLET | Refills: 1 | Status: SHIPPED | OUTPATIENT
Start: 2021-03-18 | End: 2021-09-08

## 2021-03-18 NOTE — TELEPHONE ENCOUNTER
Constipation     Diet-controlled diabetes mellitus (HCC)     Aneurysm of infrarenal abdominal aorta (HCC)     CLIFTON (acute kidney injury) ([facility] [address]     Hyperkalemia

## 2021-03-23 ENCOUNTER — OFFICE VISIT (OUTPATIENT)
Dept: FAMILY MEDICINE CLINIC | Age: 76
End: 2021-03-23
Payer: COMMERCIAL

## 2021-03-23 VITALS
BODY MASS INDEX: 33.47 KG/M2 | DIASTOLIC BLOOD PRESSURE: 82 MMHG | SYSTOLIC BLOOD PRESSURE: 130 MMHG | HEART RATE: 62 BPM | OXYGEN SATURATION: 96 % | TEMPERATURE: 98.2 F | WEIGHT: 240 LBS

## 2021-03-23 DIAGNOSIS — F41.9 ANXIETY: ICD-10-CM

## 2021-03-23 DIAGNOSIS — Z13.220 SCREENING FOR HYPERLIPIDEMIA: ICD-10-CM

## 2021-03-23 DIAGNOSIS — I48.20 ATRIAL FIBRILLATION, CHRONIC (HCC): ICD-10-CM

## 2021-03-23 DIAGNOSIS — J44.9 CHRONIC OBSTRUCTIVE PULMONARY DISEASE, UNSPECIFIED COPD TYPE (HCC): Primary | ICD-10-CM

## 2021-03-23 DIAGNOSIS — R60.9 PERIPHERAL EDEMA: ICD-10-CM

## 2021-03-23 DIAGNOSIS — E78.2 MIXED HYPERLIPIDEMIA: ICD-10-CM

## 2021-03-23 DIAGNOSIS — E11.9 DIET-CONTROLLED DIABETES MELLITUS (HCC): ICD-10-CM

## 2021-03-23 DIAGNOSIS — K59.00 CONSTIPATION, UNSPECIFIED CONSTIPATION TYPE: ICD-10-CM

## 2021-03-23 DIAGNOSIS — Z12.11 SCREENING FOR COLON CANCER: ICD-10-CM

## 2021-03-23 PROCEDURE — G8926 SPIRO NO PERF OR DOC: HCPCS | Performed by: INTERNAL MEDICINE

## 2021-03-23 PROCEDURE — 1123F ACP DISCUSS/DSCN MKR DOCD: CPT | Performed by: INTERNAL MEDICINE

## 2021-03-23 PROCEDURE — 2022F DILAT RTA XM EVC RTNOPTHY: CPT | Performed by: INTERNAL MEDICINE

## 2021-03-23 PROCEDURE — 4040F PNEUMOC VAC/ADMIN/RCVD: CPT | Performed by: INTERNAL MEDICINE

## 2021-03-23 PROCEDURE — 3023F SPIROM DOC REV: CPT | Performed by: INTERNAL MEDICINE

## 2021-03-23 PROCEDURE — 3046F HEMOGLOBIN A1C LEVEL >9.0%: CPT | Performed by: INTERNAL MEDICINE

## 2021-03-23 PROCEDURE — G8417 CALC BMI ABV UP PARAM F/U: HCPCS | Performed by: INTERNAL MEDICINE

## 2021-03-23 PROCEDURE — G8427 DOCREV CUR MEDS BY ELIG CLIN: HCPCS | Performed by: INTERNAL MEDICINE

## 2021-03-23 PROCEDURE — 3017F COLORECTAL CA SCREEN DOC REV: CPT | Performed by: INTERNAL MEDICINE

## 2021-03-23 PROCEDURE — G8484 FLU IMMUNIZE NO ADMIN: HCPCS | Performed by: INTERNAL MEDICINE

## 2021-03-23 PROCEDURE — 99214 OFFICE O/P EST MOD 30 MIN: CPT | Performed by: INTERNAL MEDICINE

## 2021-03-23 PROCEDURE — 1036F TOBACCO NON-USER: CPT | Performed by: INTERNAL MEDICINE

## 2021-03-23 RX ORDER — FUROSEMIDE 20 MG/1
20 TABLET ORAL DAILY
Qty: 5 TABLET | Refills: 0 | Status: SHIPPED | OUTPATIENT
Start: 2021-03-23 | End: 2022-04-28 | Stop reason: ALTCHOICE

## 2021-03-23 RX ORDER — LINACLOTIDE 290 UG/1
290 CAPSULE, GELATIN COATED ORAL
Qty: 90 CAPSULE | Refills: 1 | Status: SHIPPED | OUTPATIENT
Start: 2021-03-23 | End: 2022-01-03 | Stop reason: SDUPTHER

## 2021-03-23 RX ORDER — ALBUTEROL SULFATE 90 UG/1
2 AEROSOL, METERED RESPIRATORY (INHALATION) EVERY 6 HOURS PRN
Qty: 1 INHALER | Refills: 5 | Status: ON HOLD | OUTPATIENT
Start: 2021-03-23 | End: 2022-05-31

## 2021-03-23 RX ORDER — LORAZEPAM 1 MG/1
1 TABLET ORAL EVERY 8 HOURS PRN
Qty: 90 TABLET | Refills: 1 | Status: SHIPPED | OUTPATIENT
Start: 2021-03-23 | End: 2021-05-17

## 2021-03-23 SDOH — ECONOMIC STABILITY: FOOD INSECURITY: WITHIN THE PAST 12 MONTHS, THE FOOD YOU BOUGHT JUST DIDN'T LAST AND YOU DIDN'T HAVE MONEY TO GET MORE.: NEVER TRUE

## 2021-03-23 SDOH — ECONOMIC STABILITY: TRANSPORTATION INSECURITY
IN THE PAST 12 MONTHS, HAS THE LACK OF TRANSPORTATION KEPT YOU FROM MEDICAL APPOINTMENTS OR FROM GETTING MEDICATIONS?: NO

## 2021-03-23 SDOH — ECONOMIC STABILITY: TRANSPORTATION INSECURITY
IN THE PAST 12 MONTHS, HAS LACK OF TRANSPORTATION KEPT YOU FROM MEETINGS, WORK, OR FROM GETTING THINGS NEEDED FOR DAILY LIVING?: NO

## 2021-03-23 SDOH — ECONOMIC STABILITY: INCOME INSECURITY: HOW HARD IS IT FOR YOU TO PAY FOR THE VERY BASICS LIKE FOOD, HOUSING, MEDICAL CARE, AND HEATING?: NOT VERY HARD

## 2021-03-23 ASSESSMENT — ENCOUNTER SYMPTOMS
NAUSEA: 0
WHEEZING: 0
DIARRHEA: 0
CHOKING: 0
CONSTIPATION: 0
ABDOMINAL PAIN: 0
VOMITING: 0
CHEST TIGHTNESS: 0
SHORTNESS OF BREATH: 0
BLOOD IN STOOL: 0
COUGH: 0
ANAL BLEEDING: 0

## 2021-03-23 ASSESSMENT — PATIENT HEALTH QUESTIONNAIRE - PHQ9
SUM OF ALL RESPONSES TO PHQ QUESTIONS 1-9: 0
1. LITTLE INTEREST OR PLEASURE IN DOING THINGS: 0
SUM OF ALL RESPONSES TO PHQ QUESTIONS 1-9: 0
SUM OF ALL RESPONSES TO PHQ QUESTIONS 1-9: 0

## 2021-03-23 ASSESSMENT — VISUAL ACUITY: OU: 1

## 2021-03-23 NOTE — PROGRESS NOTES
Subjective:       Patient ID:     Merry Evans is a 77 y.o. male who presents for   Chief Complaint   Patient presents with    Hypertension    Diabetes       HPI:  Nursing note reviewed and discussed with patient. Here for follow-up today     Diabetes - doing well with current regimen - lifestyle and diet. Denies hypoglycemia, hyperglycemia, fatigue, polyuria, polydipsia, paresthesias, vision changes, dizziness. Eye exam was never done. Not following with podiatry. Patient is taking ACE inhibitor/ARB. Complications of diabetes include none. Hypertension-tolerating current regimen without chest pain, palpitations, dizziness, peripheral edema, dyspnea on exertion, orthopnea, paroxysmal nocturnal dyspnea. Left foot swelling for the past month, getting better over the last two days. No injury or trauma that he can  Hyperlipidemia-tolerating current regimen without myalgias, dyspepsia, jaundice. Mostly compliant with diet recommendations for low carb diet, not very compliant with exercise recommendations. Cardiovascular risk factors besides obesity: advanced age (older than 54 for men, 72 for women), diabetes mellitus, dyslipidemia, hypertension, male gender, obesity (BMI >= 30 kg/m2) and sedentary lifestyle          Patient's medications, allergies, past medical, surgical, social and family histories were reviewed and updated as appropriate.     Past Medical History:   Diagnosis Date    A-fib ([facility] [address]     Anticoagulated on Coumadin     for A-Fib, monitoring by PCP    Anxiety     Back pain     COPD (chronic obstructive pulmonary disease) ([facility] [address]     HTN (hypertension)     Hyperglycemia     Hyperlipidemia     can't take statins, due to muscle aches    Insomnia     Lung nodule     Pacemaker     cardiologist, dr. Laqueta Gosselin     Past Surgical History:   Procedure Laterality Date    PACEMAKER PLACEMENT         Social History     Tobacco Use    Smoking status: Former Smoker     Packs/day: 0.50 Years: 25.00     Pack years: 12.50     Types: Cigarettes     Quit date: 2000     Years since quittin.4    Smokeless tobacco: Never Used   Substance Use Topics    Alcohol use: No     Alcohol/week: 0.0 standard drinks      Patient Active Problem List   Diagnosis    Back pain    HTN (hypertension)    COPD (chronic obstructive pulmonary disease) (HCC)    Lung nodule    Procedure refused    Hyperlipidemia    Colonoscopy refused    Atrial fibrillation, chronic    Anxiety    Insomnia    Pacemaker battery depletion    Pacemaker    Bradycardia    Constipation    Diet-[facility] diabetes mellitus ([facility]    Aneurysm of infrarenal abdominal aorta ([facility]    CLIFTON (acute kidney injury) ([facility]    Hyperkalemia         Prior to Visit Medications    Medication Sig Taking? Authorizing Provider   dilTIAZem (CARDIZEM CD) 360 MG extended release capsule take 1 capsule by mouth once daily Yes Claudio Kaur MD   candesartan (ATACAND) 32 MG tablet take 1 tablet by mouth once daily Yes Jaylene Alejandre MD   apixaban (ELIQUIS) 5 MG TABS tablet Take 1 tablet by mouth 2 times daily Yes Jaylene Alejandre MD   linaclotide [facility]) 290 MCG CAPS capsule Take 1 capsule by mouth every morning (before breakfast) Yes Jaylene Alejandre MD   nitroGLYCERIN (NITROSTAT) 0.4 MG SL tablet Place 1 tablet under the tongue every 5 minutes as needed for Chest pain up to max of 3 total doses. If no relief after 1 dose, call 911. Yes Jaylene Alejandre MD   albuterol sulfate HFA (VENTOLIN HFA) 108 (90 Base) MCG/ACT inhaler Inhale 2 puffs into the lungs every 6 hours as needed for Wheezing Yes Claudio Kaur MD     Review of Systems  Review of Systems   Constitutional: Negative for fatigue, fever and unexpected weight change. Respiratory: Negative for cough, choking, chest tightness, shortness of breath and wheezing. Cardiovascular: Negative for chest pain, palpitations and leg swelling.    Gastrointestinal: Negative for abdominal pain, anal bleeding, blood in stool, constipation, diarrhea, nausea and vomiting. Endocrine: Negative. Musculoskeletal: Negative for joint swelling and myalgias. Skin: Negative. Neurological: Negative for dizziness. Psychiatric/Behavioral: Negative for sleep disturbance. All other systems reviewed and are negative. Objective:       Physical Exam:  /82 (Site: Left Upper Arm, Position: Sitting, Cuff Size: Large Adult)   Pulse 62   Temp 98.2 °F (36.8 °C) (Temporal)   Wt 240 lb (108.9 kg)   SpO2 96%   BMI 33.47 kg/m²   Physical Exam  Vitals signs and nursing note reviewed. Constitutional:       General: He is not in acute distress. Appearance: He is well-developed. He is not ill-appearing. Eyes:      General: Lids are normal. Vision grossly intact. Cardiovascular:      Rate and Rhythm: Normal rate and regular rhythm. Heart sounds: Normal heart sounds, S1 normal and S2 normal. No murmur. No friction rub. No gallop. Pulmonary:      Effort: Pulmonary effort is normal. No respiratory distress. Breath sounds: Normal breath sounds. No wheezing. Abdominal:      General: Bowel sounds are normal.      Palpations: Abdomen is soft. There is no mass. Tenderness: There is no abdominal tenderness. There is no guarding. Musculoskeletal: Normal range of motion. Skin:     General: Skin is warm and dry. Capillary Refill: Capillary refill takes less than 2 seconds. Neurological:      General: No focal deficit present. Mental Status: He is alert and oriented to person, place, and time. Data Review  No visits with results within 2 Month(s) from this visit. Latest known visit with results is:   Hospital Outpatient Visit on 11/02/2020   Component Date Value    Microalb, Ur 11/02/2020 62*    Creatinine, Ur 11/02/2020 165.2     Microalb/Crt.  Ratio 11/02/2020 38*    WBC 11/02/2020 8.2     RBC 11/02/2020 4.76     Hemoglobin 11/02/2020 15.9  Hematocrit 11/02/2020 49.4     MCV 11/02/2020 103.8*    MCH 11/02/2020 33.4     MCHC 11/02/2020 32.2     RDW 11/02/2020 13.4     Platelets 39/77/6557 176     MPV 11/02/2020 11.8     NRBC Automated 11/02/2020 0.0     Differential Type 11/02/2020 NOT REPORTED     Seg Neutrophils 11/02/2020 57     Lymphocytes 11/02/2020 28     Monocytes 11/02/2020 11     Eosinophils % 11/02/2020 3     Basophils 11/02/2020 1     Immature Granulocytes 11/02/2020 0     Segs Absolute 11/02/2020 4.62     Absolute Lymph # 11/02/2020 2.29     Absolute Mono # 11/02/2020 0.87     Absolute Eos # 11/02/2020 0.27     Basophils Absolute 11/02/2020 0.10     Absolute Immature Granul* 11/02/2020 <0.03     WBC Morphology 11/02/2020 NOT REPORTED     RBC Morphology 11/02/2020 MACROCYTOSIS PRESENT     Platelet Estimate 45/17/3136 NOT REPORTED     Glucose 11/02/2020 101*    BUN 11/02/2020 32*    CREATININE 11/02/2020 1.60*    Bun/Cre Ratio 11/02/2020 NOT REPORTED     Calcium 11/02/2020 9.6     Sodium 11/02/2020 141     Potassium 11/02/2020 4.6     Chloride 11/02/2020 107     CO2 11/02/2020 23     Anion Gap 11/02/2020 11     Alkaline Phosphatase 11/02/2020 133*    ALT 11/02/2020 12     AST 11/02/2020 17     Total Bilirubin 11/02/2020 0.39     Total Protein 11/02/2020 7.4     Albumin 11/02/2020 4.2     Albumin/Globulin Ratio 11/02/2020 1.3     GFR Non- 11/02/2020 42*    GFR  11/02/2020 51*    GFR Comment 11/02/2020          GFR Staging 11/02/2020 NOT REPORTED      Controlled Substance Monitoring:    Acute and Chronic Pain Monitoring:   RX Monitoring 3/23/2021   Attestation -   Periodic Controlled Substance Monitoring Possible medication side effects, risk of tolerance/dependence & alternative treatments discussed. ;No signs of potential drug abuse or diversion identified. ;Assessed functional status. Chronic Pain > 80 MEDD -            Assessment/Plan:      1.  Anxiety  - LORazepam (ATIVAN) 1 MG tablet; Take 1 tablet by mouth every 8 hours as needed for Anxiety for up to 60 days. Dispense: 90 tablet; Refill: 1    2. Atrial fibrillation, chronic (HCC)  - apixaban (ELIQUIS) 5 MG TABS tablet; Take 1 tablet by mouth 2 times daily  Dispense: 180 tablet; Refill: 1    3. Constipation, unspecified constipation type  - linaclotide (LINZESS) 290 MCG CAPS capsule; Take 1 capsule by mouth every morning (before breakfast)  Dispense: 90 capsule; Refill: 1    4. Screening for colon cancer  - POCT FECAL IMMUNOCHEMICAL TEST (FIT); Future    5. Screening for hyperlipidemia  Check labs     6. Chronic obstructive pulmonary disease, unspecified COPD type (HCC)  - albuterol sulfate HFA (VENTOLIN HFA) 108 (90 Base) MCG/ACT inhaler; Inhale 2 puffs into the lungs every 6 hours as needed for Wheezing  Dispense: 1 Inhaler; Refill: 5  - tiotropium (SPIRIVA RESPIMAT) 2.5 MCG/ACT AERS inhaler; Inhale 1 puff into the lungs daily  Dispense: 1 Inhaler; Refill: 5    7. Diet-controlled diabetes mellitus ([address]  -  DIABETES FOOT EXAM    8. Peripheral edema  - furosemide (LASIX) 20 MG tablet; Take 1 tablet by mouth daily for 5 days  Dispense: 5 tablet; Refill: 0  - Comprehensive Metabolic Panel; Future    9. Mixed hyperlipidemia  - Lipid, Fasting; Future  - Comprehensive Metabolic Panel;  Future          Health Maintenance Due   Topic Date Due    Diabetic retinal exam  Never done    Shingles Vaccine (1 of 2) Never done    Colon cancer screen colonoscopy  Never done    Pneumococcal 65+ years Vaccine (1 of 1 - PPSV23) Never done    Diabetic foot exam  02/18/2021    Lipid screen  02/18/2021       Electronically signed by Stefan Nevarez MD on 3/23/2021 at 5:24 PM

## 2021-05-17 DIAGNOSIS — F41.9 ANXIETY: ICD-10-CM

## 2021-05-17 RX ORDER — LORAZEPAM 1 MG/1
1 TABLET ORAL EVERY 8 HOURS PRN
Qty: 90 TABLET | Refills: 1 | Status: SHIPPED | OUTPATIENT
Start: 2021-05-17 | End: 2021-07-13 | Stop reason: SDUPTHER

## 2021-05-17 NOTE — TELEPHONE ENCOUNTER
Last visit: 03/23/2021  Last Med refill: 04/21/2021  Does patient have enough medication for 72 hours: Yes    Next Visit Date:  Future Appointments   Date Time Provider Jose Cruz Scruggs   6/28/2021  5:15 PM Jaylene Shannon  [address] Maintenance   Topic Date Due    Diabetic retinal exam  Never done    Shingles Vaccine (1 of 2) Never done    Colon cancer screen colonoscopy  Never done    Pneumococcal 65+ years Vaccine (1 of 1 - PPSV23) Never done    Lipid screen  02/18/2021    COVID-19 Vaccine (1) 07/23/2020 (Originally 10/25/1957)    Flu vaccine (Season Ended) 10/28/2021 (Originally 9/1/2021)    A1C test (Diabetic or Prediabetic)  10/28/2021    Diabetic microalbuminuria test  11/02/2021    Potassium monitoring  11/02/2021    Creatinine monitoring  11/02/2021    Diabetic foot exam  03/23/2022    DTaP/Tdap/Td vaccine (2 - Td) 07/10/2023    Hepatitis C screen  Completed    Hepatitis A vaccine  Aged Out    Hib vaccine  Aged Out    Meningococcal (ACWY) vaccine  Aged Out       Hemoglobin A1C (%)   Date Value   10/28/2020 5.5   02/18/2020 6.0   08/02/2019 6.0             ( goal A1C is < 7)   Microalb/Crt.  Ratio (mcg/mg creat)   Date Value   11/02/2020 38 (H)     LDL Cholesterol (mg/dL)   Date Value   02/18/2020 121   09/17/2018 98       (goal LDL is <100)   AST (U/L)   Date Value   11/02/2020 17     ALT (U/L)   Date Value   11/02/2020 12     BUN (mg/dL)   Date Value   11/02/2020 32 (H)     BP Readings from Last 3 Encounters:   03/23/21 130/82   10/28/20 128/80   05/31/20 (!) 148/70          (goal 120/80)    All Future Testing planned in [facility]  Lab Frequency Next Occurrence   Lipid, Fasting Once 05/30/2021   POCT FECAL IMMUNOCHEMICAL TEST (FIT) Once 05/27/2021   Comprehensive Metabolic Panel Once 49/05/4524               Patient Active Problem List:     Back pain     HTN (hypertension)     COPD (chronic obstructive pulmonary disease) ([facility] [address]     Lung nodule     Procedure refused     Hyperlipidemia     Colonoscopy refused     Atrial fibrillation, chronic     Anxiety     Insomnia     Pacemaker battery depletion     Pacemaker     Bradycardia     Constipation     Diet-controlled diabetes mellitus ([facility] [address]     Aneurysm of infrarenal abdominal aorta (HCC)     CLIFTON (acute kidney injury) (HCC)     Hyperkalemia

## 2021-06-14 DIAGNOSIS — R60.9 PERIPHERAL EDEMA: ICD-10-CM

## 2021-06-15 RX ORDER — FUROSEMIDE 20 MG/1
TABLET ORAL
Qty: 5 TABLET | Refills: 0 | OUTPATIENT
Start: 2021-06-15

## 2021-06-17 ENCOUNTER — HOSPITAL ENCOUNTER (OUTPATIENT)
Age: 76
Setting detail: SPECIMEN
Discharge: HOME | End: 2021-06-17
Payer: COMMERCIAL

## 2021-06-17 DIAGNOSIS — R60.9 PERIPHERAL EDEMA: ICD-10-CM

## 2021-06-17 DIAGNOSIS — E78.2 MIXED HYPERLIPIDEMIA: ICD-10-CM

## 2021-06-17 LAB
ALBUMIN SERPL-MCNC: 4.3 G/DL (ref 3.5–5.2)
ALBUMIN/GLOBULIN RATIO: 1.3 (ref 1–2.5)
ALP BLD-CCNC: 107 U/L (ref 40–129)
ALT SERPL-CCNC: 10 U/L (ref 5–41)
ANION GAP SERPL CALCULATED.3IONS-SCNC: 14 MMOL/L (ref 9–17)
AST SERPL-CCNC: 13 U/L
BILIRUB SERPL-MCNC: 0.62 MG/DL (ref 0.3–1.2)
BUN BLDV-MCNC: 33 MG/DL (ref 8–23)
BUN/CREAT BLD: ABNORMAL (ref 9–20)
CALCIUM SERPL-MCNC: 9.4 MG/DL (ref 8.6–10.4)
CHLORIDE BLD-SCNC: 102 MMOL/L (ref 98–107)
CHOLESTEROL, FASTING: 198 MG/DL
CHOLESTEROL/HDL RATIO: 3.9
CO2: 25 MMOL/L (ref 20–31)
CREAT SERPL-MCNC: 1.73 MG/DL (ref 0.7–1.2)
GFR AFRICAN AMERICAN: 47 ML/MIN
GFR NON-AFRICAN AMERICAN: 39 ML/MIN
GFR SERPL CREATININE-BSD FRML MDRD: ABNORMAL ML/MIN/{1.73_M2}
GFR SERPL CREATININE-BSD FRML MDRD: ABNORMAL ML/MIN/{1.73_M2}
GLUCOSE BLD-MCNC: 104 MG/DL (ref 70–99)
HDLC SERPL-MCNC: 51 MG/DL
LDL CHOLESTEROL: 134 MG/DL (ref 0–130)
POTASSIUM SERPL-SCNC: 4.5 MMOL/L (ref 3.7–5.3)
SODIUM BLD-SCNC: 141 MMOL/L (ref 135–144)
TOTAL PROTEIN: 7.7 G/DL (ref 6.4–8.3)
TRIGLYCERIDE, FASTING: 67 MG/DL
VLDLC SERPL CALC-MCNC: ABNORMAL MG/DL (ref 1–30)

## 2021-06-23 NOTE — RESULT ENCOUNTER NOTE
Creatinine remains elevated at 1.7. Patient should see nephrology or get additional testing, notified via [address], will proceed according to patient decision.

## 2021-07-13 ENCOUNTER — OFFICE VISIT (OUTPATIENT)
Dept: FAMILY MEDICINE CLINIC | Age: 76
End: 2021-07-13
Payer: COMMERCIAL

## 2021-07-13 VITALS
SYSTOLIC BLOOD PRESSURE: 132 MMHG | TEMPERATURE: 98 F | OXYGEN SATURATION: 100 % | HEART RATE: 74 BPM | BODY MASS INDEX: 33.17 KG/M2 | DIASTOLIC BLOOD PRESSURE: 80 MMHG | WEIGHT: 237.8 LBS

## 2021-07-13 DIAGNOSIS — G89.29 CHRONIC MIDLINE LOW BACK PAIN WITHOUT SCIATICA: ICD-10-CM

## 2021-07-13 DIAGNOSIS — R35.1 BENIGN PROSTATIC HYPERPLASIA WITH NOCTURIA: ICD-10-CM

## 2021-07-13 DIAGNOSIS — R07.9 CHEST PAIN, UNSPECIFIED TYPE: ICD-10-CM

## 2021-07-13 DIAGNOSIS — F41.9 ANXIETY: ICD-10-CM

## 2021-07-13 DIAGNOSIS — N40.1 BENIGN PROSTATIC HYPERPLASIA WITH NOCTURIA: ICD-10-CM

## 2021-07-13 DIAGNOSIS — I71.43 ANEURYSM OF INFRARENAL ABDOMINAL AORTA: ICD-10-CM

## 2021-07-13 DIAGNOSIS — E55.9 VITAMIN D DEFICIENCY: ICD-10-CM

## 2021-07-13 DIAGNOSIS — E11.9 DIET-CONTROLLED DIABETES MELLITUS (HCC): Primary | ICD-10-CM

## 2021-07-13 DIAGNOSIS — M54.50 CHRONIC MIDLINE LOW BACK PAIN WITHOUT SCIATICA: ICD-10-CM

## 2021-07-13 DIAGNOSIS — N17.9 AKI (ACUTE KIDNEY INJURY) (HCC): ICD-10-CM

## 2021-07-13 LAB — HBA1C MFR BLD: 6.1 %

## 2021-07-13 PROCEDURE — 1123F ACP DISCUSS/DSCN MKR DOCD: CPT | Performed by: INTERNAL MEDICINE

## 2021-07-13 PROCEDURE — 83036 HEMOGLOBIN GLYCOSYLATED A1C: CPT | Performed by: INTERNAL MEDICINE

## 2021-07-13 PROCEDURE — G8417 CALC BMI ABV UP PARAM F/U: HCPCS | Performed by: INTERNAL MEDICINE

## 2021-07-13 PROCEDURE — 2022F DILAT RTA XM EVC RTNOPTHY: CPT | Performed by: INTERNAL MEDICINE

## 2021-07-13 PROCEDURE — G8427 DOCREV CUR MEDS BY ELIG CLIN: HCPCS | Performed by: INTERNAL MEDICINE

## 2021-07-13 PROCEDURE — 3017F COLORECTAL CA SCREEN DOC REV: CPT | Performed by: INTERNAL MEDICINE

## 2021-07-13 PROCEDURE — 3044F HG A1C LEVEL LT 7.0%: CPT | Performed by: INTERNAL MEDICINE

## 2021-07-13 PROCEDURE — 99214 OFFICE O/P EST MOD 30 MIN: CPT | Performed by: INTERNAL MEDICINE

## 2021-07-13 PROCEDURE — 4040F PNEUMOC VAC/ADMIN/RCVD: CPT | Performed by: INTERNAL MEDICINE

## 2021-07-13 PROCEDURE — 1036F TOBACCO NON-USER: CPT | Performed by: INTERNAL MEDICINE

## 2021-07-13 RX ORDER — ERGOCALCIFEROL 1.25 MG/1
CAPSULE ORAL
Qty: 12 CAPSULE | Refills: 1 | Status: SHIPPED | OUTPATIENT
Start: 2021-07-13 | End: 2021-12-27

## 2021-07-13 RX ORDER — TAMSULOSIN HYDROCHLORIDE 0.4 MG/1
0.4 CAPSULE ORAL DAILY
Qty: 30 CAPSULE | Refills: 5 | Status: SHIPPED | OUTPATIENT
Start: 2021-07-13 | End: 2021-12-30 | Stop reason: SDUPTHER

## 2021-07-13 RX ORDER — NITROGLYCERIN 0.4 MG/1
0.4 TABLET SUBLINGUAL EVERY 5 MIN PRN
Qty: 25 TABLET | Refills: 1 | Status: SHIPPED | OUTPATIENT
Start: 2021-07-13 | End: 2021-09-08

## 2021-07-13 RX ORDER — LORAZEPAM 1 MG/1
1 TABLET ORAL EVERY 8 HOURS PRN
Qty: 90 TABLET | Refills: 1 | Status: SHIPPED | OUTPATIENT
Start: 2021-07-13 | End: 2021-09-08

## 2021-07-13 RX ORDER — IBUPROFEN 800 MG/1
TABLET ORAL
Qty: 60 TABLET | Refills: 1 | Status: ON HOLD | OUTPATIENT
Start: 2021-07-13 | End: 2022-05-31

## 2021-07-13 NOTE — PROGRESS NOTES
Pt is here today for 3 month follow up. He is requesting refills.  He would like a refill on IBU and Vit D

## 2021-07-13 NOTE — PROGRESS NOTES
Subjective:       Patient ID:     Evelyn Lima is a 77 y.o. male who presents for   Chief Complaint   Patient presents with    Medication Refill    Hypertension    Diabetes     diet controlled    COPD       HPI:  Nursing note reviewed and discussed with patient. Diabetes - doing well with current regimen. Denies hypoglycemia, hyperglycemia, fatigue, polyuria, polydipsia, paresthesias, vision changes, dizziness. Eye exam was done two years ago, due. Not following with podiatry. Patient is taking ACE inhibitor/ARB. Complications of diabetes include CV disease. Hypertension-tolerating current regimen without chest pain, palpitations, peripheral edema, dyspnea on exertion, orthopnea, paroxysmal nocturnal dyspnea. Dizziness on and off for the past couple weeks, occurs randomly, once occurred when he was sitting watching TV. He does have a pacemaker, hasnt seen cardiology in a couple years. Used to see Dr Yajaira Ham  Hyperlipidemia-tolerating current regimen without myalgias, dyspepsia, jaundice. Mostly compliant with diet recommendations for low carb diet, not very compliant with exercise recommendations. Cardiovascular risk factors: advanced age (older than 54 for men, 72 for women), diabetes mellitus, dyslipidemia, hypertension, male gender, obesity (BMI >= 30 kg/m2) and sedentary lifestyle    COPD:  Current treatment includes anticholinergic inhaler. Residual symptoms: chronic dyspnea. Denies any other symptoms. Requires rescue inhaler 1 time(s) per week. Remains on lorazepam 3 times daily for his anxiety, tolerating well with the dizziness, somnolence. He has tried SSRIs in the past, states did not do well on them. Does not want to try anything else at this time. Patient's medications, allergies, past medical, surgical, social and family histories were reviewed and updated as appropriate.     Past Medical History:   Diagnosis Date    A-fib ([facility] [address]     Anticoagulated on Coumadin     for A-Fib, extended release capsule take 1 capsule by mouth once daily Yes Jaylene Hatfield MD   candesartan (ATACAND) 32 MG tablet take 1 tablet by mouth once daily Yes Jaylene Hatfield MD   nitroGLYCERIN (NITROSTAT) 0.4 MG SL tablet Place 1 tablet under the tongue every 5 minutes as needed for Chest pain up to max of 3 total doses. If no relief after 1 dose, call 911. Yes Jaylene Hatfield MD   furosemide (LASIX) 20 MG tablet Take 1 tablet by mouth daily for 5 days  Jaylene Hatfield MD     Review of Systems  Review of Systems   Constitutional: Negative for fatigue, fever and unexpected weight change. Respiratory: Negative for cough, choking, chest tightness, shortness of breath and wheezing. Cardiovascular: Negative for chest pain, palpitations and leg swelling. Gastrointestinal: Negative for abdominal pain, anal bleeding, blood in stool, constipation, diarrhea, nausea and vomiting. Endocrine: Negative. Musculoskeletal: Negative for joint swelling and myalgias. Skin: Negative. Neurological: Negative for dizziness. Psychiatric/Behavioral: Negative for sleep disturbance. All other systems reviewed and are negative. Objective:       Physical Exam:  /80 (Site: Left Upper Arm, Position: Sitting, Cuff Size: Medium Adult)   Pulse 74   Temp 98 °F (36.7 °C) (Temporal)   Wt 237 lb 12.8 oz (107.9 kg)   SpO2 100%   BMI 33.17 kg/m²   Physical Exam  Vitals and nursing note reviewed. Constitutional:       General: He is not in acute distress. Appearance: He is well-developed. He is not ill-appearing. Eyes:      General: Lids are normal. Vision grossly intact. Cardiovascular:      Rate and Rhythm: Normal rate and regular rhythm. Heart sounds: Normal heart sounds, S1 normal and S2 normal. No murmur heard. No friction rub. No gallop. Pulmonary:      Effort: Pulmonary effort is normal. No respiratory distress. Breath sounds: Normal breath sounds. No wheezing.    Abdominal: General: Bowel sounds are normal.      Palpations: Abdomen is soft. There is no mass. Tenderness: There is no abdominal tenderness. There is no guarding. Musculoskeletal:         General: Normal range of motion. Skin:     General: Skin is warm and dry. Capillary Refill: Capillary refill takes less than 2 seconds. Neurological:      General: No focal deficit present. Mental Status: He is alert and oriented to person, place, and time. Data Review  Controlled Substance Monitoring:    Acute and Chronic Pain Monitoring:   RX Monitoring 7/13/2021   Attestation -   Periodic Controlled Substance Monitoring Possible medication side effects, risk of tolerance/dependence & alternative treatments discussed. ;No signs of potential drug abuse or diversion identified. ;Assessed functional status. ;Obtaining appropriate analgesic effect of treatment. Chronic Pain > 80 MEDD -       PDMP Monitoring:    Last PDMP Jose Eduardo as Reviewed [facility]):  Review User Review Instant Review Result   TESFAYE WINTERS 7/13/2021  5:22 PM Reviewed PDMP [1]     Last Controlled Substance Monitoring Documentation      Office Visit from 7/13/2021 in [address] [place] [address]   Periodic Controlled Substance Monitoring  Possible medication side effects, risk of tolerance/dependence & alternative treatments discussed., No signs of potential drug abuse or diversion identified. , Assessed functional status., Obtaining appropriate analgesic effect of treatment.  filed at 07/13/2021 1722        Urine Drug Screenings (1 yr)     Urine Drug Screen  Collected: 1/15/2018  8:31 AM (Final result)    Complete Results          Pain Management Drug Screen  Collected: 2/5/2019 12:13 PM (Final result)    Complete Results          Pain Management Drug Screen  Collected: 5/14/2018  7:24 PM (Final result)    Complete Results          Pain Management Drug Screen  Collected: 9/11/2017  4:41 PM (Final result)    Complete Results          Urine Drug Screen 9 Panel  Collected: 9/26/2011  9:50 AM (Final result)    Complete Results          Drugs of Abuse 7  Collected: 8/6/2012  3:43 PM (Final result)    Complete Results          Drugs of Abuse 7  Collected: 2/13/2012  9:32 AM (Final result)    Complete Results              Medication Contract and Consent for Opioid Use Documents Filed     Patient Documents       Type of Document Status Date Received Received By Description     Medication Contract Received 2/5/2019 10:18 AM MILDRED PEREZ 2/5/19 controlled medication contract: ativan     Medication Contract Received 10/30/2020  1:36 PM JAMES CAMPA NIDA 10/28/2020-Medication agreement ativan                     Assessment/Plan:      1. Chest pain, unspecified type  - nitroGLYCERIN (NITROSTAT) 0.4 MG SL tablet; Place 1 tablet under the tongue every 5 minutes as needed for Chest pain up to max of 3 total doses. If no relief after 1 dose, call 911. Dispense: 25 tablet; Refill: 1  - [facility] - Albert Warren MD, Cardiology, [facility]    2. Anxiety  - LORazepam (ATIVAN) 1 MG tablet; Take 1 tablet by mouth every 8 hours as needed for Anxiety for up to 60 days. Dispense: 90 tablet; Refill: 1    3. Chronic midline low back pain without sciatica  - ibuprofen (ADVIL;MOTRIN) 800 MG tablet; take 1 tablet by mouth every 12 hours if needed for pain  Dispense: 60 tablet; Refill: 1    4. Vitamin D deficiency  - vitamin D (ERGOCALCIFEROL) 1.25 MG (27834 UT) CAPS capsule; take 1 capsule by mouth every week  Dispense: 12 capsule; Refill: 1    5. Diet-controlled diabetes mellitus ([address]  - POCT glycosylated hemoglobin (Hb A1C)    6. Benign prostatic hyperplasia with nocturia  - tamsulosin (FLOMAX) 0.4 MG capsule; Take 1 capsule by mouth daily  Dispense: 30 capsule; Refill: 5    7. Aneurysm of infrarenal abdominal aorta (HCC)  Status post repair, follow-up vascular surgery    8. CLIFTON (acute kidney injury) [facility])  Follow-up nephrology as referred.            Health Maintenance Due   Topic Date Due    Diabetic retinal exam  Never done    Colon cancer screen colonoscopy  Never done    Shingles Vaccine (1 of 2) Never done    Pneumococcal 65+ years Vaccine (1 of 1 - PPSV23) Never done       Electronically signed by Mel Bonner MD on 7/13/2021 at 5:15 PM

## 2021-07-18 ASSESSMENT — ENCOUNTER SYMPTOMS
CHEST TIGHTNESS: 0
ANAL BLEEDING: 0
CONSTIPATION: 0
BLOOD IN STOOL: 0
SHORTNESS OF BREATH: 0
ABDOMINAL PAIN: 0
COUGH: 0
NAUSEA: 0
CHOKING: 0
VOMITING: 0
WHEEZING: 0
DIARRHEA: 0

## 2021-07-18 ASSESSMENT — VISUAL ACUITY: OU: 1

## 2021-09-07 DIAGNOSIS — R07.9 CHEST PAIN, UNSPECIFIED TYPE: ICD-10-CM

## 2021-09-07 DIAGNOSIS — I48.20 ATRIAL FIBRILLATION, CHRONIC (HCC): ICD-10-CM

## 2021-09-07 DIAGNOSIS — F41.9 ANXIETY: ICD-10-CM

## 2021-09-07 DIAGNOSIS — I10 ESSENTIAL HYPERTENSION: ICD-10-CM

## 2021-09-08 RX ORDER — LORAZEPAM 1 MG/1
1 TABLET ORAL EVERY 8 HOURS PRN
Qty: 90 TABLET | Refills: 1 | Status: SHIPPED | OUTPATIENT
Start: 2021-09-08 | End: 2021-11-04

## 2021-09-08 RX ORDER — APIXABAN 5 MG/1
TABLET, FILM COATED ORAL
Qty: 180 TABLET | Refills: 1 | Status: SHIPPED | OUTPATIENT
Start: 2021-09-08 | End: 2022-01-10 | Stop reason: SDUPTHER

## 2021-09-08 RX ORDER — NITROGLYCERIN 0.4 MG/1
TABLET SUBLINGUAL
Qty: 25 TABLET | Refills: 1 | Status: SHIPPED | OUTPATIENT
Start: 2021-09-08 | End: 2022-04-28

## 2021-09-08 RX ORDER — DILTIAZEM HYDROCHLORIDE 360 MG/1
CAPSULE, EXTENDED RELEASE ORAL
Qty: 90 CAPSULE | Refills: 1 | Status: SHIPPED | OUTPATIENT
Start: 2021-09-08 | End: 2022-01-10 | Stop reason: SDUPTHER

## 2021-09-08 RX ORDER — CANDESARTAN 32 MG/1
TABLET ORAL
Qty: 90 TABLET | Refills: 1 | Status: SHIPPED | OUTPATIENT
Start: 2021-09-08 | End: 2022-01-10 | Stop reason: SDUPTHER

## 2021-09-08 NOTE — TELEPHONE ENCOUNTER
Last visit: 7/13/21  Last Med refill: 3/18/21, 3/23/21, 7/13/21  Does patient have enough medication for 72 hours: Yes    Next Visit Date:  Future Appointments   Date Time Provider Jose Cruz Saavedrai   11/16/2021 11:00 AM MD Yesi Tipton [facility]   1/11/2022  9:10 AM Rosenda Duarte MD [facility] None       Health Maintenance   Topic Date Due    Diabetic retinal exam  Never done    Colon cancer screen colonoscopy  Never done    Shingles Vaccine (1 of 2) Never done    Pneumococcal 65+ years Vaccine (1 of 1 - PPSV23) Never done    Flu vaccine (1) 09/01/2021    Diabetic microalbuminuria test  11/02/2021    Diabetic foot exam  03/23/2022    Lipid screen  06/17/2022    Potassium monitoring  06/17/2022    Creatinine monitoring  06/17/2022    A1C test (Diabetic or Prediabetic)  07/13/2022    DTaP/Tdap/Td vaccine (2 - Td or Tdap) 07/10/2023    COVID-19 Vaccine  Completed    Hepatitis C screen  Completed    Hepatitis A vaccine  Aged Out    Hib vaccine  Aged Out    Meningococcal (ACWY) vaccine  Aged Out       Hemoglobin A1C (%)   Date Value   07/13/2021 6.1   10/28/2020 5.5   02/18/2020 6.0             ( goal A1C is < 7)   Microalb/Crt.  Ratio (mcg/mg creat)   Date Value   11/02/2020 38 (H)     LDL Cholesterol (mg/dL)   Date Value   06/17/2021 134 (H)   02/18/2020 121       (goal LDL is <100)   AST (U/L)   Date Value   06/17/2021 13     ALT (U/L)   Date Value   06/17/2021 10     BUN (mg/dL)   Date Value   06/17/2021 33 (H)     BP Readings from Last 3 Encounters:   07/13/21 132/80   03/23/21 130/82   10/28/20 128/80          (goal 120/80)    All Future Testing planned in [facility]  Lab Frequency Next Occurrence   POCT FECAL IMMUNOCHEMICAL TEST (FIT) Once 12/31/2021               Patient Active Problem List:     Back pain     HTN (hypertension)     COPD (chronic obstructive pulmonary disease) (HCC)     Lung nodule     Procedure refused     Hyperlipidemia     Colonoscopy refused     Atrial fibrillation, chronic     Anxiety     Insomnia     Pacemaker battery depletion     Pacemaker     Bradycardia     Constipation     Diet-controlled diabetes mellitus ([address]     Aneurysm of infrarenal abdominal aorta (HCC)     CLIFTON (acute kidney injury) ([address]     Hyperkalemia

## 2021-10-20 NOTE — PROGRESS NOTES
Pt taken out via wheelchair to son's car Ketoconazole Counseling:   Patient counseled regarding improving absorption with orange juice.  Adverse effects include but are not limited to breast enlargement, headache, diarrhea, nausea, upset stomach, liver function test abnormalities, taste disturbance, and stomach pain.  There is a rare possibility of liver failure that can occur when taking ketoconazole. The patient understands that monitoring of LFTs may be required, especially at baseline. The patient verbalized understanding of the proper use and possible adverse effects of ketoconazole.  All of the patient's questions and concerns were addressed.

## 2021-11-04 DIAGNOSIS — F41.9 ANXIETY: ICD-10-CM

## 2021-11-04 RX ORDER — LORAZEPAM 1 MG/1
TABLET ORAL
Qty: 90 TABLET | Refills: 1 | Status: SHIPPED | OUTPATIENT
Start: 2021-11-04 | End: 2022-01-03

## 2021-11-04 NOTE — TELEPHONE ENCOUNTER
Last visit: 7/13/21  Last Med refill: 9/8/21  Does patient have enough medication for 72 hours: Yes    Next Visit Date:  Future Appointments   Date Time Provider Jose Cruz Saavedrai   11/16/2021 11:00 AM Jaylene Palafox MD [facility]   1/11/2022  9:10 AM Hai Bill MD [facility] None       Health Maintenance   Topic Date Due    Diabetic retinal exam  Never done    Colon cancer screen colonoscopy  Never done    Shingles Vaccine (1 of 2) Never done    Pneumococcal 65+ years Vaccine (1 of 1 - PPSV23) Never done    Flu vaccine (1) 09/01/2021    Diabetic foot exam  03/23/2022    Lipid screen  06/17/2022    Potassium monitoring  06/17/2022    Creatinine monitoring  06/17/2022    A1C test (Diabetic or Prediabetic)  07/13/2022    DTaP/Tdap/Td vaccine (2 - Td or Tdap) 07/10/2023    COVID-19 Vaccine  Completed    Hepatitis C screen  Completed    Hepatitis A vaccine  Aged Out    Hib vaccine  Aged Out    Meningococcal (ACWY) vaccine  Aged Out       Hemoglobin A1C (%)   Date Value   07/13/2021 6.1   10/28/2020 5.5   02/18/2020 6.0             ( goal A1C is < 7)   Microalb/Crt.  Ratio (mcg/mg creat)   Date Value   11/02/2020 38 (H)     LDL Cholesterol (mg/dL)   Date Value   06/17/2021 134 (H)   02/18/2020 121       (goal LDL is <100)   AST (U/L)   Date Value   06/17/2021 13     ALT (U/L)   Date Value   06/17/2021 10     BUN (mg/dL)   Date Value   06/17/2021 33 (H)     BP Readings from Last 3 Encounters:   07/13/21 132/80   03/23/21 130/82   10/28/20 128/80          (goal 120/80)    All Future Testing planned in [facility]  Lab Frequency Next Occurrence   POCT FECAL IMMUNOCHEMICAL TEST (FIT) Once 12/31/2021               Patient Active Problem List:     Back pain     HTN (hypertension)     COPD (chronic obstructive pulmonary disease) (HCC)     Lung nodule     Procedure refused     Hyperlipidemia     Colonoscopy refused     Atrial fibrillation, chronic     Anxiety     Insomnia     Pacemaker battery depletion     Pacemaker     Bradycardia     Constipation     Diet-controlled diabetes mellitus (HCC)     Aneurysm of infrarenal abdominal aorta (HCC)     CLIFTON (acute kidney injury) (HCC)     Hyperkalemia

## 2021-12-02 DIAGNOSIS — J44.9 CHRONIC OBSTRUCTIVE PULMONARY DISEASE, UNSPECIFIED COPD TYPE (HCC): ICD-10-CM

## 2021-12-02 NOTE — TELEPHONE ENCOUNTER
Last visit: 7/13/21  Last Med refill: 3/23/21  Does patient have enough medication for 72 hours: No:     Next Visit Date:  Future Appointments   Date Time Provider Jose Cruz Scruggs   12/20/2021 10:15 AM CARMITA Hdz - NP Yesi POSADA [facility]   1/11/2022  9:10 AM Jose F Vincent MD [facility] None       Health Maintenance   Topic Date Due    Diabetic retinal exam  Never done    Colon cancer screen colonoscopy  Never done    Shingles Vaccine (1 of 2) Never done    Pneumococcal 65+ years Vaccine (1 of 1 - PPSV23) Never done    COVID-19 Vaccine (2 - Booster for [employer] series) 07/08/2021    Flu vaccine (1) 09/01/2021    Diabetic foot exam  03/23/2022    Lipid screen  06/17/2022    Potassium monitoring  06/17/2022    Creatinine monitoring  06/17/2022    A1C test (Diabetic or Prediabetic)  07/13/2022    DTaP/Tdap/Td vaccine (2 - Td or Tdap) 07/10/2023    Hepatitis C screen  Completed    Hepatitis A vaccine  Aged Out    Hib vaccine  Aged Out    Meningococcal (ACWY) vaccine  Aged Out       Hemoglobin A1C (%)   Date Value   07/13/2021 6.1   10/28/2020 5.5   02/18/2020 6.0             ( goal A1C is < 7)   Microalb/Crt.  Ratio (mcg/mg creat)   Date Value   11/02/2020 38 (H)     LDL Cholesterol (mg/dL)   Date Value   06/17/2021 134 (H)   02/18/2020 121       (goal LDL is <100)   AST (U/L)   Date Value   06/17/2021 13     ALT (U/L)   Date Value   06/17/2021 10     BUN (mg/dL)   Date Value   06/17/2021 33 (H)     BP Readings from Last 3 Encounters:   07/13/21 132/80   03/23/21 130/82   10/28/20 128/80          (goal 120/80)    All Future Testing planned in [facility]  Lab Frequency Next Occurrence   POCT FECAL IMMUNOCHEMICAL TEST (FIT) Once 12/31/2021               Patient Active Problem List:     Back pain     HTN (hypertension)     COPD (chronic obstructive pulmonary disease) (HCC)     Lung nodule     Procedure refused     Hyperlipidemia     Colonoscopy refused     Atrial fibrillation, chronic Anxiety     Insomnia     Pacemaker battery depletion     Pacemaker     Bradycardia     Constipation     Diet-controlled diabetes mellitus ([facility] [address]     Aneurysm of infrarenal abdominal aorta (HCC)     CLIFTON (acute kidney injury) ([facility])     Hyperkalemia

## 2021-12-06 RX ORDER — TIOTROPIUM BROMIDE INHALATION SPRAY 3.12 UG/1
SPRAY, METERED RESPIRATORY (INHALATION)
Qty: 3 EACH | Refills: 1 | Status: SHIPPED | OUTPATIENT
Start: 2021-12-06 | End: 2022-05-16 | Stop reason: ALTCHOICE

## 2021-12-27 DIAGNOSIS — E55.9 VITAMIN D DEFICIENCY: ICD-10-CM

## 2021-12-27 RX ORDER — ERGOCALCIFEROL 1.25 MG/1
CAPSULE ORAL
Qty: 12 CAPSULE | Refills: 1 | Status: SHIPPED | OUTPATIENT
Start: 2021-12-27 | End: 2022-05-16 | Stop reason: SDUPTHER

## 2021-12-27 NOTE — TELEPHONE ENCOUNTER
Last visit: 07/13/2021  Last Med refill: 07/13/2021  Does patient have enough medication for 72 hours: Yes    Next Visit Date:  Future Appointments   Date Time Provider Jose Cruz Sheba   1/10/2022  8:45 AM CARMITA Gutierrez - NP [place] [facility]   1/11/2022  9:10 AM Juliocesar Emmanuel MD [facility] None       Health Maintenance   Topic Date Due    Diabetic retinal exam  Never done    Colon cancer screen colonoscopy  Never done    Shingles Vaccine (1 of 2) Never done    Pneumococcal 65+ years Vaccine (1 of 1 - PPSV23) Never done    COVID-19 Vaccine (2 - Booster for [employer] series) 07/08/2021    Flu vaccine (1) 09/01/2021    Diabetic foot exam  03/23/2022    Lipid screen  06/17/2022    Potassium monitoring  06/17/2022    Creatinine monitoring  06/17/2022    A1C test (Diabetic or Prediabetic)  07/13/2022    DTaP/Tdap/Td vaccine (2 - Td or Tdap) 07/10/2023    Hepatitis C screen  Completed    Hepatitis A vaccine  Aged Out    Hib vaccine  Aged Out    Meningococcal (ACWY) vaccine  Aged Out       Hemoglobin A1C (%)   Date Value   07/13/2021 6.1   10/28/2020 5.5   02/18/2020 6.0             ( goal A1C is < 7)   Microalb/Crt.  Ratio (mcg/mg creat)   Date Value   11/02/2020 38 (H)     LDL Cholesterol (mg/dL)   Date Value   06/17/2021 134 (H)   02/18/2020 121       (goal LDL is <100)   AST (U/L)   Date Value   06/17/2021 13     ALT (U/L)   Date Value   06/17/2021 10     BUN (mg/dL)   Date Value   06/17/2021 33 (H)     BP Readings from Last 3 Encounters:   07/13/21 132/80   03/23/21 130/82   10/28/20 128/80          (goal 120/80)    All Future Testing planned in [facility]  Lab Frequency Next Occurrence   POCT FECAL IMMUNOCHEMICAL TEST (FIT) Once 12/31/2021               Patient Active Problem List:     Back pain     HTN (hypertension)     COPD (chronic obstructive pulmonary disease) (HCC)     Lung nodule     Procedure refused     Hyperlipidemia     Colonoscopy refused     Atrial fibrillation, chronic Anxiety     Insomnia     Pacemaker battery depletion     Pacemaker     Bradycardia     Constipation     Diet-controlled diabetes mellitus ([facility] [address]     Aneurysm of infrarenal abdominal aorta (HCC)     CLIFTON (acute kidney injury) ([facility])     Hyperkalemia

## 2021-12-30 DIAGNOSIS — F41.9 ANXIETY: ICD-10-CM

## 2021-12-30 DIAGNOSIS — R35.1 BENIGN PROSTATIC HYPERPLASIA WITH NOCTURIA: ICD-10-CM

## 2021-12-30 DIAGNOSIS — K59.00 CONSTIPATION, UNSPECIFIED CONSTIPATION TYPE: ICD-10-CM

## 2021-12-30 DIAGNOSIS — N40.1 BENIGN PROSTATIC HYPERPLASIA WITH NOCTURIA: ICD-10-CM

## 2021-12-30 RX ORDER — LORAZEPAM 1 MG/1
TABLET ORAL
Qty: 90 TABLET | OUTPATIENT
Start: 2021-12-30 | End: 2022-02-28

## 2021-12-30 RX ORDER — LINACLOTIDE 290 UG/1
CAPSULE, GELATIN COATED ORAL
Qty: 90 CAPSULE | Refills: 1 | OUTPATIENT
Start: 2021-12-30

## 2021-12-30 RX ORDER — TAMSULOSIN HYDROCHLORIDE 0.4 MG/1
CAPSULE ORAL
Qty: 30 CAPSULE | Refills: 5 | OUTPATIENT
Start: 2021-12-30

## 2021-12-30 NOTE — TELEPHONE ENCOUNTER
Last visit: 7/13/21  Last Med refill: 7/13/21, 3/23/21  Does patient have enough medication for 72 hours: Yes    Next Visit Date:  Future Appointments   Date Time Provider Jose Cruz Sheba   1/10/2022  8:45 AM CARMITA Roberson - SH[facility]IK[facility] POSADA [facility]   1/11/2022  9:10 AM Hannah Licona MD [facility] None       Health Maintenance   Topic Date Due    Diabetic retinal exam  Never done    Colon cancer screen colonoscopy  Never done    Shingles Vaccine (1 of 2) Never done    Pneumococcal 65+ years Vaccine (1 of 1 - PPSV23) Never done    COVID-19 Vaccine (2 - Booster for [employer] series) 07/08/2021    Flu vaccine (1) 09/01/2021    Diabetic foot exam  03/23/2022    Lipid screen  06/17/2022    Potassium monitoring  06/17/2022    Creatinine monitoring  06/17/2022    A1C test (Diabetic or Prediabetic)  07/13/2022    DTaP/Tdap/Td vaccine (2 - Td or Tdap) 07/10/2023    Hepatitis C screen  Completed    Hepatitis A vaccine  Aged Out    Hib vaccine  Aged Out    Meningococcal (ACWY) vaccine  Aged Out       Hemoglobin A1C (%)   Date Value   07/13/2021 6.1   10/28/2020 5.5   02/18/2020 6.0             ( goal A1C is < 7)   Microalb/Crt.  Ratio (mcg/mg creat)   Date Value   11/02/2020 38 (H)     LDL Cholesterol (mg/dL)   Date Value   06/17/2021 134 (H)   02/18/2020 121       (goal LDL is <100)   AST (U/L)   Date Value   06/17/2021 13     ALT (U/L)   Date Value   06/17/2021 10     BUN (mg/dL)   Date Value   06/17/2021 33 (H)     BP Readings from Last 3 Encounters:   07/13/21 132/80   03/23/21 130/82   10/28/20 128/80          (goal 120/80)    All Future Testing planned in [facility]  Lab Frequency Next Occurrence   POCT FECAL IMMUNOCHEMICAL TEST (FIT) Once 12/31/2021               Patient Active Problem List:     Back pain     HTN (hypertension)     COPD (chronic obstructive pulmonary disease) (HCC)     Lung nodule     Procedure refused     Hyperlipidemia     Colonoscopy refused     Atrial fibrillation, chronic Anxiety     Insomnia     Pacemaker battery depletion     Pacemaker     Bradycardia     Constipation     Diet-controlled diabetes mellitus ([facility] [address]     Aneurysm of infrarenal abdominal aorta (HCC)     CLIFTON (acute kidney injury) ([facility])     Hyperkalemia

## 2022-01-01 DIAGNOSIS — R35.1 BENIGN PROSTATIC HYPERPLASIA WITH NOCTURIA: ICD-10-CM

## 2022-01-01 DIAGNOSIS — N40.1 BENIGN PROSTATIC HYPERPLASIA WITH NOCTURIA: ICD-10-CM

## 2022-01-03 DIAGNOSIS — F41.9 ANXIETY: ICD-10-CM

## 2022-01-03 RX ORDER — TAMSULOSIN HYDROCHLORIDE 0.4 MG/1
0.4 CAPSULE ORAL DAILY
Qty: 90 CAPSULE | Refills: 1 | Status: SHIPPED | OUTPATIENT
Start: 2022-01-03 | End: 2022-05-16 | Stop reason: SDUPTHER

## 2022-01-03 RX ORDER — LORAZEPAM 1 MG/1
TABLET ORAL
Qty: 90 TABLET | Refills: 1 | OUTPATIENT
Start: 2022-01-03

## 2022-01-03 RX ORDER — TAMSULOSIN HYDROCHLORIDE 0.4 MG/1
CAPSULE ORAL
Qty: 30 CAPSULE | Refills: 5 | OUTPATIENT
Start: 2022-01-03

## 2022-01-03 RX ORDER — LORAZEPAM 1 MG/1
TABLET ORAL
Qty: 90 TABLET | OUTPATIENT
Start: 2022-01-03 | End: 2022-03-04

## 2022-01-03 RX ORDER — LINACLOTIDE 290 UG/1
290 CAPSULE, GELATIN COATED ORAL
Qty: 90 CAPSULE | Refills: 1 | Status: SHIPPED | OUTPATIENT
Start: 2022-01-03 | End: 2022-05-16 | Stop reason: SDUPTHER

## 2022-01-03 NOTE — TELEPHONE ENCOUNTER
Last visit: 7/13/2021  Last Med refill: 12/2021  Does patient have enough medication for 72 hours: No:     Next Visit Date:  Future Appointments   Date Time Provider Jose Cruz Scruggs   1/10/2022  8:45 AM CARMITA Berry - [facility]   1/11/2022  9:10 AM Nancy Rodas MD [facility] None       Health Maintenance   Topic Date Due    Diabetic retinal exam  Never done    Colon cancer screen colonoscopy  Never done    Shingles Vaccine (1 of 2) Never done    Pneumococcal 65+ years Vaccine (1 of 1 - PPSV23) Never done    COVID-19 Vaccine (2 - Booster for [employer] series) 07/08/2021    Flu vaccine (1) 09/01/2021    Diabetic foot exam  03/23/2022    Depression Screen  03/23/2022    Lipid screen  06/17/2022    Potassium monitoring  06/17/2022    Creatinine monitoring  06/17/2022    A1C test (Diabetic or Prediabetic)  07/13/2022    DTaP/Tdap/Td vaccine (2 - Td or Tdap) 07/10/2023    Hepatitis C screen  Completed    Hepatitis A vaccine  Aged Out    Hib vaccine  Aged Out    Meningococcal (ACWY) vaccine  Aged Out       Hemoglobin A1C (%)   Date Value   07/13/2021 6.1   10/28/2020 5.5   02/18/2020 6.0             ( goal A1C is < 7)   Microalb/Crt.  Ratio (mcg/mg creat)   Date Value   11/02/2020 38 (H)     LDL Cholesterol (mg/dL)   Date Value   06/17/2021 134 (H)   02/18/2020 121       (goal LDL is <100)   AST (U/L)   Date Value   06/17/2021 13     ALT (U/L)   Date Value   06/17/2021 10     BUN (mg/dL)   Date Value   06/17/2021 33 (H)     BP Readings from Last 3 Encounters:   07/13/21 132/80   03/23/21 130/82   10/28/20 128/80          (goal 120/80)    All Future Testing planned in [facility]  Lab Frequency Next Occurrence   POCT FECAL IMMUNOCHEMICAL TEST (FIT) Once 12/31/2021               Patient Active Problem List:     Back pain     HTN (hypertension)     COPD (chronic obstructive pulmonary disease) (HCC)     Lung nodule     Procedure refused     Hyperlipidemia     Colonoscopy refused Atrial fibrillation, chronic     Anxiety     Insomnia     Pacemaker battery depletion     Pacemaker     Bradycardia     Constipation     Diet-controlled diabetes mellitus ([facility] [address]     Aneurysm of infrarenal abdominal aorta (HCC)     CLIFTON (acute kidney injury) (HCC)     Hyperkalemia

## 2022-01-06 DIAGNOSIS — F41.9 ANXIETY: ICD-10-CM

## 2022-01-07 RX ORDER — LORAZEPAM 1 MG/1
TABLET ORAL
Qty: 90 TABLET | OUTPATIENT
Start: 2022-01-07 | End: 2022-03-08

## 2022-01-10 ENCOUNTER — OFFICE VISIT (OUTPATIENT)
Dept: FAMILY MEDICINE CLINIC | Age: 77
End: 2022-01-10
Payer: COMMERCIAL

## 2022-01-10 VITALS
SYSTOLIC BLOOD PRESSURE: 132 MMHG | DIASTOLIC BLOOD PRESSURE: 80 MMHG | BODY MASS INDEX: 33.18 KG/M2 | HEIGHT: 71 IN | WEIGHT: 237 LBS | OXYGEN SATURATION: 98 % | HEART RATE: 66 BPM | RESPIRATION RATE: 20 BRPM

## 2022-01-10 DIAGNOSIS — I48.20 ATRIAL FIBRILLATION, CHRONIC (HCC): ICD-10-CM

## 2022-01-10 DIAGNOSIS — F41.9 ANXIETY: ICD-10-CM

## 2022-01-10 DIAGNOSIS — H53.9 VISION CHANGES: ICD-10-CM

## 2022-01-10 DIAGNOSIS — I10 ESSENTIAL HYPERTENSION: ICD-10-CM

## 2022-01-10 DIAGNOSIS — E11.9 DIET-CONTROLLED DIABETES MELLITUS (HCC): Primary | ICD-10-CM

## 2022-01-10 LAB — HBA1C MFR BLD: 5.6 %

## 2022-01-10 PROCEDURE — 3017F COLORECTAL CA SCREEN DOC REV: CPT | Performed by: NURSE PRACTITIONER

## 2022-01-10 PROCEDURE — 99213 OFFICE O/P EST LOW 20 MIN: CPT | Performed by: NURSE PRACTITIONER

## 2022-01-10 PROCEDURE — 1036F TOBACCO NON-USER: CPT | Performed by: NURSE PRACTITIONER

## 2022-01-10 PROCEDURE — 2022F DILAT RTA XM EVC RTNOPTHY: CPT | Performed by: NURSE PRACTITIONER

## 2022-01-10 PROCEDURE — G8417 CALC BMI ABV UP PARAM F/U: HCPCS | Performed by: NURSE PRACTITIONER

## 2022-01-10 PROCEDURE — 83036 HEMOGLOBIN GLYCOSYLATED A1C: CPT | Performed by: NURSE PRACTITIONER

## 2022-01-10 PROCEDURE — G8427 DOCREV CUR MEDS BY ELIG CLIN: HCPCS | Performed by: NURSE PRACTITIONER

## 2022-01-10 PROCEDURE — 3044F HG A1C LEVEL LT 7.0%: CPT | Performed by: NURSE PRACTITIONER

## 2022-01-10 PROCEDURE — 4040F PNEUMOC VAC/ADMIN/RCVD: CPT | Performed by: NURSE PRACTITIONER

## 2022-01-10 PROCEDURE — 1123F ACP DISCUSS/DSCN MKR DOCD: CPT | Performed by: NURSE PRACTITIONER

## 2022-01-10 PROCEDURE — G8484 FLU IMMUNIZE NO ADMIN: HCPCS | Performed by: NURSE PRACTITIONER

## 2022-01-10 RX ORDER — LORAZEPAM 1 MG/1
1 TABLET ORAL EVERY 8 HOURS PRN
Qty: 90 TABLET | Refills: 1 | Status: SHIPPED | OUTPATIENT
Start: 2022-01-10 | End: 2022-03-03 | Stop reason: SDUPTHER

## 2022-01-10 RX ORDER — LORAZEPAM 1 MG/1
1 TABLET ORAL EVERY 8 HOURS PRN
Qty: 90 TABLET | Refills: 1 | Status: CANCELLED | OUTPATIENT
Start: 2022-01-10 | End: 2022-03-11

## 2022-01-10 RX ORDER — DILTIAZEM HYDROCHLORIDE 360 MG/1
360 CAPSULE, EXTENDED RELEASE ORAL DAILY
Qty: 90 CAPSULE | Refills: 1 | Status: SHIPPED | OUTPATIENT
Start: 2022-01-10 | End: 2022-05-16 | Stop reason: SDUPTHER

## 2022-01-10 RX ORDER — CANDESARTAN 32 MG/1
32 TABLET ORAL DAILY
Qty: 90 TABLET | Refills: 1 | Status: SHIPPED | OUTPATIENT
Start: 2022-01-10 | End: 2022-05-16 | Stop reason: SDUPTHER

## 2022-01-10 ASSESSMENT — ENCOUNTER SYMPTOMS
CONSTIPATION: 0
DIARRHEA: 0
CHEST TIGHTNESS: 0
SHORTNESS OF BREATH: 1
BACK PAIN: 0
ABDOMINAL PAIN: 0
EYE PAIN: 0
VOMITING: 0
SINUS PRESSURE: 0
COLOR CHANGE: 0
NAUSEA: 0
SINUS PAIN: 0

## 2022-01-10 NOTE — PROGRESS NOTES
Kenneth Atkins (:  1945) is a 77 y.o. male,Established patient, here for evaluation of the following chief complaint(s):  Diabetes (4 month), Fall (about a mopnth ago, left wrist/forearm bruised), and Health Maintenance (refuses colon screening, needs eye exam-needs to see specialist.)         ASSESSMENT/PLAN:  1. Diet-controlled diabetes mellitus ([address]  Assessment & Plan:   Well-controlled, lifestyle modifications recommended    Orders:  -     POCT glycosylated hemoglobin (Hb A1C)  2. Atrial fibrillation, chronic (HCC)  -     apixaban (ELIQUIS) 5 MG TABS tablet; Take 1 tablet by mouth 2 times daily, Disp-180 tablet, R-1Normal  -     dilTIAZem (CARDIZEM CD) 360 MG extended release capsule; Take 1 capsule by mouth daily, Disp-90 capsule, R-1Normal  3. Essential hypertension  Assessment & Plan:   Well-controlled, continue current medications, medication adherence emphasized and lifestyle modifications recommended  Orders:  -     dilTIAZem (CARDIZEM CD) 360 MG extended release capsule; Take 1 capsule by mouth daily, Disp-90 capsule, R-1Normal  -     candesartan (ATACAND) 32 MG tablet; Take 1 tablet by mouth daily, Disp-90 tablet, R-1Normal  4. Anxiety  Assessment & Plan:   Well-controlled, continue current medications  Orders:  -     LORazepam (ATIVAN) 1 MG tablet; Take 1 tablet by mouth every 8 hours as needed for Anxiety for up to 60 days. , Disp-90 tablet, R-1Normal  5. Vision changes  -     External Referral To Ophthalmology      Return in about 4 months (around 5/10/2022) for Depression/Anxiety.          Subjective   SUBJECTIVE/OBJECTIVE:  Presents for recheck on chronic conditions     DM: A1C 5.6 today  Trying to eat well, cut back on sugar    Fell one month ago   Landed on left arm  Still has bruise and hematoma to left forearm - describes as non painful  Did not hit his head during fall    Has appointment coming up with cardiology  Nephrology appointment tomorrow - trying to get a ride lined up, can not Negative for dysuria, flank pain, frequency and urgency. Musculoskeletal: Negative for arthralgias, back pain and myalgias. Skin: Positive for wound. Negative for color change and rash. Neurological: Negative for dizziness, weakness, light-headedness, numbness and headaches. Psychiatric/Behavioral: Negative for confusion, hallucinations, self-injury, sleep disturbance and suicidal ideas. The patient is nervous/anxious. Objective   Physical Exam  Vitals reviewed. Constitutional:       Appearance: Normal appearance. HENT:      Head: Normocephalic. Right Ear: Tympanic membrane, ear canal and external ear normal.      Left Ear: Tympanic membrane, ear canal and external ear normal.      Nose: Nose normal.      Mouth/Throat:      Mouth: Mucous membranes are moist.      Pharynx: Oropharynx is clear. Eyes:      Extraocular Movements: Extraocular movements intact. Conjunctiva/sclera: Conjunctivae normal.      Pupils: Pupils are equal, round, and reactive to light. Cardiovascular:      Rate and Rhythm: Normal rate and regular rhythm. Pulses: Normal pulses. Heart sounds: Normal heart sounds. Pulmonary:      Effort: Pulmonary effort is normal.      Breath sounds: Normal breath sounds. Abdominal:      General: Abdomen is flat. Bowel sounds are normal.      Palpations: Abdomen is soft. Genitourinary:     Rectum: Normal.   Musculoskeletal:         General: Normal range of motion. Cervical back: Normal range of motion. Skin:     General: Skin is warm and dry. Capillary Refill: Capillary refill takes less than 2 seconds. Findings: Ecchymosis present. Neurological:      General: No focal deficit present. Mental Status: He is alert and oriented to person, place, and time. Mental status is at baseline.    Psychiatric:         Attention and Perception: Attention and perception normal.         Mood and Affect: Mood and affect normal.         Speech: Speech normal.         Behavior: Behavior normal. Behavior is cooperative. Thought Content: Thought content normal.         Cognition and Memory: Cognition and memory normal.         Judgment: Judgment normal.                Wt Readings from Last 3 Encounters:   01/10/22 237 lb (107.5 kg)   07/13/21 237 lb 12.8 oz (107.9 kg)   03/23/21 240 lb (108.9 kg)     Temp Readings from Last 3 Encounters:   07/13/21 98 °F (36.7 °C) (Temporal)   03/23/21 98.2 °F (36.8 °C) (Temporal)   10/28/20 96.6 °F (35.9 °C) (Temporal)     BP Readings from Last 3 Encounters:   01/10/22 132/80   07/13/21 132/80   03/23/21 130/82     Pulse Readings from Last 3 Encounters:   01/10/22 66   07/13/21 74   03/23/21 62       An electronic signature was used to authenticate this note.     --CARMITA Carrillo - NP

## 2022-03-03 DIAGNOSIS — F41.9 ANXIETY: ICD-10-CM

## 2022-03-03 DIAGNOSIS — I48.20 ATRIAL FIBRILLATION, CHRONIC (HCC): ICD-10-CM

## 2022-03-03 RX ORDER — LORAZEPAM 1 MG/1
1 TABLET ORAL EVERY 8 HOURS PRN
Qty: 90 TABLET | Refills: 1 | Status: SHIPPED | OUTPATIENT
Start: 2022-03-03 | End: 2022-04-28

## 2022-03-03 NOTE — TELEPHONE ENCOUNTER
Last visit: 01/10/2022  Last Med refill: 02/10/2022  Does patient have enough medication for 72 hours: Yes    Next Visit Date:  Future Appointments   Date Time Provider Jose Cruz Scruggs   5/16/2022  8:30 AM CARMITA Mcintosh  [address] Maintenance   Topic Date Due    Diabetic retinal exam  Never done    Colorectal Cancer Screen  Never done    Shingles Vaccine (1 of 2) Never done    Pneumococcal 65+ years Vaccine (1 of 1 - PPSV23) Never done    COVID-19 Vaccine (2 - Booster for [employer] series) 07/08/2021    Flu vaccine (1) 09/01/2021    Diabetic foot exam  03/23/2022    Depression Screen  03/23/2022    Lipid screen  06/17/2022    Potassium monitoring  06/17/2022    Creatinine monitoring  06/17/2022    A1C test (Diabetic or Prediabetic)  01/10/2023    DTaP/Tdap/Td vaccine (2 - Td or Tdap) 07/10/2023    Hepatitis C screen  Completed    Hepatitis A vaccine  Aged Out    Hib vaccine  Aged Out    Meningococcal (ACWY) vaccine  Aged Out       Hemoglobin A1C (%)   Date Value   01/10/2022 5.6   07/13/2021 6.1   10/28/2020 5.5             ( goal A1C is < 7)   Microalb/Crt.  Ratio (mcg/mg creat)   Date Value   11/02/2020 38 (H)     LDL Cholesterol (mg/dL)   Date Value   06/17/2021 134 (H)   02/18/2020 121       (goal LDL is <100)   AST (U/L)   Date Value   06/17/2021 13     ALT (U/L)   Date Value   06/17/2021 10     BUN (mg/dL)   Date Value   06/17/2021 33 (H)     BP Readings from Last 3 Encounters:   01/10/22 132/80   07/13/21 132/80   03/23/21 130/82          (goal 120/80)    All Future Testing planned in [facility]  Lab Frequency Next Occurrence               Patient Active Problem List:     Back pain     Essential hypertension     COPD (chronic obstructive pulmonary disease) (HCC)     Lung nodule     Procedure refused     Hyperlipidemia     Colonoscopy refused     Atrial fibrillation, chronic     Anxiety     Insomnia     Pacemaker battery depletion     Pacemaker     Bradycardia Constipation     Diet-controlled diabetes mellitus (HCC)     Aneurysm of infrarenal abdominal aorta (HCC)     CLIFTON (acute kidney injury) ([facility] [address]     Hyperkalemia

## 2022-04-28 DIAGNOSIS — R07.9 CHEST PAIN, UNSPECIFIED TYPE: ICD-10-CM

## 2022-04-28 DIAGNOSIS — F41.9 ANXIETY: ICD-10-CM

## 2022-04-28 RX ORDER — NITROGLYCERIN 0.4 MG/1
TABLET SUBLINGUAL
Qty: 25 TABLET | Refills: 1 | Status: SHIPPED | OUTPATIENT
Start: 2022-04-28 | End: 2022-06-29

## 2022-04-28 RX ORDER — LORAZEPAM 1 MG/1
1 TABLET ORAL EVERY 8 HOURS PRN
Qty: 90 TABLET | Refills: 0 | Status: SHIPPED | OUTPATIENT
Start: 2022-05-03 | End: 2022-05-27

## 2022-04-28 NOTE — TELEPHONE ENCOUNTER
Last visit: 01/10/2022  leviVirgil  Last Med refill: 04/2022  Does patient have enough medication for 72 hours: Yes    Next Visit Date:  Future Appointments   Date Time Provider Jose Cruz Scruggs   5/16/2022  8:30 AM CARMITA Culp  [address] Maintenance   Topic Date Due    Pneumococcal 65+ years Vaccine (1 - PCV) Never done    Diabetic retinal exam  Never done    Colorectal Cancer Screen  Never done    Shingles Vaccine (1 of 2) Never done    COVID-19 Vaccine (2 - Booster for [place] series) 07/08/2021    Diabetic foot exam  03/23/2022    Depression Screen  03/23/2022    Lipids  06/17/2022    Potassium  06/17/2022    Creatinine  06/17/2022    Flu vaccine (Season Ended) 09/01/2022    A1C test (Diabetic or Prediabetic)  01/10/2023    DTaP/Tdap/Td vaccine (2 - Td or Tdap) 07/10/2023    Hepatitis C screen  Completed    Hepatitis A vaccine  Aged Out    Hib vaccine  Aged Out    Meningococcal (ACWY) vaccine  Aged Out       Hemoglobin A1C (%)   Date Value   01/10/2022 5.6   07/13/2021 6.1   10/28/2020 5.5             ( goal A1C is < 7)   Microalb/Crt.  Ratio (mcg/mg creat)   Date Value   11/02/2020 38 (H)     LDL Cholesterol (mg/dL)   Date Value   06/17/2021 134 (H)   02/18/2020 121       (goal LDL is <100)   AST (U/L)   Date Value   06/17/2021 13     ALT (U/L)   Date Value   06/17/2021 10     BUN (mg/dL)   Date Value   06/17/2021 33 (H)     BP Readings from Last 3 Encounters:   01/10/22 132/80   07/13/21 132/80   03/23/21 130/82          (goal 120/80)    All Future Testing planned in [facility]  Lab Frequency Next Occurrence               Patient Active Problem List:     Back pain     Essential hypertension     COPD (chronic obstructive pulmonary disease) (HCC)     Lung nodule     Procedure refused     Hyperlipidemia     Colonoscopy refused     Atrial fibrillation, chronic     Anxiety     Insomnia     Pacemaker battery depletion     Pacemaker     Bradycardia     Constipation [facility] [address]  [place] [place]  [phone]               Thank you for choosing us for your health care visit with Machelle Lawson PA-C.   We are glad to serve you and happy to provide you Diet-controlled diabetes mellitus (HCC)     Aneurysm of infrarenal abdominal aorta (HCC)     CLIFTON (acute kidney injury) ([facility] [address]     Hyperkalemia If you are confident that your benefit plan will not require a referral or authorization, such as [place] Medicaid, please feel free to schedule your appointment immediately.  However, if you are unsure about the requirements for authorization, please wait •[facility] call [phone] usually in [address] [facility] in Clinic in [place] at [facility].  [address] Mon-Fri at 8am-7:30pm, and Sat/Sun 9am-430pm  Also at [address]  Call [phone] for info    • Please call our office about to be seen at least twice a year. .         Allergies as of Mar 31, 2017     Levaquin [Levofloxacin]     diarrhea                Today's Vital Signs     BP Pulse Temp Height Weight BMI    128/80 mmHg 74 97.5 °F (36.4 °C) (Temporal) 67\" 220 lb 34.45 kg/m2 Call [phone] for help. [employer] is NOT to be used for urgent needs. For medical emergencies, dial 911.            Visit [facility] online at  [email or web address]

## 2022-05-16 ENCOUNTER — TELEPHONE (OUTPATIENT)
Dept: FAMILY MEDICINE CLINIC | Age: 77
End: 2022-05-16

## 2022-05-16 ENCOUNTER — HOSPITAL ENCOUNTER (OUTPATIENT)
Age: 77
Setting detail: SPECIMEN
Discharge: HOME | End: 2022-05-16

## 2022-05-16 ENCOUNTER — OFFICE VISIT (OUTPATIENT)
Dept: FAMILY MEDICINE CLINIC | Age: 77
End: 2022-05-16
Payer: COMMERCIAL

## 2022-05-16 VITALS
HEIGHT: 71 IN | OXYGEN SATURATION: 96 % | DIASTOLIC BLOOD PRESSURE: 84 MMHG | HEART RATE: 54 BPM | RESPIRATION RATE: 20 BRPM | BODY MASS INDEX: 31.78 KG/M2 | WEIGHT: 227 LBS | SYSTOLIC BLOOD PRESSURE: 136 MMHG

## 2022-05-16 DIAGNOSIS — Z02.89 MEDICATION MANAGEMENT CONTRACT AGREEMENT: ICD-10-CM

## 2022-05-16 DIAGNOSIS — F41.9 ANXIETY: Primary | ICD-10-CM

## 2022-05-16 DIAGNOSIS — R35.1 BENIGN PROSTATIC HYPERPLASIA WITH NOCTURIA: ICD-10-CM

## 2022-05-16 DIAGNOSIS — E55.9 VITAMIN D DEFICIENCY: ICD-10-CM

## 2022-05-16 DIAGNOSIS — I48.20 ATRIAL FIBRILLATION, CHRONIC (HCC): ICD-10-CM

## 2022-05-16 DIAGNOSIS — N40.1 BENIGN PROSTATIC HYPERPLASIA WITH NOCTURIA: ICD-10-CM

## 2022-05-16 DIAGNOSIS — J44.9 CHRONIC OBSTRUCTIVE PULMONARY DISEASE, UNSPECIFIED COPD TYPE (HCC): ICD-10-CM

## 2022-05-16 DIAGNOSIS — N28.9 ABNORMAL KIDNEY FUNCTION: ICD-10-CM

## 2022-05-16 DIAGNOSIS — K59.00 CONSTIPATION, UNSPECIFIED CONSTIPATION TYPE: ICD-10-CM

## 2022-05-16 DIAGNOSIS — I10 ESSENTIAL HYPERTENSION: ICD-10-CM

## 2022-05-16 DIAGNOSIS — M62.830 SPASM OF MUSCLE OF LOWER BACK: ICD-10-CM

## 2022-05-16 PROCEDURE — G8417 CALC BMI ABV UP PARAM F/U: HCPCS | Performed by: NURSE PRACTITIONER

## 2022-05-16 PROCEDURE — 3023F SPIROM DOC REV: CPT | Performed by: NURSE PRACTITIONER

## 2022-05-16 PROCEDURE — G8427 DOCREV CUR MEDS BY ELIG CLIN: HCPCS | Performed by: NURSE PRACTITIONER

## 2022-05-16 PROCEDURE — 99213 OFFICE O/P EST LOW 20 MIN: CPT | Performed by: NURSE PRACTITIONER

## 2022-05-16 PROCEDURE — 1123F ACP DISCUSS/DSCN MKR DOCD: CPT | Performed by: NURSE PRACTITIONER

## 2022-05-16 PROCEDURE — 3017F COLORECTAL CA SCREEN DOC REV: CPT | Performed by: NURSE PRACTITIONER

## 2022-05-16 PROCEDURE — 1036F TOBACCO NON-USER: CPT | Performed by: NURSE PRACTITIONER

## 2022-05-16 PROCEDURE — 4040F PNEUMOC VAC/ADMIN/RCVD: CPT | Performed by: NURSE PRACTITIONER

## 2022-05-16 RX ORDER — TIZANIDINE 2 MG/1
2 TABLET ORAL NIGHTLY PRN
Qty: 15 TABLET | Refills: 0 | Status: SHIPPED | OUTPATIENT
Start: 2022-05-16 | End: 2022-06-03

## 2022-05-16 RX ORDER — LINACLOTIDE 290 UG/1
290 CAPSULE, GELATIN COATED ORAL
Qty: 90 CAPSULE | Refills: 1 | Status: SHIPPED | OUTPATIENT
Start: 2022-05-16

## 2022-05-16 RX ORDER — ERGOCALCIFEROL 1.25 MG/1
CAPSULE ORAL
Qty: 12 CAPSULE | Refills: 1 | Status: SHIPPED | OUTPATIENT
Start: 2022-05-16

## 2022-05-16 RX ORDER — CANDESARTAN 32 MG/1
32 TABLET ORAL DAILY
Qty: 90 TABLET | Refills: 1 | Status: SHIPPED | OUTPATIENT
Start: 2022-05-16

## 2022-05-16 RX ORDER — DILTIAZEM HYDROCHLORIDE 360 MG/1
360 CAPSULE, EXTENDED RELEASE ORAL DAILY
Qty: 90 CAPSULE | Refills: 1 | Status: SHIPPED | OUTPATIENT
Start: 2022-05-16

## 2022-05-16 RX ORDER — TAMSULOSIN HYDROCHLORIDE 0.4 MG/1
0.4 CAPSULE ORAL DAILY
Qty: 90 CAPSULE | Refills: 1 | Status: SHIPPED | OUTPATIENT
Start: 2022-05-16

## 2022-05-16 RX ORDER — LORAZEPAM 1 MG/1
1 TABLET ORAL EVERY 8 HOURS PRN
Qty: 90 TABLET | Refills: 0 | Status: CANCELLED | OUTPATIENT
Start: 2022-05-16 | End: 2022-06-15

## 2022-05-16 SDOH — ECONOMIC STABILITY: FOOD INSECURITY: WITHIN THE PAST 12 MONTHS, THE FOOD YOU BOUGHT JUST DIDN'T LAST AND YOU DIDN'T HAVE MONEY TO GET MORE.: NEVER TRUE

## 2022-05-16 SDOH — ECONOMIC STABILITY: FOOD INSECURITY: WITHIN THE PAST 12 MONTHS, YOU WORRIED THAT YOUR FOOD WOULD RUN OUT BEFORE YOU GOT MONEY TO BUY MORE.: NEVER TRUE

## 2022-05-16 ASSESSMENT — ENCOUNTER SYMPTOMS
BACK PAIN: 1
VOMITING: 0
SINUS PAIN: 0
CONSTIPATION: 0
SHORTNESS OF BREATH: 0
ABDOMINAL PAIN: 0
COLOR CHANGE: 0
SINUS PRESSURE: 0
CHEST TIGHTNESS: 0
NAUSEA: 0
DIARRHEA: 0
EYE PAIN: 0

## 2022-05-16 ASSESSMENT — SOCIAL DETERMINANTS OF HEALTH (SDOH): HOW HARD IS IT FOR YOU TO PAY FOR THE VERY BASICS LIKE FOOD, HOUSING, MEDICAL CARE, AND HEATING?: NOT HARD AT ALL

## 2022-05-16 ASSESSMENT — PATIENT HEALTH QUESTIONNAIRE - PHQ9
SUM OF ALL RESPONSES TO PHQ QUESTIONS 1-9: 0
SUM OF ALL RESPONSES TO PHQ QUESTIONS 1-9: 0
1. LITTLE INTEREST OR PLEASURE IN DOING THINGS: 0
SUM OF ALL RESPONSES TO PHQ9 QUESTIONS 1 & 2: 0
2. FEELING DOWN, DEPRESSED OR HOPELESS: 0
SUM OF ALL RESPONSES TO PHQ QUESTIONS 1-9: 0
SUM OF ALL RESPONSES TO PHQ QUESTIONS 1-9: 0

## 2022-05-16 NOTE — TELEPHONE ENCOUNTER
----- Message from CARMITA Frausto NP sent at 5/16/2022 10:23 AM EDT -----  Please call and let him know I did order a CMP to recheck kidney function. If he could get this done when possible. Thanks.

## 2022-05-16 NOTE — ASSESSMENT & PLAN NOTE
Uncontrolled, changes made today: stop spiriva and start stiolto and follow up with pulmonology referral.

## 2022-05-20 LAB
6-ACETYLMORPHINE, UR: NOT DETECTED
7-AMINOCLONAZEPAM, URINE: NOT DETECTED
ALPHA-OH-ALPRAZ, URINE: NOT DETECTED
ALPHA-OH-MIDAZOLAM, URINE: NOT DETECTED
ALPRAZOLAM, URINE: NOT DETECTED
AMPHETAMINES, URINE: NOT DETECTED
BARBITURATES, URINE: NOT DETECTED
BENZOYLECGONINE, UR: NOT DETECTED
BUPRENORPHINE URINE: NOT DETECTED
CARISOPRODOL, UR: NOT DETECTED
CLONAZEPAM, URINE: NOT DETECTED
CODEINE, URINE: NOT DETECTED
CREATININE URINE: 151.6 MG/DL (ref 20–400)
DIAZEPAM, URINE: NOT DETECTED
DRUGS EXPECTED, UR: NORMAL
EER HI RES INTERP UR: NORMAL
ETHYL GLUCURONIDE UR: NOT DETECTED
FENTANYL URINE: NOT DETECTED
GABAPENTIN: NOT DETECTED
HYDROCODONE, URINE: NOT DETECTED
HYDROMORPHONE, URINE: NOT DETECTED
LORAZEPAM, URINE: PRESENT
MARIJUANA METAB, UR: NOT DETECTED
MDA, UR: NOT DETECTED
MDEA, EVE, UR: NOT DETECTED
MDMA URINE: NOT DETECTED
MEPERIDINE METAB, UR: NOT DETECTED
METHADONE, URINE: NOT DETECTED
METHAMPHETAMINE, URINE: NOT DETECTED
METHYLPHENIDATE: NOT DETECTED
MIDAZOLAM, URINE: NOT DETECTED
MORPHINE URINE: NOT DETECTED
NALOXONE URINE: NOT DETECTED
NORBUPRENORPHINE, URINE: NOT DETECTED
NORDIAZEPAM, URINE: NOT DETECTED
NORFENTANYL, URINE: NOT DETECTED
NORHYDROCODONE, URINE: NOT DETECTED
NOROXYCODONE, URINE: NOT DETECTED
NOROXYMORPHONE, URINE: NOT DETECTED
OXAZEPAM, URINE: NOT DETECTED
OXYCODONE URINE: NOT DETECTED
OXYMORPHONE, URINE: NOT DETECTED
PAIN MANAGEMENT DRUG PANEL INTERP, URINE: NORMAL
PAIN MGT DRUG PANEL, HI RES, UR: NORMAL
PCP,URINE: NOT DETECTED
PHENTERMINE, UR: NOT DETECTED
PREGABALIN: NOT DETECTED
TAPENTADOL, URINE: NOT DETECTED
TAPENTADOL-O-SULFATE, URINE: NOT DETECTED
TEMAZEPAM, URINE: NOT DETECTED
TRAMADOL, URINE: NOT DETECTED
ZOLPIDEM METABOLITE (ZCA), URINE: NOT DETECTED
ZOLPIDEM, URINE: NOT DETECTED

## 2022-05-27 DIAGNOSIS — F41.9 ANXIETY: ICD-10-CM

## 2022-05-27 RX ORDER — LORAZEPAM 1 MG/1
TABLET ORAL
Qty: 90 TABLET | Refills: 0 | Status: SHIPPED | OUTPATIENT
Start: 2022-06-02 | End: 2022-06-29

## 2022-05-27 NOTE — TELEPHONE ENCOUNTER
Last visit: 5/16/22  Last Med refill: 5/3/22  Does patient have enough medication for 72 hours: Yes    Next Visit Date:  Future Appointments   Date Time Provider Jose Cruz Scruggs   7/26/2022  9:45 AM Luly Tian MD Resp Suellen Zhang   9/19/2022 10:45 AM Norma Alpers, APRN - NP [facility] [address] Maintenance   Topic Date Due    Pneumococcal 65+ years Vaccine (1 - PCV) Never done    Diabetic retinal exam  Never done    Colorectal Cancer Screen  Never done    Shingles vaccine (1 of 2) Never done    COVID-19 Vaccine (2 - Booster for [place] series) 07/08/2021    Diabetic foot exam  03/23/2022    Lipids  06/17/2022    Flu vaccine (Season Ended) 09/01/2022    A1C test (Diabetic or Prediabetic)  01/10/2023    Depression Screen  05/16/2023    DTaP/Tdap/Td vaccine (2 - Td or Tdap) 07/10/2023    Hepatitis C screen  Completed    Hepatitis A vaccine  Aged Out    Hib vaccine  Aged Out    Meningococcal (ACWY) vaccine  Aged Out       Hemoglobin A1C (%)   Date Value   01/10/2022 5.6   07/13/2021 6.1   10/28/2020 5.5             ( goal A1C is < 7)   Microalb/Crt.  Ratio (mcg/mg creat)   Date Value   11/02/2020 38 (H)     LDL Cholesterol (mg/dL)   Date Value   06/17/2021 134 (H)   02/18/2020 121       (goal LDL is <100)   AST (U/L)   Date Value   06/17/2021 13     ALT (U/L)   Date Value   06/17/2021 10     BUN (mg/dL)   Date Value   06/17/2021 33 (H)     BP Readings from Last 3 Encounters:   05/16/22 136/84   01/10/22 132/80   07/13/21 132/80          (goal 120/80)    All Future Testing planned in [facility]  Lab Frequency Next Occurrence   Comprehensive Metabolic Panel Once 84/18/1535               Patient Active Problem List:     Back pain     Essential hypertension     COPD (chronic obstructive pulmonary disease) (HCC)     Lung nodule     Procedure refused     Hyperlipidemia     Colonoscopy refused     Atrial fibrillation, chronic     Anxiety     Insomnia     Pacemaker battery depletion Pacemaker     Bradycardia     Constipation     Diet-controlled diabetes mellitus ([facility] [address]     Aneurysm of infrarenal abdominal aorta (HCC)     CLIFTON (acute kidney injury) (HCC)     Hyperkalemia

## 2022-05-30 ENCOUNTER — HOSPITAL ENCOUNTER (INPATIENT)
Age: 77
LOS: 1 days | Discharge: HOME | DRG: 284 | End: 2022-06-01
Attending: EMERGENCY MEDICINE | Admitting: FAMILY MEDICINE
Payer: COMMERCIAL

## 2022-05-30 ENCOUNTER — APPOINTMENT (OUTPATIENT)
Dept: GENERAL RADIOLOGY | Age: 77
DRG: 284 | End: 2022-05-30
Payer: COMMERCIAL

## 2022-05-30 DIAGNOSIS — K81.0 ACUTE CHOLECYSTITIS: Primary | ICD-10-CM

## 2022-05-30 LAB
ABSOLUTE EOS #: 0.12 K/UL (ref 0–0.44)
ABSOLUTE IMMATURE GRANULOCYTE: 0.03 K/UL (ref 0–0.3)
ABSOLUTE LYMPH #: 0.8 K/UL (ref 1.1–3.7)
ABSOLUTE MONO #: 0.82 K/UL (ref 0.1–1.2)
BASOPHILS # BLD: 1 % (ref 0–2)
BASOPHILS ABSOLUTE: 0.07 K/UL (ref 0–0.2)
EOSINOPHILS RELATIVE PERCENT: 1 % (ref 1–4)
HCT VFR BLD CALC: 47.6 % (ref 40.7–50.3)
HEMOGLOBIN: 15.2 G/DL (ref 13–17)
IMMATURE GRANULOCYTES: 0 %
LYMPHOCYTES # BLD: 9 % (ref 24–43)
MCH RBC QN AUTO: 31.1 PG (ref 25.2–33.5)
MCHC RBC AUTO-ENTMCNC: 31.9 G/DL (ref 28.4–34.8)
MCV RBC AUTO: 97.5 FL (ref 82.6–102.9)
MONOCYTES # BLD: 10 % (ref 3–12)
NRBC AUTOMATED: 0 PER 100 WBC
PDW BLD-RTO: 14.7 % (ref 11.8–14.4)
PLATELET # BLD: 184 K/UL (ref 138–453)
PMV BLD AUTO: 11.4 FL (ref 8.1–13.5)
RBC # BLD: 4.88 M/UL (ref 4.21–5.77)
RBC # BLD: ABNORMAL 10*6/UL
REASON FOR REJECTION: NORMAL
SEG NEUTROPHILS: 79 % (ref 36–65)
SEGMENTED NEUTROPHILS ABSOLUTE COUNT: 6.71 K/UL (ref 1.5–8.1)
WBC # BLD: 8.6 K/UL (ref 3.5–11.3)
ZZ NTE CLEAN UP: ORDERED TEST: NORMAL
ZZ NTE WITH NAME CLEAN UP: SPECIMEN SOURCE: NORMAL

## 2022-05-30 PROCEDURE — 93005 ELECTROCARDIOGRAM TRACING: CPT | Performed by: EMERGENCY MEDICINE

## 2022-05-30 PROCEDURE — 71045 X-RAY EXAM CHEST 1 VIEW: CPT

## 2022-05-30 PROCEDURE — 85025 COMPLETE CBC W/AUTO DIFF WBC: CPT

## 2022-05-30 PROCEDURE — 80048 BASIC METABOLIC PNL TOTAL CA: CPT

## 2022-05-30 PROCEDURE — 96374 THER/PROPH/DIAG INJ IV PUSH: CPT

## 2022-05-30 PROCEDURE — 83690 ASSAY OF LIPASE: CPT

## 2022-05-30 PROCEDURE — 82150 ASSAY OF AMYLASE: CPT

## 2022-05-30 PROCEDURE — 80076 HEPATIC FUNCTION PANEL: CPT

## 2022-05-30 PROCEDURE — 96375 TX/PRO/DX INJ NEW DRUG ADDON: CPT

## 2022-05-30 PROCEDURE — 99285 EMERGENCY DEPT VISIT HI MDM: CPT

## 2022-05-30 PROCEDURE — 6360000002 HC RX W HCPCS: Performed by: EMERGENCY MEDICINE

## 2022-05-30 PROCEDURE — 6370000000 HC RX 637 (ALT 250 FOR IP): Performed by: EMERGENCY MEDICINE

## 2022-05-30 RX ORDER — MORPHINE SULFATE 4 MG/ML
4 INJECTION, SOLUTION INTRAMUSCULAR; INTRAVENOUS ONCE
Status: COMPLETED | OUTPATIENT
Start: 2022-05-30 | End: 2022-05-30

## 2022-05-30 RX ADMIN — MORPHINE SULFATE 4 MG: 4 INJECTION, SOLUTION INTRAMUSCULAR; INTRAVENOUS at 23:03

## 2022-05-30 RX ADMIN — ALUMINA, MAGNESIA, AND SIMETHICONE ORAL SUSPENSION REGULAR STRENGTH: 1200; 1200; 120 SUSPENSION ORAL at 22:50

## 2022-05-30 ASSESSMENT — PAIN SCALES - GENERAL
PAINLEVEL_OUTOF10: 8

## 2022-05-30 ASSESSMENT — PAIN DESCRIPTION - LOCATION
LOCATION: ABDOMEN
LOCATION: ABDOMEN

## 2022-05-30 ASSESSMENT — ENCOUNTER SYMPTOMS
CONSTIPATION: 0
ABDOMINAL PAIN: 0
EYE DISCHARGE: 0
COUGH: 0
VOMITING: 0
COLOR CHANGE: 0
FACIAL SWELLING: 0
DIARRHEA: 0
EYE REDNESS: 0
SHORTNESS OF BREATH: 0

## 2022-05-30 ASSESSMENT — PAIN - FUNCTIONAL ASSESSMENT: PAIN_FUNCTIONAL_ASSESSMENT: 0-10

## 2022-05-31 ENCOUNTER — APPOINTMENT (OUTPATIENT)
Dept: ULTRASOUND IMAGING | Age: 77
DRG: 284 | End: 2022-05-31
Payer: COMMERCIAL

## 2022-05-31 ENCOUNTER — APPOINTMENT (OUTPATIENT)
Dept: CT IMAGING | Age: 77
DRG: 284 | End: 2022-05-31
Payer: COMMERCIAL

## 2022-05-31 PROBLEM — K81.0 ACUTE CHOLECYSTITIS: Status: ACTIVE | Noted: 2022-05-31

## 2022-05-31 LAB
ALBUMIN SERPL-MCNC: 4 G/DL (ref 3.5–5.2)
ALP BLD-CCNC: 129 U/L (ref 40–129)
ALT SERPL-CCNC: 13 U/L (ref 5–41)
AMYLASE: 30 U/L (ref 28–100)
ANION GAP SERPL CALCULATED.3IONS-SCNC: 12 MMOL/L (ref 9–17)
AST SERPL-CCNC: 17 U/L
BILIRUB SERPL-MCNC: 0.46 MG/DL (ref 0.3–1.2)
BILIRUBIN DIRECT: 0.2 MG/DL
BILIRUBIN, INDIRECT: 0.26 MG/DL (ref 0–1)
BUN BLDV-MCNC: 21 MG/DL (ref 8–23)
BUN/CREAT BLD: 15 (ref 9–20)
CALCIUM SERPL-MCNC: 9.2 MG/DL (ref 8.6–10.4)
CHLORIDE BLD-SCNC: 105 MMOL/L (ref 98–107)
CO2: 25 MMOL/L (ref 20–31)
CREAT SERPL-MCNC: 1.41 MG/DL (ref 0.7–1.2)
GFR AFRICAN AMERICAN: 59 ML/MIN
GFR NON-AFRICAN AMERICAN: 49 ML/MIN
GFR SERPL CREATININE-BSD FRML MDRD: ABNORMAL ML/MIN/{1.73_M2}
GLUCOSE BLD-MCNC: 101 MG/DL (ref 75–110)
GLUCOSE BLD-MCNC: 102 MG/DL (ref 75–110)
GLUCOSE BLD-MCNC: 122 MG/DL (ref 75–110)
GLUCOSE BLD-MCNC: 124 MG/DL (ref 70–99)
INR BLD: 1.4
LIPASE: 13 U/L (ref 13–60)
PARTIAL THROMBOPLASTIN TIME: 36.6 SEC (ref 23.9–33.8)
POTASSIUM SERPL-SCNC: 3.8 MMOL/L (ref 3.7–5.3)
PROTHROMBIN TIME: 17.1 SEC (ref 11.5–14.2)
SODIUM BLD-SCNC: 142 MMOL/L (ref 135–144)
TOTAL PROTEIN: 7.1 G/DL (ref 6.4–8.3)

## 2022-05-31 PROCEDURE — 96367 TX/PROPH/DG ADDL SEQ IV INF: CPT

## 2022-05-31 PROCEDURE — 96366 THER/PROPH/DIAG IV INF ADDON: CPT

## 2022-05-31 PROCEDURE — 96361 HYDRATE IV INFUSION ADD-ON: CPT

## 2022-05-31 PROCEDURE — 2580000003 HC RX 258: Performed by: EMERGENCY MEDICINE

## 2022-05-31 PROCEDURE — 99222 1ST HOSP IP/OBS MODERATE 55: CPT | Performed by: FAMILY MEDICINE

## 2022-05-31 PROCEDURE — 85610 PROTHROMBIN TIME: CPT

## 2022-05-31 PROCEDURE — 2500000003 HC RX 250 WO HCPCS: Performed by: EMERGENCY MEDICINE

## 2022-05-31 PROCEDURE — 6360000002 HC RX W HCPCS: Performed by: EMERGENCY MEDICINE

## 2022-05-31 PROCEDURE — 2500000003 HC RX 250 WO HCPCS: Performed by: NURSE PRACTITIONER

## 2022-05-31 PROCEDURE — APPSS30 APP SPLIT SHARED TIME 16-30 MINUTES: Performed by: NURSE PRACTITIONER

## 2022-05-31 PROCEDURE — 2580000003 HC RX 258: Performed by: NURSE PRACTITIONER

## 2022-05-31 PROCEDURE — 36415 COLL VENOUS BLD VENIPUNCTURE: CPT

## 2022-05-31 PROCEDURE — 76705 ECHO EXAM OF ABDOMEN: CPT

## 2022-05-31 PROCEDURE — G0378 HOSPITAL OBSERVATION PER HR: HCPCS

## 2022-05-31 PROCEDURE — 1200000000 HC SEMI PRIVATE

## 2022-05-31 PROCEDURE — 6360000004 HC RX CONTRAST MEDICATION: Performed by: EMERGENCY MEDICINE

## 2022-05-31 PROCEDURE — 96376 TX/PRO/DX INJ SAME DRUG ADON: CPT

## 2022-05-31 PROCEDURE — 85730 THROMBOPLASTIN TIME PARTIAL: CPT

## 2022-05-31 PROCEDURE — 96365 THER/PROPH/DIAG IV INF INIT: CPT

## 2022-05-31 PROCEDURE — 74177 CT ABD & PELVIS W/CONTRAST: CPT

## 2022-05-31 PROCEDURE — 99285 EMERGENCY DEPT VISIT HI MDM: CPT

## 2022-05-31 PROCEDURE — 82947 ASSAY GLUCOSE BLOOD QUANT: CPT

## 2022-05-31 RX ORDER — SODIUM CHLORIDE 0.9 % (FLUSH) 0.9 %
10 SYRINGE (ML) INJECTION PRN
Status: DISCONTINUED | OUTPATIENT
Start: 2022-05-31 | End: 2022-06-01 | Stop reason: HOSPADM

## 2022-05-31 RX ORDER — MAGNESIUM SULFATE 1 G/100ML
1000 INJECTION INTRAVENOUS PRN
Status: DISCONTINUED | OUTPATIENT
Start: 2022-05-31 | End: 2022-06-01 | Stop reason: HOSPADM

## 2022-05-31 RX ORDER — POTASSIUM CHLORIDE 20 MEQ/1
40 TABLET, EXTENDED RELEASE ORAL PRN
Status: DISCONTINUED | OUTPATIENT
Start: 2022-05-31 | End: 2022-06-01 | Stop reason: HOSPADM

## 2022-05-31 RX ORDER — MORPHINE SULFATE 2 MG/ML
2 INJECTION, SOLUTION INTRAMUSCULAR; INTRAVENOUS
Status: DISCONTINUED | OUTPATIENT
Start: 2022-05-31 | End: 2022-06-01 | Stop reason: HOSPADM

## 2022-05-31 RX ORDER — MORPHINE SULFATE 4 MG/ML
4 INJECTION, SOLUTION INTRAMUSCULAR; INTRAVENOUS ONCE
Status: COMPLETED | OUTPATIENT
Start: 2022-05-31 | End: 2022-05-31

## 2022-05-31 RX ORDER — ACETAMINOPHEN 650 MG/1
650 SUPPOSITORY RECTAL EVERY 6 HOURS PRN
Status: DISCONTINUED | OUTPATIENT
Start: 2022-05-31 | End: 2022-06-01 | Stop reason: HOSPADM

## 2022-05-31 RX ORDER — ACETAMINOPHEN 325 MG/1
650 TABLET ORAL EVERY 6 HOURS PRN
Status: DISCONTINUED | OUTPATIENT
Start: 2022-05-31 | End: 2022-06-01 | Stop reason: HOSPADM

## 2022-05-31 RX ORDER — SODIUM CHLORIDE 0.9 % (FLUSH) 0.9 %
10 SYRINGE (ML) INJECTION ONCE
Status: COMPLETED | OUTPATIENT
Start: 2022-05-31 | End: 2022-05-31

## 2022-05-31 RX ORDER — VALSARTAN 160 MG/1
160 TABLET ORAL DAILY
Status: DISCONTINUED | OUTPATIENT
Start: 2022-05-31 | End: 2022-06-01 | Stop reason: HOSPADM

## 2022-05-31 RX ORDER — METRONIDAZOLE 500 MG/100ML
500 INJECTION, SOLUTION INTRAVENOUS EVERY 8 HOURS
Status: DISCONTINUED | OUTPATIENT
Start: 2022-05-31 | End: 2022-06-01 | Stop reason: HOSPADM

## 2022-05-31 RX ORDER — SODIUM CHLORIDE 9 MG/ML
INJECTION, SOLUTION INTRAVENOUS CONTINUOUS
Status: DISCONTINUED | OUTPATIENT
Start: 2022-05-31 | End: 2022-06-01 | Stop reason: HOSPADM

## 2022-05-31 RX ORDER — POTASSIUM CHLORIDE 7.45 MG/ML
10 INJECTION INTRAVENOUS PRN
Status: DISCONTINUED | OUTPATIENT
Start: 2022-05-31 | End: 2022-06-01 | Stop reason: HOSPADM

## 2022-05-31 RX ORDER — MORPHINE SULFATE 4 MG/ML
4 INJECTION, SOLUTION INTRAMUSCULAR; INTRAVENOUS
Status: DISCONTINUED | OUTPATIENT
Start: 2022-05-31 | End: 2022-06-01 | Stop reason: HOSPADM

## 2022-05-31 RX ORDER — DILTIAZEM HYDROCHLORIDE 180 MG/1
360 CAPSULE, COATED, EXTENDED RELEASE ORAL DAILY
Status: DISCONTINUED | OUTPATIENT
Start: 2022-05-31 | End: 2022-06-01 | Stop reason: HOSPADM

## 2022-05-31 RX ORDER — ONDANSETRON 2 MG/ML
4 INJECTION INTRAMUSCULAR; INTRAVENOUS EVERY 6 HOURS PRN
Status: DISCONTINUED | OUTPATIENT
Start: 2022-05-31 | End: 2022-06-01 | Stop reason: HOSPADM

## 2022-05-31 RX ORDER — ONDANSETRON 4 MG/1
4 TABLET, ORALLY DISINTEGRATING ORAL EVERY 8 HOURS PRN
Status: DISCONTINUED | OUTPATIENT
Start: 2022-05-31 | End: 2022-06-01 | Stop reason: HOSPADM

## 2022-05-31 RX ORDER — DEXTROSE, SODIUM CHLORIDE, AND POTASSIUM CHLORIDE 5; .45; .15 G/100ML; G/100ML; G/100ML
INJECTION INTRAVENOUS CONTINUOUS
Status: DISCONTINUED | OUTPATIENT
Start: 2022-05-31 | End: 2022-05-31

## 2022-05-31 RX ORDER — 0.9 % SODIUM CHLORIDE 0.9 %
80 INTRAVENOUS SOLUTION INTRAVENOUS ONCE
Status: COMPLETED | OUTPATIENT
Start: 2022-05-31 | End: 2022-05-31

## 2022-05-31 RX ORDER — ENOXAPARIN SODIUM 100 MG/ML
30 INJECTION SUBCUTANEOUS 2 TIMES DAILY
Status: DISCONTINUED | OUTPATIENT
Start: 2022-05-31 | End: 2022-05-31

## 2022-05-31 RX ORDER — SODIUM CHLORIDE 0.9 % (FLUSH) 0.9 %
5-40 SYRINGE (ML) INJECTION EVERY 12 HOURS SCHEDULED
Status: DISCONTINUED | OUTPATIENT
Start: 2022-05-31 | End: 2022-06-01 | Stop reason: HOSPADM

## 2022-05-31 RX ORDER — SODIUM CHLORIDE 9 MG/ML
INJECTION, SOLUTION INTRAVENOUS PRN
Status: DISCONTINUED | OUTPATIENT
Start: 2022-05-31 | End: 2022-06-01 | Stop reason: HOSPADM

## 2022-05-31 RX ORDER — POLYETHYLENE GLYCOL 3350 17 G/17G
17 POWDER, FOR SOLUTION ORAL DAILY PRN
Status: DISCONTINUED | OUTPATIENT
Start: 2022-05-31 | End: 2022-06-01 | Stop reason: HOSPADM

## 2022-05-31 RX ORDER — METRONIDAZOLE 500 MG/100ML
500 INJECTION, SOLUTION INTRAVENOUS ONCE
Status: COMPLETED | OUTPATIENT
Start: 2022-05-31 | End: 2022-05-31

## 2022-05-31 RX ORDER — HEPARIN SODIUM 5000 [USP'U]/ML
5000 INJECTION, SOLUTION INTRAVENOUS; SUBCUTANEOUS EVERY 8 HOURS SCHEDULED
Status: DISCONTINUED | OUTPATIENT
Start: 2022-05-31 | End: 2022-05-31

## 2022-05-31 RX ADMIN — METRONIDAZOLE 500 MG: 500 INJECTION, SOLUTION INTRAVENOUS at 19:01

## 2022-05-31 RX ADMIN — CEFEPIME HYDROCHLORIDE 2000 MG: 2 INJECTION, POWDER, FOR SOLUTION INTRAVENOUS at 01:37

## 2022-05-31 RX ADMIN — SODIUM CHLORIDE: 9 INJECTION, SOLUTION INTRAVENOUS at 03:13

## 2022-05-31 RX ADMIN — METRONIDAZOLE 500 MG: 500 INJECTION, SOLUTION INTRAVENOUS at 13:19

## 2022-05-31 RX ADMIN — SODIUM CHLORIDE 80 ML: 9 INJECTION, SOLUTION INTRAVENOUS at 00:20

## 2022-05-31 RX ADMIN — SODIUM CHLORIDE: 9 INJECTION, SOLUTION INTRAVENOUS at 19:00

## 2022-05-31 RX ADMIN — MORPHINE SULFATE 4 MG: 4 INJECTION, SOLUTION INTRAMUSCULAR; INTRAVENOUS at 00:29

## 2022-05-31 RX ADMIN — IOPAMIDOL 75 ML: 755 INJECTION, SOLUTION INTRAVENOUS at 00:18

## 2022-05-31 RX ADMIN — SODIUM CHLORIDE, PRESERVATIVE FREE 10 ML: 5 INJECTION INTRAVENOUS at 00:18

## 2022-05-31 RX ADMIN — METRONIDAZOLE 500 MG: 500 INJECTION, SOLUTION INTRAVENOUS at 02:19

## 2022-05-31 ASSESSMENT — ENCOUNTER SYMPTOMS
NAUSEA: 1
CONSTIPATION: 0
SHORTNESS OF BREATH: 0
ABDOMINAL PAIN: 1
VOMITING: 0
COUGH: 1
DIARRHEA: 0
BLOOD IN STOOL: 0
COLOR CHANGE: 0

## 2022-05-31 ASSESSMENT — PAIN DESCRIPTION - ORIENTATION: ORIENTATION: RIGHT;UPPER;ANTERIOR

## 2022-05-31 ASSESSMENT — PAIN SCALES - GENERAL
PAINLEVEL_OUTOF10: 2
PAINLEVEL_OUTOF10: 4
PAINLEVEL_OUTOF10: 4

## 2022-05-31 ASSESSMENT — PAIN DESCRIPTION - LOCATION: LOCATION: ABDOMEN

## 2022-05-31 NOTE — CONSULTS
General Surgery:    Consult Note        PATIENT NAME: Maria Luisa Roque   YOB: 1945    ADMISSION DATE: 5/30/2022 10:36 PM     Admitting Provider: Dr. Ricardo Brooks Physician: Dr. Lott Prior: 5/31/2022    Chief Complaint:  Abdominal pain   Consult Regarding:  Concern for acute cholecystitis     HISTORY OF PRESENT ILLNESS:  The patient is a 77 y.o. male who presented to the emergency department yesterday with chief complaint of abdominal pain. Patient reports that he was eating mac & cheese last night when he developed epigastric and right upper quadrant pain. Patient describes his pain is diffuse and it lasted until he came to the emergency department received IV pain medication. Patient reports his pain was very constant and dull. Patient reports he has never experienced pain like this in the past.  He denies any symptoms of previous biliary colic. Reports he can eat fatty and fried foods without any abdominal pain. Patient reports he did have a bowel movement yesterday. Has been passing flatus. Patient has a past surgical history of an endovascular AAA repair. Patient does have a past medical history as listed below. Patient does take Eliquis for A. fib. Patient does have COPD, he reports he stopped smoking years ago. Patient does have a pacemaker in place. In the emergency department the patient underwent CT abdomen pelvis which showed a distended gallbladder with gallbladder wall thickening with concern for acute cholecystitis.     Past Medical History:        Diagnosis Date    A-fib ([address]     Anticoagulated on Coumadin     for A-Fib, monitoring by PCP    Anxiety     Back pain     COPD (chronic obstructive pulmonary disease) ([address]     HTN (hypertension)     Hyperglycemia     Hyperlipidemia     can't take statins, due to muscle aches    Insomnia     Lung nodule     Pacemaker     cardiologist, dr. Wilman Coy       Past Surgical History:        Procedure Laterality Date    ABDOMINAL AORTIC ANEURYSM REPAIR      PACEMAKER PLACEMENT         Medications Prior to Admission:   Medications Prior to Admission: [START ON 6/2/2022] LORazepam (ATIVAN) 1 MG tablet, take 1 tablet by mouth every 8 hours if needed for anxiety  candesartan (ATACAND) 32 MG tablet, Take 1 tablet by mouth daily  dilTIAZem (CARDIZEM CD) 360 MG extended release capsule, Take 1 capsule by mouth daily  linaclotide (LINZESS) 290 MCG CAPS capsule, Take 1 capsule by mouth every morning (before breakfast)  tamsulosin (FLOMAX) 0.4 MG capsule, Take 1 capsule by mouth daily  vitamin D (ERGOCALCIFEROL) 1.25 MG (55693 UT) CAPS capsule, take 1 capsule by mouth every week  tiZANidine (ZANAFLEX) 2 MG tablet, Take 1 tablet by mouth nightly as needed (Muscle spasms)  tiotropium-olodaterol (STIOLTO RESPIMAT) 2.5-2.5 MCG/ACT AERS, Inhale 2 puffs into the lungs daily  nitroGLYCERIN (NITROSTAT) 0.4 MG SL tablet, place 1 tablet under the tongue if needed every 5 minutes for chest pain for 3 doses IF NO RELIEF AFTER THIRD DOSE CALL 911.  apixaban (ELIQUIS) 5 MG TABS tablet, Take 1 tablet by mouth 2 times daily  ibuprofen (ADVIL;MOTRIN) 800 MG tablet, take 1 tablet by mouth every 12 hours if needed for pain  albuterol sulfate HFA (VENTOLIN HFA) 108 (90 Base) MCG/ACT inhaler, Inhale 2 puffs into the lungs every 6 hours as needed for Wheezing (Patient not taking: Reported on 5/31/2022)    Allergies:  Lisinopril, Neurontin [gabapentin], Pcn [penicillins], Statins support therapy, and Hydrochlorothiazide    Social History:   Social History     Socioeconomic History    Marital status:      Spouse name: Not on file    Number of children: Not on file    Years of education: Not on file    Highest education level: Not on file   Occupational History    Not on file   Tobacco Use    Smoking status: Former Smoker     Packs/day: 0.50     Years: 25.00     Pack years: 12.50     Types: Cigarettes     Quit date: 9/28/2000 Years since quittin.6    Smokeless tobacco: Never Used   Vaping Use    Vaping Use: Never used   Substance and Sexual Activity    Alcohol use: No     Alcohol/week: 0.0 standard drinks    Drug use: No    Sexual activity: Never   Other Topics Concern    Not on file   Social History Narrative    Not on file     Social Determinants of Health     Financial Resource Strain: Low Risk     Difficulty of Paying Living Expenses: Not hard at all   Food Insecurity: No Food Insecurity    Worried About Running Out of Food in the Last Year: Never true    Yvan of Food in the Last Year: Never true   Transportation Needs:     Lack of Transportation (Medical): Not on file    Lack of Transportation (Non-Medical): Not on file   Physical Activity:     Days of Exercise per Week: Not on file    Minutes of Exercise per Session: Not on file   Stress:     Feeling of Stress : Not on file   Social Connections:     Frequency of Communication with Friends and Family: Not on file    Frequency of Social Gatherings with Friends and Family: Not on file    Attends Voodoo Services: Not on file    Active Member of [address] [place] [address] or Organizations: Not on file    Attends Club or Organization Meetings: Not on file    Marital Status: Not on file   Intimate Partner Violence:     Fear of Current or Ex-Partner: Not on file    Emotionally Abused: Not on file    Physically Abused: Not on file    Sexually Abused: Not on file   Housing Stability:     Unable to Pay for Housing in the Last Year: Not on file    Number of [place] in the Last Year: Not on file    Unstable Housing in the Last Year: Not on file       Family History:       Problem Relation Age of Onset    Heart Disease Mother     Heart Disease Father        REVIEW OF SYSTEMS:    CONSTITUTIONAL:  negative for  fevers, chills and fatigue  No recent weight gain/loss. Energy level normal for pt. HEENT:  No nasal congestion or drainage.   CARDIOVASCULAR:  No chest pain  GASTROINTESTINAL:  positive for abdominal pain  GENITOURINARY:  No dysuria  HEMATOLOGIC/LYMPHATIC:  No easy bruising. No history of cancer  ENDOCRINE: negative  Review of systems negative unless listed above. PHYSICAL EXAM:    VITALS:  BP (!) 138/94   Pulse 84   Temp 97.1 °F (36.2 °C) (Oral)   Resp 18   Ht 5' 11.5\" (1.816 m)   Wt 227 lb (103 kg)   SpO2 98%   BMI 31.22 kg/m²   INTAKE/OUTPUT:   No intake or output data in the 24 hours ending 05/31/22 0630    CONSTITUTIONAL:  awake, alert, not distressed and mildly obese  ENT:  normocephalic/atraumatic, without obvious abnormality  NECK:  supple, symmetrical, trachea midline   LUNGS:  CTA bilaterally  CARDIOVASCULAR: Currently in A. fib  ABDOMEN: Soft, minimal right upper quadrant/epigastric tenderness, nondistended, no peritoneal signs, no rebound tenderness  SKIN: No rashes or skin lesions noted  MUSCULOSKELETAL:  No joint swelling, deformity, or tenderness. , there is not obvious somatic dysfunction  NEUROLOGIC:  Mental Status Exam:  Level of Alertness:   alert  Orientation:   oriented to person, place, and time      CBC with Differential:    Lab Results   Component Value Date    WBC 8.6 05/30/2022    RBC 4.88 05/30/2022    RBC 4.71 02/13/2012    HGB 15.2 05/30/2022    HCT 47.6 05/30/2022     05/30/2022     02/13/2012    MCV 97.5 05/30/2022    MCH 31.1 05/30/2022    MCHC 31.9 05/30/2022    RDW 14.7 05/30/2022    LYMPHOPCT 9 05/30/2022    MONOPCT 10 05/30/2022    BASOPCT 1 05/30/2022    MONOSABS 0.82 05/30/2022    LYMPHSABS 0.80 05/30/2022    EOSABS 0.12 05/30/2022    BASOSABS 0.07 05/30/2022    DIFFTYPE NOT REPORTED 11/02/2020     BMP:    Lab Results   Component Value Date     05/30/2022    K 3.8 05/30/2022     05/30/2022    CO2 25 05/30/2022    BUN 21 05/30/2022    LABALBU 4.0 05/30/2022    LABALBU 4.5 02/13/2012    CREATININE 1.41 05/30/2022    CALCIUM 9.2 05/30/2022    GFRAA 59 05/30/2022    LABGLOM 49 05/30/2022 GLUCOSE 124 05/30/2022    GLUCOSE 98 02/13/2012       Pertinent Radiology:   CT ABDOMEN PELVIS W IV CONTRAST Additional Contrast? None    Result Date: 5/31/2022  EXAMINATION: CT OF THE ABDOMEN AND PELVIS WITH CONTRAST 5/31/2022 12:11 am TECHNIQUE: CT of the abdomen and pelvis was performed with the administration of intravenous contrast. Multiplanar reformatted images are provided for review. Automated exposure control, iterative reconstruction, and/or weight based adjustment of the mA/kV was utilized to reduce the radiation dose to as low as reasonably achievable. COMPARISON: 05/31/2020 HISTORY: ORDERING SYSTEM PROVIDED HISTORY: Upper abdominal pain, remote history of AAA with graft TECHNOLOGIST PROVIDED HISTORY: Upper abdominal pain, remote history of AAA with graft Decision Support Exception - unselect if not a suspected or confirmed emergency medical condition->Emergency Medical Condition (MA) Reason for Exam: epigastric pain FINDINGS: Lower Chest: Partially imaged bilateral pleural effusions. There are stable left lower lobe pulmonary nodules, the largest measuring 2.7 cm in size when compared to the previous study in 2020. Chronic subpleural tree-in-bud opacities as well as reticular changes noted at the lung bases. There is some mild bronchial thickening. Cardiac leads noted within the right atrium and ventricle. Organs: Benign cyst noted within the liver. No splenic mass. Hyperenhancement is seen within the liver adjacent to the gallbladder fossa. Gallbladder wall thickening is noted. No significant common duct dilation. Pancreatic atrophy without focal mass. No suspicious enhancing renal mass. Nonobstructive left renal calculus. No ureteral calculi. No hydroureteronephrosis is identified. GI/Bowel: Liquid stool is seen within the proximal colon. No acute diverticulitis. Normal appendix. Colonic diverticulosis. Pelvis: No pelvic mass. No bladder wall thickening or significant bladder distention. No bulky pelvic lymphadenopathy. Peritoneum/Retroperitoneum: No retroperitoneal or mesenteric lymphadenopathy. There is an aorto bi-iliac endograft, with the aorta measuring 7.6 x 7.9 cm, previously measuring 8.0 x 8.1 cm. No definite endoleak though limited evaluation given the single phase of contrast.  No intragraft stenosis is identified. The iliac arteries are patent as are the outflow arteries in the proximal thighs. There is a trace amount of ascites. Bones/Soft Tissues: No acute subcutaneous soft tissue abnormality. Multilevel degenerative changes are seen in the spine. Diffuse osteopenia. Bilateral pars defects seen at L5 with anterolisthesis of L5 on S1. Vertebral body alignment appears unchanged from the previous comparison. 1. Gallbladder distension, with gallbladder wall thickening and minimal adjacent haziness which may represent developing acute cholecystitis. This could be further assessed with right upper quadrant ultrasound if clinically concern. 2. Non masslike hyperenhancement seen in the liver involving the segments adjacent to the gallbladder, which may be related to hyperemia from underlying cholecystitis. No discrete focal enhancing mass. 3. Small bilateral pleural effusions, with cardiomegaly and mild peribronchial thickening which may be related to cardiogenic edema. Other chronic changes in the lung bases as above. 4. No ileus or obstruction. 5. Aorto iliac endograft, with no definite endoleak. Interval retraction of the aneurysm sac size when compared to the previous examination. 6. Trace ascites. 7. Nonobstructive calculus in the lower pole the left kidney. XR CHEST PORTABLE    Result Date: 5/30/2022  EXAMINATION: ONE XRAY VIEW OF THE CHEST 5/30/2022 10:58 pm COMPARISON: 05/17/2020 chest x-ray HISTORY: ORDERING SYSTEM PROVIDED HISTORY: cough TECHNOLOGIST PROVIDED HISTORY: cough FINDINGS: Mildly enlarged. Left-sided pacemaker device. Concepcion Melissa Perihilar congestion.  Minimal subpleural opacities greatest right base may represent chronic interstitial thickening versus developing airspace disease or edema. No pleural effusion or pneumothorax is present. Mild bibasilar airspace disease. Question may represent interstitial edema versus developing airspace versus chronic areas of interstitial thickening. ASSESSMENT:  Active Hospital Problems    Diagnosis Date Noted    Acute cholecystitis [K81.0] 05/31/2022     Priority: Medium    Pacemaker [Z95.0]     Atrial fibrillation, chronic (HCC) [I48.20]     COPD [facility]chronic obstructive [facility] disease) [facility] [J44.9]     Essential hypertension [I10]        1. 77 y.o. male presenting with acute abdominal pain concerning for acute cholecystitis versus biliary colic, history of COPD, A. fib on Eliquis    Plan:  1. Patient was seen and examined at bedside this morning. 2. Labs and imaging reviewed. WBC-8.6. CT abdomen pelvis with concern for acute cholecystitis with distended gallbladder and gallbladder wall thickening. Patient's symptoms consistent with an episode of biliary colic. No hyperbilirubinemia noted. Normal liver enzymes. Lipase 13.  3. Continue IV antibiotics. 4. Will obtain right upper quadrant ultrasound. 5. We will follow-up morning labs. 6. Recommend continue to hold anticoagulation. 7. Will follow with you. Electronically signed by Hyacinth Richards DO  on 5/31/2022 at 6:30 AM   Attending Physician Statement  I have discussed the case, including pertinent history and exam findings with the resident. I agree with the assessment, plan and orders as documented by the resident. Patient examined. Denies any abdominal pain.   Check U/S  No clinical evidence of cholecystitis

## 2022-05-31 NOTE — ED NOTES
Pt states that the pain medication did not help at all and he may need something else. Dr. Renetta Murphy notified.       Cricket Grimes RN  05/31/22 0246

## 2022-05-31 NOTE — PROGRESS NOTES
Patient transferred from ER. Report received from [facility]. Patient oriented to the room. Bed in the lowest position, 2 side rails up, bed alarm on and call light within reach.

## 2022-05-31 NOTE — H&P
[facility]  Office: [address], DO, Edmondmary beth Betts, DO, [place] Kim, DO, Jeimy Delgado Blood, DO, Keesha Vargas MD, Julián Sanchez MD, Rocio Cruz MD, Jason Lozada MD, Billy Garza MD, Denny Milian MD, Mario Marie MD, Quinton Rees, DO, Orquidea Tidwell, DO, Mary Jo Carlos MD,  Catalina Villagran DO, Quan Sinclair MD, Kim Alamo MD, Andrei Badillo MD, Savanna Bolaños DO, Bernard Fernandes MD, Damion Cisneros MD, Roma Mane MD, Emil Alan, [facility], [facility], [facility], Griffin Denise, CNP, Marcelina Vargas, CNP, Delfina Kc, CNP, Kavon Collins, CNP, Augustine Green PA-C, Lisandro Knapp, [facility], Virginia Martini, CNP, Xavi Cortez, CNP, Con Distance, CNP, Laura Shahid, CNS, Lieutenant Dick, [facility], Karlie Boateng, [facility], [facility], CNP, Spring Lin, [facility]    HISTORY AND PHYSICAL EXAMINATION            Date:   5/31/2022  Patient name:  Rudy Brooks  Date of admission:  5/30/2022 10:36 PM  MRN:   [id number]  Account:  [de-identified]  YOB: 1945  PCP:    Francisca Warner MD  Room:   [address] [place] [address]  Code Status:    Full Code    Chief Complaint:     Chief Complaint   Patient presents with    Abdominal Pain     upper abdomen. started after dinner. ate mac n cheese. hx of AAA in the past couple of years. History Obtained From:     Patient and electronic medical record. History of Present Illness:     Rudy Brooks is a 77 y.o. Non- / non  male who presents with Abdominal Pain (upper abdomen. started after dinner. ate mac n cheese. hx of AAA in the past couple of years.)   and is admitted to the hospital for the management of Acute cholecystitis. The patient presents to the hospital with complaint of abdominal pain. He states he developed midline abdominal pain yesterday after he ate macaroni and cheese for dinner.  He describes his pain as intermittent to constant, sharp and severe in intensity. He has nausea without vomiting. He denies fever, chills or chest pain. He reports a chronic cough, dry mouth/throat and fatigue with activity. No additional symptomology or modifying factors. He has past medical history that includes afib (eliquis), implanted pacemaker, COPD, hypertension and dyslipidemia. He has a known lung nodule. He follows outpatient with Dr. Natty Russ with cardiology. Creatinine 1.41, WBC 8.6. CT abdomen and pelvis with IV contrast shows:  1. Gallbladder distension, with gallbladder wall thickening and minimal adjacent haziness which may represent developing acute cholecystitis. This could be further assessed with right upper quadrant ultrasound if clinically concern. 2. Non masslike hyperenhancement seen in the liver involving the segments adjacent to the gallbladder, which may be related to hyperemia from underlying cholecystitis. No discrete focal enhancing mass. 3. Small bilateral pleural effusions, with cardiomegaly and mild peribronchial thickening which may be related to cardiogenic edema. Other chronic changes in the lung bases as above. 4. No ileus or obstruction. 5. Aorto iliac endograft, with no definite endoleak. Interval retraction of the aneurysm sac size when compared to the previous examination. 6. Trace ascites. 7. Nonobstructive calculus in the lower pole the left kidney. CXR shows:  Mild bibasilar airspace disease. Question may represent interstitial edema versus developing airspace versus chronic areas of interstitial thickening. EKG:  Atrial fibrillation with frequent ventricular-paced complexes and with premature ventricular or aberrantly conducted complexes  Ventricular rate 85  Right bundle branch block  T wave abnormality, consider lateral ischemia  Abnormal ECG  When compared with ECG of 12-MAY-2010 19:56,  Vent.  rate has increased BY  21 BPM        Past Medical History:     Past Medical History:   Diagnosis Date    A-fib ([facility] [address]     Anticoagulated on Coumadin     for A-Fib, monitoring by PCP    Anxiety     Back pain     COPD (chronic obstructive pulmonary disease) ([facility] [address]     HTN (hypertension)     Hyperglycemia     Hyperlipidemia     can't take statins, due to muscle aches    Insomnia     Lung nodule     Pacemaker     cardiologist, dr. Julia Card        Past Surgical History:     Past Surgical History:   Procedure Laterality Date    ABDOMINAL AORTIC ANEURYSM REPAIR      PACEMAKER PLACEMENT          Medications Prior to Admission:     Prior to Admission medications    Medication Sig Start Date End Date Taking?  Authorizing Provider   LORazepam (ATIVAN) 1 MG tablet take 1 tablet by mouth every 8 hours if needed for anxiety 6/2/22 7/2/22  Jaylene Ruffin MD   candesartan (ATACAND) 32 MG tablet Take 1 tablet by mouth daily 5/16/22   CARMITA Santoyo NP   dilTIAZem (CARDIZEM CD) 360 MG extended release capsule Take 1 capsule by mouth daily 5/16/22   CARMITA Santoyo NP   [facility]lotide [facility]) 290 MCG CAPS capsule Take 1 capsule by mouth every morning (before breakfast) 5/16/22   CARMITA Santoyo NP   tamsulosin [facility]) 0.4 MG capsule Take 1 capsule by mouth daily 5/16/22   CARMITA Santoyo NP   vitamin D (ERGOCALCIFEROL) 1.25 MG (25527 UT) CAPS capsule take 1 capsule by mouth every week 5/16/22   CARMITA Santoyo NP   tiZANidine (ZANAFLEX) 2 MG tablet Take 1 tablet by mouth nightly as needed (Muscle spasms) 5/16/22   CARMITA Santoyo NP   tiotropium-olodaterol (STIOLTO RESPIMAT) 2.5-2.5 MCG/ACT AERS Inhale 2 puffs into the lungs daily 5/16/22   CARMITA Santoyo NP   nitroGLYCERIN (NITROSTAT) 0.4 MG SL tablet place 1 tablet under the tongue if needed every 5 minutes for chest pain for 3 doses IF NO RELIEF AFTER THIRD DOSE CALL 911. 4/28/22   Jaylene Ruffin MD   apixaban (ELIQUIS) 5 MG TABS tablet Take 1 tablet by mouth 2 times daily 3/3/22   Jaylene Ruffin MD   ibuprofen (ADVIL;MOTRIN) 800 MG tablet take 1 tablet by mouth every 12 hours if needed for pain 7/13/21   Jaylene Garcia MD   albuterol sulfate HFA (VENTOLIN HFA) 108 (90 Base) MCG/ACT inhaler Inhale 2 puffs into the lungs every 6 hours as needed for Wheezing 3/23/21   Jaylene Garcia MD        Allergies:     Lisinopril, Neurontin [gabapentin], Pcn [penicillins], Statins support therapy, and Hydrochlorothiazide    Social History:     Tobacco:    reports that he quit smoking about 21 years ago. His smoking use included cigarettes. He has a 12.50 pack-year smoking history. He has never used smokeless tobacco.  Alcohol:      reports no history of alcohol use. Drug Use:  reports no history of drug use. Family History:     Family History   Problem Relation Age of Onset    Heart Disease Mother     Heart Disease Father        Review of Systems:     Positive and Negative as described in HPI. Review of Systems   Constitutional: Positive for fatigue. Negative for chills, diaphoresis and fever. HENT: Negative for congestion. Chronic dry mouth/throat    Eyes: Negative for visual disturbance. Respiratory: Positive for cough. Negative for shortness of breath. Cardiovascular: Negative for chest pain, palpitations and leg swelling. Gastrointestinal: Positive for abdominal pain and nausea. Negative for blood in stool, constipation, diarrhea and vomiting. Endocrine: Negative for cold intolerance and heat intolerance. Genitourinary: Negative for difficulty urinating, dysuria, frequency and urgency. Musculoskeletal: Negative for arthralgias and myalgias. Skin: Negative for color change and rash. Neurological: Negative for dizziness, weakness, light-headedness, numbness and headaches. Hematological: Bruises/bleeds easily. Due to eliquis    Psychiatric/Behavioral: The patient is not nervous/anxious. All other systems reviewed and are negative.       Physical Exam:   BP (!) 165/102   Pulse 84 Temp 97.1 °F (36.2 °C) (Oral)   Resp 18   Ht 5' 11.5\" (1.816 m)   Wt 227 lb (103 kg)   SpO2 98%   BMI 31.22 kg/m²   Temp (24hrs), Av.3 °F (36.3 °C), Min:97.1 °F (36.2 °C), Max:97.5 °F (36.4 °C)    No results for input(s): POCGLU in the last 72 hours. No intake or output data in the 24 hours ending 22 0242    Physical Exam  Vitals and nursing note reviewed. Constitutional:       Appearance: He is not diaphoretic. HENT:      Head: Normocephalic and atraumatic. Right Ear: Hearing normal.      Left Ear: Hearing normal.      Nose: Nose normal. No rhinorrhea. Eyes:      General: Lids are normal.      Extraocular Movements:      Right eye: Normal extraocular motion. Left eye: Normal extraocular motion. Conjunctiva/sclera: Conjunctivae normal.      Right eye: Right conjunctiva is not injected. Left eye: Left conjunctiva is not injected. Pupils: Pupils are equal, round, and reactive to light. Pupils are equal.      Right eye: Pupil is reactive. Left eye: Pupil is reactive. Neck:      Thyroid: No thyromegaly. Trachea: Trachea normal. No tracheal deviation. Cardiovascular:      Rate and Rhythm: Normal rate. Rhythm irregularly irregular. Pulses: Normal pulses. Heart sounds: Normal heart sounds. Pulmonary:      Effort: Pulmonary effort is normal.      Breath sounds: Examination of the right-lower field reveals decreased breath sounds. Examination of the left-lower field reveals decreased breath sounds. Decreased breath sounds present. Abdominal:      General: Bowel sounds are normal. There is no distension. Palpations: Abdomen is soft. There is no mass. Tenderness: There is abdominal tenderness in the right upper quadrant. There is no guarding. Musculoskeletal:         General: No tenderness. Cervical back: Neck supple. Skin:     General: Skin is warm and dry.    Neurological:      Mental Status: He is alert and oriented to person, place, and time. Psychiatric:         Speech: Speech normal.         Behavior: Behavior normal.         Investigations:      Laboratory Testing:  Recent Results (from the past 24 hour(s))   CBC with Auto Differential    Collection Time: 05/30/22 10:55 PM   Result Value Ref Range    WBC 8.6 3.5 - 11.3 k/uL    RBC 4.88 4.21 - 5.77 m/uL    Hemoglobin 15.2 13.0 - 17.0 g/dL    Hematocrit 47.6 40.7 - 50.3 %    MCV 97.5 82.6 - 102.9 fL    MCH 31.1 25.2 - 33.5 pg    MCHC 31.9 28.4 - 34.8 g/dL    RDW 14.7 (H) 11.8 - 14.4 %    Platelets 214 102 - 138 k/uL    MPV 11.4 8.1 - 13.5 fL    NRBC Automated 0.0 0.0 per 100 WBC    Seg Neutrophils 79 (H) 36 - 65 %    Lymphocytes 9 (L) 24 - 43 %    Monocytes 10 3 - 12 %    Eosinophils % 1 1 - 4 %    Basophils 1 0 - 2 %    Immature Granulocytes 0 0 %    Segs Absolute 6.71 1.50 - 8.10 k/uL    Absolute Lymph # 0.80 (L) 1.10 - 3.70 k/uL    Absolute Mono # 0.82 0.10 - 1.20 k/uL    Absolute Eos # 0.12 0.00 - 0.44 k/uL    Basophils Absolute 0.07 0.00 - 0.20 k/uL    Absolute Immature Granulocyte 0.03 0.00 - 0.30 k/uL    RBC Morphology ANISOCYTOSIS PRESENT    SPECIMEN REJECTION    Collection Time: 05/30/22 10:55 PM   Result Value Ref Range    Specimen Source . BLOOD     Ordered Test MELANY LIP BMP LIVP     Reason for Rejection Unable to perform testing: Specimen hemolyzed.     EKG 12 Lead    Collection Time: 05/30/22 11:00 PM   Result Value Ref Range    Ventricular Rate 85 BPM    Atrial Rate 105 BPM    QRS Duration 142 ms    Q-T Interval 402 ms    QTc Calculation (Bazett) 478 ms    R Axis 1 degrees    T Axis 6 degrees   Amylase    Collection Time: 05/30/22 11:24 PM   Result Value Ref Range    Amylase 30 28 - 100 U/L   Basic Metabolic Panel    Collection Time: 05/30/22 11:24 PM   Result Value Ref Range    Glucose 124 (H) 70 - 99 mg/dL    BUN 21 8 - 23 mg/dL    CREATININE 1.41 (H) 0.70 - 1.20 mg/dL    Bun/Cre Ratio 15 9 - 20    Calcium 9.2 8.6 - 10.4 mg/dL    Sodium 142 135 - 144 mmol/L    Potassium 3.8 3.7 - 5.3 mmol/L    Chloride 105 98 - 107 mmol/L    CO2 25 20 - 31 mmol/L    Anion Gap 12 9 - 17 mmol/L    GFR Non-African American 49 (L) >60 mL/min    GFR  59 (L) >60 mL/min    GFR Comment         Lipase    Collection Time: 05/30/22 11:24 PM   Result Value Ref Range    Lipase 13 13 - 60 U/L   Hepatic Function Panel    Collection Time: 05/30/22 11:24 PM   Result Value Ref Range    Albumin 4.0 3.5 - 5.2 g/dL    Alkaline Phosphatase 129 40 - 129 U/L    ALT 13 5 - 41 U/L    AST 17 <40 U/L    Total Bilirubin 0.46 0.3 - 1.2 mg/dL    Bilirubin, Direct 0.20 <0.31 mg/dL    Bilirubin, Indirect 0.26 0.00 - 1.00 mg/dL    Total Protein 7.1 6.4 - 8.3 g/dL       Imaging/Diagnostics:  CT ABDOMEN PELVIS W IV CONTRAST Additional Contrast? None    Result Date: 5/31/2022  1. Gallbladder distension, with gallbladder wall thickening and minimal adjacent haziness which may represent developing acute cholecystitis. This could be further assessed with right upper quadrant ultrasound if clinically concern. 2. Non masslike hyperenhancement seen in the liver involving the segments adjacent to the gallbladder, which may be related to hyperemia from underlying cholecystitis. No discrete focal enhancing mass. 3. Small bilateral pleural effusions, with cardiomegaly and mild peribronchial thickening which may be related to cardiogenic edema. Other chronic changes in the lung bases as above. 4. No ileus or obstruction. 5. Aorto iliac endograft, with no definite endoleak. Interval retraction of the aneurysm sac size when compared to the previous examination. 6. Trace ascites. 7. Nonobstructive calculus in the lower pole the left kidney. XR CHEST PORTABLE    Result Date: 5/30/2022  Mild bibasilar airspace disease. Question may represent interstitial edema versus developing airspace versus chronic areas of interstitial thickening.        Assessment :      Hospital Problems           Last Modified POA    * (Principal) Acute cholecystitis 5/31/2022 Yes    Essential hypertension 5/31/2022 Yes    COPD (chronic obstructive pulmonary disease) ([facility] [address] 5/31/2022 Yes    Atrial fibrillation, chronic ([facility] [address] 5/31/2022 Yes    Overview Addendum 5/31/2022  1:42 AM by CARMITA Bond CNP     Eliquis         Pacemaker 5/31/2022 Yes    Overview Signed 3/16/2015  9:09 AM by CARMITA Brady - SHANIKA Bronson, replaced in 2013. Plan:     Patient status inpatient in the  Med/Surge    1. Consult general surgery, appreciate recommendations. 2. IV flagyl, cefepime. Patient has penicillin allergy. 3. Gallbladder ultrasound. 4. Hypertension- continue atacand. 5. COPD- continue stiolto. 6. Afib- continue diltizem. 7. Eliquis- old anticoagulation pending surgery input. 8. Telemetry. 9. Monitor creatinine, it appears the patient likely has a component of CKD. Avoid nephrotoxic agents. 10. Gentle IV hydration. 11. Antiemetics. 12. Pain control. 13. Monitor vital signs. 14. Follow chemistries. 15. NPO for now. 16. Activity as tolerated with assist.    Plan of care discussed in room with patient and Pat RN. Total time spent on direct and indirect patient care, orders, documentation and discussions with other providers and staff 20 minutes. Consultations:   IP CONSULT TO HOSPITALIST  IP CONSULT TO GENERAL SURGERY    Patient is admitted as inpatient status because of co-morbidities listed above, severity of signs and symptoms as outlined, requirement for current medical therapies and most importantly because of direct risk to patient if care not provided in a hospital setting. Expected length of stay > 48 hours.     CARMITA Bond CNP  5/31/2022  2:42 AM    Copy sent to Dr. Mel Bonner MD

## 2022-05-31 NOTE — CARE COORDINATION
Case Management Initial Discharge Plan  Miguel Wood,         Readmission Risk              Risk of Unplanned Readmission:  9             Met with:patient to discuss discharge plans. Information verified: address, contacts, phone number, , insurance Yes  PCP: Myra Spring MD  Date of last visit: 22    Insurance Provider: Joana Nguyen Medicaid (does not have Medicare)    Discharge Planning  Current Residence:  1 story home with son   Living Arrangements:    [facility] has 1 stories/2 stairs to climb  Support Systems:   son   Current Services PTA:  Na  Agency: na   Patient able to perform ADL's:Independent  DME in home:  Na   DME used to aid ambulation prior to admission:   Na   DME used during admission:  Na     Potential Assistance Needed:       Pharmacy: [facility] Medications:     Does patient want to participate in local refill/ meds to beds program?  Yes    Patient agreeable to home care: No  [place] of choice provided:  n/a      Type of Home Care Services:     Patient expects to be discharged to:       Prior SNF/Rehab Placement and Facility: no   Agreeable to SNF/Rehab: No  [place] of choice provided: n/a   Evaluation: n/a    Expected Discharge date: Follow Up Appointment: Best Day/ Time:      Transportation provider: son if before 4 pm   Transportation arrangements needed for discharge: may need a cab     Discharge Plan: Independent, lives with son, non  dt vision. No DME. Surgery consult. GBUS. Cefepime/Flagyl IV. May need a cab. Spoke with pt at bedside, a+o, lives with son, independent, non  dt vision.      Abd pain  Surgery consult-No clinical evidence of cholecystitis   GBUS  NPO  IVF  Cefepime IV  Flagyl IV          Electronically signed by Bernardo Mckeon RN on 22 at 9:16 AM EDT

## 2022-05-31 NOTE — PLAN OF CARE
Problem: Discharge Planning  Goal: Discharge to home or other facility with appropriate resources  Outcome: Progressing  Flowsheets  Taken 5/31/2022 0740 by Ashlee Sargent RN  Discharge to home or other facility with appropriate resources: Identify barriers to discharge with patient and caregiver  Taken 5/31/2022 0333 by Monisha Gallagher RN  Discharge to home or other facility with appropriate resources: Identify barriers to discharge with patient and caregiver     Problem: Pain  Goal: Verbalizes/displays adequate comfort level or baseline comfort level  Outcome: Progressing     Problem: Safety - Adult  Goal: Free from fall injury  Outcome: Progressing     Problem: ABCDS Injury Assessment  Goal: Absence of physical injury  Outcome: Progressing     Problem: Chronic Conditions and Co-morbidities  Goal: Patient's chronic conditions and co-morbidity symptoms are monitored and maintained or improved  Outcome: Progressing  Flowsheets (Taken 5/31/2022 0740)  Care Plan - Patient's Chronic Conditions and Co-Morbidity Symptoms are Monitored and Maintained or Improved: Monitor and assess patient's chronic conditions and comorbid symptoms for stability, deterioration, or improvement

## 2022-05-31 NOTE — ED PROVIDER NOTES
[address] [place] [address] ED  EMERGENCY DEPARTMENT ENCOUNTER      Pt Name: Rudy Brooks  MRN: [id number]  [place] 1945  Date of evaluation: 5/30/2022  Provider: Kortney Noonan MD    CHIEF COMPLAINT       Chief Complaint   Patient presents with    Abdominal Pain     upper abdomen. started after dinner. ate mac n cheese. hx of AAA in the past couple of years. HISTORY OF PRESENT ILLNESS  (Location/Symptom, Timing/Onset, Context/Setting, Quality, Duration, Modifying Factors, Severity.)   Rudy Brooks is a 77 y.o. male who presents to the emergency department for abdominal pain. It started 4 to 5 hours ago and its in the upper abdomen near the midline. He has not had pain like this before. He has no lower abdominal pain. No fever or trauma or diarrhea. No constipation. He has had a slight cough recently and some congestion. He was not doing anything in particular when this started. The pain is moderate and continuous. Nursing Notes were reviewed.     ALLERGIES     Lisinopril, Neurontin [gabapentin], Pcn [penicillins], Statins support therapy, and Hydrochlorothiazide    CURRENT MEDICATIONS       Previous Medications    ALBUTEROL SULFATE HFA (VENTOLIN HFA) 108 (90 BASE) MCG/ACT INHALER    Inhale 2 puffs into the lungs every 6 hours as needed for Wheezing    APIXABAN (ELIQUIS) 5 MG TABS TABLET    Take 1 tablet by mouth 2 times daily    CANDESARTAN (ATACAND) 32 MG TABLET    Take 1 tablet by mouth daily    DILTIAZEM (CARDIZEM CD) 360 MG EXTENDED RELEASE CAPSULE    Take 1 capsule by mouth daily    IBUPROFEN (ADVIL;MOTRIN) 800 MG TABLET    take 1 tablet by mouth every 12 hours if needed for pain    LINACLOTIDE (LINZESS) 290 MCG CAPS CAPSULE    Take 1 capsule by mouth every morning (before breakfast)    LORAZEPAM (ATIVAN) 1 MG TABLET    take 1 tablet by mouth every 8 hours if needed for anxiety    NITROGLYCERIN (NITROSTAT) 0.4 MG SL TABLET    place 1 tablet under the tongue if needed every 5 minutes for chest pain for 3 doses IF NO RELIEF AFTER THIRD DOSE CALL 911. TAMSULOSIN (FLOMAX) 0.4 MG CAPSULE    Take 1 capsule by mouth daily    TIOTROPIUM-OLODATEROL (STIOLTO RESPIMAT) 2.5-2.5 MCG/ACT AERS    Inhale 2 puffs into the lungs daily    TIZANIDINE (ZANAFLEX) 2 MG TABLET    Take 1 tablet by mouth nightly as needed (Muscle spasms)    VITAMIN D (ERGOCALCIFEROL) 1.25 MG (46497 UT) CAPS CAPSULE    take 1 capsule by mouth every week       PAST MEDICAL HISTORY         Diagnosis Date    [facility]fib ([facility]     Anticoagulated on Coumadin     for A-Fib, monitoring by PCP    Anxiety     Back pain     [facility]     HTN (hypertension)     Hyperglycemia     Hyperlipidemia     can't take statins, due to muscle aches    Insomnia     Lung nodule     Pacemaker     cardiologist, dr. Elsie Weeks       SURGICAL HISTORY           Procedure Laterality Date    PACEMAKER PLACEMENT           FAMILY HISTORY           Problem Relation Age of Onset    Heart Disease Mother     Heart Disease Father      Family Status   Relation Name Status    Mother      Father  Other        SOCIAL HISTORY      reports that he quit smoking about 21 years ago. His smoking use included cigarettes. He has a 12.50 pack-year smoking history. He has never used smokeless tobacco. He reports that he does not drink alcohol and does not use drugs. REVIEW OF SYSTEMS    (2-9 systems for level 4, 10 or more for level 5)     Review of Systems   Constitutional: Negative for chills, fatigue and fever. HENT: Negative for congestion, ear discharge and facial swelling. Eyes: Negative for discharge and redness. Respiratory: Negative for cough and shortness of breath. Cardiovascular: Negative for chest pain. Gastrointestinal: Negative for abdominal pain, constipation, diarrhea and vomiting. Genitourinary: Negative for dysuria and hematuria. Musculoskeletal: Negative for arthralgias.    Skin: Negative for color change and rash. Neurological: Negative for syncope, numbness and headaches. Hematological: Negative for adenopathy. Psychiatric/Behavioral: Negative for confusion. The patient is not nervous/anxious. Except as noted above the remainder of the review of systems was reviewed and negative. PHYSICAL EXAM    (up to 7 for level 4, 8 or more for level 5)     Vitals:    05/30/22 2230   BP: (!) 165/102   Pulse: 84   Resp: 18   Temp: 97.5 °F (36.4 °C)   TempSrc: Oral   SpO2: 98%   Weight: 227 lb (103 kg)   Height: 5' 11.5\" (1.816 m)       Physical Exam  Constitutional:       General: He is not in acute distress. Appearance: He is well-developed. He is not diaphoretic. HENT:      Head: Normocephalic and atraumatic. Eyes:      General: No scleral icterus. Right eye: No discharge. Left eye: No discharge. Cardiovascular:      Rate and Rhythm: Normal rate and regular rhythm. Pulmonary:      Effort: Pulmonary effort is normal. No respiratory distress. Breath sounds: Normal breath sounds. No stridor. No wheezing or rales. Abdominal:      General: There is no distension. Palpations: Abdomen is soft. Tenderness: There is abdominal tenderness. Comments: He has some tenderness in the epigastric area without mass or distention. No lower abdominal tenderness. Musculoskeletal:         General: Normal range of motion. Cervical back: Neck supple. Lymphadenopathy:      Cervical: No cervical adenopathy. Skin:     General: Skin is warm and dry. Findings: No erythema or rash. Neurological:      Mental Status: He is alert and oriented to person, place, and time.    Psychiatric:         Behavior: Behavior normal.             DIAGNOSTIC RESULTS     EKG: All EKG's are interpreted by the Emergency Department Physician who either signs or Co-signs this chart in the absence of a cardiologist.    RADIOLOGY:   Non-plain film images such as CT, Ultrasound and MRI are read by the radiologist. [facility] radiographic images are visualized and preliminarily interpreted by the emergency physician with the below findings:    Interpretation per the Radiologist below, if available at the time of this note:    CT ABDOMEN PELVIS W IV CONTRAST Additional Contrast? None    Result Date: 5/31/2022  EXAMINATION: CT OF THE ABDOMEN AND PELVIS WITH CONTRAST 5/31/2022 12:11 am TECHNIQUE: CT of the abdomen and pelvis was performed with the administration of intravenous contrast. Multiplanar reformatted images are provided for review. Automated exposure control, iterative reconstruction, and/or weight based adjustment of the mA/kV was utilized to reduce the radiation dose to as low as reasonably achievable. COMPARISON: 05/31/2020 HISTORY: ORDERING SYSTEM PROVIDED HISTORY: Upper abdominal pain, remote history of AAA with graft TECHNOLOGIST PROVIDED HISTORY: Upper abdominal pain, remote history of AAA with graft Decision Support Exception - unselect if not a suspected or confirmed emergency medical condition->Emergency Medical Condition (MA) Reason for Exam: epigastric pain FINDINGS: Lower Chest: Partially imaged bilateral pleural effusions. There are stable left lower lobe pulmonary nodules, the largest measuring 2.7 cm in size when compared to the previous study in 2020. Chronic subpleural tree-in-bud opacities as well as reticular changes noted at the lung bases. There is some mild bronchial thickening. Cardiac leads noted within the right atrium and ventricle. Organs: Benign cyst noted within the liver. No splenic mass. Hyperenhancement is seen within the liver adjacent to the gallbladder fossa. Gallbladder wall thickening is noted. No significant common duct dilation. Pancreatic atrophy without focal mass. No suspicious enhancing renal mass. Nonobstructive left renal calculus. No ureteral calculi. No hydroureteronephrosis is identified.  GI/Bowel: Liquid stool is seen within the proximal colon.  No acute diverticulitis. Normal appendix. Colonic diverticulosis. Pelvis: No pelvic mass. No bladder wall thickening or significant bladder distention. No bulky pelvic lymphadenopathy. Peritoneum/Retroperitoneum: No retroperitoneal or mesenteric lymphadenopathy. There is an aorto bi-iliac endograft, with the aorta measuring 7.6 x 7.9 cm, previously measuring 8.0 x 8.1 cm. No definite endoleak though limited evaluation given the single phase of contrast.  No intragraft stenosis is identified. The iliac arteries are patent as are the outflow arteries in the proximal thighs. There is a trace amount of ascites. Bones/Soft Tissues: No acute subcutaneous soft tissue abnormality. Multilevel degenerative changes are seen in the spine. Diffuse osteopenia. Bilateral pars defects seen at L5 with anterolisthesis of L5 on S1. Vertebral body alignment appears unchanged from the previous comparison. 1. Gallbladder distension, with gallbladder wall thickening and minimal adjacent haziness which may represent developing acute cholecystitis. This could be further assessed with right upper quadrant ultrasound if clinically concern. 2. Non masslike hyperenhancement seen in the liver involving the segments adjacent to the gallbladder, which may be related to hyperemia from underlying cholecystitis. No discrete focal enhancing mass. 3. Small bilateral pleural effusions, with cardiomegaly and mild peribronchial thickening which may be related to cardiogenic edema. Other chronic changes in the lung bases as above. 4. No ileus or obstruction. 5. Aorto iliac endograft, with no definite endoleak. Interval retraction of the aneurysm sac size when compared to the previous examination. 6. Trace ascites. 7. Nonobstructive calculus in the lower pole the left kidney.      XR CHEST PORTABLE    Result Date: 5/30/2022  EXAMINATION: ONE XRAY VIEW OF THE CHEST 5/30/2022 10:58 pm COMPARISON: 05/17/2020 chest x-ray HISTORY: ORDERING SYSTEM PROVIDED HISTORY: cough TECHNOLOGIST PROVIDED HISTORY: cough FINDINGS: Mildly enlarged. Left-sided pacemaker device. [place] Perihilar congestion. Minimal subpleural opacities greatest right base may represent chronic interstitial thickening versus developing airspace disease or edema. No pleural effusion or pneumothorax is present. Mild bibasilar airspace disease. Question may represent interstitial edema versus developing airspace versus chronic areas of interstitial thickening. LABS:  Labs Reviewed   CBC WITH AUTO DIFFERENTIAL - Abnormal; Notable for the following components:       Result Value    RDW 14.7 (*)     Seg Neutrophils 79 (*)     Lymphocytes 9 (*)     Absolute Lymph # 0.80 (*)     All other components within normal limits   BASIC METABOLIC PANEL - Abnormal; Notable for the following components:    Glucose 124 (*)     CREATININE 1.41 (*)     GFR Non- 49 (*)     GFR  59 (*)     All other components within normal limits   SPECIMEN REJECTION   AMYLASE   LIPASE   HEPATIC FUNCTION PANEL       All other labs were within normal range or not returned as of this dictation.     EMERGENCY DEPARTMENT COURSE and DIFFERENTIAL DIAGNOSIS/MDM:   Vitals:    Vitals:    05/30/22 2230   BP: (!) 165/102   Pulse: 84   Resp: 18   Temp: 97.5 °F (36.4 °C)   TempSrc: Oral   SpO2: 98%   Weight: 227 lb (103 kg)   Height: 5' 11.5\" (1.816 m)       Orders Placed This Encounter   Medications    aluminum & magnesium hydroxide-simethicone (MAALOX) 30 mL, lidocaine viscous hcl (XYLOCAINE) 5 mL (GI COCKTAIL)    morphine sulfate (PF) injection 4 mg    0.9 % sodium chloride bolus    iopamidol (ISOVUE-370) 76 % injection 75 mL    sodium chloride flush 0.9 % injection 10 mL    morphine sulfate (PF) injection 4 mg    piperacillin-tazobactam (ZOSYN) 3,375 mg in dextrose 5 % 50 mL IVPB (mini-bag)     Order Specific Question:   Antimicrobial Indications     Answer:   Intra-Abdominal Infection Medical Decision Making: Acute cholecystitis is identified and Zosyn is ordered. Amylase and lipase and LFTs are essentially normal.  He is being admitted. Treatment diagnosis and disposition were discussed with the patient. CONSULTS:  IP CONSULT TO HOSPITALIST    PROCEDURES:  None    FINAL IMPRESSION      1. Acute cholecystitis          DISPOSITION/PLAN   DISPOSITION Decision To Admit 05/31/2022 01:08:10 AM      PATIENT REFERRED TO:   No follow-up provider specified. DISCHARGE MEDICATIONS:     New Prescriptions    No medications on file       The care is provided during an unprecedented national emergency due to the novel coronavirus, COVID-19.     (Please note that portions of this note were completed with a voice recognition program.  Efforts were made to edit the dictations but occasionally words are mis-transcribed.)    Ankit Michaud MD  Attending Emergency Physician           Ankit Michaud MD  05/31/22 3836

## 2022-05-31 NOTE — PROGRESS NOTES
Dr Mars Colmenares notified for consult for gallbladder. Called back stated he would see pt in the morning.

## 2022-05-31 NOTE — ACP (ADVANCE CARE PLANNING)
Advance Care Planning     Advance Care Planning Activator (Inpatient)  Conversation Note      Date of ACP Conversation: 5/31/2022     Conversation Conducted with: Patient with Decision Making Capacity    ACP Activator: Lisa Beaver RN        Health Care Decision Maker: self     Current Designated Health Care Decision Maker:     Primary Decision Maker: Madi Franklin - [phone]  Click here to complete Healthcare Decision Makers including section of the Healthcare Decision Maker Relationship (ie \"Primary\")  Today we documented Decision Maker(s) consistent with ACP documents on file. Care Preferences    Ventilation: \"If you were in your present state of health and suddenly became very ill and were unable to breathe on your own, what would your preference be about the use of a ventilator (breathing machine) if it were available to you? \"      Would the patient desire the use of ventilator (breathing machine)?: yes    \"If your health worsens and it becomes clear that your chance of recovery is unlikely, what would your preference be about the use of a ventilator (breathing machine) if it were available to you? \"     Would the patient desire the use of ventilator (breathing machine)?: unsure       Resuscitation  \"CPR works best to restart the heart when there is a sudden event, like a heart attack, in someone who is otherwise healthy. Unfortunately, CPR does not typically restart the heart for people who have serious health conditions or who are very sick. \"    \"In the event your heart stopped as a result of an underlying serious health condition, would you want attempts to be made to restart your heart (answer \"yes\" for attempt to resuscitate) or would you prefer a natural death (answer \"no\" for do not attempt to resuscitate)? \" unsure        [] Yes   [] No   Educated Patient / Belle Singh regarding differences between Advance Directives and portable DNR orders.     Length of ACP Conversation in minutes: 10    Conversation Outcomes:  [x] ACP discussion completed  [] Existing advance directive reviewed with patient; no changes to patient's previously recorded wishes  [] New Advance Directive completed  [] Portable Do Not Rescitate prepared for Provider review and signature  [] POLST/POST/MOLST/MOST prepared for Provider review and signature      Follow-up plan:    [] Schedule follow-up conversation to continue planning  [x] Referred individual to Provider for additional questions/concerns   [] Advised patient/agent/surrogate to review completed ACP document and update if needed with changes in condition, patient preferences or care setting    [] This note routed to one or more involved healthcare providers

## 2022-05-31 NOTE — PROGRESS NOTES
Transitions of Care Pharmacy Service   Medication Review    The patient's list of current home medications has been reviewed. His PCP office prescribes in epic. Source(s) of information: patient, Epic, [facility] refill report      Please feel free to call me with any questions about this encounter. Thank you.     [facility]   Transitions of Care Pharmacy Service  Phone:  [phone]  Fax: [phone]      Electronically signed by [facility] on 5/31/2022 at 7:17 PM           Medications Prior to Admission:   LORazepam (ATIVAN) 1 MG tablet, take 1 tablet by mouth every 8 hours if needed for anxiety  candesartan (ATACAND) 32 MG tablet, Take 1 tablet by mouth daily  dilTIAZem (CARDIZEM CD) 360 MG extended release capsule, Take 1 capsule by mouth daily  linaclotide (LINZESS) 290 MCG CAPS capsule, Take 1 capsule by mouth every morning (before breakfast)  tamsulosin (FLOMAX) 0.4 MG capsule, Take 1 capsule by mouth daily  vitamin D (ERGOCALCIFEROL) 1.25 MG (63165 UT) CAPS capsule, take 1 capsule by mouth every week (Patient taking differently: Indications: Saturdays)  tiZANidine (ZANAFLEX) 2 MG tablet, Take 1 tablet by mouth nightly as needed (Muscle spasms)  tiotropium-olodaterol (STIOLTO RESPIMAT) 2.5-2.5 MCG/ACT AERS, Inhale 2 puffs into the lungs daily  nitroGLYCERIN (NITROSTAT) 0.4 MG SL tablet, place 1 tablet under the tongue if needed every 5 minutes for chest pain for 3 doses IF NO RELIEF AFTER THIRD DOSE CALL 911.  apixaban (ELIQUIS) 5 MG TABS tablet, Take 1 tablet by mouth 2 times daily

## 2022-06-01 VITALS
WEIGHT: 228.56 LBS | DIASTOLIC BLOOD PRESSURE: 84 MMHG | TEMPERATURE: 97.5 F | HEART RATE: 81 BPM | SYSTOLIC BLOOD PRESSURE: 151 MMHG | BODY MASS INDEX: 30.96 KG/M2 | HEIGHT: 72 IN | RESPIRATION RATE: 16 BRPM | OXYGEN SATURATION: 93 %

## 2022-06-01 LAB
ALBUMIN SERPL-MCNC: 3.9 G/DL (ref 3.5–5.2)
ALP BLD-CCNC: 110 U/L (ref 40–129)
ALT SERPL-CCNC: 6 U/L (ref 5–41)
AMYLASE: 23 U/L (ref 28–100)
ANION GAP SERPL CALCULATED.3IONS-SCNC: 12 MMOL/L (ref 9–17)
AST SERPL-CCNC: 14 U/L
BILIRUB SERPL-MCNC: 0.8 MG/DL (ref 0.3–1.2)
BUN BLDV-MCNC: 17 MG/DL (ref 8–23)
BUN/CREAT BLD: 14 (ref 9–20)
CALCIUM SERPL-MCNC: 8.4 MG/DL (ref 8.6–10.4)
CHLORIDE BLD-SCNC: 107 MMOL/L (ref 98–107)
CO2: 21 MMOL/L (ref 20–31)
CREAT SERPL-MCNC: 1.24 MG/DL (ref 0.7–1.2)
EKG ATRIAL RATE: 105 BPM
EKG Q-T INTERVAL: 402 MS
EKG QRS DURATION: 142 MS
EKG QTC CALCULATION (BAZETT): 478 MS
EKG R AXIS: 1 DEGREES
EKG T AXIS: 6 DEGREES
EKG VENTRICULAR RATE: 85 BPM
GFR AFRICAN AMERICAN: >60 ML/MIN
GFR NON-AFRICAN AMERICAN: 57 ML/MIN
GFR SERPL CREATININE-BSD FRML MDRD: ABNORMAL ML/MIN/{1.73_M2}
GLUCOSE BLD-MCNC: 103 MG/DL (ref 75–110)
GLUCOSE BLD-MCNC: 114 MG/DL (ref 75–110)
GLUCOSE BLD-MCNC: 99 MG/DL (ref 70–99)
LIPASE: 5 U/L (ref 13–60)
MAGNESIUM: 2.3 MG/DL (ref 1.6–2.6)
PHOSPHORUS: 2.2 MG/DL (ref 2.5–4.5)
POTASSIUM SERPL-SCNC: 4.2 MMOL/L (ref 3.7–5.3)
SODIUM BLD-SCNC: 140 MMOL/L (ref 135–144)
TOTAL PROTEIN: 6.2 G/DL (ref 6.4–8.3)

## 2022-06-01 PROCEDURE — 82150 ASSAY OF AMYLASE: CPT

## 2022-06-01 PROCEDURE — 80053 COMPREHEN METABOLIC PANEL: CPT

## 2022-06-01 PROCEDURE — 96366 THER/PROPH/DIAG IV INF ADDON: CPT

## 2022-06-01 PROCEDURE — 83690 ASSAY OF LIPASE: CPT

## 2022-06-01 PROCEDURE — 36415 COLL VENOUS BLD VENIPUNCTURE: CPT

## 2022-06-01 PROCEDURE — 6370000000 HC RX 637 (ALT 250 FOR IP): Performed by: NURSE PRACTITIONER

## 2022-06-01 PROCEDURE — 82947 ASSAY GLUCOSE BLOOD QUANT: CPT

## 2022-06-01 PROCEDURE — 99238 HOSP IP/OBS DSCHRG MGMT 30/<: CPT | Performed by: FAMILY MEDICINE

## 2022-06-01 PROCEDURE — G0378 HOSPITAL OBSERVATION PER HR: HCPCS

## 2022-06-01 PROCEDURE — 6370000000 HC RX 637 (ALT 250 FOR IP): Performed by: STUDENT IN AN ORGANIZED HEALTH CARE EDUCATION/TRAINING PROGRAM

## 2022-06-01 PROCEDURE — 2580000003 HC RX 258: Performed by: NURSE PRACTITIONER

## 2022-06-01 PROCEDURE — 84100 ASSAY OF PHOSPHORUS: CPT

## 2022-06-01 PROCEDURE — 96361 HYDRATE IV INFUSION ADD-ON: CPT

## 2022-06-01 PROCEDURE — 6360000002 HC RX W HCPCS: Performed by: NURSE PRACTITIONER

## 2022-06-01 PROCEDURE — 83735 ASSAY OF MAGNESIUM: CPT

## 2022-06-01 PROCEDURE — 2500000003 HC RX 250 WO HCPCS: Performed by: NURSE PRACTITIONER

## 2022-06-01 RX ORDER — PANTOPRAZOLE SODIUM 40 MG/1
40 TABLET, DELAYED RELEASE ORAL
Status: DISCONTINUED | OUTPATIENT
Start: 2022-06-01 | End: 2022-06-01 | Stop reason: HOSPADM

## 2022-06-01 RX ORDER — PANTOPRAZOLE SODIUM 40 MG/1
40 TABLET, DELAYED RELEASE ORAL DAILY
Qty: 30 TABLET | Refills: 1 | Status: SHIPPED | OUTPATIENT
Start: 2022-06-01 | End: 2022-09-19

## 2022-06-01 RX ADMIN — METRONIDAZOLE 500 MG: 500 INJECTION, SOLUTION INTRAVENOUS at 04:55

## 2022-06-01 RX ADMIN — CEFEPIME HYDROCHLORIDE 2000 MG: 2 INJECTION, POWDER, FOR SOLUTION INTRAVENOUS at 00:14

## 2022-06-01 RX ADMIN — PANTOPRAZOLE SODIUM 40 MG: 40 TABLET, DELAYED RELEASE ORAL at 10:53

## 2022-06-01 RX ADMIN — METRONIDAZOLE 500 MG: 500 INJECTION, SOLUTION INTRAVENOUS at 12:19

## 2022-06-01 RX ADMIN — DILTIAZEM HYDROCHLORIDE 360 MG: 180 CAPSULE, COATED, EXTENDED RELEASE ORAL at 10:54

## 2022-06-01 ASSESSMENT — ENCOUNTER SYMPTOMS
WHEEZING: 0
RHINORRHEA: 0
CONSTIPATION: 0
NAUSEA: 0
VOMITING: 0
COUGH: 0
ABDOMINAL PAIN: 0
CHOKING: 0
DIARRHEA: 0
VOICE CHANGE: 0
BACK PAIN: 0
CHEST TIGHTNESS: 0
SINUS PRESSURE: 0
SHORTNESS OF BREATH: 0

## 2022-06-01 NOTE — PLAN OF CARE
Problem: Pain  Goal: Verbalizes/displays adequate comfort level or baseline comfort level  6/1/2022 0253 by Ashleigh Orr RN  Outcome: Progressing     Problem: Pain  Goal: Verbalizes/displays adequate comfort level or baseline comfort level  6/1/2022 0253 by Ashleigh Orr RN  Outcome: Progressing     Problem: Safety - Adult  Goal: Free from fall injury  6/1/2022 0253 by Ashleigh Orr RN  Outcome: Progressing  Flowsheets (Taken 5/31/2022 1443)  Free From Fall Injury: Ekaterina Salter family/caregiver on patient safety     Problem: Chronic Conditions and Co-morbidities  Goal: Patient's chronic conditions and co-morbidity symptoms are monitored and maintained or improved  6/1/2022 0253 by Ashleigh Orr RN  Outcome: Progressing     Problem: Chronic Conditions and Co-morbidities  Goal: Patient's chronic conditions and co-morbidity symptoms are monitored and maintained or improved  6/1/2022 0253 by Ashleigh Orr RN  Outcome: Progressing

## 2022-06-01 NOTE — PLAN OF CARE
Problem: Discharge Planning  Goal: Discharge to home or other facility with appropriate resources  Outcome: Progressing  Flowsheets (Taken 6/1/2022 0830)  Discharge to home or other facility with appropriate resources: Identify barriers to discharge with patient and caregiver     Problem: Pain  Goal: Verbalizes/displays adequate comfort level or baseline comfort level  6/1/2022 1149 by Madhu Kang RN  Outcome: Progressing  6/1/2022 0253 by Blanca Carrizales RN  Outcome: Progressing     Problem: Safety - Adult  Goal: Free from fall injury  6/1/2022 1149 by Madhu Kang RN  Outcome: Progressing  6/1/2022 0253 by Blanca Carrizales RN  Outcome: Progressing  Flowsheets (Taken 5/31/2022 2345)  Free From Fall Injury: Vira Leivne family/caregiver on patient safety     Problem: ABCDS Injury Assessment  Goal: Absence of physical injury  Outcome: Progressing     Problem: Chronic Conditions and Co-morbidities  Goal: Patient's chronic conditions and co-morbidity symptoms are monitored and maintained or improved  6/1/2022 1149 by Madhu Kang RN  Outcome: Progressing  Flowsheets (Taken 6/1/2022 0830)  Care Plan - Patient's Chronic Conditions and Co-Morbidity Symptoms are Monitored and Maintained or Improved:   Monitor and assess patient's chronic conditions and comorbid symptoms for stability, deterioration, or improvement   Collaborate with multidisciplinary team to address chronic and comorbid conditions and prevent exacerbation or deterioration  6/1/2022 0253 by Blanca Carrizales RN  Outcome: Progressing   Pain level assessment complete.    Patient educated on pain scale and control interventions  PRN pain medication given per patient request  Patient instructed to call out with new onset of pain or unrelieved pain

## 2022-06-01 NOTE — PROGRESS NOTES
Pt discharged to home in good condition with belongings  Discharge instructions given  Pt denies having any further questions at this time  Personal items given to patient at discharge  Patient/family state they have everything they were admitted with.

## 2022-06-01 NOTE — PROGRESS NOTES
[address] [place] [address]  GENERAL SURGERY  PROGRESS NOTE      Patient Name: Shay Wiseman  MRN: [id number]   Date of admission: 2022  Length of Stay: 1  Summary: 78-year-old male presenting with abdominal pain concerning for acute cholecystitis versus biliary colic, history of COPD, A. fib on Eliquis. Subjective:  Patient seen and chart reviewed. No acute events overnight. Afebrile, normotensive, normal sinus rhythm, and saturating >95% on RA. Voiding with good uop. Pain controlled. Ambulating. Creatinine downtrending. Denies fever or chills  Denies SOB or cough  Denies palpitations or CP  Denies abdominal pain, nausea, vomiting, or diarrhea    Vitals:                                                                            Temp  Av °F (36.7 °C)  Min: 97.5 °F (36.4 °C)  Max: 98.4 °F (43.5 °C)  Systolic (16GZB), CND:327 , Min:134 , KET:967   Diastolic (09RSQ), AJ:71, Min:73, Max:84  Pulse  Av.3  Min: 50  Max: 81  Resp  Av.2  Min: 16  Max: 19  SpO2: 93 % SpO2  Av.8 %  Min: 93 %  Max: 98 %     I/O's  I/O last 3 completed shifts: In: 1426.5 [I.V.:1183; IV Piggyback:243.5]  Out: 700 [Urine:700]  Date 22 0000 - 22 2359   Shift 7722-3692 8354-5537 4009-5654 24 Hour Total   INTAKE   Shift Total(mL/kg)       OUTPUT   Urine(mL/kg/hr) 150   150   Shift Total(mL/kg) 150(1.4)   150(1.4)   Weight (kg) 103.7 103.7 103.7 103.7       Physical exam:  General: NAD, resting comfortably in bed. Lungs: Equal chest rise bilaterally, no audible wheezes or rhonchi. Heart: Regular rate and rhythm. Warm and well perfused. Abdomen: Soft, non-distended, non-tender, no peritoneal signs.   Extremities: Moves all extremities x4, No edema    Labs:  CBC:  Recent Labs     22  2255 22  0335   WBC 8.6  --    RBC 4.88  --    HGB 15.2  --    HCT 47.6  --    MCV 97.5  --    MCH 31.1  --    MCHC 31.9  --    RDW 14.7*  --      --    MPV 11.4  --    INR  --  1.4       Chem:  Recent Labs 05/30/22  2324 06/01/22  0550    140   K 3.8 4.2    107   CO2 25 21   GLUCOSE 124* 99   BUN 21 17   CREATININE 1.41* 1.24*   MG  --  2.3   ANIONGAP 12 12   LABGLOM 49* 57*   GFRAA 59* >60   CALCIUM 9.2 8.4*   PHOS  --  2.2*     Recent Labs     05/30/22  2324 05/31/22  1159 05/31/22  1617 05/31/22 1945 06/01/22  0550 06/01/22  0634   PROT 7.1  --   --   --  6.2*  --    LABALBU 4.0  --   --   --  3.9  --    AST 17  --   --   --  14  --    ALT 13  --   --   --  6  --    ALKPHOS 129  --   --   --  110  --    BILITOT 0.46  --   --   --  0.80  --    BILIDIR 0.20  --   --   --   --   --    AMYLASE 30  --   --   --  23*  --    LIPASE 13  --   --   --  5*  --    POCGLU  --  122* 102 101  --  103       Glucose:  Recent Labs     05/31/22  1159 05/31/22 1617 05/31/22 1945 06/01/22  0634   POCGLU 122* 102 101 103       ABG:No results found for: POCPH, PHART, PH, POCPCO2, JBQ0JXV, PCO2, POCPO2, PO2ART, PO2, POCHCO3, PMW4CJG, HCO3, NBEA, PBEA, BEART, BE, THGBART, THB, KMJ9QKF, CTSL5VBN, R6VAAEUN, O2SAT, FIO2    Radiology:  CT ABDOMEN PELVIS W IV CONTRAST Additional Contrast? None    Result Date: 5/31/2022  1. Gallbladder distension, with gallbladder wall thickening and minimal adjacent haziness which may represent developing acute cholecystitis. This could be further assessed with right upper quadrant ultrasound if clinically concern. 2. Non masslike hyperenhancement seen in the liver involving the segments adjacent to the gallbladder, which may be related to hyperemia from underlying cholecystitis. No discrete focal enhancing mass. 3. Small bilateral pleural effusions, with cardiomegaly and mild peribronchial thickening which may be related to cardiogenic edema. Other chronic changes in the lung bases as above. 4. No ileus or obstruction. 5. Aorto iliac endograft, with no definite endoleak. Interval retraction of the aneurysm sac size when compared to the previous examination. 6. Trace ascites.  7. Nonobstructive calculus in the lower pole the left kidney. XR CHEST PORTABLE    Result Date: 5/30/2022  Mild bibasilar airspace disease. Question may represent interstitial edema versus developing airspace versus chronic areas of interstitial thickening. US ABDOMEN LIMITED Specify organ? GALLBLADDER    Result Date: 5/31/2022  Dependent sludge with significant gallbladder wall thickening. No pericholecystic fluid or positive sonographic Carrington's sign. Correlate clinically for possible cholecystitis. Consider HIDA follow-up study if indicated. Common duct prominent at 8 mm without intraductal stone demonstrated. Correlation clinical findings and laboratory values required. RECOMMENDATIONS: Unavailable       ASSESSMENT  78-year-old male presenting with abdominal pain concerning for acute cholecystitis versus biliary colic, history of COPD, A. fib on Eliquis. Problem List  Patient Active Problem List   Diagnosis    Back pain    Essential hypertension    COPD (chronic obstructive pulmonary disease) (HCC)    Lung nodule    Procedure refused    Hyperlipidemia    Colonoscopy refused    Atrial fibrillation, chronic ([address]    Anxiety    Insomnia    Pacemaker battery depletion    Pacemaker    Bradycardia    Constipation    Diet-controlled diabetes mellitus ([address]    Aneurysm of infrarenal abdominal aorta ([address]    CLIFTON (acute kidney injury) ([address]    Hyperkalemia    Acute cholecystitis       PLAN  -Clinical picture is atypical for acute cholecystitis but may be related to biliary colic versus esophageal or gastric origin.  -Start PPI  -OK for low fat diet. If tolerating diet, ok from surgical standpoint to discharge and follow up as needed.  -Pain control and nausea control PRN  -Monitor vitals per unit standard  -Encourage IS, pulmonary hygeine  -Encourage ambulation  -Monitor I/O's  -Okay for VTE PPx.   OK to resume anticoagulation from surgery standpoint.  -Remainder of care and disposition per primary team.    Jose Ramon Moya MD  General Surgery PGY4  Available via [facility]      General Surgery Attending Attestation Note    Patient was seen and examined. I agree with the above resident's exam, assessment and plan.      Doing well  Pain resolved  Likely biliary colic  Tolerating low fat diet  Ok to discharge from surgical standpoint and follow up with Dr. Cookie Sandhoff in the next 2-3 weeks    Narda Apodaca, [address], [address], [address] [facility]  Office: [phone]  After Hours: Please call answering service

## 2022-06-01 NOTE — DISCHARGE SUMMARY
[facility]  Office: [phone]  [facility] DO, Osman Ruiz MD, Jordon Arias MD, Alvarez Balbuena MD, Jed Messer MD, Bear Corbett MD, Hetal Overton MD, Lulu Hamlin MD, Mckay Roca MD, Nima Elias MD, Heike Morales DO, Benito Ridley MD, Joelle Moura MD, Misty Quinones DO, Mita Pineda MD,  Panda Fernandez DO, Jose Carlos Reese MD, Helen Ruvalcaba MD, Kailash Hussein CNP, [facility] CNP, Boris Amaya CNP, Zaheer Muñoz CNS, Yareli Lawrence CNP, Carisa Quach CNP, Corina rBooks CNP, Cira Senior CNP, Marcelina Baum CNP, Melissa Lackey PA-C, Ekaterina Wilkins CNP, Roberto Pearson CNP, Lalo Comfort CNP, Mike Conquest CNP, Elvira Garcia CNP, Jenna Phan, [facility]      Discharge Summary     Patient ID: Misty Wynne  :     MRN: [id number]     ACCOUNT:  [de-identified]   Patient Location :   Patient's PCP: Shanthi Colin MD  Admit Date: 2022   Discharge Date: 2022     Length of Stay: 1  Code Status:  Full Code  Admitting Physician: Alvarez Balbuena MD  Discharge Physician: Alvarez Balbuena MD     Active Discharge Diagnosis :     Primary Problem  Acute cholecystitis      Hospital Problems  Active Hospital Problems    Diagnosis Date Noted    Acute cholecystitis [K81.0] 2022     Priority: Medium    Pacemaker [Z95.0]     Atrial fibrillation, chronic ([facility] [I48.20]     COPD (chronic obstructive pulmonary disease) ([facility] [J44.9]     Essential hypertension [I10]        Admission Condition:  fair     Discharged Condition: stable    Hospital Stay:     Hospital Course:  Misty Wynne is a 77 y.o. male who was admitted for the management of   Acute cholecystitis , presented to ER with Abdominal Pain (upper abdomen. started after dinner. ate mac n cheese.  hx of AAA in the past couple of years.)    Patient came to hospital with acute onset right upper quadrant abdominal pain. Rafael Villalobos denied any fever, nausea.  Patient is significant history of atrial fibrillation on long-term anticoagulation with Eliquis.  Last dose of anticoagulation was 2 days before.  Other comorbidities include hypertension, COPD, hyperlipidemia.  Patient was found to have normal temperature, elevated blood pressure.  Lab evaluation showed BUN 21, creatinine 1.41, white count 8.6.  CT abdomen pelvis was history of gallbladder distention and wall thickening suggestive of acute cholecystitis. General surgery was consulted and recommended nonsurgical treatment. Significant therapeutic interventions:   1. Cholelithiasis with acute cholecystitis -improved with bowel rest, pain control, IV hydration. Biliary colic. No plans for surgery at this time. Outpatient follow-up with general surgery. 2. Chronic atrial fibrillation -continue Eliquis Cardizem.  Discontinue ibuprofen. 3. Essential hypertension-continue ARB.        Significant Diagnostic Studies:   Labs / Micro:/Radiology  Recent Labs     05/30/22  2255   WBC 8.6   HGB 15.2   HCT 47.6   MCV 97.5        Labs Renal Latest Ref Rng & Units 6/1/2022 5/30/2022 6/17/2021 11/2/2020 5/31/2020   BUN 8 - 23 mg/dL 17 21 33(H) 32(H) 23   Cr 0.70 - 1.20 mg/dL 1.24(H) 1.41(H) 1.73(H) 1.60(H) 1.11   K 3.7 - 5.3 mmol/L 4.2 3.8 4.5 4.6 4.9   Na 135 - 144 mmol/L 140 142 141 141 135     Lab Results   Component Value Date    ALT 6 06/01/2022    AST 14 06/01/2022    ALKPHOS 110 06/01/2022    BILITOT 0.80 06/01/2022     Lab Results   Component Value Date    TSH 2.27 11/08/2013     Lab Results   Component Value Date    HEPCAB NONREACTIVE 01/09/2017     Lab Results   Component Value Date    COLORU YELLOW 05/11/2020    LABPH neg 01/15/2018    NITRU NEGATIVE 05/11/2020    GLUCOSEU NEGATIVE 05/11/2020    GLUCOSEU NEGATIVE 09/26/2011    KETUA NEGATIVE 05/11/2020    UROBILINOGEN Normal 05/11/2020    BILIRUBINUR NEGATIVE 05/11/2020    BILIRUBINUR neg 08/21/2013    BILIRUBINUR NEGATIVE 09/26/2011     Lab Results   Component Value Date    LABA1C 5.6 01/10/2022     Lab Results   Component Value Date     02/18/2020     Lab Results   Component Value Date    INR 1.4 05/31/2022    INR 1.3 05/12/2020    INR 1.2 05/11/2020    PROTIME 17.1 (H) 05/31/2022    PROTIME 13.3 (H) 05/12/2020    PROTIME 13.0 (H) 05/11/2020       CT ABDOMEN PELVIS W IV CONTRAST Additional Contrast? None    Result Date: 5/31/2022  1. Gallbladder distension, with gallbladder wall thickening and minimal adjacent haziness which may represent developing acute cholecystitis. This could be further assessed with right upper quadrant ultrasound if clinically concern. 2. Non masslike hyperenhancement seen in the liver involving the segments adjacent to the gallbladder, which may be related to hyperemia from underlying cholecystitis. No discrete focal enhancing mass. 3. Small bilateral pleural effusions, with cardiomegaly and mild peribronchial thickening which may be related to cardiogenic edema. Other chronic changes in the lung bases as above. 4. No ileus or obstruction. 5. Aorto iliac endograft, with no definite endoleak. Interval retraction of the aneurysm sac size when compared to the previous examination. 6. Trace ascites. 7. Nonobstructive calculus in the lower pole the left kidney. XR CHEST PORTABLE    Result Date: 5/30/2022  Mild bibasilar airspace disease. Question may represent interstitial edema versus developing airspace versus chronic areas of interstitial thickening. US ABDOMEN LIMITED Specify organ? GALLBLADDER    Result Date: 5/31/2022  Dependent sludge with significant gallbladder wall thickening. No pericholecystic fluid or positive sonographic Carrington's sign. Correlate clinically for possible cholecystitis. Consider HIDA follow-up study if indicated. Common duct prominent at 8 mm without intraductal stone demonstrated. Correlation clinical findings and laboratory values required.  RECOMMENDATIONS: Unavailable             Consultations:    Consults:     Final Specialist Recommendations/Findings:   IP CONSULT TO HOSPITALIST  IP CONSULT TO GENERAL SURGERY      The patient was seen and examined on day of discharge and this discharge summary is in conjunction with any daily progress note from day of discharge. Discharge plan:     Disposition: Home    Physician Follow Up:     Rafael Warner MD  [address] [phone]      post hospital visit    [address] [place] [address]  [phone]    if symptoms worsen        Requiring Further Evaluation/Follow Up POST HOSPITALIZATION/Incidental Findings: f/u with surgery     Diet: cardiac diet    Activity: As tolerated.     Instructions to Patient: medication as advised,     Discharge Medications:      Medication List      START taking these medications    pantoprazole 40 MG tablet  Commonly known as: PROTONIX  Take 1 tablet by mouth daily        CONTINUE taking these medications    apixaban 5 MG Tabs tablet  Commonly known as: Eliquis  Take 1 tablet by mouth 2 times daily     candesartan 32 MG tablet  Commonly known as: ATACAND  Take 1 tablet by mouth daily     dilTIAZem 360 MG extended release capsule  Commonly known as: CARDIZEM CD  Take 1 capsule by mouth daily     Linzess 290 MCG Caps capsule  Generic drug: linaclotide  Take 1 capsule by mouth every morning (before breakfast)     LORazepam 1 MG tablet  Commonly known as: ATIVAN  take 1 tablet by mouth every 8 hours if needed for anxiety  Start taking on: June 2, 2022     nitroGLYCERIN 0.4 MG SL tablet  Commonly known as: NITROSTAT  place 1 tablet under the tongue if needed every 5 minutes for chest pain for 3 doses IF NO RELIEF AFTER THIRD DOSE CALL 911.     tamsulosin 0.4 mg capsule  Commonly known as: FLOMAX  Take 1 capsule by mouth daily     tiotropium-olodaterol 2.5-2.5 MCG/ACT Aers  Commonly known as: Stiolto Respimat  Inhale 2 puffs into the lungs daily     tiZANidine 2 MG tablet  Commonly known as: ZANAFLEX  Take 1 tablet by mouth nightly as needed (Muscle spasms)     vitamin D 1.25 MG (03561 UT) Caps capsule  Commonly known as: ERGOCALCIFEROL  take 1 capsule by mouth every week           Where to Get Your Medications      These medications were sent to [address] [place], [place] [address], [facility] [address]    Phone: [phone]   · pantoprazole 40 MG tablet         Time Spent on discharge is  25 mins in patient examination, evaluation, counseling as well as medication reconciliation, prescriptions for required medications, discharge plan and follow up. Electronically signed by   Christiano Baca MD  6/1/2022        Thank you Dr. Myra Spring MD for the opportunity to be involved in this patient's care.

## 2022-06-01 NOTE — PROGRESS NOTES
[facility]  Office: [address], DO, Marinacally Lay, DO, Yvonne Menons, DO, Oliver Tejeda, DO, Jonnathan Gallegos MD, Fiorella Harrison MD, Sully Maldonado MD, Lolis Brown MD, Vy Momin MD, Nimisha Leone MD, Shila Smith MD, Osiel Hammonds, DO, Laura Garza MD,  Colette Mast DO, Kasie Staples MD, Will Machuca MD, Mildred Damon MD, Mackenzie Miller DO, Margaret Dang MD, Micha Huitron MD, Michi Cuellar DO, Joli Angelucci, MD, Luc Tang, [facility], [facility], [facility], Quincy Villatoro, CNP, Vonda Borges, CNP, Kimberly Ellison, CNP, Tre Naranjo, CNP, Cody Hou PA-C, Mellisa Gilliam, DNP, Haydee Seamarques, CNP, Aditya Racemarques, CNP, Jennifer Ross, CNP, Aaron Ley, CNS, Jimmy Nails, DNP, Noe Clapper, CNP, Nile Proper, CNP, Ita Serum, [facility]      Daily Progress Note     Admit Date: 5/30/2022  Bed/Room No.  2021/2021-01  Admitting Physician : Sully Maldonado MD  Code Status :Full Code  Hospital Day:  LOS: 1 day   Chief Complaint:     Chief Complaint   Patient presents with    Abdominal Pain     upper abdomen. started after dinner. ate mac n cheese. hx of AAA in the past couple of years. Principal Problem:    Acute cholecystitis  Active Problems:    Essential hypertension    COPD (chronic obstructive pulmonary disease) (HCC)    Atrial fibrillation, chronic (HCC)    Pacemaker  Resolved Problems:    * No resolved hospital problems. *    Subjective : Interval History/Significant events :  06/01/22    Patient is tolerating diet. He denies any nausea, vomiting. Patient remains afebrile. General surgery recommendations noted. No plans for surgery. Vitals - Stable afebrile  Labs -normal LFT. Nursing notes , Consults notes reviewed. Overnight events and updates discussed with Nursing staff .    Background History:         Paige Gonsalves is 77 y.o. male  Who was admitted to the hospital on 5/30/2022 for treatment of Acute cholecystitis. Patient came to hospital with acute onset right upper quadrant abdominal pain. He denied any fever, nausea. Patient is significant history of atrial fibrillation on long-term anticoagulation with Eliquis. Last dose of anticoagulation was 2 days before. Other comorbidities include hypertension, COPD, hyperlipidemia. Patient was found to have normal temperature, elevated blood pressure. Lab evaluation showed BUN 21, creatinine 1.41, white count 8.6. CT abdomen pelvis was history of gallbladder distention and wall thickening suggestive of acute cholecystitis. General surgery was consulted and recommended nonsurgical treatment. PMH:  Past Medical History:   Diagnosis Date    A-fib ([facility] [address]     Anxiety     Back pain     COPD (chronic obstructive pulmonary disease) (HCC)     HTN (hypertension)     Hyperglycemia     Hyperlipidemia     can't take statins, due to muscle aches    Insomnia     Lung nodule     Pacemaker     cardiologist, dr. Shirin an      Allergies: Allergies   Allergen Reactions    Lisinopril      cough    Neurontin [Gabapentin] Other (See Comments)     Took 3 nights and felt awful    Pcn [Penicillins] Swelling    Statins Support Therapy Other (See Comments)     Leg pains, tried pravachol, crestor, lipitor, simvastatin    Hydrochlorothiazide Rash     Breaks out      Medications :  pantoprazole, 40 mg, Oral, QAM AC  cefepime, 2,000 mg, IntraVENous, Q12H  tiotropium-olodaterol, 2 puff, Inhalation, Daily  sodium chloride flush, 5-40 mL, IntraVENous, 2 times per day  metroNIDAZOLE, 500 mg, IntraVENous, Q8H  [Held by provider] apixaban, 5 mg, Oral, BID  valsartan, 160 mg, Oral, Daily  dilTIAZem, 360 mg, Oral, Daily  linaclotide, 290 mcg, Oral, QAM AC        Review of Systems   Review of Systems   Constitutional: Negative for activity change, appetite change, fatigue, fever and unexpected weight change.    HENT: Negative for congestion, nosebleeds, rhinorrhea, sinus pressure, sneezing and voice change. Respiratory: Negative for cough, choking, chest tightness, shortness of breath and wheezing. Cardiovascular: Negative for chest pain, palpitations and leg swelling. Gastrointestinal: Negative for abdominal pain, constipation, diarrhea, nausea and vomiting. Genitourinary: Negative for difficulty urinating, dysuria, frequency, penile discharge and testicular pain. Musculoskeletal: Negative for back pain. Skin: Negative for rash. Neurological: Negative for dizziness, weakness, light-headedness and headaches. Hematological: Does not bruise/bleed easily. Psychiatric/Behavioral: Negative for agitation, behavioral problems, confusion, self-injury, sleep disturbance and suicidal ideas. Objective :      Current Vitals : Temp: 97.5 °F (36.4 °C),  Heart Rate: 81, Resp: 16, BP: (!) 151/84, SpO2: 93 %  Last 24 Hrs Vitals   Patient Vitals for the past 24 hrs:   BP Temp Temp src Pulse Resp SpO2 Weight   06/01/22 0719 (!) 151/84 97.5 °F (36.4 °C) Oral 81 16 93 % --   06/01/22 0547 -- -- -- -- -- -- 228 lb 9 oz (103.7 kg)   06/01/22 0432 (!) 143/74 98.4 °F (36.9 °C) Oral 59 18 93 % --   06/01/22 0142 (!) 145/73 98.1 °F (36.7 °C) Oral 50 19 93 % --   05/31/22 1916 (!) 159/77 97.9 °F (36.6 °C) Oral 66 18 95 % --   05/31/22 1506 134/74 98 °F (36.7 °C) Oral 54 16 97 % --     Intake / output   05/30 1901 - 06/01 0700  In: 1426.5 [I.V.:1183]  Out: 700 [Urine:700]  Physical Exam:  Physical Exam  Vitals and nursing note reviewed. Constitutional:       General: He is not in acute distress. Appearance: He is not diaphoretic. HENT:      Head: Normocephalic and atraumatic. Nose:      Right Sinus: No maxillary sinus tenderness or frontal sinus tenderness. Left Sinus: No maxillary sinus tenderness or frontal sinus tenderness. Mouth/Throat:      Pharynx: No oropharyngeal exudate. Eyes:      General: No scleral icterus.      Conjunctiva/sclera: Conjunctivae normal. Pupils: Pupils are equal, round, and reactive to light. Neck:      Thyroid: No thyromegaly. Vascular: No JVD. Cardiovascular:      Rate and Rhythm: Normal rate and regular rhythm. Pulses:           Dorsalis pedis pulses are 2+ on the right side and 2+ on the left side. Heart sounds: Normal heart sounds. No murmur heard. Pulmonary:      Effort: Pulmonary effort is normal.      Breath sounds: Normal breath sounds. No wheezing or rales. Abdominal:      Palpations: Abdomen is soft. There is no mass. Tenderness: There is no abdominal tenderness. Musculoskeletal:      Cervical back: Full passive range of motion without pain and neck supple. Lymphadenopathy:      Head:      Right side of head: No submandibular adenopathy. Left side of head: No submandibular adenopathy. Cervical: No cervical adenopathy. Skin:     General: Skin is warm. Neurological:      Mental Status: He is alert and oriented to person, place, and time. Motor: No tremor. Psychiatric:         Behavior: Behavior is cooperative.            Laboratory findings:    Recent Labs     05/30/22  2255 05/31/22  0335   WBC 8.6  --    HGB 15.2  --    HCT 47.6  --      --    INR  --  1.4     Recent Labs     05/30/22  2324 06/01/22  0550    140   K 3.8 4.2    107   CO2 25 21   GLUCOSE 124* 99   BUN 21 17   CREATININE 1.41* 1.24*   MG  --  2.3   CALCIUM 9.2 8.4*   PHOS  --  2.2*     Recent Labs     05/30/22  2324 06/01/22  0550   PROT 7.1 6.2*   LABALBU 4.0 3.9   AST 17 14   ALT 13 6   ALKPHOS 129 110   BILITOT 0.46 0.80   BILIDIR 0.20  --    AMYLASE 30 23*   LIPASE 13 5*          Specific Gravity, UA   Date Value Ref Range Status   05/11/2020 1.058 (H) 1.005 - 1.030 Final     Protein, UA   Date Value Ref Range Status   05/11/2020 NEGATIVE NEGATIVE Final     RBC, UA   Date Value Ref Range Status   08/06/2012 None 0 - 2 /HPF Final     Blood, UA POC   Date Value Ref Range Status   08/21/2013 large  Final     Bacteria, UA   Date Value Ref Range Status   08/06/2012 FEW (A) NONE Final     Nitrite, Urine   Date Value Ref Range Status   05/11/2020 NEGATIVE NEGATIVE Final     WBC, UA   Date Value Ref Range Status   08/06/2012 None 0 - 5 /HPF Final     Leukocyte Esterase, Urine   Date Value Ref Range Status   05/11/2020 NEGATIVE NEGATIVE Final       Imaging / Clinical Data :-   CT ABDOMEN PELVIS W IV CONTRAST Additional Contrast? None    Result Date: 5/31/2022  1. Gallbladder distension, with gallbladder wall thickening and minimal adjacent haziness which may represent developing acute cholecystitis. This could be further assessed with right upper quadrant ultrasound if clinically concern. 2. Non masslike hyperenhancement seen in the liver involving the segments adjacent to the gallbladder, which may be related to hyperemia from underlying cholecystitis. No discrete focal enhancing mass. 3. Small bilateral pleural effusions, with cardiomegaly and mild peribronchial thickening which may be related to cardiogenic edema. Other chronic changes in the lung bases as above. 4. No ileus or obstruction. 5. Aorto iliac endograft, with no definite endoleak. Interval retraction of the aneurysm sac size when compared to the previous examination. 6. Trace ascites. 7. Nonobstructive calculus in the lower pole the left kidney. XR CHEST PORTABLE    Result Date: 5/30/2022  Mild bibasilar airspace disease. Question may represent interstitial edema versus developing airspace versus chronic areas of interstitial thickening. US ABDOMEN LIMITED Specify organ? GALLBLADDER    Result Date: 5/31/2022  Dependent sludge with significant gallbladder wall thickening. No pericholecystic fluid or positive sonographic Carrington's sign. Correlate clinically for possible cholecystitis. Consider HIDA follow-up study if indicated. Common duct prominent at 8 mm without intraductal stone demonstrated.  Correlation clinical findings and laboratory values required. RECOMMENDATIONS: Unavailable        Clinical Course : stable  Assessment and Plan  :        1. Cholelithiasis with acute cholecystitis -improved with bowel rest, pain control, IV hydration. Biliary colic. No plans for surgery at this time. Outpatient follow-up with general surgery. 2. Chronic atrial fibrillation -continue Eliquis Cardizem. Discontinue ibuprofen. 3. Essential hypertension-continue ARB. Discharge home. Continue to monitor vitals , Intake / output ,  Cell count , HGB , Kidney function, oxygenation  as indicated . Plan and updates discussed with patient ,  answers  explained to satisfaction.    Plan discussed with Staff  RN     (Please note that portions of this note were completed with a voice recognition program. Efforts were made to edit the dictations but occasionally words are mis-transcribed.)      Maine Whaley MD  6/1/2022

## 2022-06-02 ENCOUNTER — TELEPHONE (OUTPATIENT)
Dept: FAMILY MEDICINE CLINIC | Age: 77
End: 2022-06-02

## 2022-06-02 DIAGNOSIS — M62.830 SPASM OF MUSCLE OF LOWER BACK: ICD-10-CM

## 2022-06-02 NOTE — TELEPHONE ENCOUNTER
[facility] Transitions Initial Follow Up Call    Outreach made within 2 business days of discharge: Yes    Patient: Magdalene Brantley Patient : 1945   MRN: [id number]  Reason for Admission: There are no discharge diagnoses documented for the most recent discharge. Discharge Date: 22       Spoke with: patient    Discharge department/facility: [facility] Interactive Patient Contact:  Was patient able to fill all prescriptions: Yes  Was patient instructed to bring all medications to the follow-up visit: Yes  Is patient taking all medications as directed in the discharge summary? Yes  Does patient understand their discharge instructions: Yes  Does patient have questions or concerns that need addressed prior to 7-14 day follow up office visit: no    Patient declined to schedule. States he is feeling fine.     Scheduled appointment with PCP within 7-14 days    Follow Up  Future Appointments   Date Time Provider Jose rCuz Scruggs   2022  9:45 AM Sandra Ware MD Resp Suellen Chun   2022 10:45 AM CARMITA Valle NP [facility] [address], [place]

## 2022-06-02 NOTE — TELEPHONE ENCOUNTER
Last visit: 5/16/22  Last Med refill: 5/16/22  Does patient have enough medication for 72 hours: No:     Next Visit Date:  Future Appointments   Date Time Provider Jose Cruz Scruggs   7/26/2022  9:45 AM Liane Bazzi MD Resp Spec [address]   9/19/2022 10:45 AM Bailey Mitchell, APRN - NP [facility] [address] Maintenance   Topic Date Due    Pneumococcal 65+ years Vaccine (1 - PCV) Never done    Diabetic retinal exam  Never done    Colorectal Cancer Screen  Never done    Shingles vaccine (1 of 2) Never done    COVID-19 Vaccine (2 - Booster for Marysol series) 07/08/2021    Diabetic foot exam  03/23/2022    Lipids  06/17/2022    Flu vaccine (Season Ended) 09/01/2022    A1C test (Diabetic or Prediabetic)  01/10/2023    Depression Screen  05/16/2023    DTaP/Tdap/Td vaccine (2 - Td or Tdap) 07/10/2023    Hepatitis C screen  Completed    Hepatitis A vaccine  Aged Out    Hib vaccine  Aged Out    Meningococcal (ACWY) vaccine  Aged Out       Hemoglobin A1C (%)   Date Value   01/10/2022 5.6   07/13/2021 6.1   10/28/2020 5.5             ( goal A1C is < 7)   Microalb/Crt.  Ratio (mcg/mg creat)   Date Value   11/02/2020 38 (H)     LDL Cholesterol (mg/dL)   Date Value   06/17/2021 134 (H)   02/18/2020 121       (goal LDL is <100)   AST (U/L)   Date Value   06/01/2022 14     ALT (U/L)   Date Value   06/01/2022 6     BUN (mg/dL)   Date Value   06/01/2022 17     BP Readings from Last 3 Encounters:   06/01/22 (!) 151/84   05/16/22 136/84   01/10/22 132/80          (goal 120/80)    All Future Testing planned in [facility]  Lab Frequency Next Occurrence   Comprehensive Metabolic Panel Once 48/14/0450               Patient Active Problem List:     Back pain     Essential hypertension     COPD (chronic obstructive pulmonary disease) (HCC)     Lung nodule     Procedure refused     Hyperlipidemia     Colonoscopy refused     Atrial fibrillation, chronic (HCC)     Anxiety     Insomnia     Pacemaker battery depletion Pacemaker     Bradycardia     Constipation     Diet-controlled diabetes mellitus ([facility] [address]     Aneurysm of infrarenal abdominal aorta (HCC)     CLIFTON (acute kidney injury) ([address]     Hyperkalemia     Acute cholecystitis

## 2022-06-03 RX ORDER — TIZANIDINE 2 MG/1
TABLET ORAL
Qty: 15 TABLET | Refills: 0 | Status: SHIPPED | OUTPATIENT
Start: 2022-06-03

## 2022-06-16 ENCOUNTER — TELEPHONE (OUTPATIENT)
Dept: FAMILY MEDICINE CLINIC | Age: 77
End: 2022-06-16

## 2022-06-29 DIAGNOSIS — R07.9 CHEST PAIN, UNSPECIFIED TYPE: ICD-10-CM

## 2022-06-29 DIAGNOSIS — F41.9 ANXIETY: ICD-10-CM

## 2022-06-29 RX ORDER — NITROGLYCERIN 0.4 MG/1
TABLET SUBLINGUAL
Qty: 25 TABLET | Refills: 1 | Status: SHIPPED | OUTPATIENT
Start: 2022-06-29 | End: 2022-08-30

## 2022-06-29 RX ORDER — LORAZEPAM 1 MG/1
TABLET ORAL
Qty: 90 TABLET | Refills: 0 | Status: SHIPPED | OUTPATIENT
Start: 2022-06-29 | End: 2022-07-28

## 2022-06-29 NOTE — TELEPHONE ENCOUNTER
Last visit: 05/16/2022   Toney  Last Med refill: 5/27/& 5/31/22  Does patient have enough medication for 72 hours: Yes    Next Visit Date:  Future Appointments   Date Time Provider Jose Cruz Scruggs   7/26/2022  9:45 AM Kunal Palomares MD Resp Spec Radha Harrington   9/19/2022 10:45 AM Argelia Kim APRN - NP [facility] FP [address] Maintenance   Topic Date Due    Pneumococcal 65+ years Vaccine (1 - PCV) Never done    Diabetic retinal exam  Never done    Colorectal Cancer Screen  Never done    Shingles vaccine (1 of 2) Never done    COVID-19 Vaccine (2 - Booster for Marysol series) 07/08/2021    Diabetic foot exam  03/23/2022    Lipids  06/17/2022    Flu vaccine (Season Ended) 09/01/2022    A1C test (Diabetic or Prediabetic)  01/10/2023    Depression Screen  05/16/2023    DTaP/Tdap/Td vaccine (2 - Td or Tdap) 07/10/2023    Hepatitis C screen  Completed    Hepatitis A vaccine  Aged Out    Hib vaccine  Aged Out    Meningococcal (ACWY) vaccine  Aged Out       Hemoglobin A1C (%)   Date Value   01/10/2022 5.6   07/13/2021 6.1   10/28/2020 5.5             ( goal A1C is < 7)   Microalb/Crt.  Ratio (mcg/mg creat)   Date Value   11/02/2020 38 (H)     LDL Cholesterol (mg/dL)   Date Value   06/17/2021 134 (H)   02/18/2020 121       (goal LDL is <100)   AST (U/L)   Date Value   06/01/2022 14     ALT (U/L)   Date Value   06/01/2022 6     BUN (mg/dL)   Date Value   06/01/2022 17     BP Readings from Last 3 Encounters:   06/01/22 (!) 151/84   05/16/22 136/84   01/10/22 132/80          (goal 120/80)    All Future Testing planned in [facility]  Lab Frequency Next Occurrence               Patient Active Problem List:     Back pain     Essential hypertension     COPD (chronic obstructive pulmonary disease) (HCC)     Lung nodule     Procedure refused     Hyperlipidemia     Colonoscopy refused     Atrial fibrillation, chronic (HCC)     Anxiety     Insomnia     Pacemaker battery depletion     Pacemaker     Bradycardia Constipation     Diet-controlled diabetes mellitus (HCC)     Aneurysm of infrarenal abdominal aorta (HCC)     CLIFTON (acute kidney injury) ([facility] [address]     Hyperkalemia     Acute cholecystitis

## 2022-07-28 DIAGNOSIS — F41.9 ANXIETY: ICD-10-CM

## 2022-07-28 RX ORDER — LORAZEPAM 1 MG/1
TABLET ORAL
Qty: 90 TABLET | Refills: 1 | Status: SHIPPED | OUTPATIENT
Start: 2022-07-30 | End: 2022-09-28 | Stop reason: SDUPTHER

## 2022-08-29 DIAGNOSIS — R07.9 CHEST PAIN, UNSPECIFIED TYPE: ICD-10-CM

## 2022-08-29 DIAGNOSIS — I48.20 ATRIAL FIBRILLATION, CHRONIC (HCC): ICD-10-CM

## 2022-08-30 RX ORDER — NITROGLYCERIN 0.4 MG/1
TABLET SUBLINGUAL
Qty: 25 TABLET | Refills: 1 | Status: SHIPPED | OUTPATIENT
Start: 2022-08-30

## 2022-08-30 NOTE — TELEPHONE ENCOUNTER
Last visit: 05/16/2022  Last Med refill: 07/28/2022,  not sure when Eliquis was last filled  Does patient have enough medication for 72 hours: Yes    Next Visit Date:  Future Appointments   Date Time Provider Jose Cruz Scruggs   9/19/2022 10:45 AM [facility]   10/11/2022 10:00 AM Manfred Ojeda  [address] Maintenance   Topic Date Due    Pneumococcal 65+ years Vaccine (1 - PCV) Never done    Diabetic retinal exam  Never done    Colorectal Cancer Screen  Never done    Shingles vaccine (1 of 2) Never done    COVID-19 Vaccine (2 - Booster for Marysol series) 07/08/2021    Diabetic foot exam  03/23/2022    Lipids  06/17/2022    Flu vaccine (1) 09/01/2022    A1C test (Diabetic or Prediabetic)  01/10/2023    Depression Screen  05/16/2023    DTaP/Tdap/Td vaccine (2 - Td or Tdap) 07/10/2023    Hepatitis C screen  Completed    Hepatitis A vaccine  Aged Out    Hib vaccine  Aged Out    Meningococcal (ACWY) vaccine  Aged Out       Hemoglobin A1C (%)   Date Value   01/10/2022 5.6   07/13/2021 6.1   10/28/2020 5.5             ( goal A1C is < 7)   Microalb/Crt.  Ratio (mcg/mg creat)   Date Value   11/02/2020 38 (H)     LDL Cholesterol (mg/dL)   Date Value   06/17/2021 134 (H)   02/18/2020 121       (goal LDL is <100)   AST (U/L)   Date Value   06/01/2022 14     ALT (U/L)   Date Value   06/01/2022 6     BUN (mg/dL)   Date Value   06/01/2022 17     BP Readings from Last 3 Encounters:   06/01/22 (!) 151/84   05/16/22 136/84   01/10/22 132/80          (goal 120/80)    All Future Testing planned in [facility]  Lab Frequency Next Occurrence               Patient Active Problem List:     Back pain     Essential hypertension     COPD (chronic obstructive pulmonary disease) (HCC)     Lung nodule     Procedure refused     Hyperlipidemia     Colonoscopy refused     Atrial fibrillation, chronic (HCC)     Anxiety     Insomnia     Pacemaker battery depletion     Pacemaker     Bradycardia Constipation     Diet-controlled diabetes mellitus (HCC)     Aneurysm of infrarenal abdominal aorta (HCC)     CLIFTON (acute kidney injury) ([facility] [address]     Hyperkalemia     Acute cholecystitis

## 2022-09-19 ENCOUNTER — OFFICE VISIT (OUTPATIENT)
Dept: FAMILY MEDICINE CLINIC | Age: 77
End: 2022-09-19
Payer: COMMERCIAL

## 2022-09-19 VITALS
BODY MASS INDEX: 29.8 KG/M2 | RESPIRATION RATE: 22 BRPM | HEART RATE: 75 BPM | DIASTOLIC BLOOD PRESSURE: 86 MMHG | WEIGHT: 220 LBS | OXYGEN SATURATION: 99 % | SYSTOLIC BLOOD PRESSURE: 152 MMHG | HEIGHT: 72 IN

## 2022-09-19 DIAGNOSIS — E78.2 MIXED HYPERLIPIDEMIA: ICD-10-CM

## 2022-09-19 DIAGNOSIS — J44.9 CHRONIC OBSTRUCTIVE PULMONARY DISEASE, UNSPECIFIED COPD TYPE (HCC): ICD-10-CM

## 2022-09-19 DIAGNOSIS — F41.9 ANXIETY: Primary | ICD-10-CM

## 2022-09-19 DIAGNOSIS — R06.81 WITNESSED EPISODE OF APNEA: ICD-10-CM

## 2022-09-19 DIAGNOSIS — R05.9 COUGH: ICD-10-CM

## 2022-09-19 DIAGNOSIS — Z23 NEED FOR INFLUENZA VACCINATION: ICD-10-CM

## 2022-09-19 PROCEDURE — 90471 IMMUNIZATION ADMIN: CPT | Performed by: NURSE PRACTITIONER

## 2022-09-19 PROCEDURE — G8427 DOCREV CUR MEDS BY ELIG CLIN: HCPCS | Performed by: NURSE PRACTITIONER

## 2022-09-19 PROCEDURE — 1123F ACP DISCUSS/DSCN MKR DOCD: CPT | Performed by: NURSE PRACTITIONER

## 2022-09-19 PROCEDURE — 99214 OFFICE O/P EST MOD 30 MIN: CPT | Performed by: NURSE PRACTITIONER

## 2022-09-19 PROCEDURE — 1036F TOBACCO NON-USER: CPT | Performed by: NURSE PRACTITIONER

## 2022-09-19 PROCEDURE — G8417 CALC BMI ABV UP PARAM F/U: HCPCS | Performed by: NURSE PRACTITIONER

## 2022-09-19 PROCEDURE — 3023F SPIROM DOC REV: CPT | Performed by: NURSE PRACTITIONER

## 2022-09-19 PROCEDURE — 90694 VACC AIIV4 NO PRSRV 0.5ML IM: CPT | Performed by: NURSE PRACTITIONER

## 2022-09-19 PROCEDURE — 3017F COLORECTAL CA SCREEN DOC REV: CPT | Performed by: NURSE PRACTITIONER

## 2022-09-19 RX ORDER — ALBUTEROL SULFATE 90 UG/1
2 AEROSOL, METERED RESPIRATORY (INHALATION) EVERY 6 HOURS PRN
Qty: 1 EACH | Refills: 5 | Status: SHIPPED | OUTPATIENT
Start: 2022-09-19

## 2022-09-19 ASSESSMENT — ENCOUNTER SYMPTOMS
CHEST TIGHTNESS: 0
NAUSEA: 0
ABDOMINAL PAIN: 0
SHORTNESS OF BREATH: 1
CONSTIPATION: 0
VOMITING: 0
BACK PAIN: 1
COLOR CHANGE: 0
SINUS PAIN: 0
DIARRHEA: 0
EYE PAIN: 0
SINUS PRESSURE: 0

## 2022-09-19 NOTE — PROGRESS NOTES
Blanquita Slater (:  1945) is a 77 y.o. male,Established patient, here for evaluation of the following chief complaint(s): Anxiety and Referral - General (Would like referral for his eyes. /)         ASSESSMENT/PLAN:  1. Anxiety  Assessment & Plan:   Well-controlled, continue current medications  2. Mixed hyperlipidemia  -     Lipid Panel; Future  3. Chronic obstructive pulmonary disease, unspecified COPD type ([facility] [address]  Assessment & Plan:   Unclear control, continue current medications. Keep track of how often you are needing to use albuterol inhaler. Can consider stepping up maintenance inhaler. Orders:  -     albuterol sulfate HFA (VENTOLIN HFA) 108 (90 Base) MCG/ACT inhaler; Inhale 2 puffs into the lungs every 6 hours as needed for Wheezing, Disp-1 each, R-5Normal  4. Cough  Comments: Follow up pending results. Orders:  -     XR CHEST (2 VW); Future  5. Need for influenza vaccination  -     Influenza, FLUAD, (age 72 y+), IM, Preservative Free, 0.5 mL  6. Witnessed episode of apnea  Comments:  Encouraged to think about sleep study. Reviewed effects on heart, BP, anxiety and energy levels. Re-printed referral to eye doctor     Return in about 3 months (around 2022) for Depression/Anxiety, HTN.          Subjective   SUBJECTIVE/OBJECTIVE:  Presents for 3 month check on chronic conditions  Has lost 7lbs since last OV     Anxiety: Taking ativan TID  PDMP reviewed   'As controlled as it can be'    Sleep: Not getting good sleep  Back hurts all of the time and this keeps him awake  Son came back drunk last night, this worked him up today   Endorses apneic episodes, has jumped out of bed awake because he stops breathing  Has worn bipap while in patient in hospitals  Does not want to complete sleep study 'If I go under, I will go under on my own terms'     HTN: BP elevated, believes it is because he is wound up  Taking all BP meds regularly, denies chest pain, dizziness/lightheadedness, heart palpitations     COPD: Using stiolto daily, needs refill of albuterol   Has been waking up congested cough the last few months  Feels like 'water bubbles' are in his chest while watching tv     Denies any other problems/concerns at this time       Review of Systems   Constitutional:  Negative for activity change, chills, fatigue and unexpected weight change. HENT:  Positive for congestion. Negative for hearing loss, postnasal drip, sinus pressure and sinus pain. Eyes:  Negative for pain and visual disturbance. Respiratory:  Positive for shortness of breath (COPD). Negative for chest tightness. Cardiovascular:  Negative for chest pain and palpitations. Gastrointestinal:  Negative for abdominal pain, constipation, diarrhea, nausea and vomiting. Endocrine: Negative for polydipsia, polyphagia and polyuria. Genitourinary:  Negative for dysuria, flank pain, frequency and urgency. Musculoskeletal:  Positive for arthralgias and back pain. Negative for myalgias. Skin:  Negative for color change and rash. Allergic/Immunologic: Negative for environmental allergies. Neurological:  Negative for dizziness, weakness, light-headedness, numbness and headaches. Psychiatric/Behavioral:  Positive for sleep disturbance. Negative for confusion, hallucinations, self-injury and suicidal ideas. The patient is nervous/anxious. Objective   Physical Exam  Vitals and nursing note reviewed. Constitutional:       General: He is not in acute distress. Appearance: Normal appearance. HENT:      Head: Normocephalic. Right Ear: Hearing normal.      Left Ear: Hearing normal.      Nose: Nose normal. No congestion or rhinorrhea. Mouth/Throat:      Mouth: Mucous membranes are moist.      Pharynx: Oropharynx is clear. Eyes:      Extraocular Movements: Extraocular movements intact. Conjunctiva/sclera: Conjunctivae normal.      Pupils: Pupils are equal, round, and reactive to light.    Cardiovascular: Rate and Rhythm: Normal rate and regular rhythm. Pulses: Normal pulses. Heart sounds: Normal heart sounds. Pulmonary:      Effort: Pulmonary effort is normal.      Breath sounds: Normal breath sounds and air entry. No decreased breath sounds, wheezing, rhonchi or rales. Abdominal:      General: Abdomen is flat. Bowel sounds are normal.      Palpations: Abdomen is soft. Musculoskeletal:         General: Normal range of motion. Cervical back: Normal range of motion. Skin:     General: Skin is warm and dry. Capillary Refill: Capillary refill takes less than 2 seconds. Neurological:      General: No focal deficit present. Mental Status: He is alert and oriented to person, place, and time. Mental status is at baseline. Psychiatric:         Attention and Perception: Attention and perception normal.         Mood and Affect: Mood and affect normal.         Speech: Speech normal.         Behavior: Behavior normal. Behavior is cooperative. Thought Content: Thought content normal.         Cognition and Memory: Cognition and memory normal.         Judgment: Judgment normal.            Wt Readings from Last 3 Encounters:   09/19/22 220 lb (99.8 kg)   06/01/22 228 lb 9 oz (103.7 kg)   05/16/22 227 lb (103 kg)     Temp Readings from Last 3 Encounters:   06/01/22 97.5 °F (36.4 °C) (Oral)   07/13/21 98 °F (36.7 °C) (Temporal)   03/23/21 98.2 °F (36.8 °C) (Temporal)     BP Readings from Last 3 Encounters:   09/19/22 (!) 152/86   06/01/22 (!) 151/84   05/16/22 136/84     Pulse Readings from Last 3 Encounters:   09/19/22 75   06/01/22 81   05/16/22 54           An electronic signature was used to authenticate this note.     --CARMITA Courtney - NP

## 2022-09-19 NOTE — ASSESSMENT & PLAN NOTE
Unclear control, continue current medications. Keep track of how often you are needing to use albuterol inhaler. Can consider stepping up maintenance inhaler.

## 2022-09-27 DIAGNOSIS — F41.9 ANXIETY: ICD-10-CM

## 2022-09-27 DIAGNOSIS — J44.9 CHRONIC OBSTRUCTIVE PULMONARY DISEASE, UNSPECIFIED COPD TYPE (HCC): ICD-10-CM

## 2022-09-27 RX ORDER — LORAZEPAM 1 MG/1
TABLET ORAL
Qty: 90 TABLET | OUTPATIENT
Start: 2022-09-27

## 2022-09-27 NOTE — TELEPHONE ENCOUNTER
Pacemaker     Bradycardia     Constipation     Diet-controlled diabetes mellitus ([facility] [address]     Aneurysm of infrarenal abdominal aorta (HCC)     CLIFTON (acute kidney injury) ([address]     Hyperkalemia     Acute cholecystitis

## 2022-09-28 RX ORDER — TIOTROPIUM BROMIDE AND OLODATEROL 3.124; 2.736 UG/1; UG/1
SPRAY, METERED RESPIRATORY (INHALATION)
Qty: 1 EACH | Refills: 5 | Status: SHIPPED | OUTPATIENT
Start: 2022-09-28

## 2022-09-28 RX ORDER — LORAZEPAM 1 MG/1
TABLET ORAL
Qty: 90 TABLET | Refills: 1 | Status: SHIPPED | OUTPATIENT
Start: 2022-09-28 | End: 2022-11-24

## 2022-09-30 ENCOUNTER — HOSPITAL ENCOUNTER (OUTPATIENT)
Dept: GENERAL RADIOLOGY | Facility: CLINIC | Age: 77
Discharge: HOME | End: 2022-10-02
Payer: COMMERCIAL

## 2022-09-30 ENCOUNTER — HOSPITAL ENCOUNTER (OUTPATIENT)
Facility: CLINIC | Age: 77
Discharge: HOME | End: 2022-10-02
Payer: COMMERCIAL

## 2022-09-30 ENCOUNTER — HOSPITAL ENCOUNTER (OUTPATIENT)
Age: 77
Setting detail: SPECIMEN
Discharge: HOME | End: 2022-09-30

## 2022-09-30 DIAGNOSIS — R05.9 COUGH: ICD-10-CM

## 2022-09-30 DIAGNOSIS — E78.2 MIXED HYPERLIPIDEMIA: ICD-10-CM

## 2022-09-30 LAB
CHOLESTEROL/HDL RATIO: 3.5
CHOLESTEROL: 184 MG/DL
HDLC SERPL-MCNC: 52 MG/DL
LDL CHOLESTEROL: 116 MG/DL (ref 0–130)
TRIGL SERPL-MCNC: 78 MG/DL

## 2022-09-30 PROCEDURE — 71046 X-RAY EXAM CHEST 2 VIEWS: CPT

## 2022-10-03 ENCOUNTER — TELEPHONE (OUTPATIENT)
Dept: FAMILY MEDICINE CLINIC | Age: 77
End: 2022-10-03

## 2022-10-03 DIAGNOSIS — J44.1 COPD EXACERBATION (HCC): Primary | ICD-10-CM

## 2022-10-03 RX ORDER — IPRATROPIUM BROMIDE AND ALBUTEROL SULFATE 2.5; .5 MG/3ML; MG/3ML
1 SOLUTION RESPIRATORY (INHALATION) EVERY 4 HOURS
Qty: 360 ML | Refills: 1 | Status: SHIPPED | OUTPATIENT
Start: 2022-10-03

## 2022-11-22 DIAGNOSIS — F41.9 ANXIETY: ICD-10-CM

## 2022-11-22 RX ORDER — LORAZEPAM 1 MG/1
TABLET ORAL
Qty: 90 TABLET | Refills: 1 | Status: SHIPPED | OUTPATIENT
Start: 2022-11-22 | End: 2023-01-21

## 2022-11-22 NOTE — TELEPHONE ENCOUNTER
Last visit: 9/19/22  Last Med refill: 9/28/22  Does patient have enough medication for 72 hours: Yes    Next Visit Date:  Future Appointments   Date Time Provider Jose Cruz Saavedrai   1/23/2023  8:45 AM Jaylene Miller  [address] Maintenance   Topic Date Due    Pneumococcal 65+ years Vaccine (1 - PCV) Never done    Colorectal Cancer Screen  Never done    Shingles vaccine (1 of 2) Never done    COVID-19 Vaccine (2 - Booster for Marysol series) 07/08/2021    Depression Screen  05/16/2023    DTaP/Tdap/Td vaccine (2 - Td or Tdap) 07/10/2023    Flu vaccine  Completed    Hepatitis C screen  Completed    Hepatitis A vaccine  Aged Out    Hib vaccine  Aged Out    Meningococcal (ACWY) vaccine  Aged Out       Hemoglobin A1C (%)   Date Value   01/10/2022 5.6   07/13/2021 6.1   10/28/2020 5.5             ( goal A1C is < 7)   Microalb/Crt.  Ratio (mcg/mg creat)   Date Value   11/02/2020 38 (H)     LDL Cholesterol (mg/dL)   Date Value   09/30/2022 116   06/17/2021 134 (H)       (goal LDL is <100)   AST (U/L)   Date Value   06/01/2022 14     ALT (U/L)   Date Value   06/01/2022 6     BUN (mg/dL)   Date Value   06/01/2022 17     BP Readings from Last 3 Encounters:   09/19/22 (!) 152/86   06/01/22 (!) 151/84   05/16/22 136/84          (goal 120/80)    All Future Testing planned in [facility]  Lab Frequency Next Occurrence               Patient Active Problem List:     Back pain     Essential hypertension     COPD (chronic obstructive pulmonary disease) (HCC)     Lung nodule     Procedure refused     Hyperlipidemia     Colonoscopy refused     Atrial fibrillation, chronic (HCC)     Anxiety     Insomnia     Pacemaker battery depletion     Pacemaker     Bradycardia     Constipation     Diet-controlled diabetes mellitus ([facility] [address]     Aneurysm of infrarenal abdominal aorta     CLIFTON (acute kidney injury) (HCC)     Hyperkalemia     Acute cholecystitis

## 2022-12-24 DIAGNOSIS — N40.1 BENIGN PROSTATIC HYPERPLASIA WITH NOCTURIA: ICD-10-CM

## 2022-12-24 DIAGNOSIS — R35.1 BENIGN PROSTATIC HYPERPLASIA WITH NOCTURIA: ICD-10-CM

## 2022-12-27 RX ORDER — TAMSULOSIN HYDROCHLORIDE 0.4 MG/1
CAPSULE ORAL
Qty: 90 CAPSULE | Refills: 1 | Status: SHIPPED | OUTPATIENT
Start: 2022-12-27

## 2023-01-16 DIAGNOSIS — E55.9 VITAMIN D DEFICIENCY: ICD-10-CM

## 2023-01-16 RX ORDER — ERGOCALCIFEROL 1.25 MG/1
CAPSULE ORAL
Qty: 12 CAPSULE | Refills: 1 | Status: SHIPPED | OUTPATIENT
Start: 2023-01-16

## 2023-01-16 NOTE — TELEPHONE ENCOUNTER
Last visit: 10/19/22  Last Med refill: 5/16/22  Does patient have enough medication for 72 hours: No:     Next Visit Date:  Future Appointments   Date Time Provider Jose Cruz Saavedrai   1/23/2023  8:45 AM Jaylene Soria  [address] Maintenance   Topic Date Due    Pneumococcal 65+ years Vaccine (1 - PCV) Never done    Colorectal Cancer Screen  Never done    Shingles vaccine (1 of 2) Never done    COVID-19 Vaccine (2 - Booster for Marysol series) 07/08/2021    Depression Screen  05/16/2023    DTaP/Tdap/Td vaccine (2 - Td or Tdap) 07/10/2023    Flu vaccine  Completed    Hepatitis C screen  Completed    Hepatitis A vaccine  Aged Out    Hib vaccine  Aged Out    Meningococcal (ACWY) vaccine  Aged Out       Hemoglobin A1C (%)   Date Value   01/10/2022 5.6   07/13/2021 6.1   10/28/2020 5.5             ( goal A1C is < 7)   Microalb/Crt.  Ratio (mcg/mg creat)   Date Value   11/02/2020 38 (H)     LDL Cholesterol (mg/dL)   Date Value   09/30/2022 116   06/17/2021 134 (H)       (goal LDL is <100)   AST (U/L)   Date Value   06/01/2022 14     ALT (U/L)   Date Value   06/01/2022 6     BUN (mg/dL)   Date Value   06/01/2022 17     BP Readings from Last 3 Encounters:   09/19/22 (!) 152/86   06/01/22 (!) 151/84   05/16/22 136/84          (goal 120/80)    All Future Testing planned in [facility]  Lab Frequency Next Occurrence               Patient Active Problem List:     Back pain     Essential hypertension     COPD (chronic obstructive pulmonary disease) (HCC)     Lung nodule     Procedure refused     Hyperlipidemia     Colonoscopy refused     Atrial fibrillation, chronic (HCC)     Anxiety     Insomnia     Pacemaker battery depletion     Pacemaker     Bradycardia     Constipation     Diet-controlled diabetes mellitus ([facility] [address]     Aneurysm of infrarenal abdominal aorta     CLIFTON (acute kidney injury) (HCC)     Hyperkalemia     Acute cholecystitis

## 2023-01-23 ENCOUNTER — OFFICE VISIT (OUTPATIENT)
Dept: FAMILY MEDICINE CLINIC | Age: 78
End: 2023-01-23
Payer: COMMERCIAL

## 2023-01-23 VITALS
TEMPERATURE: 97.9 F | OXYGEN SATURATION: 97 % | SYSTOLIC BLOOD PRESSURE: 138 MMHG | HEART RATE: 64 BPM | DIASTOLIC BLOOD PRESSURE: 70 MMHG | BODY MASS INDEX: 31.19 KG/M2 | WEIGHT: 226.8 LBS

## 2023-01-23 DIAGNOSIS — E78.2 MIXED HYPERLIPIDEMIA: ICD-10-CM

## 2023-01-23 DIAGNOSIS — I10 ESSENTIAL HYPERTENSION: ICD-10-CM

## 2023-01-23 DIAGNOSIS — I48.20 ATRIAL FIBRILLATION, CHRONIC (HCC): ICD-10-CM

## 2023-01-23 DIAGNOSIS — Z23 NEED FOR PNEUMOCOCCAL VACCINE: ICD-10-CM

## 2023-01-23 DIAGNOSIS — Z95.0 PACEMAKER: ICD-10-CM

## 2023-01-23 DIAGNOSIS — E11.9 DIET-CONTROLLED DIABETES MELLITUS (HCC): Primary | ICD-10-CM

## 2023-01-23 DIAGNOSIS — I71.43 INFRARENAL ABDOMINAL AORTIC ANEURYSM (AAA) WITHOUT RUPTURE: ICD-10-CM

## 2023-01-23 DIAGNOSIS — J44.9 CHRONIC OBSTRUCTIVE PULMONARY DISEASE, UNSPECIFIED COPD TYPE (HCC): ICD-10-CM

## 2023-01-23 DIAGNOSIS — F41.9 ANXIETY: ICD-10-CM

## 2023-01-23 DIAGNOSIS — K59.00 CONSTIPATION, UNSPECIFIED CONSTIPATION TYPE: ICD-10-CM

## 2023-01-23 DIAGNOSIS — Z53.20 PROCEDURE REFUSED: ICD-10-CM

## 2023-01-23 DIAGNOSIS — R91.1 LEFT LOWER LOBE PULMONARY NODULE: ICD-10-CM

## 2023-01-23 LAB — HBA1C MFR BLD: 5.5 %

## 2023-01-23 PROCEDURE — 3075F SYST BP GE 130 - 139MM HG: CPT | Performed by: INTERNAL MEDICINE

## 2023-01-23 PROCEDURE — 1036F TOBACCO NON-USER: CPT | Performed by: INTERNAL MEDICINE

## 2023-01-23 PROCEDURE — G8427 DOCREV CUR MEDS BY ELIG CLIN: HCPCS | Performed by: INTERNAL MEDICINE

## 2023-01-23 PROCEDURE — 1123F ACP DISCUSS/DSCN MKR DOCD: CPT | Performed by: INTERNAL MEDICINE

## 2023-01-23 PROCEDURE — G8417 CALC BMI ABV UP PARAM F/U: HCPCS | Performed by: INTERNAL MEDICINE

## 2023-01-23 PROCEDURE — 83036 HEMOGLOBIN GLYCOSYLATED A1C: CPT | Performed by: INTERNAL MEDICINE

## 2023-01-23 PROCEDURE — 3023F SPIROM DOC REV: CPT | Performed by: INTERNAL MEDICINE

## 2023-01-23 PROCEDURE — 99214 OFFICE O/P EST MOD 30 MIN: CPT | Performed by: INTERNAL MEDICINE

## 2023-01-23 PROCEDURE — G8484 FLU IMMUNIZE NO ADMIN: HCPCS | Performed by: INTERNAL MEDICINE

## 2023-01-23 PROCEDURE — 3078F DIAST BP <80 MM HG: CPT | Performed by: INTERNAL MEDICINE

## 2023-01-23 RX ORDER — LINACLOTIDE 290 UG/1
290 CAPSULE, GELATIN COATED ORAL
Qty: 90 CAPSULE | Refills: 1 | Status: SHIPPED | OUTPATIENT
Start: 2023-01-23

## 2023-01-23 RX ORDER — LORAZEPAM 1 MG/1
TABLET ORAL
Qty: 90 TABLET | Refills: 1 | Status: SHIPPED | OUTPATIENT
Start: 2023-01-23 | End: 2023-03-24

## 2023-01-23 RX ORDER — CANDESARTAN 32 MG/1
32 TABLET ORAL DAILY
Qty: 90 TABLET | Refills: 1 | Status: SHIPPED | OUTPATIENT
Start: 2023-01-23

## 2023-01-23 RX ORDER — DILTIAZEM HYDROCHLORIDE 360 MG/1
360 CAPSULE, EXTENDED RELEASE ORAL DAILY
Qty: 90 CAPSULE | Refills: 1 | Status: SHIPPED | OUTPATIENT
Start: 2023-01-23

## 2023-01-23 RX ORDER — ALBUTEROL SULFATE 90 UG/1
2 AEROSOL, METERED RESPIRATORY (INHALATION) EVERY 6 HOURS PRN
Qty: 1 EACH | Refills: 5 | Status: SHIPPED | OUTPATIENT
Start: 2023-01-23

## 2023-01-23 ASSESSMENT — PATIENT HEALTH QUESTIONNAIRE - PHQ9
SUM OF ALL RESPONSES TO PHQ QUESTIONS 1-9: 0
SUM OF ALL RESPONSES TO PHQ9 QUESTIONS 1 & 2: 0
SUM OF ALL RESPONSES TO PHQ QUESTIONS 1-9: 0
2. FEELING DOWN, DEPRESSED OR HOPELESS: 0
1. LITTLE INTEREST OR PLEASURE IN DOING THINGS: 0
SUM OF ALL RESPONSES TO PHQ QUESTIONS 1-9: 0
SUM OF ALL RESPONSES TO PHQ QUESTIONS 1-9: 0

## 2023-01-23 ASSESSMENT — ENCOUNTER SYMPTOMS
ANAL BLEEDING: 0
ABDOMINAL PAIN: 0
DIARRHEA: 0
WHEEZING: 0
CHOKING: 0
NAUSEA: 0
BLOOD IN STOOL: 0
COUGH: 0
CHEST TIGHTNESS: 0
SHORTNESS OF BREATH: 0
VOMITING: 0
CONSTIPATION: 0

## 2023-01-23 ASSESSMENT — VISUAL ACUITY: OU: 1

## 2023-01-23 NOTE — TELEPHONE ENCOUNTER
Last visit: 01/23/23  Last Med refill: 12/26/2022  Does patient have enough medication for 72 hours: Yes    Next Visit Date:  Future Appointments   Date Time Provider Jose Cruz Saavedrai   5/24/2023  1:00 PM Jaylene Bravo  [address] Maintenance   Topic Date Due    Pneumococcal 65+ years Vaccine (1 - PCV) Never done    COVID-19 Vaccine (2 - Booster for Marysol series) 07/23/2023 (Originally 7/8/2021)    Shingles vaccine (1 of 2) 01/23/2024 (Originally 10/25/1996)    Depression Screen  05/16/2023    DTaP/Tdap/Td vaccine (2 - Td or Tdap) 07/10/2023    Colorectal Cancer Screen  10/25/2024    Flu vaccine  Completed    Hepatitis C screen  Completed    Hepatitis A vaccine  Aged Out    Hib vaccine  Aged Out    Meningococcal (ACWY) vaccine  Aged Out       Hemoglobin A1C (%)   Date Value   01/23/2023 5.5   01/10/2022 5.6   07/13/2021 6.1             ( goal A1C is < 7)   Microalb/Crt.  Ratio (mcg/mg creat)   Date Value   11/02/2020 38 (H)     LDL Cholesterol (mg/dL)   Date Value   09/30/2022 116   06/17/2021 134 (H)       (goal LDL is <100)   AST (U/L)   Date Value   06/01/2022 14     ALT (U/L)   Date Value   06/01/2022 6     BUN (mg/dL)   Date Value   06/01/2022 17     BP Readings from Last 3 Encounters:   01/23/23 138/70   09/19/22 (!) 152/86   06/01/22 (!) 151/84          (goal 120/80)    All Future Testing planned in [facility]  Lab Frequency Next Occurrence               Patient Active Problem List:     Back pain     Essential hypertension     COPD (chronic obstructive pulmonary disease) (HCC)     Left lower lobe pulmonary nodule     Procedure refused     Hyperlipidemia     Colonoscopy refused     Atrial fibrillation, chronic (HCC)     Anxiety     Insomnia     Pacemaker battery depletion     Pacemaker     Bradycardia     Constipation     Diet-controlled diabetes mellitus ([facility] [address]     Aneurysm of infrarenal abdominal aorta     CLIFTON (acute kidney injury) (HCC)     Hyperkalemia     Acute cholecystitis

## 2023-01-23 NOTE — PROGRESS NOTES
[facility] PHYSICIANS  [facility] [address]  [place] [id number]  Dept: [phone]  Dept Fax: [phone]      Tigre Owen is a 69 y.o. male who presents today for hismedical conditions/complaints as noted below. Tigre Owen is c/o of Diabetes (F/u) and COPD (F/u)        Assessment/Plan:     1. Diet-controlled diabetes mellitus ([facility]  -     POCT glycosylated hemoglobin (Hb A1C)  2. Essential hypertension  -     dilTIAZem (CARDIZEM CD) 360 MG extended release capsule; Take 1 capsule by mouth daily, Disp-90 capsule, R-1Normal  -     candesartan (ATACAND) 32 MG tablet; Take 1 tablet by mouth daily, Disp-90 tablet, R-1Normal  3. Atrial fibrillation, chronic ([facility]  Comments:  Needs to call cardiology and schedule follow up appointment. Orders:  -     dilTIAZem (CARDIZEM CD) 360 MG extended release capsule; Take 1 capsule by mouth daily, Disp-90 capsule, R-1Normal  -     apixaban (ELIQUIS) 5 MG TABS tablet; Take 1 tablet by mouth 2 times daily, Disp-180 tablet, R-1Normal  4. Constipation, unspecified constipation type  -     linaclotide (LINZESS) 290 MCG CAPS capsule; Take 1 capsule by mouth every morning (before breakfast), Disp-90 capsule, R-1Normal  5. Atrial fibrillation, chronic (HCC)  -     dilTIAZem (CARDIZEM CD) 360 MG extended release capsule; Take 1 capsule by mouth daily, Disp-90 capsule, R-1Normal  -     apixaban (ELIQUIS) 5 MG TABS tablet; Take 1 tablet by mouth 2 times daily, Disp-180 tablet, R-1Normal  6. Chronic obstructive pulmonary disease, unspecified COPD type (HCC)  -     albuterol sulfate HFA (VENTOLIN HFA) 108 (90 Base) MCG/ACT inhaler; Inhale 2 puffs into the lungs every 6 hours as needed for Wheezing, Disp-1 each, R-5Normal  -     tiotropium-olodaterol (STIOLTO RESPIMAT) 2.5-2.5 MCG/ACT AERS; inhale 2 puffs by mouth and INTO THE LUNGS once daily, Disp-1 each, R-5Normal  7. Left lower lobe pulmonary nodule  8. Need for pneumococcal vaccine  9. Pacemaker  10. Procedure refused  11. Mixed hyperlipidemia  12. Infrarenal abdominal aortic aneurysm (AAA) without rupture    Declines repeat imaging of the lung to evaluate lung nodule - increasing size on imagign reviewed  Declines pulm referral     Patient Instructions   Start using your Stiolto daily - two puffs once daily         No follow-ups on file.      HPI     Has a lot of chest congestion in the morning, he states gets better during the day but every morning he is coughing up mucus.   COPD:  Current treatment includes short-acting beta agonist inhaler, combined beta agonist/antichoinergic inhaler.  Residual symptoms: chronic dyspnea.  Denies any other symptoms. Requires rescue inhaler 1 time(s) per day, typically at bedtime. Using his Stiolto as needed - not using daily   Following with cardiology - Dr Prado, has pacemaker in place, 9 years old now.     Diabetes - doing well with current regimen. Denies hypoglycemia, hyperglycemia, fatigue, polyuria, polydipsia, paresthesias, vision changes, dizziness.  Eye exam was done.  Not following with podiatry.  Patient is taking ACE inhibitor/ARB.  Complications of diabetes include CAD.   Hypertension-tolerating current regimen without chest pain, palpitations, dizziness, peripheral edema, dyspnea on exertion, orthopnea, paroxysmal nocturnal dyspnea.  Hyperlipidemia-tolerating current regimen without myalgias, dyspepsia, jaundice.     Mostly compliant with diet recommendations for low carb diet, not very compliant with exercise recommendations.    Cardiovascular risk factors: advanced age (older than 55 for men, 65 for women), diabetes mellitus, dyslipidemia, hypertension, male gender, obesity (BMI >= 30 kg/m2), and sedentary lifestyle      BP Readings from Last 3 Encounters:   01/23/23 138/70   09/19/22 (!) 152/86   06/01/22 (!) 151/84              Past Medical History:   Diagnosis Date    A-fib (HCC)     Anxiety     Back pain     COPD (chronic obstructive pulmonary disease)  ([facility] [address]     HTN (hypertension)     Hyperglycemia     Hyperlipidemia     can't take statins, due to muscle aches    Insomnia     Lung nodule     Pacemaker     cardiologist, dr. Alma Schmitz      Past Surgical History:   Procedure Laterality Date    ABDOMINAL AORTIC ANEURYSM REPAIR      PACEMAKER PLACEMENT         Family History   Problem Relation Age of Onset    Heart Disease Mother     Heart Disease Father        Social History     Tobacco Use    Smoking status: Former     Packs/day: 0.50     Years: 25.00     Pack years: 12.50     Types: Cigarettes     Quit date: 2000     Years since quittin.3    Smokeless tobacco: Never   Substance Use Topics    Alcohol use: No     Alcohol/week: 0.0 standard drinks        Allergies   Allergen Reactions    Lisinopril      cough    Neurontin [Gabapentin] Other (See Comments)     Took 3 nights and felt awful    Pcn [Penicillins] Swelling    Statins Support Therapy Other (See Comments)     Leg pains, tried pravachol, crestor, lipitor, simvastatin    Hydrochlorothiazide Rash     Breaks out     Prior to Visit Medications    Medication Sig Taking? Authorizing Provider   vitamin D (ERGOCALCIFEROL) 1.25 MG (43376 UT) CAPS capsule take 1 capsule by mouth every week Yes Jaylene Monterroso MD   tamsulosin (FLOMAX) 0.4 MG capsule take 1 capsule by mouth once daily Yes Rachid Davidson MD   ipratropium-albuterol (DUONEB) 0.5-2.5 (3) MG/3ML SOLN nebulizer solution Inhale 3 mLs into the lungs every 4 hours Yes CARMITA Soriano NP   STIOLTO RESPIMAT 2.5-2.5 MCG/ACT AERS inhale 2 puffs by mouth and INTO THE LUNGS once daily Yes Jaylene Monterroso MD   albuterol sulfate HFA (VENTOLIN HFA) 108 (90 Base) MCG/ACT inhaler Inhale 2 puffs into the lungs every 6 hours as needed for Wheezing Yes CARMITA Soriano NP   nitroGLYCERIN (NITROSTAT) 0.4 MG SL tablet place 1 tablet under the tongue if needed every 5 minutes for chest pain for 3 doses IF NO RELIEF AFTER THIRD DOSE CALL 911.  Yes Jaylene Samuel Melara MD   apixaban (ELIQUIS) 5 MG TABS tablet Take 1 tablet by mouth 2 times daily Yes Jaylene Melara MD   candesartan (ATACAND) 32 MG tablet Take 1 tablet by mouth daily Yes Monty Fossa, APRN - SHANIKA   dilTIAZem (CARDIZEM CD) 360 MG extended release capsule Take 1 capsule by mouth daily Yes Monty Fossa, APRN - NP   linaclotide (LINZESS) 290 MCG CAPS capsule Take 1 capsule by mouth every morning (before breakfast) Yes Monty Fossa, APRN - NP   tiZANidine (ZANAFLEX) 2 MG tablet take 1 tablet by mouth nightly if needed  Patient not taking: No sig reported  Jaylene Melara MD       Review of Systems     Review of Systems   Constitutional:  Negative for fatigue, fever and unexpected weight change. Respiratory:  Negative for cough, choking, chest tightness, shortness of breath and wheezing. Cardiovascular:  Negative for chest pain, palpitations and leg swelling. Gastrointestinal:  Negative for abdominal pain, anal bleeding, blood in stool, constipation, diarrhea, nausea and vomiting. Endocrine: Negative. Musculoskeletal:  Negative for joint swelling and myalgias. Skin: Negative. Neurological:  Negative for dizziness. Psychiatric/Behavioral:  Negative for sleep disturbance. All other systems reviewed and are negative. Objective     /70 (Site: Left Upper Arm, Position: Sitting, Cuff Size: Large Adult)   Pulse 64   Temp 97.9 °F (36.6 °C)   Wt 226 lb 12.8 oz (102.9 kg)   SpO2 97%   BMI 31.19 kg/m²   Physical Exam  Vitals and nursing note reviewed. Constitutional:       General: He is not in acute distress. Appearance: He is well-developed. He is not ill-appearing. Eyes:      General: Lids are normal. Vision grossly intact. Cardiovascular:      Rate and Rhythm: Normal rate. Rhythm irregularly irregular. Heart sounds: Normal heart sounds, S1 normal and S2 normal. No murmur heard. No friction rub. No gallop.    Pulmonary:      Effort: Pulmonary effort is normal. No respiratory distress. Breath sounds: Normal breath sounds. No wheezing. Abdominal:      General: Bowel sounds are normal.      Palpations: Abdomen is soft. There is no mass. Tenderness: There is no abdominal tenderness. There is no guarding. Musculoskeletal:         General: Normal range of motion. Skin:     General: Skin is warm and dry. Capillary Refill: Capillary refill takes less than 2 seconds. Neurological:      General: No focal deficit present. Mental Status: He is alert and oriented to person, place, and time. Data Review     A1c 5.5% today   Lung imaging in chart reviewed - CXR, chest CT 2020   UDS reviewed in chart     Health Maintenance Due   Topic Date Due    Pneumococcal 65+ years Vaccine (1 - PCV) Never done           Patient given educational materials- see patient instructions. Discussed use, benefit, and side effects of prescribedmedications. All patient questions answered. Pt voiced understanding. Reviewedhealth maintenance. Instructed to continue current medications, diet and exercise. Patient agreed with treatment plan. Follow up as directed.      Electronically signedby Coco Valderrama MD on 1/23/2023

## 2023-01-23 NOTE — PROGRESS NOTES
Pt is here today for a regular f/u    Pt needs to discuss results from chest x-ray from 10/2022   We was not able to contact pt for results, resulted by Jose Soriano on 10/03/2022    Pt has congestion when he wakes up in the morning, does have a productive cough

## 2023-01-24 DIAGNOSIS — R07.9 CHEST PAIN, UNSPECIFIED TYPE: ICD-10-CM

## 2023-01-24 RX ORDER — NITROGLYCERIN 0.4 MG/1
TABLET SUBLINGUAL
Qty: 25 TABLET | Refills: 1 | Status: SHIPPED | OUTPATIENT
Start: 2023-01-24

## 2023-01-24 NOTE — TELEPHONE ENCOUNTER
Last visit: 1/23/23  Last Med refill: 8/30/22  Does patient have enough medication for 72 hours: No:   Next Visit Date:  Future Appointments   Date Time Provider Jose Cruz Scruggs   5/24/2023  1:00 PM Jaylene Sullivan  [address] Maintenance   Topic Date Due    Pneumococcal 65+ years Vaccine (1 - PCV) Never done    COVID-19 Vaccine (2 - Booster for Marysol series) 07/23/2023 (Originally 7/8/2021)    Shingles vaccine (1 of 2) 01/23/2024 (Originally 10/25/1996)    DTaP/Tdap/Td vaccine (2 - Td or Tdap) 07/10/2023    Depression Screen  01/23/2024    Colorectal Cancer Screen  10/25/2024    Flu vaccine  Completed    Hepatitis C screen  Completed    Hepatitis A vaccine  Aged Out    Hib vaccine  Aged Out    Meningococcal (ACWY) vaccine  Aged Out       Hemoglobin A1C (%)   Date Value   01/23/2023 5.5   01/10/2022 5.6   07/13/2021 6.1             ( goal A1C is < 7)   Microalb/Crt.  Ratio (mcg/mg creat)   Date Value   11/02/2020 38 (H)     LDL Cholesterol (mg/dL)   Date Value   09/30/2022 116   06/17/2021 134 (H)       (goal LDL is <100)   AST (U/L)   Date Value   06/01/2022 14     ALT (U/L)   Date Value   06/01/2022 6     BUN (mg/dL)   Date Value   06/01/2022 17     BP Readings from Last 3 Encounters:   01/23/23 138/70   09/19/22 (!) 152/86   06/01/22 (!) 151/84          (goal 120/80)    All Future Testing planned in [facility]  Lab Frequency Next Occurrence               Patient Active Problem List:     Back pain     Essential hypertension     COPD (chronic obstructive pulmonary disease) (HCC)     Left lower lobe pulmonary nodule     Procedure refused     Hyperlipidemia     Colonoscopy refused     Atrial fibrillation, chronic (HCC)     Anxiety     Insomnia     Pacemaker battery depletion     Pacemaker     Bradycardia     Constipation     Diet-controlled diabetes mellitus ([facility] [address]     Aneurysm of infrarenal abdominal aorta     CLIFTON (acute kidney injury) (HCC)     Hyperkalemia     Acute cholecystitis

## 2023-03-20 DIAGNOSIS — R07.9 CHEST PAIN, UNSPECIFIED TYPE: ICD-10-CM

## 2023-03-20 DIAGNOSIS — F41.9 ANXIETY: ICD-10-CM

## 2023-03-20 RX ORDER — NITROGLYCERIN 0.4 MG/1
TABLET SUBLINGUAL
Qty: 25 TABLET | Refills: 1 | OUTPATIENT
Start: 2023-03-20

## 2023-03-20 RX ORDER — LORAZEPAM 1 MG/1
TABLET ORAL
Qty: 90 TABLET | Refills: 1 | Status: SHIPPED | OUTPATIENT
Start: 2023-03-20 | End: 2023-05-19

## 2023-03-20 NOTE — TELEPHONE ENCOUNTER
Last visit: 1/23/23  Last Med refill: 1/23/23  Does patient have enough medication for 72 hours: No:     Next Visit Date:  Future Appointments   Date Time Provider Jose Cruz Saavedrai   5/24/2023  1:00 PM Jaylene Hooker  [address] [facility]   Topic Date Due    Pneumococcal 65+ years Vaccine (1 - PCV) Never done    COVID-19 Vaccine (2 - Booster for Marysol series) 07/23/2023 (Originally 7/8/2021)    Shingles vaccine (1 of 2) 01/23/2024 (Originally 10/25/1996)    DTaP/Tdap/Td vaccine (2 - Td or Tdap) 07/10/2023    Depression Screen  01/23/2024    Colorectal Cancer Screen  10/25/2024    Flu vaccine  Completed    Hepatitis C screen  Completed    Hepatitis A vaccine  Aged Out    Hib vaccine  Aged Out    Meningococcal (ACWY) vaccine  Aged Out       Hemoglobin A1C (%)   Date Value   01/23/2023 5.5   01/10/2022 5.6   07/13/2021 6.1             ( goal A1C is < 7)   Microalb/Crt.  Ratio (mcg/mg creat)   Date Value   11/02/2020 38 (H)     LDL Cholesterol (mg/dL)   Date Value   09/30/2022 116   06/17/2021 134 (H)       (goal LDL is <100)   AST (U/L)   Date Value   06/01/2022 14     ALT (U/L)   Date Value   06/01/2022 6     BUN (mg/dL)   Date Value   06/01/2022 17     BP Readings from Last 3 Encounters:   01/23/23 138/70   09/19/22 (!) 152/86   06/01/22 (!) 151/84          (goal 120/80)    All Future Testing planned in [facility]  Lab Frequency Next Occurrence               Patient Active Problem List:     Back pain     Essential hypertension     COPD (chronic obstructive pulmonary disease) (HCC)     Left lower lobe pulmonary nodule     Procedure refused     Hyperlipidemia     Colonoscopy refused     Atrial fibrillation, chronic (HCC)     Anxiety     Insomnia     Pacemaker battery depletion     Pacemaker     Bradycardia     Constipation     Diet-controlled diabetes mellitus ([facility] [address]     Aneurysm of infrarenal abdominal aorta     CLIFTON (acute kidney injury) (HCC)     Hyperkalemia     Acute cholecystitis

## 2023-05-06 ENCOUNTER — ANCILLARY PROCEDURE (OUTPATIENT)
Dept: EMERGENCY DEPT | Age: 78
DRG: 951 | End: 2023-05-06
Payer: COMMERCIAL

## 2023-05-06 ENCOUNTER — HOSPITAL ENCOUNTER (INPATIENT)
Age: 78
LOS: 4 days | Discharge: HOME | DRG: 951 | End: 2023-05-10
Attending: EMERGENCY MEDICINE | Admitting: STUDENT IN AN ORGANIZED HEALTH CARE EDUCATION/TRAINING PROGRAM
Payer: COMMERCIAL

## 2023-05-06 ENCOUNTER — APPOINTMENT (OUTPATIENT)
Dept: CT IMAGING | Age: 78
DRG: 951 | End: 2023-05-06
Payer: COMMERCIAL

## 2023-05-06 ENCOUNTER — APPOINTMENT (OUTPATIENT)
Dept: GENERAL RADIOLOGY | Age: 78
DRG: 951 | End: 2023-05-06
Payer: COMMERCIAL

## 2023-05-06 DIAGNOSIS — R10.84 GENERALIZED ABDOMINAL PAIN: ICD-10-CM

## 2023-05-06 DIAGNOSIS — T82.118A PACEMAKER FAILURE, INITIAL ENCOUNTER: ICD-10-CM

## 2023-05-06 DIAGNOSIS — N17.9 AKI (ACUTE KIDNEY INJURY) (HCC): Primary | ICD-10-CM

## 2023-05-06 DIAGNOSIS — M54.40 BILATERAL LOW BACK PAIN WITH SCIATICA, SCIATICA LATERALITY UNSPECIFIED, UNSPECIFIED CHRONICITY: ICD-10-CM

## 2023-05-06 DIAGNOSIS — Z45.010 PACEMAKER BATTERY DEPLETION: ICD-10-CM

## 2023-05-06 LAB
ABSOLUTE EOS #: 0.11 K/UL (ref 0–0.44)
ABSOLUTE IMMATURE GRANULOCYTE: 0.04 K/UL (ref 0–0.3)
ABSOLUTE LYMPH #: 0.77 K/UL (ref 1.1–3.7)
ABSOLUTE MONO #: 0.64 K/UL (ref 0.1–1.2)
ALBUMIN SERPL-MCNC: 4.2 G/DL (ref 3.5–5.2)
ALBUMIN/GLOBULIN RATIO: 1.2 (ref 1–2.5)
ALP SERPL-CCNC: 137 U/L (ref 40–129)
ALT SERPL-CCNC: 10 U/L (ref 5–41)
ANION GAP SERPL CALCULATED.3IONS-SCNC: 14 MMOL/L (ref 9–17)
AST SERPL-CCNC: 14 U/L
BASOPHILS # BLD: 1 % (ref 0–2)
BASOPHILS ABSOLUTE: 0.07 K/UL (ref 0–0.2)
BILIRUB DIRECT SERPL-MCNC: 0.2 MG/DL
BILIRUB INDIRECT SERPL-MCNC: 0.3 MG/DL (ref 0–1)
BILIRUB SERPL-MCNC: 0.5 MG/DL (ref 0.3–1.2)
BILIRUBIN URINE: NEGATIVE
BUN SERPL-MCNC: 28 MG/DL (ref 8–23)
CALCIUM SERPL-MCNC: 9.5 MG/DL (ref 8.6–10.4)
CASTS UA: ABNORMAL /LPF (ref 0–8)
CHLORIDE SERPL-SCNC: 103 MMOL/L (ref 98–107)
CO2 SERPL-SCNC: 26 MMOL/L (ref 20–31)
COLOR: YELLOW
CREAT SERPL-MCNC: 1.75 MG/DL (ref 0.7–1.2)
EOSINOPHILS RELATIVE PERCENT: 1 % (ref 1–4)
EPITHELIAL CELLS UA: ABNORMAL /HPF (ref 0–5)
GFR SERPL CREATININE-BSD FRML MDRD: 40 ML/MIN/1.73M2
GLUCOSE SERPL-MCNC: 141 MG/DL (ref 70–99)
GLUCOSE UR STRIP.AUTO-MCNC: NEGATIVE MG/DL
HCT VFR BLD AUTO: 46.3 % (ref 40.7–50.3)
HGB BLD-MCNC: 14.8 G/DL (ref 13–17)
IMMATURE GRANULOCYTES: 1 %
KETONES UR STRIP.AUTO-MCNC: NEGATIVE MG/DL
LEUKOCYTE ESTERASE UR QL STRIP.AUTO: NEGATIVE
LIPASE SERPL-CCNC: 16 U/L (ref 13–60)
LYMPHOCYTES # BLD: 9 % (ref 24–43)
MCH RBC QN AUTO: 32 PG (ref 25.2–33.5)
MCHC RBC AUTO-ENTMCNC: 32 G/DL (ref 28.4–34.8)
MCV RBC AUTO: 100 FL (ref 82.6–102.9)
MONOCYTES # BLD: 8 % (ref 3–12)
MYOGLOBIN SERPL-MCNC: 54 NG/ML (ref 28–72)
NITRITE UR QL STRIP.AUTO: NEGATIVE
NRBC AUTOMATED: 0 PER 100 WBC
PDW BLD-RTO: 13.7 % (ref 11.8–14.4)
PLATELET # BLD AUTO: 190 K/UL (ref 138–453)
PMV BLD AUTO: 11.1 FL (ref 8.1–13.5)
POTASSIUM SERPL-SCNC: 4.1 MMOL/L (ref 3.7–5.3)
PROT SERPL-MCNC: 7.6 G/DL (ref 6.4–8.3)
PROT UR STRIP.AUTO-MCNC: >9 MG/DL (ref 5–8)
PROT UR STRIP.AUTO-MCNC: ABNORMAL MG/DL
RBC # BLD: 4.63 M/UL (ref 4.21–5.77)
RBC CLUMPS #/AREA URNS AUTO: ABNORMAL /HPF (ref 0–4)
SEG NEUTROPHILS: 80 % (ref 36–65)
SEGMENTED NEUTROPHILS ABSOLUTE COUNT: 6.86 K/UL (ref 1.5–8.1)
SODIUM SERPL-SCNC: 143 MMOL/L (ref 135–144)
SODIUM,UR: 160 MMOL/L
SPECIFIC GRAVITY UA: 1.03 (ref 1–1.03)
TOTAL PROTEIN, URINE: 14 MG/DL
TROPONIN I SERPL DL<=0.01 NG/ML-MCNC: 20 NG/L (ref 0–22)
TROPONIN I SERPL DL<=0.01 NG/ML-MCNC: 21 NG/L (ref 0–22)
TROPONIN I SERPL DL<=0.01 NG/ML-MCNC: 22 NG/L (ref 0–22)
TURBIDITY: CLEAR
URINE HGB: NEGATIVE
UROBILINOGEN, URINE: NORMAL
WBC # BLD AUTO: 8.5 K/UL (ref 3.5–11.3)
WBC UA: ABNORMAL /HPF (ref 0–5)

## 2023-05-06 PROCEDURE — 6370000000 HC RX 637 (ALT 250 FOR IP): Performed by: NURSE PRACTITIONER

## 2023-05-06 PROCEDURE — 71045 X-RAY EXAM CHEST 1 VIEW: CPT

## 2023-05-06 PROCEDURE — 96374 THER/PROPH/DIAG INJ IV PUSH: CPT

## 2023-05-06 PROCEDURE — 84156 ASSAY OF PROTEIN URINE: CPT

## 2023-05-06 PROCEDURE — 84300 ASSAY OF URINE SODIUM: CPT

## 2023-05-06 PROCEDURE — 96375 TX/PRO/DX INJ NEW DRUG ADDON: CPT

## 2023-05-06 PROCEDURE — 3209999900 POC US FAST ABDOMEN LIMITED

## 2023-05-06 PROCEDURE — 99285 EMERGENCY DEPT VISIT HI MDM: CPT

## 2023-05-06 PROCEDURE — 0JPT0PZ REMOVAL OF CARDIAC RHYTHM RELATED DEVICE FROM TRUNK SUBCUTANEOUS TISSUE AND FASCIA, OPEN APPROACH: ICD-10-PCS | Performed by: INTERNAL MEDICINE

## 2023-05-06 PROCEDURE — 80076 HEPATIC FUNCTION PANEL: CPT

## 2023-05-06 PROCEDURE — 83690 ASSAY OF LIPASE: CPT

## 2023-05-06 PROCEDURE — 0JH606Z INSERTION OF PACEMAKER, DUAL CHAMBER INTO CHEST SUBCUTANEOUS TISSUE AND FASCIA, OPEN APPROACH: ICD-10-PCS | Performed by: INTERNAL MEDICINE

## 2023-05-06 PROCEDURE — 6360000004 HC RX CONTRAST MEDICATION: Performed by: STUDENT IN AN ORGANIZED HEALTH CARE EDUCATION/TRAINING PROGRAM

## 2023-05-06 PROCEDURE — 93005 ELECTROCARDIOGRAM TRACING: CPT | Performed by: STUDENT IN AN ORGANIZED HEALTH CARE EDUCATION/TRAINING PROGRAM

## 2023-05-06 PROCEDURE — 2580000003 HC RX 258: Performed by: NURSE PRACTITIONER

## 2023-05-06 PROCEDURE — 6360000002 HC RX W HCPCS: Performed by: STUDENT IN AN ORGANIZED HEALTH CARE EDUCATION/TRAINING PROGRAM

## 2023-05-06 PROCEDURE — 84484 ASSAY OF TROPONIN QUANT: CPT

## 2023-05-06 PROCEDURE — 80048 BASIC METABOLIC PNL TOTAL CA: CPT

## 2023-05-06 PROCEDURE — 2700000000 HC OXYGEN THERAPY PER DAY

## 2023-05-06 PROCEDURE — 99222 1ST HOSP IP/OBS MODERATE 55: CPT | Performed by: NURSE PRACTITIONER

## 2023-05-06 PROCEDURE — 93976 VASCULAR STUDY: CPT

## 2023-05-06 PROCEDURE — 2580000003 HC RX 258

## 2023-05-06 PROCEDURE — 81001 URINALYSIS AUTO W/SCOPE: CPT

## 2023-05-06 PROCEDURE — 83874 ASSAY OF MYOGLOBIN: CPT

## 2023-05-06 PROCEDURE — 1200000000 HC SEMI PRIVATE

## 2023-05-06 PROCEDURE — 94664 DEMO&/EVAL PT USE INHALER: CPT

## 2023-05-06 PROCEDURE — 94761 N-INVAS EAR/PLS OXIMETRY MLT: CPT

## 2023-05-06 PROCEDURE — 94640 AIRWAY INHALATION TREATMENT: CPT

## 2023-05-06 PROCEDURE — 85025 COMPLETE CBC W/AUTO DIFF WBC: CPT

## 2023-05-06 PROCEDURE — 74174 CTA ABD&PLVS W/CONTRAST: CPT

## 2023-05-06 RX ORDER — MORPHINE SULFATE 4 MG/ML
4 INJECTION, SOLUTION INTRAMUSCULAR; INTRAVENOUS ONCE
Status: COMPLETED | OUTPATIENT
Start: 2023-05-06 | End: 2023-05-06

## 2023-05-06 RX ORDER — ONDANSETRON 2 MG/ML
4 INJECTION INTRAMUSCULAR; INTRAVENOUS ONCE
Status: COMPLETED | OUTPATIENT
Start: 2023-05-06 | End: 2023-05-06

## 2023-05-06 RX ORDER — SODIUM CHLORIDE 0.9 % (FLUSH) 0.9 %
5-40 SYRINGE (ML) INJECTION EVERY 12 HOURS SCHEDULED
Status: DISCONTINUED | OUTPATIENT
Start: 2023-05-06 | End: 2023-05-10 | Stop reason: HOSPADM

## 2023-05-06 RX ORDER — LORAZEPAM 1 MG/1
1 TABLET ORAL EVERY 8 HOURS PRN
Status: DISCONTINUED | OUTPATIENT
Start: 2023-05-06 | End: 2023-05-10 | Stop reason: HOSPADM

## 2023-05-06 RX ORDER — ONDANSETRON 2 MG/ML
4 INJECTION INTRAMUSCULAR; INTRAVENOUS EVERY 6 HOURS PRN
Status: DISCONTINUED | OUTPATIENT
Start: 2023-05-06 | End: 2023-05-10 | Stop reason: HOSPADM

## 2023-05-06 RX ORDER — SODIUM CHLORIDE 9 MG/ML
INJECTION, SOLUTION INTRAVENOUS PRN
Status: DISCONTINUED | OUTPATIENT
Start: 2023-05-06 | End: 2023-05-10 | Stop reason: HOSPADM

## 2023-05-06 RX ORDER — SODIUM CHLORIDE 0.9 % (FLUSH) 0.9 %
5-40 SYRINGE (ML) INJECTION PRN
Status: DISCONTINUED | OUTPATIENT
Start: 2023-05-06 | End: 2023-05-10 | Stop reason: HOSPADM

## 2023-05-06 RX ORDER — ONDANSETRON 4 MG/1
4 TABLET, ORALLY DISINTEGRATING ORAL EVERY 8 HOURS PRN
Status: DISCONTINUED | OUTPATIENT
Start: 2023-05-06 | End: 2023-05-10 | Stop reason: HOSPADM

## 2023-05-06 RX ORDER — DILTIAZEM HYDROCHLORIDE 180 MG/1
360 CAPSULE, COATED, EXTENDED RELEASE ORAL DAILY
Status: DISCONTINUED | OUTPATIENT
Start: 2023-05-06 | End: 2023-05-10 | Stop reason: HOSPADM

## 2023-05-06 RX ORDER — ERGOCALCIFEROL 1.25 MG/1
50000 CAPSULE ORAL WEEKLY
Status: DISCONTINUED | OUTPATIENT
Start: 2023-05-06 | End: 2023-05-10 | Stop reason: HOSPADM

## 2023-05-06 RX ORDER — VALSARTAN 80 MG/1
80 TABLET ORAL DAILY
Status: DISCONTINUED | OUTPATIENT
Start: 2023-05-06 | End: 2023-05-10 | Stop reason: HOSPADM

## 2023-05-06 RX ORDER — FENTANYL CITRATE 50 UG/ML
50 INJECTION, SOLUTION INTRAMUSCULAR; INTRAVENOUS ONCE
Status: COMPLETED | OUTPATIENT
Start: 2023-05-06 | End: 2023-05-06

## 2023-05-06 RX ORDER — SODIUM CHLORIDE 9 MG/ML
INJECTION, SOLUTION INTRAVENOUS CONTINUOUS
Status: DISCONTINUED | OUTPATIENT
Start: 2023-05-06 | End: 2023-05-10 | Stop reason: HOSPADM

## 2023-05-06 RX ORDER — ENOXAPARIN SODIUM 100 MG/ML
30 INJECTION SUBCUTANEOUS 2 TIMES DAILY
Status: DISCONTINUED | OUTPATIENT
Start: 2023-05-06 | End: 2023-05-06

## 2023-05-06 RX ORDER — TAMSULOSIN HYDROCHLORIDE 0.4 MG/1
0.4 CAPSULE ORAL DAILY
Status: DISCONTINUED | OUTPATIENT
Start: 2023-05-06 | End: 2023-05-10 | Stop reason: HOSPADM

## 2023-05-06 RX ORDER — ACETAMINOPHEN 650 MG/1
650 SUPPOSITORY RECTAL EVERY 6 HOURS PRN
Status: DISCONTINUED | OUTPATIENT
Start: 2023-05-06 | End: 2023-05-10 | Stop reason: HOSPADM

## 2023-05-06 RX ORDER — 0.9 % SODIUM CHLORIDE 0.9 %
500 INTRAVENOUS SOLUTION INTRAVENOUS ONCE
Status: COMPLETED | OUTPATIENT
Start: 2023-05-06 | End: 2023-05-06

## 2023-05-06 RX ORDER — MORPHINE SULFATE 4 MG/ML
2 INJECTION, SOLUTION INTRAMUSCULAR; INTRAVENOUS EVERY 4 HOURS PRN
Status: DISCONTINUED | OUTPATIENT
Start: 2023-05-06 | End: 2023-05-08

## 2023-05-06 RX ORDER — ACETAMINOPHEN 325 MG/1
650 TABLET ORAL EVERY 6 HOURS PRN
Status: DISCONTINUED | OUTPATIENT
Start: 2023-05-06 | End: 2023-05-10 | Stop reason: HOSPADM

## 2023-05-06 RX ADMIN — ONDANSETRON 4 MG: 2 INJECTION INTRAMUSCULAR; INTRAVENOUS at 00:56

## 2023-05-06 RX ADMIN — SODIUM CHLORIDE 500 ML: 9 INJECTION, SOLUTION INTRAVENOUS at 06:19

## 2023-05-06 RX ADMIN — LORAZEPAM 1 MG: 1 TABLET ORAL at 10:10

## 2023-05-06 RX ADMIN — APIXABAN 5 MG: 5 TABLET, FILM COATED ORAL at 21:08

## 2023-05-06 RX ADMIN — VALSARTAN 80 MG: 80 TABLET, FILM COATED ORAL at 10:11

## 2023-05-06 RX ADMIN — SODIUM CHLORIDE: 9 INJECTION, SOLUTION INTRAVENOUS at 10:07

## 2023-05-06 RX ADMIN — FENTANYL CITRATE 50 MCG: 50 INJECTION, SOLUTION INTRAMUSCULAR; INTRAVENOUS at 03:18

## 2023-05-06 RX ADMIN — IOPAMIDOL 100 ML: 755 INJECTION, SOLUTION INTRAVENOUS at 03:04

## 2023-05-06 RX ADMIN — MORPHINE SULFATE 4 MG: 4 INJECTION INTRAVENOUS at 00:56

## 2023-05-06 RX ADMIN — TIOTROPIUM BROMIDE AND OLODATEROL 2 PUFF: 3.124; 2.736 SPRAY, METERED RESPIRATORY (INHALATION) at 09:11

## 2023-05-06 RX ADMIN — LORAZEPAM 1 MG: 1 TABLET ORAL at 21:07

## 2023-05-06 RX ADMIN — APIXABAN 5 MG: 5 TABLET, FILM COATED ORAL at 10:11

## 2023-05-06 RX ADMIN — DILTIAZEM HYDROCHLORIDE 360 MG: 180 CAPSULE, COATED, EXTENDED RELEASE ORAL at 10:11

## 2023-05-06 ASSESSMENT — ENCOUNTER SYMPTOMS
BACK PAIN: 0
ABDOMINAL PAIN: 1
PHOTOPHOBIA: 0
COUGH: 0
SORE THROAT: 0
VOMITING: 0
DIARRHEA: 0
NAUSEA: 1
SHORTNESS OF BREATH: 0
CONSTIPATION: 1

## 2023-05-06 ASSESSMENT — PAIN - FUNCTIONAL ASSESSMENT: PAIN_FUNCTIONAL_ASSESSMENT: 0-10

## 2023-05-06 ASSESSMENT — PAIN SCALES - GENERAL: PAINLEVEL_OUTOF10: 8

## 2023-05-07 LAB
ALBUMIN SERPL-MCNC: 3.2 G/DL (ref 3.5–5.2)
ALBUMIN/GLOBULIN RATIO: 1.1 (ref 1–2.5)
ALP SERPL-CCNC: 105 U/L (ref 40–129)
ALT SERPL-CCNC: 7 U/L (ref 5–41)
ANION GAP SERPL CALCULATED.3IONS-SCNC: 8 MMOL/L (ref 9–17)
AST SERPL-CCNC: 12 U/L
BILIRUB SERPL-MCNC: 0.9 MG/DL (ref 0.3–1.2)
BUN SERPL-MCNC: 24 MG/DL (ref 8–23)
CALCIUM SERPL-MCNC: 8.6 MG/DL (ref 8.6–10.4)
CHLORIDE SERPL-SCNC: 106 MMOL/L (ref 98–107)
CO2 SERPL-SCNC: 22 MMOL/L (ref 20–31)
CREAT SERPL-MCNC: 1.78 MG/DL (ref 0.7–1.2)
GFR SERPL CREATININE-BSD FRML MDRD: 39 ML/MIN/1.73M2
GLUCOSE SERPL-MCNC: 131 MG/DL (ref 70–99)
INR PPP: 1.6
MAGNESIUM SERPL-MCNC: 2 MG/DL (ref 1.6–2.6)
MYOGLOBIN SERPL-MCNC: 122 NG/ML (ref 28–72)
MYOGLOBIN SERPL-MCNC: 140 NG/ML (ref 28–72)
MYOGLOBIN SERPL-MCNC: 152 NG/ML (ref 28–72)
MYOGLOBIN SERPL-MCNC: 155 NG/ML (ref 28–72)
POTASSIUM SERPL-SCNC: 4.5 MMOL/L (ref 3.7–5.3)
PROT SERPL-MCNC: 6.2 G/DL (ref 6.4–8.3)
PROTHROMBIN TIME: 18.8 SEC (ref 11.7–14.9)
SODIUM SERPL-SCNC: 136 MMOL/L (ref 135–144)
TROPONIN I SERPL DL<=0.01 NG/ML-MCNC: 25 NG/L (ref 0–22)
TROPONIN I SERPL DL<=0.01 NG/ML-MCNC: 28 NG/L (ref 0–22)
TROPONIN I SERPL DL<=0.01 NG/ML-MCNC: 28 NG/L (ref 0–22)
TROPONIN I SERPL DL<=0.01 NG/ML-MCNC: 29 NG/L (ref 0–22)

## 2023-05-07 PROCEDURE — 99231 SBSQ HOSP IP/OBS SF/LOW 25: CPT | Performed by: NURSE PRACTITIONER

## 2023-05-07 PROCEDURE — 51798 US URINE CAPACITY MEASURE: CPT

## 2023-05-07 PROCEDURE — 94640 AIRWAY INHALATION TREATMENT: CPT

## 2023-05-07 PROCEDURE — 6370000000 HC RX 637 (ALT 250 FOR IP): Performed by: NURSE PRACTITIONER

## 2023-05-07 PROCEDURE — 94761 N-INVAS EAR/PLS OXIMETRY MLT: CPT

## 2023-05-07 PROCEDURE — 1200000000 HC SEMI PRIVATE

## 2023-05-07 PROCEDURE — 85610 PROTHROMBIN TIME: CPT

## 2023-05-07 PROCEDURE — 83735 ASSAY OF MAGNESIUM: CPT

## 2023-05-07 PROCEDURE — 93005 ELECTROCARDIOGRAM TRACING: CPT | Performed by: CLINICAL NURSE SPECIALIST

## 2023-05-07 PROCEDURE — 36415 COLL VENOUS BLD VENIPUNCTURE: CPT

## 2023-05-07 PROCEDURE — 2700000000 HC OXYGEN THERAPY PER DAY

## 2023-05-07 PROCEDURE — 2580000003 HC RX 258: Performed by: NURSE PRACTITIONER

## 2023-05-07 PROCEDURE — 83874 ASSAY OF MYOGLOBIN: CPT

## 2023-05-07 PROCEDURE — 84484 ASSAY OF TROPONIN QUANT: CPT

## 2023-05-07 PROCEDURE — 80053 COMPREHEN METABOLIC PANEL: CPT

## 2023-05-07 RX ADMIN — VALSARTAN 80 MG: 80 TABLET, FILM COATED ORAL at 10:52

## 2023-05-07 RX ADMIN — DILTIAZEM HYDROCHLORIDE 360 MG: 180 CAPSULE, COATED, EXTENDED RELEASE ORAL at 08:49

## 2023-05-07 RX ADMIN — LORAZEPAM 1 MG: 1 TABLET ORAL at 08:53

## 2023-05-07 RX ADMIN — SODIUM CHLORIDE: 9 INJECTION, SOLUTION INTRAVENOUS at 05:53

## 2023-05-07 RX ADMIN — APIXABAN 5 MG: 5 TABLET, FILM COATED ORAL at 08:49

## 2023-05-07 RX ADMIN — LORAZEPAM 1 MG: 1 TABLET ORAL at 23:50

## 2023-05-07 RX ADMIN — TAMSULOSIN HYDROCHLORIDE 0.4 MG: 0.4 CAPSULE ORAL at 08:49

## 2023-05-07 RX ADMIN — TIOTROPIUM BROMIDE AND OLODATEROL 2 PUFF: 3.124; 2.736 SPRAY, METERED RESPIRATORY (INHALATION) at 08:50

## 2023-05-08 LAB
ANION GAP SERPL CALCULATED.3IONS-SCNC: 14 MMOL/L (ref 9–17)
BUN SERPL-MCNC: 27 MG/DL (ref 8–23)
CALCIUM SERPL-MCNC: 8.7 MG/DL (ref 8.6–10.4)
CHLORIDE SERPL-SCNC: 107 MMOL/L (ref 98–107)
CO2 SERPL-SCNC: 21 MMOL/L (ref 20–31)
CREAT SERPL-MCNC: 1.98 MG/DL (ref 0.7–1.2)
GFR SERPL CREATININE-BSD FRML MDRD: 34 ML/MIN/1.73M2
GLUCOSE SERPL-MCNC: 107 MG/DL (ref 70–99)
HCT VFR BLD AUTO: 40.1 % (ref 40.7–50.3)
HGB BLD-MCNC: 12.6 G/DL (ref 13–17)
MCH RBC QN AUTO: 32.3 PG (ref 25.2–33.5)
MCHC RBC AUTO-ENTMCNC: 31.4 G/DL (ref 28.4–34.8)
MCV RBC AUTO: 102.8 FL (ref 82.6–102.9)
MYOGLOBIN SERPL-MCNC: 61 NG/ML (ref 28–72)
MYOGLOBIN SERPL-MCNC: 75 NG/ML (ref 28–72)
MYOGLOBIN SERPL-MCNC: 92 NG/ML (ref 28–72)
NRBC AUTOMATED: 0 PER 100 WBC
PDW BLD-RTO: 13.6 % (ref 11.8–14.4)
PLATELET # BLD AUTO: ABNORMAL K/UL (ref 138–453)
PLATELET, FLUORESCENCE: 133 K/UL (ref 138–453)
PLATELET, IMMATURE FRACTION: 5.4 % (ref 1.1–10.3)
POTASSIUM SERPL-SCNC: 4.2 MMOL/L (ref 3.7–5.3)
RBC # BLD: 3.9 M/UL (ref 4.21–5.77)
SODIUM SERPL-SCNC: 142 MMOL/L (ref 135–144)
TROPONIN I SERPL DL<=0.01 NG/ML-MCNC: 20 NG/L (ref 0–22)
TROPONIN I SERPL DL<=0.01 NG/ML-MCNC: 23 NG/L (ref 0–22)
TROPONIN I SERPL DL<=0.01 NG/ML-MCNC: 26 NG/L (ref 0–22)
WBC # BLD AUTO: 8 K/UL (ref 3.5–11.3)

## 2023-05-08 PROCEDURE — 36415 COLL VENOUS BLD VENIPUNCTURE: CPT

## 2023-05-08 PROCEDURE — 85027 COMPLETE CBC AUTOMATED: CPT

## 2023-05-08 PROCEDURE — 99232 SBSQ HOSP IP/OBS MODERATE 35: CPT | Performed by: NURSE PRACTITIONER

## 2023-05-08 PROCEDURE — 2720000010 HC SURG SUPPLY STERILE

## 2023-05-08 PROCEDURE — 6370000000 HC RX 637 (ALT 250 FOR IP): Performed by: NURSE PRACTITIONER

## 2023-05-08 PROCEDURE — 2580000003 HC RX 258: Performed by: NURSE PRACTITIONER

## 2023-05-08 PROCEDURE — 2500000003 HC RX 250 WO HCPCS

## 2023-05-08 PROCEDURE — C1785 PMKR, DUAL, RATE-RESP: HCPCS

## 2023-05-08 PROCEDURE — 6360000002 HC RX W HCPCS

## 2023-05-08 PROCEDURE — 94760 N-INVAS EAR/PLS OXIMETRY 1: CPT

## 2023-05-08 PROCEDURE — 1200000000 HC SEMI PRIVATE

## 2023-05-08 PROCEDURE — 80048 BASIC METABOLIC PNL TOTAL CA: CPT

## 2023-05-08 PROCEDURE — 2709999900 HC NON-CHARGEABLE SUPPLY

## 2023-05-08 PROCEDURE — 85055 RETICULATED PLATELET ASSAY: CPT

## 2023-05-08 PROCEDURE — 84484 ASSAY OF TROPONIN QUANT: CPT

## 2023-05-08 PROCEDURE — 83874 ASSAY OF MYOGLOBIN: CPT

## 2023-05-08 PROCEDURE — 94640 AIRWAY INHALATION TREATMENT: CPT

## 2023-05-08 PROCEDURE — 2700000000 HC OXYGEN THERAPY PER DAY

## 2023-05-08 RX ORDER — HYDROCODONE BITARTRATE AND ACETAMINOPHEN 5; 325 MG/1; MG/1
2 TABLET ORAL EVERY 4 HOURS PRN
Status: DISCONTINUED | OUTPATIENT
Start: 2023-05-08 | End: 2023-05-10 | Stop reason: HOSPADM

## 2023-05-08 RX ORDER — DOCUSATE SODIUM 100 MG/1
100 CAPSULE, LIQUID FILLED ORAL 2 TIMES DAILY PRN
Status: DISCONTINUED | OUTPATIENT
Start: 2023-05-08 | End: 2023-05-10 | Stop reason: HOSPADM

## 2023-05-08 RX ORDER — HYDROCODONE BITARTRATE AND ACETAMINOPHEN 5; 325 MG/1; MG/1
1 TABLET ORAL EVERY 4 HOURS PRN
Status: DISCONTINUED | OUTPATIENT
Start: 2023-05-08 | End: 2023-05-10 | Stop reason: HOSPADM

## 2023-05-08 RX ORDER — VALSARTAN 80 MG/1
80 TABLET ORAL DAILY
Status: CANCELLED | OUTPATIENT
Start: 2023-05-09

## 2023-05-08 RX ADMIN — TIOTROPIUM BROMIDE AND OLODATEROL 2 PUFF: 3.124; 2.736 SPRAY, METERED RESPIRATORY (INHALATION) at 08:53

## 2023-05-08 RX ADMIN — LORAZEPAM 1 MG: 1 TABLET ORAL at 09:30

## 2023-05-08 RX ADMIN — DILTIAZEM HYDROCHLORIDE 360 MG: 180 CAPSULE, COATED, EXTENDED RELEASE ORAL at 09:27

## 2023-05-08 RX ADMIN — SODIUM CHLORIDE: 9 INJECTION, SOLUTION INTRAVENOUS at 05:51

## 2023-05-08 RX ADMIN — VALSARTAN 80 MG: 80 TABLET, FILM COATED ORAL at 09:27

## 2023-05-08 NOTE — PLAN OF CARE
Problem: Pain  Goal: Verbalizes/displays adequate comfort level or baseline comfort level  5/8/2023 0520 by Crispin Dye RN  Outcome: Progressing  5/7/2023 1806 by Amanda Gill RN  Outcome: Progressing     Problem: ABCDS Injury Assessment  Goal: Absence of physical injury  5/8/2023 0520 by Crispin Dye RN  Outcome: Progressing  5/7/2023 1806 by Amanda Gill RN  Outcome: Progressing     Problem: Safety - Adult  Goal: Free from fall injury  5/8/2023 0520 by Crispin Dye RN  Outcome: Progressing  5/7/2023 1806 by Amanda Gill RN  Outcome: Progressing     Problem: Respiratory - Adult  Goal: Achieves optimal ventilation and oxygenation  Outcome: Progressing

## 2023-05-08 NOTE — PLAN OF CARE
Discussed with Dr. Sabrina Bellamy , plan for outpatient PPM Battery exchange  , cardiology is signing off , please call us back with any concern       Electronically signed by CARMITA Muñiz NP on 5/8/23 at 1:22 PM EDT

## 2023-05-08 NOTE — PLAN OF CARE
Problem: Respiratory - Adult  Goal: Achieves optimal ventilation and oxygenation  5/8/2023 0856 by Radha De La Rosa RCP  Outcome: Progressing

## 2023-05-08 NOTE — PLAN OF CARE
Received a call from  cath lab  that Dr. Susy Victor is able to to do the case tomorrow at 2 pm  , please keep patient npo after midnight and continue to hold Eliquis       Electronically signed by CARMITA Muse NP on 5/8/23 at 4:40 PM EDT

## 2023-05-08 NOTE — PLAN OF CARE
Problem: Pain  Goal: Verbalizes/displays adequate comfort level or baseline comfort level  5/8/2023 1735 by Renetta Ward RN  Outcome: Progressing  5/8/2023 0520 by Noelle Marvin RN  Outcome: Progressing     Problem: ABCDS Injury Assessment  Goal: Absence of physical injury  5/8/2023 1735 by Renetta Ward RN  Outcome: Progressing  5/8/2023 0520 by Noelle Marvin RN  Outcome: Progressing     Problem: Safety - Adult  Goal: Free from fall injury  5/8/2023 1735 by Renetta Ward RN  Outcome: Progressing  5/8/2023 0520 by Noelle Marvin RN  Outcome: Progressing     Problem: Respiratory - Adult  Goal: Achieves optimal ventilation and oxygenation  5/8/2023 1735 by Renetta Ward RN  Outcome: Progressing  5/8/2023 0856 by Manuel Porter RCP  Outcome: Progressing  5/8/2023 0520 by Noelle Marvin RN  Outcome: Progressing

## 2023-05-09 ENCOUNTER — APPOINTMENT (OUTPATIENT)
Dept: CARDIAC CATH/INVASIVE PROCEDURES | Age: 78
DRG: 951 | End: 2023-05-09
Payer: COMMERCIAL

## 2023-05-09 LAB
ANION GAP SERPL CALCULATED.3IONS-SCNC: 14 MMOL/L (ref 9–17)
BUN SERPL-MCNC: 17 MG/DL (ref 8–23)
CALCIUM SERPL-MCNC: 8.8 MG/DL (ref 8.6–10.4)
CHLORIDE SERPL-SCNC: 108 MMOL/L (ref 98–107)
CO2 SERPL-SCNC: 18 MMOL/L (ref 20–31)
CREAT SERPL-MCNC: 1.36 MG/DL (ref 0.7–1.2)
EKG ATRIAL RATE: 312 BPM
EKG ATRIAL RATE: 43 BPM
EKG Q-T INTERVAL: 446 MS
EKG Q-T INTERVAL: 488 MS
EKG QRS DURATION: 138 MS
EKG QRS DURATION: 168 MS
EKG QTC CALCULATION (BAZETT): 477 MS
EKG QTC CALCULATION (BAZETT): 483 MS
EKG R AXIS: -5 DEGREES
EKG R AXIS: -67 DEGREES
EKG T AXIS: -66 DEGREES
EKG T AXIS: 106 DEGREES
EKG VENTRICULAR RATE: 59 BPM
EKG VENTRICULAR RATE: 69 BPM
GFR SERPL CREATININE-BSD FRML MDRD: 54 ML/MIN/1.73M2
GLUCOSE SERPL-MCNC: 101 MG/DL (ref 70–99)
POTASSIUM SERPL-SCNC: 3.7 MMOL/L (ref 3.7–5.3)
SODIUM SERPL-SCNC: 140 MMOL/L (ref 135–144)

## 2023-05-09 PROCEDURE — 94761 N-INVAS EAR/PLS OXIMETRY MLT: CPT

## 2023-05-09 PROCEDURE — 80048 BASIC METABOLIC PNL TOTAL CA: CPT

## 2023-05-09 PROCEDURE — 33228 REMV&REPLC PM GEN DUAL LEAD: CPT

## 2023-05-09 PROCEDURE — 6360000002 HC RX W HCPCS: Performed by: INTERNAL MEDICINE

## 2023-05-09 PROCEDURE — 6360000002 HC RX W HCPCS

## 2023-05-09 PROCEDURE — 2580000003 HC RX 258: Performed by: NURSE PRACTITIONER

## 2023-05-09 PROCEDURE — 2709999900 HC NON-CHARGEABLE SUPPLY

## 2023-05-09 PROCEDURE — 1200000000 HC SEMI PRIVATE

## 2023-05-09 PROCEDURE — C1785 PMKR, DUAL, RATE-RESP: HCPCS

## 2023-05-09 PROCEDURE — 6370000000 HC RX 637 (ALT 250 FOR IP): Performed by: STUDENT IN AN ORGANIZED HEALTH CARE EDUCATION/TRAINING PROGRAM

## 2023-05-09 PROCEDURE — 2500000003 HC RX 250 WO HCPCS

## 2023-05-09 PROCEDURE — 6370000000 HC RX 637 (ALT 250 FOR IP): Performed by: NURSE PRACTITIONER

## 2023-05-09 PROCEDURE — 2720000010 HC SURG SUPPLY STERILE

## 2023-05-09 PROCEDURE — 36415 COLL VENOUS BLD VENIPUNCTURE: CPT

## 2023-05-09 PROCEDURE — 99152 MOD SED SAME PHYS/QHP 5/>YRS: CPT

## 2023-05-09 RX ORDER — SODIUM CHLORIDE 9 MG/ML
INJECTION, SOLUTION INTRAVENOUS PRN
Status: DISCONTINUED | OUTPATIENT
Start: 2023-05-09 | End: 2023-05-10 | Stop reason: HOSPADM

## 2023-05-09 RX ORDER — HYDRALAZINE HYDROCHLORIDE 20 MG/ML
10 INJECTION INTRAMUSCULAR; INTRAVENOUS EVERY 4 HOURS PRN
Status: DISCONTINUED | OUTPATIENT
Start: 2023-05-09 | End: 2023-05-10 | Stop reason: HOSPADM

## 2023-05-09 RX ORDER — SODIUM CHLORIDE 0.9 % (FLUSH) 0.9 %
5-40 SYRINGE (ML) INJECTION PRN
Status: DISCONTINUED | OUTPATIENT
Start: 2023-05-09 | End: 2023-05-10 | Stop reason: HOSPADM

## 2023-05-09 RX ORDER — SODIUM CHLORIDE 0.9 % (FLUSH) 0.9 %
5-40 SYRINGE (ML) INJECTION EVERY 12 HOURS SCHEDULED
Status: DISCONTINUED | OUTPATIENT
Start: 2023-05-09 | End: 2023-05-10 | Stop reason: HOSPADM

## 2023-05-09 RX ORDER — BISACODYL 10 MG
10 SUPPOSITORY, RECTAL RECTAL DAILY PRN
Status: DISCONTINUED | OUTPATIENT
Start: 2023-05-09 | End: 2023-05-10 | Stop reason: HOSPADM

## 2023-05-09 RX ADMIN — HYDROCODONE BITARTRATE AND ACETAMINOPHEN 2 TABLET: 5; 325 TABLET ORAL at 20:05

## 2023-05-09 RX ADMIN — TIOTROPIUM BROMIDE AND OLODATEROL 2 PUFF: 3.124; 2.736 SPRAY, METERED RESPIRATORY (INHALATION) at 08:29

## 2023-05-09 RX ADMIN — HYDRALAZINE HYDROCHLORIDE 10 MG: 20 INJECTION INTRAMUSCULAR; INTRAVENOUS at 10:05

## 2023-05-09 RX ADMIN — DILTIAZEM HYDROCHLORIDE 360 MG: 180 CAPSULE, COATED, EXTENDED RELEASE ORAL at 08:54

## 2023-05-09 RX ADMIN — TAMSULOSIN HYDROCHLORIDE 0.4 MG: 0.4 CAPSULE ORAL at 08:54

## 2023-05-09 RX ADMIN — LORAZEPAM 1 MG: 1 TABLET ORAL at 08:54

## 2023-05-09 RX ADMIN — HYDROCODONE BITARTRATE AND ACETAMINOPHEN 2 TABLET: 5; 325 TABLET ORAL at 14:44

## 2023-05-09 RX ADMIN — SODIUM CHLORIDE: 9 INJECTION, SOLUTION INTRAVENOUS at 00:07

## 2023-05-09 ASSESSMENT — PAIN SCALES - GENERAL
PAINLEVEL_OUTOF10: 9
PAINLEVEL_OUTOF10: 9

## 2023-05-09 ASSESSMENT — PAIN - FUNCTIONAL ASSESSMENT: PAIN_FUNCTIONAL_ASSESSMENT: PREVENTS OR INTERFERES SOME ACTIVE ACTIVITIES AND ADLS

## 2023-05-09 ASSESSMENT — PAIN DESCRIPTION - ORIENTATION: ORIENTATION: LEFT

## 2023-05-09 ASSESSMENT — PAIN DESCRIPTION - DESCRIPTORS: DESCRIPTORS: ACHING

## 2023-05-09 ASSESSMENT — PAIN DESCRIPTION - LOCATION: LOCATION: CHEST

## 2023-05-09 NOTE — CARE COORDINATION
Transitional planning    Writer to room to discuss d/c plan, patient really wanting to leave, advised that leaving AMA could generate a hospital bill. Patient would like early morning d/c tomorrow if possible, primary RN updated, will need medical cab.

## 2023-05-09 NOTE — PLAN OF CARE
Problem: Pain  Goal: Verbalizes/displays adequate comfort level or baseline comfort level  5/9/2023 0406 by Gabriela Guidry RN  Outcome: Progressing  5/8/2023 1735 by Lurdes Mcduffie RN  Outcome: Progressing     Problem: ABCDS Injury Assessment  Goal: Absence of physical injury  5/9/2023 0406 by Gabriela Guidry RN  Outcome: Progressing  5/8/2023 1735 by Lurdes Mcduffie RN  Outcome: Progressing     Problem: Safety - Adult  Goal: Free from fall injury  5/9/2023 0406 by Gabriela Guidry RN  Outcome: Progressing  5/8/2023 1735 by Lurdes Mcduffie RN  Outcome: Progressing     Problem: Respiratory - Adult  Goal: Achieves optimal ventilation and oxygenation  5/9/2023 0406 by Gabriela Guidry RN  Outcome: Progressing  5/8/2023 1735 by Lurdes Mcduffie RN  Outcome: Progressing

## 2023-05-09 NOTE — PROCEDURES
[facility]             Procedure Note      Today's Date:  5/9/2023  Patient name:  Felicita Abdalla  MRN:   [id number]  YOB: 1945  PCP:    Garrett Gudino MD      Procedure: PPM battery change     Indication: Battery end of life     Operators:  Primary: Dr Winston Colmenares (Attending)              Procedure:  After the usual preparation of the left neck and chest, the patient was draped in the usual sterile manner. Local anesthesia was infiltrated over the old incision. An incision was made over the old incision. The incision was carried down to the fascia, dissecting the leads and generator using the Plasma Blase in the previously made pocket. The old generator was removed, and the previously implanted leads were inspected and tested before it were attached to the new generator using the setscrews. The pocket was irrigated with antibiotic solution. The pulse generator and leads were coiled and placed in the pocket with  Tyrex patch. The pocket was closed using multiple layers of suture and a dry sterile dressing was applied. There were no complications, patient tolerated the procedure well. The patient left the EP lab in stable condition. Impression / Device:  Successful device battery change out. Adequate sensing and pacing threshold      Plan:  -- [facility] in 7 days, device clinic in 6 months with remote interrogations every 3 months. -- Arm sling for next 24 hrs     Estimated Blood Loss:            [x] Minimal 10-25 cc   [] Minimal < 25 cc      [] Moderate 25-50 cc         [] >50 cc     Patient instructed to no showering or get the wound wet for 1 week, no driving for 1 week, no lifting more than 10 pounds for 4 weeks, no arm raising above the shoulder for 4 weeks. No lovenox or heparin at any dose is to be given.        Electronically signed on 05/09/23 at 11:58 AM by:    Yael Miranda MD, MD   Fellow, [id number]

## 2023-05-09 NOTE — PLAN OF CARE
Problem: Pain  Goal: Verbalizes/displays adequate comfort level or baseline comfort level  Outcome: Progressing     Problem: ABCDS Injury Assessment  Goal: Absence of physical injury  Outcome: Progressing     Problem: Safety - Adult  Goal: Free from fall injury  Outcome: Progressing     Problem: Respiratory - Adult  Goal: Achieves optimal ventilation and oxygenation  Outcome: Progressing

## 2023-05-10 VITALS
SYSTOLIC BLOOD PRESSURE: 121 MMHG | DIASTOLIC BLOOD PRESSURE: 72 MMHG | BODY MASS INDEX: 31.91 KG/M2 | HEART RATE: 79 BPM | WEIGHT: 232 LBS | RESPIRATION RATE: 18 BRPM | OXYGEN SATURATION: 96 % | TEMPERATURE: 97.9 F

## 2023-05-10 PROCEDURE — 6370000000 HC RX 637 (ALT 250 FOR IP): Performed by: STUDENT IN AN ORGANIZED HEALTH CARE EDUCATION/TRAINING PROGRAM

## 2023-05-10 PROCEDURE — 94640 AIRWAY INHALATION TREATMENT: CPT

## 2023-05-10 RX ORDER — PSEUDOEPHEDRINE HCL 30 MG
100 TABLET ORAL 2 TIMES DAILY PRN
Qty: 30 CAPSULE | Refills: 1 | Status: SHIPPED | OUTPATIENT
Start: 2023-05-10

## 2023-05-10 RX ORDER — HYDROCODONE BITARTRATE AND ACETAMINOPHEN 5; 325 MG/1; MG/1
1 TABLET ORAL EVERY 6 HOURS PRN
Qty: 6 TABLET | Refills: 0 | Status: SHIPPED | OUTPATIENT
Start: 2023-05-10 | End: 2023-05-15

## 2023-05-10 RX ADMIN — TIOTROPIUM BROMIDE AND OLODATEROL 2 PUFF: 3.124; 2.736 SPRAY, METERED RESPIRATORY (INHALATION) at 08:08

## 2023-05-10 RX ADMIN — TAMSULOSIN HYDROCHLORIDE 0.4 MG: 0.4 CAPSULE ORAL at 08:21

## 2023-05-10 RX ADMIN — HYDROCODONE BITARTRATE AND ACETAMINOPHEN 2 TABLET: 5; 325 TABLET ORAL at 08:19

## 2023-05-10 RX ADMIN — LORAZEPAM 1 MG: 1 TABLET ORAL at 08:21

## 2023-05-10 RX ADMIN — DILTIAZEM HYDROCHLORIDE 360 MG: 180 CAPSULE, COATED, EXTENDED RELEASE ORAL at 08:21

## 2023-05-10 ASSESSMENT — PAIN SCALES - GENERAL: PAINLEVEL_OUTOF10: 9

## 2023-05-10 NOTE — DISCHARGE SUMMARY
mouth every morning (before breakfast)     LORazepam 1 MG tablet  Commonly known as: ATIVAN  take 1 tablet by mouth every 8 hours if needed for anxiety     nitroGLYCERIN 0.4 MG SL tablet  Commonly known as: NITROSTAT  place 1 tablet under the tongue if needed every 5 minutes for chest pain for 3 doses IF NO RELIEF AFTER THIRD DOSE CALL 911.     tamsulosin 0.4 MG capsule  Commonly known as: FLOMAX  take 1 capsule by mouth once daily     tiotropium-olodaterol 2.5-2.5 MCG/ACT Aers  Commonly known as: Stiolto Respimat  inhale 2 puffs by mouth and INTO THE LUNGS once daily     vitamin D 1.25 MG (46925 UT) Caps capsule  Commonly known as: ERGOCALCIFEROL  take 1 capsule by mouth every week               Where to Get Your Medications        These medications were sent to [facility] [address] [facility], [address], [address] [address]      Phone: [phone]   docusate 100 MG Caps  HYDROcodone-acetaminophen 5-325 MG per tablet         Discharge Procedure Orders   Basic Metabolic Panel   Standing Status: Future Standing Exp. Date: 06/10/23   Order Comments: Send results to nephrology and cardiology       Time Spent on discharge is  31 mins in patient examination, evaluation, counseling as well as medication reconciliation, prescriptions for required medications, discharge plan and follow up. Electronically signed by   Luisito Quevedo MD  5/10/2023  2:35 PM      Thank you Dr. George Ko MD for the opportunity to be involved in this patient's care.

## 2023-05-10 NOTE — CARE COORDINATION
Transitional Planning:  [facility] arranged for discharge. Call took 30 minutes. Spoke with Sang Harpero.   [place] #

## 2023-05-10 NOTE — PLAN OF CARE
Problem: Pain  Goal: Verbalizes/displays adequate comfort level or baseline comfort level  5/10/2023 1138 by Ericka Gabriel RN  Outcome: Completed  5/10/2023 0347 by Shimon Corona RN  Outcome: Progressing     Problem: ABCDS Injury Assessment  Goal: Absence of physical injury  5/10/2023 1138 by Ericka Gabriel RN  Outcome: Completed  5/10/2023 0347 by Shimon Corona RN  Outcome: Progressing     Problem: Safety - Adult  Goal: Free from fall injury  5/10/2023 1138 by Ericka Gabriel RN  Outcome: Completed  5/10/2023 0347 by Shimon Corona RN  Outcome: Progressing     Problem: Respiratory - Adult  Goal: Achieves optimal ventilation and oxygenation  5/10/2023 1138 by Ericka Gabriel RN  Outcome: Completed  5/10/2023 0811 by Malu Villar RCP  Outcome: Progressing  5/10/2023 0347 by Shimon Corona RN  Outcome: Progressing

## 2023-05-10 NOTE — PROGRESS NOTES
CLINICAL PHARMACY NOTE: MEDS TO BEDS    Total # of Prescriptions Filled: 2   The following medications were delivered to the patient:  Akash Palmer    Additional Documentation:
[facility]   Progress Note                   Date:   5/10/2023  Patient name: Chika Pickering  Date of admission:  5/6/2023 12:21 AM  MRN:   [id number]  YOB: 1945  PCP: Tiesha Aden MD    Reason for Admission: Generalized abdominal pain [R10.84]  CLIFTON (acute kidney injury) ([facility] [address] [N17.9]  Pacemaker failure, initial encounter [T82.118A]    Subjective:       Clinical Changes / Abnormalities: Pt and examined in the room. Pt denies any CP or sob. Left chest site reviewed. Medications:   Scheduled Meds:   vancomycin irrigation  1,000 mg Irrigation Once    sodium chloride flush  5-40 mL IntraVENous 2 times per day    apixaban  5 mg Oral BID    [Held by provider] valsartan  80 mg Oral Daily    dilTIAZem  360 mg Oral Daily    linaclotide  290 mcg Oral QAM AC    tamsulosin  0.4 mg Oral Daily    tiotropium-olodaterol  2 puff Inhalation Daily    vitamin D  50,000 Units Oral Weekly    sodium chloride flush  5-40 mL IntraVENous 2 times per day     Continuous Infusions:   sodium chloride      sodium chloride 125 mL/hr at 05/09/23 0007    sodium chloride       CBC:   Recent Labs     05/08/23  0735   WBC 8.0   HGB 12.6*   PLT See Reflexed IPF Result       BMP:    Recent Labs     05/08/23  0735 05/09/23  1149    140   K 4.2 3.7    108*   CO2 21 18*   BUN 27* 17   CREATININE 1.98* 1.36*   GLUCOSE 107* 101*       Hepatic:   No results for input(s): AST, ALT, ALB, BILITOT, ALKPHOS in the last 72 hours. Troponin:   Recent Labs     05/08/23  0024 05/08/23  0735 05/08/23  1137   TROPHS 26* 23* 20       BNP: No results for input(s): BNP in the last 72 hours. Lipids: No results for input(s): CHOL, HDL in the last 72 hours. Invalid input(s): LDLCALCU  INR: No results for input(s): INR in the last 72 hours.       Objective:   Vitals: /72   Pulse 79   Temp 97.9 °F (36.6 °C) (Oral)   Resp 18   Wt 232 lb (105.2 kg)   SpO2 96%   BMI 31.91 kg/m²   General appearance: alert and
[facility]  Office: [address], DO, Spencer Guerra DO, Jessica Ruiz DO, Charlotte Tejeda, DO, Reina Kellogg MD, Mahamed Bledsoe MD, Duke Magdaleno MD, Teofilo Bustamante MD,  Kelsey Fitzpatrick MD, Zach Sorto MD, Nicolas Flowers DO, Javad Mckeon MD,  Seema Mcconnell MD, Dani Guerrero MD, Phil Grijalva DO, Michelle Aviles MD, Lalitha Altamirano MD, Ericka Acevedo DO, Luma Carr MD, Gabrielle Scott MD, Pola Patel MD, Tu Patrick MD,  Daryn Miller DO, Sonia Morgan MD,  Paulo Chauhan, CNP,  Jane Mohr, CNP, Erik Guerrero, CNP, Pito Cortés, CNP,  Spencer Bowers, [facility], Amairani De La Paz, CNP, Amy Haddad, CNP, Bev Wilkerson, CNP, Michele Han, CNP, Dominique Higginbotham, CNP, RANDI HilarioC, Strala Perez, CNS, Tessy Foley, CNP, Em Ingram, CNP         [address]    Progress Note    5/10/2023    10:19 AM    Name:   Junior Benavidez  MRN:     [id number]     Acct:      [de-identified]   Room:   [address] [place] [address] Day:  4  Admit Date:  5/6/2023 12:21 AM    PCP:   Wei Rodriguez MD  Code Status:  Full Code    Subjective:     C/C:   Chief Complaint   Patient presents with    Abdominal Pain     4 hours PTA     Interval History Status: . Seen today morning  Replacement of pacemaker battery has been done yesterday 5/9/2023  Wound is dressed and no bleeding at site  Feels his fatigue is better today  Denies abdominal pain  Eliquis on hold which will be restarted if okay with cardiology    Brief History: This is a 69year old male who presented for evaluation of 4 hours of non-specific abdominal pain. He does endorse chronic constipation and has not taking his Linzess lately. His last bowel movement was 3 days ago. He denied any nausea, vomiting. He states he could visualize his stomach \"jumping\" in his abdomen.   He states he had same presentation 1 year ago, was told he had a bad
[facility]  Office: [address], DO, Demetrio Figueroa, DO, La Taylor, DO, Natalie Burnett Blood, DO, Suzi Shields MD, Donnell Wilkes MD, Femi Joel MD, Leticia Rollins MD,  Darvin Yu MD, Leticia Lanes, MD, Ashtyn Mueller, DO, Dillan Mclaughlin MD,  Bridget Mcdaniels MD, John Blancas MD, Oscar Acosta, DO, Earnestine Moctezuma MD, Mike Lowry MD, Harpreet Kuhn, DO, Genevieve Garg MD, Erlin Carpenter MD, Alayna Cuellar MD, Alessia Talamantes MD,  Mattie Kimble, DO, Rylie Burnette MD,  Piedad Umana, CNP,  Radha Shabazz, CNP, Elsy Zamudio, CNP, Marcy Parks, CNP,  Rayray Veliz, [facility], Alonzo Rueda, CNP, Jocelyne Zamudio, CNP, Alex Morgan, CNP, Sachi Owen, CNP, Marta Mitchell, CNP, Danni Mitchell, PAAnabelaC, Rodrick Boateng, CNS, Susannah Settler, CNP, Cloteal Lebec, CNP         [address]    Progress Note    5/9/2023    2:27 PM    Name:   Eloina Conner  MRN:     [id number]     Acct:      [de-identified]   Room:   [facility]48   Day:  3  Admit Date:  5/6/2023 12:21 AM    PCP:   Shana Ge MD  Code Status:  Full Code    Subjective:     C/C:   Chief Complaint   Patient presents with    Abdominal Pain     4 hours PTA     Interval History Status: . Seen today morning  Has been n.p.o. for battery replacement of pacemaker  Has been feeling fatigued for quite a while  Denies abdominal pain  Eliquis on hold  Brief History: This is a 69year old male who presented for evaluation of 4 hours of non-specific abdominal pain. He does endorse chronic constipation and has not taking his Linzess lately. His last bowel movement was 3 days ago. He denied any nausea, vomiting. He states he could visualize his stomach \"jumping\" in his abdomen. He states he had same presentation 1 year ago, was told he had a bad gallbladder but \"not bad enough to take out\".   He does have a medical history of hypertension, COPD,
General appearance: alert and cooperative with exam  HEENT: Head: Normocephalic, no lesions, without obvious abnormality. Neck: no JVD, trachea midline, no adenopathy  Lungs: Clear to auscultation  Heart: Regular rate and rhythm, s1/s2 auscultated, no murmurs  Abdomen: soft, non-tender, bowel sounds active  Extremities: no edema  Neurologic: not done        Assessment / Acute Cardiac Problems:   Abdominal pain  Pacemaker nonfunctioning  Essential hypertension  COPD  CLIFTON  Hyperlipidemia  History of atrial fibrillation on Eliquis  History of AAA status post repair    Patient Active Problem List:     Back pain     Essential hypertension     COPD (chronic obstructive pulmonary disease) (HCC)     Left lower lobe pulmonary nodule     Procedure refused     Hyperlipidemia     Colonoscopy refused     Atrial fibrillation, chronic (HCC)     Anxiety     Insomnia     Pacemaker battery depletion     Pacemaker     Bradycardia     Constipation     Diet-controlled diabetes mellitus ([facility] [address]     Aneurysm of infrarenal abdominal aorta (HCC)     CLIFTON (acute kidney injury) (HCC)     Hyperkalemia     Acute cholecystitis      Plan of Treatment:   AFib. Stable. Continue current medications. Eliquis on hold  PPM interrogation shows EOL. Will need battery replaced.   Will discuss case with Dr. Winston Colmenares about timing of procedure , patient can eat today   CLIFTON per primary       Electronically signed by Sunshine Barros, APRN - NP on 5/8/2023 at 12:19 PM  [address]  [phone]
Modified POA    * (Principal) CLIFTON (acute kidney injury) ([facility] [address] (Chronic) 5/6/2023 Yes    Essential hypertension 5/6/2023 Yes    COPD (chronic obstructive pulmonary disease) ([facility] [address] 5/6/2023 Yes    Hyperlipidemia 5/6/2023 Yes    Atrial fibrillation, chronic ([facility] [address] 5/6/2023 Yes    Overview Addendum 5/31/2022  1:42 AM by CARMITA Rodríguez CNP     Eliquis         Anxiety 5/6/2023 Yes    Pacemaker battery depletion 5/6/2023 Yes    Constipation 5/6/2023 Yes       Plan:        CLIFTON: Renal function worsening, creatinine 1.98 up from 1.78 yesterday. Hold valsartan, IV fluids continue, increase rate today. Patient did receive contrast dye on 5/6/23  Chronic atrial fibrillation: Status post pacer placement. Battery life low, cardiology following. Battery replacement scheduled for 5/8/2023.   Continue Cardizem, resume Eliquis after pacer battery is replaced  Primary hypertension: On Cardizem, valsartan held secondary to renal function  History of AAA with endograft: CTA abdomen pelvis completed on 5/6 negative for endoleak  Left lower lobe pulmonary nodules: Maximum dimension of 2.7 cm  GI/DVT prophylaxis: No GI prophylaxis, continue Eliquis    CARMITA Fernandez NP  5/8/2023  7:19 AM

## 2023-05-10 NOTE — PLAN OF CARE
Problem: Pain  Goal: Verbalizes/displays adequate comfort level or baseline comfort level  5/10/2023 0347 by Concepción Reyes RN  Outcome: Progressing  5/9/2023 1849 by Lindsey Rouse RN  Outcome: Progressing     Problem: ABCDS Injury Assessment  Goal: Absence of physical injury  5/10/2023 0347 by Concepción Reyes RN  Outcome: Progressing  5/9/2023 1849 by Lindsey Rouse RN  Outcome: Progressing     Problem: Safety - Adult  Goal: Free from fall injury  5/10/2023 0347 by Concepción Reyes RN  Outcome: Progressing  5/9/2023 1849 by Lindsey Rouse RN  Outcome: Progressing     Problem: Respiratory - Adult  Goal: Achieves optimal ventilation and oxygenation  5/10/2023 0347 by Concepción Reyes RN  Outcome: Progressing  5/9/2023 1849 by Lindsey Rouse RN  Outcome: Progressing

## 2023-05-10 NOTE — PLAN OF CARE
Problem: Respiratory - Adult  Goal: Achieves optimal ventilation and oxygenation  5/10/2023 0811 by Trevon Echavarria RCP  Outcome: Progressing     BRONCHOSPASM/BRONCHOCONSTRICTION     [x]         IMPROVE AERATION/BREATH SOUNDS  [x]   ADMINISTER BRONCHODILATOR THERAPY AS APPROPRIATE  [x]   ASSESS BREATH SOUNDS  []   IMPLEMENT AEROSOL/MDI PROTOCOL  [x]   PATIENT EDUCATION AS NEEDED

## 2023-05-16 DIAGNOSIS — F41.9 ANXIETY: ICD-10-CM

## 2023-05-16 RX ORDER — LORAZEPAM 1 MG/1
TABLET ORAL
Qty: 90 TABLET | Refills: 0 | Status: SHIPPED | OUTPATIENT
Start: 2023-05-16 | End: 2023-06-15

## 2023-05-16 NOTE — TELEPHONE ENCOUNTER
Last visit: 01/23/2023  Last Med refill: 04/20/2023  Does patient have enough medication for 72 hours: No:     Next Visit Date:  Future Appointments   Date Time Provider Jose Cruz Scruggs   5/18/2023 11:30 AM SCHEDULE, [facility] WHITT RN [facility] TOLE [facility] WHITT C   5/24/2023  1:00 PM Jaylene Easton MD [facility]   6/29/2023 11:00 AM MD ABDIEL Long TCC TOLE [facility] WHITT C       Health Maintenance   Topic Date Due    Pneumococcal 65+ years Vaccine (1 - PCV) Never done    COVID-19 Vaccine (2 - Booster for Marysol series) 07/23/2023 (Originally 7/8/2021)    Shingles vaccine (1 of 2) 01/23/2024 (Originally 10/25/1996)    DTaP/Tdap/Td vaccine (2 - Td or Tdap) 07/10/2023    Depression Screen  01/23/2024    Colorectal Cancer Screen  10/25/2024    Flu vaccine  Completed    Hepatitis C screen  Completed    Hepatitis A vaccine  Aged Out    Hib vaccine  Aged Out    Meningococcal (ACWY) vaccine  Aged Out    Diabetic foot exam  Discontinued    A1C test (Diabetic or Prediabetic)  Discontinued    Diabetic Alb to Cr ratio (uACR) test  Discontinued    Lipids  Discontinued       Hemoglobin A1C (%)   Date Value   01/23/2023 5.5   01/10/2022 5.6   07/13/2021 6.1             ( goal A1C is < 7)   Microalb/Crt.  Ratio (mcg/mg creat)   Date Value   11/02/2020 38 (H)     LDL Cholesterol (mg/dL)   Date Value   09/30/2022 116   06/17/2021 134 (H)       (goal LDL is <100)   AST (U/L)   Date Value   05/07/2023 12     ALT (U/L)   Date Value   05/07/2023 7     BUN (mg/dL)   Date Value   05/09/2023 17     BP Readings from Last 3 Encounters:   05/10/23 121/72   01/23/23 138/70   09/19/22 (!) 152/86          (goal 120/80)    All Future Testing planned in [facility]  Lab Frequency Next Occurrence   Basic Metabolic Panel Once 17/73/6453               Patient Active Problem List:     Back pain     Essential hypertension     COPD (chronic obstructive pulmonary disease) (HCC)     Left lower lobe pulmonary nodule     Procedure refused

## 2023-05-24 ENCOUNTER — HOSPITAL ENCOUNTER (OUTPATIENT)
Age: 78
Setting detail: SPECIMEN
Discharge: HOME | End: 2023-05-24

## 2023-05-24 ENCOUNTER — OFFICE VISIT (OUTPATIENT)
Dept: FAMILY MEDICINE CLINIC | Age: 78
End: 2023-05-24
Payer: COMMERCIAL

## 2023-05-24 VITALS
BODY MASS INDEX: 29.27 KG/M2 | DIASTOLIC BLOOD PRESSURE: 60 MMHG | SYSTOLIC BLOOD PRESSURE: 126 MMHG | HEART RATE: 70 BPM | TEMPERATURE: 98.1 F | OXYGEN SATURATION: 92 % | WEIGHT: 212.8 LBS

## 2023-05-24 DIAGNOSIS — I48.20 ATRIAL FIBRILLATION, CHRONIC (HCC): ICD-10-CM

## 2023-05-24 DIAGNOSIS — B37.0 ORAL THRUSH: ICD-10-CM

## 2023-05-24 DIAGNOSIS — R68.89 FLU-LIKE SYMPTOMS: ICD-10-CM

## 2023-05-24 DIAGNOSIS — R12 HEARTBURN: ICD-10-CM

## 2023-05-24 DIAGNOSIS — R68.89 FLU-LIKE SYMPTOMS: Primary | ICD-10-CM

## 2023-05-24 DIAGNOSIS — I10 ESSENTIAL HYPERTENSION: ICD-10-CM

## 2023-05-24 DIAGNOSIS — J44.1 CHRONIC OBSTRUCTIVE PULMONARY DISEASE WITH ACUTE EXACERBATION (HCC): ICD-10-CM

## 2023-05-24 DIAGNOSIS — K59.00 CONSTIPATION, UNSPECIFIED CONSTIPATION TYPE: ICD-10-CM

## 2023-05-24 PROCEDURE — G8417 CALC BMI ABV UP PARAM F/U: HCPCS | Performed by: INTERNAL MEDICINE

## 2023-05-24 PROCEDURE — 99214 OFFICE O/P EST MOD 30 MIN: CPT | Performed by: INTERNAL MEDICINE

## 2023-05-24 PROCEDURE — 3074F SYST BP LT 130 MM HG: CPT | Performed by: INTERNAL MEDICINE

## 2023-05-24 PROCEDURE — 1123F ACP DISCUSS/DSCN MKR DOCD: CPT | Performed by: INTERNAL MEDICINE

## 2023-05-24 PROCEDURE — 1036F TOBACCO NON-USER: CPT | Performed by: INTERNAL MEDICINE

## 2023-05-24 PROCEDURE — G8427 DOCREV CUR MEDS BY ELIG CLIN: HCPCS | Performed by: INTERNAL MEDICINE

## 2023-05-24 PROCEDURE — 3078F DIAST BP <80 MM HG: CPT | Performed by: INTERNAL MEDICINE

## 2023-05-24 PROCEDURE — 1111F DSCHRG MED/CURRENT MED MERGE: CPT | Performed by: INTERNAL MEDICINE

## 2023-05-24 PROCEDURE — 3023F SPIROM DOC REV: CPT | Performed by: INTERNAL MEDICINE

## 2023-05-24 RX ORDER — CANDESARTAN 32 MG/1
32 TABLET ORAL DAILY
Qty: 90 TABLET | Refills: 1 | Status: SHIPPED | OUTPATIENT
Start: 2023-05-24

## 2023-05-24 RX ORDER — PREDNISONE 10 MG/1
TABLET ORAL
Qty: 30 TABLET | Refills: 0 | Status: SHIPPED | OUTPATIENT
Start: 2023-05-24

## 2023-05-24 RX ORDER — ALBUTEROL SULFATE 90 UG/1
2 AEROSOL, METERED RESPIRATORY (INHALATION) EVERY 6 HOURS PRN
Qty: 1 EACH | Refills: 5 | Status: SHIPPED | OUTPATIENT
Start: 2023-05-24

## 2023-05-24 RX ORDER — DILTIAZEM HYDROCHLORIDE 360 MG/1
360 CAPSULE, EXTENDED RELEASE ORAL DAILY
Qty: 90 CAPSULE | Refills: 1 | Status: SHIPPED | OUTPATIENT
Start: 2023-05-24

## 2023-05-24 RX ORDER — LINACLOTIDE 290 UG/1
290 CAPSULE, GELATIN COATED ORAL
Qty: 90 CAPSULE | Refills: 1 | Status: SHIPPED | OUTPATIENT
Start: 2023-05-24

## 2023-05-24 RX ORDER — FAMOTIDINE 20 MG/1
20 TABLET, FILM COATED ORAL 2 TIMES DAILY
Qty: 60 TABLET | Refills: 3 | Status: SHIPPED | OUTPATIENT
Start: 2023-05-24

## 2023-05-24 SDOH — ECONOMIC STABILITY: FOOD INSECURITY: WITHIN THE PAST 12 MONTHS, YOU WORRIED THAT YOUR FOOD WOULD RUN OUT BEFORE YOU GOT MONEY TO BUY MORE.: NEVER TRUE

## 2023-05-24 SDOH — ECONOMIC STABILITY: FOOD INSECURITY: WITHIN THE PAST 12 MONTHS, THE FOOD YOU BOUGHT JUST DIDN'T LAST AND YOU DIDN'T HAVE MONEY TO GET MORE.: NEVER TRUE

## 2023-05-24 SDOH — ECONOMIC STABILITY: HOUSING INSECURITY
IN THE LAST 12 MONTHS, WAS THERE A TIME WHEN YOU DID NOT HAVE A STEADY PLACE TO SLEEP OR SLEPT IN A SHELTER (INCLUDING NOW)?: NO

## 2023-05-24 SDOH — ECONOMIC STABILITY: INCOME INSECURITY: HOW HARD IS IT FOR YOU TO PAY FOR THE VERY BASICS LIKE FOOD, HOUSING, MEDICAL CARE, AND HEATING?: NOT VERY HARD

## 2023-05-24 ASSESSMENT — ENCOUNTER SYMPTOMS
SHORTNESS OF BREATH: 1
WHEEZING: 1
RHINORRHEA: 1
CONSTIPATION: 0
ABDOMINAL PAIN: 0
ANAL BLEEDING: 0
CHOKING: 0
NAUSEA: 0
COUGH: 1
DIARRHEA: 0
VOMITING: 0
CHEST TIGHTNESS: 1
BLOOD IN STOOL: 0

## 2023-05-24 ASSESSMENT — VISUAL ACUITY: OU: 1

## 2023-05-24 NOTE — PROGRESS NOTES
Pt states he has been sick for about 5 days, coughing so much his ribs hurt, runny nose, headache, unable to sleep, pt has had a fever last night, somewhat of a sore throat, wheezing, coughing up white mucus, feels weak, a little dizzy, loss of appetite     No vomiting, no diarrhea,     Just had new pacemaker put in on 05/10/2023     Pt would like a med for his GERD, asking for Pepcid
Rate and Rhythm: Normal rate and regular rhythm. Heart sounds: Normal heart sounds, S1 normal and S2 normal. No murmur heard. No friction rub. No gallop. Pulmonary:      Effort: Pulmonary effort is normal. No respiratory distress. Breath sounds: Wheezing present. Abdominal:      General: Bowel sounds are normal.      Palpations: Abdomen is soft. There is no mass. Tenderness: There is no abdominal tenderness. There is no guarding. Musculoskeletal:         General: Normal range of motion. Skin:     General: Skin is warm and dry. Capillary Refill: Capillary refill takes less than 2 seconds. Neurological:      General: No focal deficit present. Mental Status: He is alert and oriented to person, place, and time. [address] [place] record reviewed in chart     Health Maintenance Due   Topic Date Due    Pneumococcal 65+ years Vaccine (1 - PCV) Never done           Patient given educational materials- see patient instructions. Discussed use, benefit, and side effects of prescribedmedications. All patient questions answered. Pt voiced understanding. Reviewedhealth maintenance. Instructed to continue current medications, diet and exercise. Patient agreed with treatment plan. Follow up as directed.      Electronically signedby Charmaine Suarez MD on 5/24/2023

## 2023-05-25 LAB
SARS-COV-2 RNA RESP QL NAA+PROBE: NORMAL
SARS-COV-2 RNA RESP QL NAA+PROBE: NOT DETECTED
SOURCE: NORMAL

## 2023-07-08 DIAGNOSIS — E55.9 VITAMIN D DEFICIENCY: ICD-10-CM

## 2023-07-10 RX ORDER — ERGOCALCIFEROL 1.25 MG/1
CAPSULE ORAL
Qty: 12 CAPSULE | Refills: 1 | Status: SHIPPED | OUTPATIENT
Start: 2023-07-10

## 2023-07-10 NOTE — TELEPHONE ENCOUNTER
Last visit: 05/24/23  Last Med refill: 04/15/23  Does patient have enough medication for 72 hours: Yes    Next Visit Date:  Future Appointments   Date Time Provider [address]   8/31/2023  9:45 AM Kathe Alfredo MD [facility]   9/26/2023 10:45 AM Jaylene Dawson MD [address] Maintenance   Topic Date Due    Pneumococcal 65+ years Vaccine (1 - PCV) Never done    DTaP/Tdap/Td vaccine (2 - Td or Tdap) 07/10/2023    COVID-19 Vaccine (2 - Booster for [place] series) 07/23/2023 (Originally 7/8/2021)    Shingles vaccine (1 of 2) 01/23/2024 (Originally 10/25/1996)    Flu vaccine (1) 08/01/2023    Depression Screen  01/23/2024    Colorectal Cancer Screen  10/25/2024    Hepatitis C screen  Completed    Hepatitis A vaccine  Aged Out    Hib vaccine  Aged Out    Meningococcal (ACWY) vaccine  Aged Out    Diabetic foot exam  Discontinued    A1C test (Diabetic or Prediabetic)  Discontinued    Diabetic Alb to Cr ratio (uACR) test  Discontinued    Lipids  Discontinued       Hemoglobin A1C (%)   Date Value   01/23/2023 5.5   01/10/2022 5.6   07/13/2021 6.1             ( goal A1C is < 7)   Microalb/Crt.  Ratio (mcg/mg creat)   Date Value   11/02/2020 38 (H)     LDL Cholesterol (mg/dL)   Date Value   09/30/2022 116   06/17/2021 134 (H)       (goal LDL is <100)   AST (U/L)   Date Value   05/07/2023 12     ALT (U/L)   Date Value   05/07/2023 7     BUN (mg/dL)   Date Value   05/09/2023 17     BP Readings from Last 3 Encounters:   05/24/23 126/60   05/18/23 (!) 147/81   05/10/23 121/72          (goal 120/80)    All Future Testing planned in [facility]  Lab Frequency Next Occurrence               Patient Active Problem List:     Back pain     Essential hypertension     COPD (chronic obstructive pulmonary disease) (HCC)     Left lower lobe pulmonary nodule     Procedure refused     Hyperlipidemia     Colonoscopy refused     Atrial fibrillation, chronic ([address])     Anxiety     Insomnia     Pacemaker

## 2023-07-11 ENCOUNTER — APPOINTMENT (OUTPATIENT)
Dept: CT IMAGING | Age: 78
End: 2023-07-11
Payer: COMMERCIAL

## 2023-07-11 ENCOUNTER — HOSPITAL ENCOUNTER (INPATIENT)
Age: 78
LOS: 3 days | Discharge: HOME | End: 2023-07-14
Attending: EMERGENCY MEDICINE | Admitting: INTERNAL MEDICINE
Payer: COMMERCIAL

## 2023-07-11 ENCOUNTER — APPOINTMENT (OUTPATIENT)
Dept: GENERAL RADIOLOGY | Age: 78
End: 2023-07-11
Payer: COMMERCIAL

## 2023-07-11 DIAGNOSIS — A41.9 SEPSIS, DUE TO UNSPECIFIED ORGANISM, UNSPECIFIED WHETHER ACUTE ORGAN DYSFUNCTION PRESENT (HCC): ICD-10-CM

## 2023-07-11 DIAGNOSIS — N30.01 ACUTE CYSTITIS WITH HEMATURIA: Primary | ICD-10-CM

## 2023-07-11 DIAGNOSIS — N17.9 AKI (ACUTE KIDNEY INJURY) (HCC): ICD-10-CM

## 2023-07-11 PROBLEM — N39.0 UTI (URINARY TRACT INFECTION): Status: ACTIVE | Noted: 2023-07-11

## 2023-07-11 PROBLEM — J44.1 COPD EXACERBATION (HCC): Status: ACTIVE | Noted: 2023-07-11

## 2023-07-11 LAB
ALBUMIN SERPL-MCNC: 3.8 G/DL (ref 3.5–5.2)
ALBUMIN/GLOB SERPL: 1.1 {RATIO} (ref 1–2.5)
ALP SERPL-CCNC: 107 U/L (ref 40–129)
ALT SERPL-CCNC: 9 U/L (ref 5–41)
ANION GAP SERPL CALCULATED.3IONS-SCNC: 17 MMOL/L (ref 9–17)
AST SERPL-CCNC: 13 U/L
BACTERIA URNS QL MICRO: ABNORMAL
BASOPHILS # BLD: 0 K/UL (ref 0–0.2)
BASOPHILS NFR BLD: 0 % (ref 0–2)
BILIRUB SERPL-MCNC: 0.8 MG/DL (ref 0.3–1.2)
BILIRUB UR QL STRIP: NEGATIVE
BNP SERPL-MCNC: ABNORMAL PG/ML
BUN SERPL-MCNC: 27 MG/DL (ref 8–23)
CALCIUM SERPL-MCNC: 9.6 MG/DL (ref 8.6–10.4)
CHLORIDE SERPL-SCNC: 102 MMOL/L (ref 98–107)
CLARITY UR: ABNORMAL
CO2 SERPL-SCNC: 19 MMOL/L (ref 20–31)
COLOR UR: YELLOW
CREAT SERPL-MCNC: 2.4 MG/DL (ref 0.7–1.2)
EOSINOPHIL # BLD: 0 K/UL (ref 0–0.4)
EOSINOPHILS RELATIVE PERCENT: 0 % (ref 1–4)
EPI CELLS #/AREA URNS HPF: ABNORMAL /HPF (ref 0–5)
ERYTHROCYTE [DISTWIDTH] IN BLOOD BY AUTOMATED COUNT: 14.8 % (ref 11.8–14.4)
FLUAV AG SPEC QL: NEGATIVE
FLUBV AG SPEC QL: NEGATIVE
GFR SERPL CREATININE-BSD FRML MDRD: 27 ML/MIN/1.73M2
GLUCOSE SERPL-MCNC: 143 MG/DL (ref 70–99)
GLUCOSE UR STRIP-MCNC: NEGATIVE MG/DL
HCT VFR BLD AUTO: 45.1 % (ref 40.7–50.3)
HGB BLD-MCNC: 14.7 G/DL (ref 13–17)
HGB UR QL STRIP.AUTO: ABNORMAL
IMM GRANULOCYTES # BLD AUTO: 0 K/UL (ref 0–0.3)
IMM GRANULOCYTES NFR BLD: 0 %
KETONES UR STRIP-MCNC: ABNORMAL MG/DL
LACTIC ACID, SEPSIS WHOLE BLOOD: 2.5 MMOL/L (ref 0.5–1.9)
LACTIC ACID, WHOLE BLOOD: 1.5 MMOL/L (ref 0.7–2.1)
LEUKOCYTE ESTERASE UR QL STRIP: ABNORMAL
LYMPHOCYTES # BLD: 13 % (ref 24–44)
LYMPHOCYTES NFR BLD: 1.17 K/UL (ref 1–4.8)
MAGNESIUM SERPL-MCNC: 1.8 MG/DL (ref 1.6–2.6)
MCH RBC QN AUTO: 31.4 PG (ref 25.2–33.5)
MCHC RBC AUTO-ENTMCNC: 32.6 G/DL (ref 28.4–34.8)
MCV RBC AUTO: 96.4 FL (ref 82.6–102.9)
MONOCYTES NFR BLD: 0.36 K/UL (ref 0.1–0.8)
MONOCYTES NFR BLD: 4 % (ref 1–7)
MORPHOLOGY: ABNORMAL
NEUTROPHILS NFR BLD: 83 % (ref 36–66)
NEUTS SEG NFR BLD: 7.47 K/UL (ref 1.8–7.7)
NITRITE UR QL STRIP: NEGATIVE
NRBC BLD-RTO: 0 PER 100 WBC
PH UR STRIP: 5.5 [PH] (ref 5–8)
PLATELET # BLD AUTO: ABNORMAL K/UL (ref 138–453)
PLATELET, FLUORESCENCE: 130 K/UL (ref 138–453)
PLATELETS.RETICULATED NFR BLD AUTO: 5.1 % (ref 1.1–10.3)
POTASSIUM SERPL-SCNC: 4.3 MMOL/L (ref 3.7–5.3)
PROT SERPL-MCNC: 7.3 G/DL (ref 6.4–8.3)
PROT UR STRIP-MCNC: ABNORMAL MG/DL
RBC # BLD AUTO: 4.68 M/UL (ref 4.21–5.77)
RBC #/AREA URNS HPF: ABNORMAL /HPF (ref 0–2)
SARS-COV-2 RDRP RESP QL NAA+PROBE: NOT DETECTED
SODIUM SERPL-SCNC: 138 MMOL/L (ref 135–144)
SP GR UR STRIP: 1.01 (ref 1–1.03)
SPECIMEN DESCRIPTION: NORMAL
TROPONIN I SERPL HS-MCNC: 41 NG/L (ref 0–22)
TROPONIN I SERPL HS-MCNC: 43 NG/L (ref 0–22)
UROBILINOGEN UR STRIP-ACNC: NORMAL
WBC #/AREA URNS HPF: ABNORMAL /HPF (ref 0–5)
WBC OTHER # BLD: 9 K/UL (ref 3.5–11.3)

## 2023-07-11 PROCEDURE — 83605 ASSAY OF LACTIC ACID: CPT

## 2023-07-11 PROCEDURE — 71046 X-RAY EXAM CHEST 2 VIEWS: CPT

## 2023-07-11 PROCEDURE — 87205 SMEAR GRAM STAIN: CPT

## 2023-07-11 PROCEDURE — 85027 COMPLETE CBC AUTOMATED: CPT

## 2023-07-11 PROCEDURE — 6370000000 HC RX 637 (ALT 250 FOR IP): Performed by: STUDENT IN AN ORGANIZED HEALTH CARE EDUCATION/TRAINING PROGRAM

## 2023-07-11 PROCEDURE — 2700000000 HC OXYGEN THERAPY PER DAY

## 2023-07-11 PROCEDURE — 6370000000 HC RX 637 (ALT 250 FOR IP)

## 2023-07-11 PROCEDURE — 84484 ASSAY OF TROPONIN QUANT: CPT

## 2023-07-11 PROCEDURE — 71250 CT THORAX DX C-: CPT

## 2023-07-11 PROCEDURE — 87040 BLOOD CULTURE FOR BACTERIA: CPT

## 2023-07-11 PROCEDURE — 6360000002 HC RX W HCPCS: Performed by: STUDENT IN AN ORGANIZED HEALTH CARE EDUCATION/TRAINING PROGRAM

## 2023-07-11 PROCEDURE — 2580000003 HC RX 258

## 2023-07-11 PROCEDURE — 99285 EMERGENCY DEPT VISIT HI MDM: CPT

## 2023-07-11 PROCEDURE — 87154 CUL TYP ID BLD PTHGN 6+ TRGT: CPT

## 2023-07-11 PROCEDURE — 87077 CULTURE AEROBIC IDENTIFY: CPT

## 2023-07-11 PROCEDURE — 87086 URINE CULTURE/COLONY COUNT: CPT

## 2023-07-11 PROCEDURE — 99223 1ST HOSP IP/OBS HIGH 75: CPT | Performed by: INTERNAL MEDICINE

## 2023-07-11 PROCEDURE — 81001 URINALYSIS AUTO W/SCOPE: CPT

## 2023-07-11 PROCEDURE — 94761 N-INVAS EAR/PLS OXIMETRY MLT: CPT

## 2023-07-11 PROCEDURE — 6360000002 HC RX W HCPCS

## 2023-07-11 PROCEDURE — 83735 ASSAY OF MAGNESIUM: CPT

## 2023-07-11 PROCEDURE — 85055 RETICULATED PLATELET ASSAY: CPT

## 2023-07-11 PROCEDURE — 2580000003 HC RX 258: Performed by: STUDENT IN AN ORGANIZED HEALTH CARE EDUCATION/TRAINING PROGRAM

## 2023-07-11 PROCEDURE — 99222 1ST HOSP IP/OBS MODERATE 55: CPT | Performed by: INTERNAL MEDICINE

## 2023-07-11 PROCEDURE — 83880 ASSAY OF NATRIURETIC PEPTIDE: CPT

## 2023-07-11 PROCEDURE — 87635 SARS-COV-2 COVID-19 AMP PRB: CPT

## 2023-07-11 PROCEDURE — 87186 SC STD MICRODIL/AGAR DIL: CPT

## 2023-07-11 PROCEDURE — 93005 ELECTROCARDIOGRAM TRACING: CPT | Performed by: STUDENT IN AN ORGANIZED HEALTH CARE EDUCATION/TRAINING PROGRAM

## 2023-07-11 PROCEDURE — 96361 HYDRATE IV INFUSION ADD-ON: CPT

## 2023-07-11 PROCEDURE — 1200000000 HC SEMI PRIVATE

## 2023-07-11 PROCEDURE — 80053 COMPREHEN METABOLIC PANEL: CPT

## 2023-07-11 PROCEDURE — 96365 THER/PROPH/DIAG IV INF INIT: CPT

## 2023-07-11 PROCEDURE — 87804 INFLUENZA ASSAY W/OPTIC: CPT

## 2023-07-11 RX ORDER — ALBUTEROL SULFATE 90 UG/1
2 AEROSOL, METERED RESPIRATORY (INHALATION) EVERY 6 HOURS PRN
Status: DISCONTINUED | OUTPATIENT
Start: 2023-07-11 | End: 2023-07-14 | Stop reason: HOSPADM

## 2023-07-11 RX ORDER — DILTIAZEM HYDROCHLORIDE 180 MG/1
360 CAPSULE, COATED, EXTENDED RELEASE ORAL DAILY
Status: DISCONTINUED | OUTPATIENT
Start: 2023-07-11 | End: 2023-07-14 | Stop reason: HOSPADM

## 2023-07-11 RX ORDER — FAMOTIDINE 20 MG/1
20 TABLET, FILM COATED ORAL DAILY
Status: DISCONTINUED | OUTPATIENT
Start: 2023-07-11 | End: 2023-07-13

## 2023-07-11 RX ORDER — TAMSULOSIN HYDROCHLORIDE 0.4 MG/1
0.4 CAPSULE ORAL DAILY
Status: DISCONTINUED | OUTPATIENT
Start: 2023-07-11 | End: 2023-07-14 | Stop reason: HOSPADM

## 2023-07-11 RX ORDER — ACETAMINOPHEN 500 MG
1000 TABLET ORAL ONCE
Status: COMPLETED | OUTPATIENT
Start: 2023-07-11 | End: 2023-07-11

## 2023-07-11 RX ORDER — ONDANSETRON 2 MG/ML
4 INJECTION INTRAMUSCULAR; INTRAVENOUS EVERY 6 HOURS PRN
Status: DISCONTINUED | OUTPATIENT
Start: 2023-07-11 | End: 2023-07-14 | Stop reason: HOSPADM

## 2023-07-11 RX ORDER — SODIUM CHLORIDE 9 MG/ML
INJECTION, SOLUTION INTRAVENOUS PRN
Status: DISCONTINUED | OUTPATIENT
Start: 2023-07-11 | End: 2023-07-14 | Stop reason: HOSPADM

## 2023-07-11 RX ORDER — SODIUM CHLORIDE 0.9 % (FLUSH) 0.9 %
5-40 SYRINGE (ML) INJECTION PRN
Status: DISCONTINUED | OUTPATIENT
Start: 2023-07-11 | End: 2023-07-14 | Stop reason: HOSPADM

## 2023-07-11 RX ORDER — SODIUM CHLORIDE 0.9 % (FLUSH) 0.9 %
5-40 SYRINGE (ML) INJECTION EVERY 12 HOURS SCHEDULED
Status: DISCONTINUED | OUTPATIENT
Start: 2023-07-11 | End: 2023-07-14 | Stop reason: HOSPADM

## 2023-07-11 RX ORDER — 0.9 % SODIUM CHLORIDE 0.9 %
1000 INTRAVENOUS SOLUTION INTRAVENOUS ONCE
Status: COMPLETED | OUTPATIENT
Start: 2023-07-11 | End: 2023-07-11

## 2023-07-11 RX ORDER — ONDANSETRON 4 MG/1
4 TABLET, ORALLY DISINTEGRATING ORAL EVERY 8 HOURS PRN
Status: DISCONTINUED | OUTPATIENT
Start: 2023-07-11 | End: 2023-07-14 | Stop reason: HOSPADM

## 2023-07-11 RX ORDER — ACETAMINOPHEN 325 MG/1
650 TABLET ORAL EVERY 6 HOURS PRN
Status: DISCONTINUED | OUTPATIENT
Start: 2023-07-11 | End: 2023-07-14 | Stop reason: HOSPADM

## 2023-07-11 RX ORDER — LORAZEPAM 1 MG/1
1 TABLET ORAL EVERY 8 HOURS PRN
Status: DISCONTINUED | OUTPATIENT
Start: 2023-07-11 | End: 2023-07-14 | Stop reason: HOSPADM

## 2023-07-11 RX ORDER — POLYETHYLENE GLYCOL 3350 17 G/17G
17 POWDER, FOR SOLUTION ORAL DAILY PRN
Status: DISCONTINUED | OUTPATIENT
Start: 2023-07-11 | End: 2023-07-14 | Stop reason: HOSPADM

## 2023-07-11 RX ORDER — ACETAMINOPHEN 650 MG/1
650 SUPPOSITORY RECTAL EVERY 6 HOURS PRN
Status: DISCONTINUED | OUTPATIENT
Start: 2023-07-11 | End: 2023-07-14 | Stop reason: HOSPADM

## 2023-07-11 RX ORDER — SODIUM CHLORIDE 9 MG/ML
INJECTION, SOLUTION INTRAVENOUS CONTINUOUS
Status: DISCONTINUED | OUTPATIENT
Start: 2023-07-11 | End: 2023-07-14

## 2023-07-11 RX ADMIN — LORAZEPAM 1 MG: 1 TABLET ORAL at 21:04

## 2023-07-11 RX ADMIN — FAMOTIDINE 20 MG: 20 TABLET, FILM COATED ORAL at 11:58

## 2023-07-11 RX ADMIN — LORAZEPAM 1 MG: 1 TABLET ORAL at 14:16

## 2023-07-11 RX ADMIN — APIXABAN 5 MG: 5 TABLET, FILM COATED ORAL at 13:00

## 2023-07-11 RX ADMIN — TAMSULOSIN HYDROCHLORIDE 0.4 MG: 0.4 CAPSULE ORAL at 11:58

## 2023-07-11 RX ADMIN — SODIUM CHLORIDE 1000 ML: 9 INJECTION, SOLUTION INTRAVENOUS at 06:49

## 2023-07-11 RX ADMIN — APIXABAN 5 MG: 5 TABLET, FILM COATED ORAL at 21:05

## 2023-07-11 RX ADMIN — CEFEPIME 2000 MG: 2 INJECTION, POWDER, FOR SOLUTION INTRAVENOUS at 19:03

## 2023-07-11 RX ADMIN — CEFTRIAXONE SODIUM 1000 MG: 1 INJECTION, POWDER, FOR SOLUTION INTRAMUSCULAR; INTRAVENOUS at 09:04

## 2023-07-11 RX ADMIN — ACETAMINOPHEN 1000 MG: 500 TABLET ORAL at 06:49

## 2023-07-11 RX ADMIN — SODIUM CHLORIDE: 9 INJECTION, SOLUTION INTRAVENOUS at 11:32

## 2023-07-11 RX ADMIN — DILTIAZEM HYDROCHLORIDE 360 MG: 180 CAPSULE, COATED, EXTENDED RELEASE ORAL at 13:24

## 2023-07-11 ASSESSMENT — ENCOUNTER SYMPTOMS
ABDOMINAL PAIN: 1
DIARRHEA: 0
SHORTNESS OF BREATH: 0
VOMITING: 0
NAUSEA: 0

## 2023-07-11 ASSESSMENT — PAIN SCALES - GENERAL: PAINLEVEL_OUTOF10: 5

## 2023-07-11 ASSESSMENT — PAIN - FUNCTIONAL ASSESSMENT: PAIN_FUNCTIONAL_ASSESSMENT: NONE - DENIES PAIN

## 2023-07-11 NOTE — ED NOTES
Dr. Blanca Edwards at bedside to assess pt.      Beckie Tang RN  07/11/23 1194
Labeled blood specimens sent to lab via tube system.     [] Lavender   [] on ice   [] Blue   [] Green/yellow  [x] Green/black [] on ice  [] Pink  [] Red  [] Yellow  [] on ice  [] Blood Cultures      Naheed Ibarra RN  07/11/23 2672
Labeled urine specimen sent to lab via tube system. Urine Sample   []  Clean catch   [] Straight cath   [x] Urine voided   []  Indwelling catheter   []  Suprapubic catheter    Labeled COVID swab sent to lab via tube system. COVID-19 swab      [x] Rapid   [] Non- Rapid/PCR  [] Respiratory Panel with COVID    Labeled flu swab sent to lab via tube system.      Danni Clarke RN  07/11/23 7490
Message sent to Pharmacy to send medication to ED.      Lakisha Archibald RN  07/11/23 3337
Pt returned from X-ray via Sebas Castillo, JENNA  07/11/23 1218
Pt to ED c/o fever and feeling ill x2 days. Pt states he has been feeling weaker the past couple days. Pt also states he is having some right sided rib pain that only hurts when he touches it. Pt states he has a history of COPD, does not wear oxygen at home. Pt has hx of a-fib that he takes elaquis for. Pt has a [employer] pacemaker placed for a-fib. Upo arrival pt is febrile. Pt placed on continuous cardiac monitor, bp and pulse ox. EKG completed, IV established, blood work drawn. Pt alert and oriented x4, talking in complete sentences, respirations even and unlabored. Pt acting age appropriate.  White board updated, will continue to plan of care      Manas Carballo, RN  07/11/23 6317
Pt to x-ray via [facility], RN  07/11/23 677 [facility], RN  07/11/23 9190
Report received from [place], [place].      Naheed Ibarra RN  07/11/23 9441
therapy     Answer:   7 days    0.9 % sodium chloride infusion       SURGICAL HISTORY       Past Surgical History:   Procedure Laterality Date    ABDOMINAL AORTIC ANEURYSM REPAIR      PACEMAKER PLACEMENT         PAST MEDICAL HISTORY       Past Medical History:   Diagnosis Date    A-fib ([address])     Anxiety     Back pain     COPD (chronic obstructive pulmonary disease) (HCC)     HTN (hypertension)     Hyperglycemia     Hyperlipidemia     can't take statins, due to muscle aches    Insomnia     Lung nodule     Pacemaker     cardiologist, dr. Subha an       Labs:  Labs Reviewed   LACTATE, SEPSIS - Abnormal; Notable for the following components:       Result Value    Lactic Acid, Sepsis, Whole Blood 2.5 (*)     All other components within normal limits   CBC WITH AUTO DIFFERENTIAL - Abnormal; Notable for the following components:    RDW 14.8 (*)     Platelet, Fluorescence 130 (*)     Seg Neutrophils 83 (*)     Lymphocytes 13 (*)     Eosinophils % 0 (*)     All other components within normal limits   COMPREHENSIVE METABOLIC PANEL - Abnormal; Notable for the following components:    Glucose 143 (*)     BUN 27 (*)     Creatinine 2.4 (*)     Est, Glom Filt Rate 27 (*)     CO2 19 (*)     All other components within normal limits   TROPONIN - Abnormal; Notable for the following components:    Troponin, High Sensitivity 43 (*)     All other components within normal limits   TROPONIN - Abnormal; Notable for the following components:    Troponin, High Sensitivity 41 (*)     All other components within normal limits   BRAIN NATRIURETIC PEPTIDE - Abnormal; Notable for the following components:    Pro-BNP 11,657 (*)     All other components within normal limits   URINALYSIS WITH MICROSCOPIC - Abnormal; Notable for the following components:    Turbidity UA Turbid (*)     Ketones, Urine TRACE (*)     Urine Hgb LARGE (*)     Protein, UA 2+ (*)     Leukocyte Esterase, Urine LARGE (*)     Bacteria, UA MANY (*)     All other components
Fall with Harm Risk

## 2023-07-11 NOTE — CARE COORDINATION
Issues/Barriers to RETURNING to current housing: none  Potential Assistance needed at discharge: N/A             Patient expects to discharge to: [id number] [place] [address] [place] for transportation at discharge: Family    Financial    Payor: Blu Lynch / Plan: Huseyin Marcelino / Product Type: *No Product type* /     Does insurance require precert for SNF: Yes    Potential assistance Purchasing Medications: No  Meds-to-Beds request:  yes      [id number] [id number] Miky Wells, [address] [place] [address] - F [phone]  [address] [place] [id number]  Phone: [phone] Fax: [phone]      Notes:    Factors facilitating achievement of predicted outcomes: Family support    Barriers to discharge: clinical status    Additional Case Management Notes: return home with son     The Plan for Transition of Care is related to the following treatment goals of COPD exacerbation ([address]) [J44.1]  CLIFTON (acute kidney injury) ([address]) [N17.9]  Acute cystitis with hematuria [N30.01]  Sepsis ([address]) [A41.9]  Sepsis, due to unspecified organism, unspecified whether acute organ dysfunction present ([address]) [N83.9]    IF APPLICABLE: The Patient and/or patient representative Rishi Cook and his family were provided with a choice of provider and agrees with the discharge plan.  Freedom of choice list with basic dialogue that supports the patient's individualized plan of care/goals and shares the quality data associated with the providers was provided to:     Patient   The Patient and/or Patient Representative Agree with the Discharge Plan?  yes    Montez Cockayne, RN  Case Management Department

## 2023-07-11 NOTE — H&P
g Topical BID    sodium chloride flush  5-40 mL IntraVENous 2 times per day    apixaban  5 mg Oral BID    dilTIAZem  360 mg Oral Daily    famotidine  20 mg Oral Daily    linaclotide  290 mcg Oral QAM AC    tamsulosin  0.4 mg Oral Daily    tiotropium-olodaterol  2 puff Inhalation Daily    sodium chloride flush  5-40 mL IntraVENous 2 times per day    cefepime  2,000 mg IntraVENous Q24H     Continuous Infusions:    sodium chloride      sodium chloride      sodium chloride 75 mL/hr at 07/11/23 2009     PRN Meds: sodium chloride flush, sodium chloride, albuterol sulfate HFA, LORazepam, sodium chloride flush, sodium chloride, ondansetron **OR** ondansetron, polyethylene glycol, acetaminophen **OR** acetaminophen       MD EDILBERTO Gonzales[address], [address].    Phone [phone]   Fax: [phone]  Answering Service: [phone]

## 2023-07-11 NOTE — ED PROVIDER NOTES
[facility]  Emergency Department Encounter  Emergency Medicine Resident     Pt Desiree Domínguez  MRN: [id number]  [address] [facility] 1945  Date of evaluation: 23  PCP:  Rima Gunter MD  Note Started: 6:37 AM EDT    CHIEF COMPLAINT       Chief Complaint   Patient presents with    Illness     HISTORY OF PRESENT ILLNESS  (Location/Symptom, Timing/Onset, Context/Setting, Quality, Duration, Modifying Factors, Severity.)      Cristine Severe is a 69 y.o. male with history of A-fib, COPD, hypertension and hyperlipidemia who presents with fever and generalized weakness x1 week. Patient states that he tried to wait it out at home but symptoms persisted and worsened. He called EMS. Patient is overall poor historian. Complaining of RUQ pain. Patient does have history of abdominal aortic aneurysm repair in 2020. PAST MEDICAL / SURGICAL / SOCIAL / FAMILY HISTORY      has a past medical history of A-fib ([address]), Anxiety, Back pain, COPD (chronic obstructive pulmonary disease) ([address]), HTN (hypertension), Hyperglycemia, Hyperlipidemia, Insomnia, Lung nodule, and Pacemaker. has a past surgical history that includes pacemaker placement and Abdominal aortic aneurysm repair.     Social History     Socioeconomic History    Marital status:      Spouse name: Not on file    Number of children: Not on file    Years of education: Not on file    Highest education level: Not on file   Occupational History    Not on file   Tobacco Use    Smoking status: Former     Packs/day: 0.50     Years: 25.00     Pack years: 12.50     Types: Cigarettes     Quit date: 2000     Years since quittin.7    Smokeless tobacco: Never   Vaping Use    Vaping Use: Never used   Substance and Sexual Activity    Alcohol use: No     Alcohol/week: 0.0 standard drinks    Drug use: No    Sexual activity: Never   Other Topics Concern    Not on file   Social History Narrative    Not on file     Social Determinants of Health

## 2023-07-11 NOTE — CONSULTS
[facility] Cardiology Cardiology    Consult / H&P               Today's Date: 7/11/2023  Patient Name: Ruy Bonds  Date of admission: 7/11/2023  6:27 AM  Patient's age: 69 y. o., 1945  Admission Dx: Sepsis ([address]) [A41.9]  COPD exacerbation ([address]) [J44.1]    Requesting Physician: Chay Shelton MD    Cardiac Evaluation Reason: EKG changes, bigeminy, elevated troponins    History Obtained From: patient and chart review     History of Present Illness: This patient 69y.o. years old with past medical history of sick sinus syndrome status post PPM, atrial fibrillation, COPD, hypertension who presented to ER with fatigue and weakness. Patient was found to be in sepsis secondary to UTI. He is febrile with Tmax of 102F. Cardiology has been consulted for evaluation of bigeminy, and EKG changes, elevated troponins. Patient denies chest pain or shortness of breath. EKG with V paced rhythm, with ventricular escape complexes. Patient has been started on IV fluids and antibiotics. Of note, he has permanent pacemaker in place. He had a left heart cath in May 2020 which showed nonobstructive CAD. Past Medical History:   has a past medical history of A-fib ([address]), Anxiety, Back pain, COPD (chronic obstructive pulmonary disease) ([address]), HTN (hypertension), Hyperglycemia, Hyperlipidemia, Insomnia, Lung nodule, and Pacemaker. Past Surgical History:   has a past surgical history that includes pacemaker placement and Abdominal aortic aneurysm repair. Home Medications:    Prior to Admission medications    Medication Sig Start Date End Date Taking? Authorizing Provider   vitamin D (ERGOCALCIFEROL) 1.25 MG (09174 UT) CAPS capsule take 1 capsule by mouth every week 7/10/23   Jaylene Amador MD   LORazepam (ATIVAN) 1 MG tablet Take 1 tablet by mouth every 8 hours as needed for Anxiety for up to 60 days.  Max Daily Amount: 3 mg 6/14/23 8/13/23  Jaylene Amador MD   linaclotide [facility]) 290 MCG CAPS capsule Take 1

## 2023-07-12 PROBLEM — A41.9 SEPTICEMIA (HCC): Status: ACTIVE | Noted: 2023-07-12

## 2023-07-12 PROBLEM — Z88.0 PENICILLIN ALLERGY: Status: ACTIVE | Noted: 2023-07-12

## 2023-07-12 PROBLEM — R91.1 LUNG NODULE: Status: ACTIVE | Noted: 2023-07-12

## 2023-07-12 LAB
ANION GAP SERPL CALCULATED.3IONS-SCNC: 14 MMOL/L (ref 9–17)
BASOPHILS # BLD: 0.05 K/UL (ref 0–0.2)
BASOPHILS NFR BLD: 1 % (ref 0–2)
BUN SERPL-MCNC: 28 MG/DL (ref 8–23)
CALCIUM SERPL-MCNC: 8.5 MG/DL (ref 8.6–10.4)
CHLORIDE SERPL-SCNC: 107 MMOL/L (ref 98–107)
CO2 SERPL-SCNC: 18 MMOL/L (ref 20–31)
CREAT SERPL-MCNC: 2.2 MG/DL (ref 0.7–1.2)
CRP SERPL HS-MCNC: 213.6 MG/L (ref 0–5)
EOSINOPHIL # BLD: 0.12 K/UL (ref 0–0.44)
EOSINOPHILS RELATIVE PERCENT: 2 % (ref 1–4)
ERYTHROCYTE [DISTWIDTH] IN BLOOD BY AUTOMATED COUNT: 14.6 % (ref 11.8–14.4)
GFR SERPL CREATININE-BSD FRML MDRD: 30 ML/MIN/1.73M2
GLUCOSE SERPL-MCNC: 107 MG/DL (ref 70–99)
HCT VFR BLD AUTO: 41.7 % (ref 40.7–50.3)
HGB BLD-MCNC: 13.2 G/DL (ref 13–17)
IMM GRANULOCYTES # BLD AUTO: <0.03 K/UL (ref 0–0.3)
IMM GRANULOCYTES NFR BLD: 0 %
LYMPHOCYTES # BLD: 11 % (ref 24–43)
LYMPHOCYTES NFR BLD: 0.76 K/UL (ref 1.1–3.7)
MCH RBC QN AUTO: 31.4 PG (ref 25.2–33.5)
MCHC RBC AUTO-ENTMCNC: 31.7 G/DL (ref 28.4–34.8)
MCV RBC AUTO: 99.3 FL (ref 82.6–102.9)
MICROORGANISM SPEC CULT: ABNORMAL
MONOCYTES NFR BLD: 1.08 K/UL (ref 0.1–1.2)
MONOCYTES NFR BLD: 16 % (ref 3–12)
NEUTROPHILS NFR BLD: 71 % (ref 36–65)
NEUTS SEG NFR BLD: 4.83 K/UL (ref 1.5–8.1)
NRBC BLD-RTO: 0 PER 100 WBC
PLATELET # BLD AUTO: ABNORMAL K/UL (ref 138–453)
PLATELET, FLUORESCENCE: 119 K/UL (ref 138–453)
PLATELETS.RETICULATED NFR BLD AUTO: 5.8 % (ref 1.1–10.3)
POTASSIUM SERPL-SCNC: 4.2 MMOL/L (ref 3.7–5.3)
PSA SERPL-MCNC: 0.68 NG/ML
RBC # BLD AUTO: 4.2 M/UL (ref 4.21–5.77)
RBC # BLD: ABNORMAL 10*6/UL
SODIUM SERPL-SCNC: 139 MMOL/L (ref 135–144)
SPECIMEN DESCRIPTION: ABNORMAL
TROPONIN I SERPL HS-MCNC: 29 NG/L (ref 0–22)
WBC OTHER # BLD: 6.9 K/UL (ref 3.5–11.3)

## 2023-07-12 PROCEDURE — 85027 COMPLETE CBC AUTOMATED: CPT

## 2023-07-12 PROCEDURE — 99233 SBSQ HOSP IP/OBS HIGH 50: CPT | Performed by: INTERNAL MEDICINE

## 2023-07-12 PROCEDURE — 93005 ELECTROCARDIOGRAM TRACING: CPT | Performed by: INTERNAL MEDICINE

## 2023-07-12 PROCEDURE — 6370000000 HC RX 637 (ALT 250 FOR IP)

## 2023-07-12 PROCEDURE — 99221 1ST HOSP IP/OBS SF/LOW 40: CPT | Performed by: INTERNAL MEDICINE

## 2023-07-12 PROCEDURE — 6360000002 HC RX W HCPCS

## 2023-07-12 PROCEDURE — 94640 AIRWAY INHALATION TREATMENT: CPT

## 2023-07-12 PROCEDURE — G0103 PSA SCREENING: HCPCS

## 2023-07-12 PROCEDURE — 86140 C-REACTIVE PROTEIN: CPT

## 2023-07-12 PROCEDURE — 80048 BASIC METABOLIC PNL TOTAL CA: CPT

## 2023-07-12 PROCEDURE — 1200000000 HC SEMI PRIVATE

## 2023-07-12 PROCEDURE — 85055 RETICULATED PLATELET ASSAY: CPT

## 2023-07-12 PROCEDURE — 99233 SBSQ HOSP IP/OBS HIGH 50: CPT | Performed by: SURGERY

## 2023-07-12 PROCEDURE — 36415 COLL VENOUS BLD VENIPUNCTURE: CPT

## 2023-07-12 PROCEDURE — 84484 ASSAY OF TROPONIN QUANT: CPT

## 2023-07-12 PROCEDURE — 94760 N-INVAS EAR/PLS OXIMETRY 1: CPT

## 2023-07-12 PROCEDURE — 2580000003 HC RX 258

## 2023-07-12 RX ADMIN — APIXABAN 5 MG: 5 TABLET, FILM COATED ORAL at 08:22

## 2023-07-12 RX ADMIN — CEFEPIME 2000 MG: 2 INJECTION, POWDER, FOR SOLUTION INTRAVENOUS at 18:00

## 2023-07-12 RX ADMIN — SODIUM CHLORIDE, PRESERVATIVE FREE 10 ML: 5 INJECTION INTRAVENOUS at 20:00

## 2023-07-12 RX ADMIN — DILTIAZEM HYDROCHLORIDE 360 MG: 180 CAPSULE, COATED, EXTENDED RELEASE ORAL at 08:20

## 2023-07-12 RX ADMIN — TIOTROPIUM BROMIDE AND OLODATEROL 2 PUFF: 3.124; 2.736 SPRAY, METERED RESPIRATORY (INHALATION) at 09:21

## 2023-07-12 RX ADMIN — LORAZEPAM 1 MG: 1 TABLET ORAL at 06:59

## 2023-07-12 RX ADMIN — DICLOFENAC SODIUM 2 G: 10 GEL TOPICAL at 20:00

## 2023-07-12 RX ADMIN — SODIUM CHLORIDE, PRESERVATIVE FREE 10 ML: 5 INJECTION INTRAVENOUS at 20:01

## 2023-07-12 RX ADMIN — FAMOTIDINE 20 MG: 20 TABLET, FILM COATED ORAL at 08:19

## 2023-07-12 RX ADMIN — TAMSULOSIN HYDROCHLORIDE 0.4 MG: 0.4 CAPSULE ORAL at 08:19

## 2023-07-12 RX ADMIN — APIXABAN 5 MG: 5 TABLET, FILM COATED ORAL at 20:00

## 2023-07-12 ASSESSMENT — PAIN SCALES - GENERAL
PAINLEVEL_OUTOF10: 2
PAINLEVEL_OUTOF10: 2

## 2023-07-12 NOTE — CONSULTS
[facility] - Initial Consult Note  Today's Date and Time: 7/12/2023, 1:45 PM    Impression :   E. Coli septicemia 7-11-23  UTI  Hypoxia  CLIFTON  COPD  Afib on Eliquis  HLD  Lung nodule  Hx AAA repair in 2020  SSS s/p Pacemaker  PCN allergy    Recommendations:   Continue IV cefepime 2 gm daily pending finalized cultures and sensitivities. Renal considerations    Medical Decision Making/Summary/Discussion:7/12/2023       Infection Control Recommendations   [place] Precautions    Antimicrobial Stewardship Recommendations     Simplification of therapy  Targeted therapy  PK dosing  Coordination of Outpatient Care:   Estimated Length of IV antimicrobials:TBD  Patient will need Midline Catheter Insertion: TBD  Patient will need PICC line Insertion:TBD  Patient will need: Home IV , [address] [place],  SNF,  LTAC: TBD  Patient will need outpatient wound care:No    Chief complaint/reason for consultation:   E. Coli urosepsis      History of Present Illness:   Marilynn Mesa is a 69y.o.-year-old male who was initially admitted on 7/11/2023. Patient seen at the request of Dr. El Verdugo:    This patient, with a PCN allergy and hx of COPD, SSS s/p pacemaker and Afib on Eliquis, presented to the ED on 7/11/23 with complaints of shortness of breath. Upon evaluation, he was found to have an SpO2 in the 80s on room air. He also had a fever of 102 F. Lab work was significant for CLIFTON, elevated lactic acid, BNP and troponin. CT chest/abd/pelvis showed chronic pulmonary findings as well as, contracted bladder with mild fat stranding. UA was suspicious for a UTI. Blood cultures were drawn and have resulted positive for 1/2 E. Coli. Urine culture is growing GNR. The patient was initiated on IV Cefepime. Cardiology was consulted and have ordered an echo. They feel the elevated troponin is likely related to demand ischemia in the setting of sepsis.      ID was

## 2023-07-13 PROBLEM — B96.20 E COLI BACTEREMIA: Status: ACTIVE | Noted: 2023-07-13

## 2023-07-13 PROBLEM — R78.81 E COLI BACTEREMIA: Status: ACTIVE | Noted: 2023-07-13

## 2023-07-13 LAB
ANION GAP SERPL CALCULATED.3IONS-SCNC: 11 MMOL/L (ref 9–17)
BASOPHILS # BLD: 0.05 K/UL (ref 0–0.2)
BASOPHILS NFR BLD: 1 % (ref 0–2)
BUN SERPL-MCNC: 22 MG/DL (ref 8–23)
CALCIUM SERPL-MCNC: 8.2 MG/DL (ref 8.6–10.4)
CHLORIDE SERPL-SCNC: 108 MMOL/L (ref 98–107)
CO2 SERPL-SCNC: 19 MMOL/L (ref 20–31)
CREAT SERPL-MCNC: 2 MG/DL (ref 0.7–1.2)
EKG ATRIAL RATE: 254 BPM
EKG ATRIAL RATE: 66 BPM
EKG P AXIS: 92 DEGREES
EKG P-R INTERVAL: 122 MS
EKG Q-T INTERVAL: 362 MS
EKG Q-T INTERVAL: 436 MS
EKG QRS DURATION: 126 MS
EKG QRS DURATION: 128 MS
EKG QTC CALCULATION (BAZETT): 442 MS
EKG QTC CALCULATION (BAZETT): 473 MS
EKG R AXIS: -20 DEGREES
EKG R AXIS: 4 DEGREES
EKG T AXIS: -66 DEGREES
EKG T AXIS: -73 DEGREES
EKG VENTRICULAR RATE: 71 BPM
EKG VENTRICULAR RATE: 90 BPM
EOSINOPHIL # BLD: 0.32 K/UL (ref 0–0.44)
EOSINOPHILS RELATIVE PERCENT: 5 % (ref 1–4)
ERYTHROCYTE [DISTWIDTH] IN BLOOD BY AUTOMATED COUNT: 14.7 % (ref 11.8–14.4)
GFR SERPL CREATININE-BSD FRML MDRD: 34 ML/MIN/1.73M2
GLUCOSE SERPL-MCNC: 101 MG/DL (ref 70–99)
HCT VFR BLD AUTO: 38.2 % (ref 40.7–50.3)
HGB BLD-MCNC: 12.1 G/DL (ref 13–17)
IMM GRANULOCYTES # BLD AUTO: <0.03 K/UL (ref 0–0.3)
IMM GRANULOCYTES NFR BLD: 0 %
LYMPHOCYTES # BLD: 14 % (ref 24–43)
LYMPHOCYTES NFR BLD: 0.83 K/UL (ref 1.1–3.7)
MCH RBC QN AUTO: 31.3 PG (ref 25.2–33.5)
MCHC RBC AUTO-ENTMCNC: 31.7 G/DL (ref 28.4–34.8)
MCV RBC AUTO: 99 FL (ref 82.6–102.9)
MICROORGANISM SPEC CULT: ABNORMAL
MONOCYTES NFR BLD: 1.3 K/UL (ref 0.1–1.2)
MONOCYTES NFR BLD: 22 % (ref 3–12)
NEUTROPHILS NFR BLD: 58 % (ref 36–65)
NEUTS SEG NFR BLD: 3.53 K/UL (ref 1.5–8.1)
NRBC BLD-RTO: 0 PER 100 WBC
PLATELET # BLD AUTO: 117 K/UL (ref 138–453)
PMV BLD AUTO: 12.2 FL (ref 8.1–13.5)
POTASSIUM SERPL-SCNC: 4.2 MMOL/L (ref 3.7–5.3)
RBC # BLD AUTO: 3.86 M/UL (ref 4.21–5.77)
RBC # BLD: ABNORMAL 10*6/UL
SERVICE CMNT-IMP: ABNORMAL
SODIUM SERPL-SCNC: 138 MMOL/L (ref 135–144)
SPECIMEN DESCRIPTION: ABNORMAL
TROPONIN I SERPL HS-MCNC: 20 NG/L (ref 0–22)
WBC OTHER # BLD: 6.1 K/UL (ref 3.5–11.3)

## 2023-07-13 PROCEDURE — 94640 AIRWAY INHALATION TREATMENT: CPT

## 2023-07-13 PROCEDURE — 93010 ELECTROCARDIOGRAM REPORT: CPT | Performed by: INTERNAL MEDICINE

## 2023-07-13 PROCEDURE — 84484 ASSAY OF TROPONIN QUANT: CPT

## 2023-07-13 PROCEDURE — 1200000000 HC SEMI PRIVATE

## 2023-07-13 PROCEDURE — 99233 SBSQ HOSP IP/OBS HIGH 50: CPT | Performed by: INTERNAL MEDICINE

## 2023-07-13 PROCEDURE — 2580000003 HC RX 258

## 2023-07-13 PROCEDURE — 6370000000 HC RX 637 (ALT 250 FOR IP): Performed by: INTERNAL MEDICINE

## 2023-07-13 PROCEDURE — 99232 SBSQ HOSP IP/OBS MODERATE 35: CPT | Performed by: INTERNAL MEDICINE

## 2023-07-13 PROCEDURE — 94760 N-INVAS EAR/PLS OXIMETRY 1: CPT

## 2023-07-13 PROCEDURE — 6370000000 HC RX 637 (ALT 250 FOR IP)

## 2023-07-13 PROCEDURE — 80048 BASIC METABOLIC PNL TOTAL CA: CPT

## 2023-07-13 PROCEDURE — 85027 COMPLETE CBC AUTOMATED: CPT

## 2023-07-13 PROCEDURE — 93005 ELECTROCARDIOGRAM TRACING: CPT

## 2023-07-13 PROCEDURE — 36415 COLL VENOUS BLD VENIPUNCTURE: CPT

## 2023-07-13 RX ORDER — 0.9 % SODIUM CHLORIDE 0.9 %
500 INTRAVENOUS SOLUTION INTRAVENOUS ONCE
Status: COMPLETED | OUTPATIENT
Start: 2023-07-13 | End: 2023-07-13

## 2023-07-13 RX ORDER — FAMOTIDINE 20 MG/1
20 TABLET, FILM COATED ORAL 2 TIMES DAILY
Status: DISCONTINUED | OUTPATIENT
Start: 2023-07-13 | End: 2023-07-13

## 2023-07-13 RX ORDER — NITROGLYCERIN 0.4 MG/1
0.4 TABLET SUBLINGUAL ONCE
Status: DISCONTINUED | OUTPATIENT
Start: 2023-07-13 | End: 2023-07-14 | Stop reason: HOSPADM

## 2023-07-13 RX ORDER — CIPROFLOXACIN 750 MG/1
750 TABLET, FILM COATED ORAL
Status: DISCONTINUED | OUTPATIENT
Start: 2023-07-13 | End: 2023-07-14 | Stop reason: HOSPADM

## 2023-07-13 RX ADMIN — DILTIAZEM HYDROCHLORIDE 360 MG: 180 CAPSULE, COATED, EXTENDED RELEASE ORAL at 08:38

## 2023-07-13 RX ADMIN — LORAZEPAM 1 MG: 1 TABLET ORAL at 00:34

## 2023-07-13 RX ADMIN — SODIUM CHLORIDE, PRESERVATIVE FREE 10 ML: 5 INJECTION INTRAVENOUS at 00:34

## 2023-07-13 RX ADMIN — APIXABAN 5 MG: 5 TABLET, FILM COATED ORAL at 20:50

## 2023-07-13 RX ADMIN — LORAZEPAM 1 MG: 1 TABLET ORAL at 08:38

## 2023-07-13 RX ADMIN — SODIUM CHLORIDE, PRESERVATIVE FREE 10 ML: 5 INJECTION INTRAVENOUS at 08:42

## 2023-07-13 RX ADMIN — CIPROFLOXACIN 750 MG: 750 TABLET, FILM COATED ORAL at 12:02

## 2023-07-13 RX ADMIN — FAMOTIDINE 20 MG: 20 TABLET, FILM COATED ORAL at 08:38

## 2023-07-13 RX ADMIN — APIXABAN 5 MG: 5 TABLET, FILM COATED ORAL at 08:38

## 2023-07-13 RX ADMIN — SODIUM CHLORIDE 500 ML: 9 INJECTION, SOLUTION INTRAVENOUS at 12:03

## 2023-07-13 RX ADMIN — TAMSULOSIN HYDROCHLORIDE 0.4 MG: 0.4 CAPSULE ORAL at 08:38

## 2023-07-13 RX ADMIN — SODIUM CHLORIDE: 9 INJECTION, SOLUTION INTRAVENOUS at 22:36

## 2023-07-13 RX ADMIN — LORAZEPAM 1 MG: 1 TABLET ORAL at 20:50

## 2023-07-13 RX ADMIN — TIOTROPIUM BROMIDE AND OLODATEROL 2 PUFF: 3.124; 2.736 SPRAY, METERED RESPIRATORY (INHALATION) at 07:26

## 2023-07-13 ASSESSMENT — PAIN SCALES - GENERAL: PAINLEVEL_OUTOF10: 1

## 2023-07-13 NOTE — PLAN OF CARE
Problem: Discharge Planning  Goal: Discharge to home or other facility with appropriate resources  7/13/2023 0038 by Doreen Alberto RN  Outcome: Progressing  7/12/2023 1729 by Claudine Doss RN  Outcome: Progressing     Problem: Safety - Adult  Goal: Free from fall injury  7/13/2023 0038 by Doreen Alberto RN  Outcome: Progressing  7/12/2023 1729 by Claudine Doss RN  Outcome: Progressing     Problem: Chronic Conditions and Co-morbidities  Goal: Patient's chronic conditions and co-morbidity symptoms are monitored and maintained or improved  7/13/2023 0038 by Doreen Alberto RN  Outcome: Progressing  7/12/2023 1729 by Claudine Doss RN  Outcome: Progressing     Problem: Cardiovascular - Adult  Goal: Maintains optimal cardiac output and hemodynamic stability  Outcome: Progressing  Goal: Absence of cardiac dysrhythmias or at baseline  Outcome: Progressing     Problem: Pain  Goal: Verbalizes/displays adequate comfort level or baseline comfort level  Outcome: Progressing

## 2023-07-13 NOTE — PLAN OF CARE
Problem: Discharge Planning  Goal: Discharge to home or other facility with appropriate resources  Outcome: Progressing     Problem: Safety - Adult  Goal: Free from fall injury  Outcome: Progressing     Problem: Chronic Conditions and Co-morbidities  Goal: Patient's chronic conditions and co-morbidity symptoms are monitored and maintained or improved  Outcome: Progressing     Problem: Cardiovascular - Adult  Goal: Maintains optimal cardiac output and hemodynamic stability  Outcome: Progressing  Goal: Absence of cardiac dysrhythmias or at baseline  Outcome: Progressing     Problem: Pain  Goal: Verbalizes/displays adequate comfort level or baseline comfort level  Outcome: Progressing     Problem: Respiratory - Adult  Goal: Achieves optimal ventilation and oxygenation  7/13/2023 1839 by Rosaura Devine RN  Outcome: Progressing  7/13/2023 0826 by Leticia Bhatti RCP  Outcome: Progressing     Problem: ABCDS Injury Assessment  Goal: Absence of physical injury  Outcome: Progressing

## 2023-07-14 VITALS
TEMPERATURE: 98.6 F | BODY MASS INDEX: 29.82 KG/M2 | DIASTOLIC BLOOD PRESSURE: 80 MMHG | HEART RATE: 65 BPM | SYSTOLIC BLOOD PRESSURE: 154 MMHG | OXYGEN SATURATION: 96 % | WEIGHT: 213 LBS | RESPIRATION RATE: 17 BRPM | HEIGHT: 71 IN

## 2023-07-14 DIAGNOSIS — N40.1 BENIGN PROSTATIC HYPERPLASIA WITH NOCTURIA: ICD-10-CM

## 2023-07-14 DIAGNOSIS — R35.1 BENIGN PROSTATIC HYPERPLASIA WITH NOCTURIA: ICD-10-CM

## 2023-07-14 PROBLEM — J44.1 COPD EXACERBATION (HCC): Status: RESOLVED | Noted: 2023-07-11 | Resolved: 2023-07-14

## 2023-07-14 PROBLEM — A41.9 SEPTICEMIA (HCC): Status: RESOLVED | Noted: 2023-07-12 | Resolved: 2023-07-14

## 2023-07-14 PROBLEM — A41.9 SEPSIS (HCC): Status: RESOLVED | Noted: 2023-07-11 | Resolved: 2023-07-14

## 2023-07-14 LAB
ANION GAP SERPL CALCULATED.3IONS-SCNC: 13 MMOL/L (ref 9–17)
BASOPHILS # BLD: 0.05 K/UL (ref 0–0.2)
BASOPHILS NFR BLD: 1 % (ref 0–2)
BUN SERPL-MCNC: 18 MG/DL (ref 8–23)
CALCIUM SERPL-MCNC: 8 MG/DL (ref 8.6–10.4)
CHLORIDE SERPL-SCNC: 108 MMOL/L (ref 98–107)
CO2 SERPL-SCNC: 17 MMOL/L (ref 20–31)
CREAT SERPL-MCNC: 1.6 MG/DL (ref 0.7–1.2)
EKG ATRIAL RATE: 69 BPM
EKG Q-T INTERVAL: 412 MS
EKG QRS DURATION: 132 MS
EKG QTC CALCULATION (BAZETT): 495 MS
EKG R AXIS: -9 DEGREES
EKG T AXIS: -54 DEGREES
EKG VENTRICULAR RATE: 87 BPM
EOSINOPHIL # BLD: 0.35 K/UL (ref 0–0.44)
EOSINOPHILS RELATIVE PERCENT: 5 % (ref 1–4)
ERYTHROCYTE [DISTWIDTH] IN BLOOD BY AUTOMATED COUNT: 14.6 % (ref 11.8–14.4)
GFR SERPL CREATININE-BSD FRML MDRD: 44 ML/MIN/1.73M2
GLUCOSE SERPL-MCNC: 133 MG/DL (ref 70–99)
HCT VFR BLD AUTO: 37.2 % (ref 40.7–50.3)
HGB BLD-MCNC: 11.8 G/DL (ref 13–17)
IMM GRANULOCYTES # BLD AUTO: 0.03 K/UL (ref 0–0.3)
IMM GRANULOCYTES NFR BLD: 1 %
LYMPHOCYTES # BLD: 14 % (ref 24–43)
LYMPHOCYTES NFR BLD: 0.91 K/UL (ref 1.1–3.7)
MCH RBC QN AUTO: 31.7 PG (ref 25.2–33.5)
MCHC RBC AUTO-ENTMCNC: 31.7 G/DL (ref 28.4–34.8)
MCV RBC AUTO: 100 FL (ref 82.6–102.9)
MICROORGANISM SPEC CULT: NORMAL
MONOCYTES NFR BLD: 1.07 K/UL (ref 0.1–1.2)
MONOCYTES NFR BLD: 16 % (ref 3–12)
NEUTROPHILS NFR BLD: 64 % (ref 36–65)
NEUTS SEG NFR BLD: 4.22 K/UL (ref 1.5–8.1)
NRBC BLD-RTO: 0 PER 100 WBC
PLATELET # BLD AUTO: ABNORMAL K/UL (ref 138–453)
PLATELET, FLUORESCENCE: 128 K/UL (ref 138–453)
PLATELETS.RETICULATED NFR BLD AUTO: 5.6 % (ref 1.1–10.3)
POTASSIUM SERPL-SCNC: 3.8 MMOL/L (ref 3.7–5.3)
RBC # BLD AUTO: 3.72 M/UL (ref 4.21–5.77)
RBC # BLD: ABNORMAL 10*6/UL
SERVICE CMNT-IMP: NORMAL
SODIUM SERPL-SCNC: 138 MMOL/L (ref 135–144)
SPECIMEN DESCRIPTION: NORMAL
WBC OTHER # BLD: 6.6 K/UL (ref 3.5–11.3)

## 2023-07-14 PROCEDURE — 36415 COLL VENOUS BLD VENIPUNCTURE: CPT

## 2023-07-14 PROCEDURE — 80048 BASIC METABOLIC PNL TOTAL CA: CPT

## 2023-07-14 PROCEDURE — 94640 AIRWAY INHALATION TREATMENT: CPT

## 2023-07-14 PROCEDURE — 85027 COMPLETE CBC AUTOMATED: CPT

## 2023-07-14 PROCEDURE — 94760 N-INVAS EAR/PLS OXIMETRY 1: CPT

## 2023-07-14 PROCEDURE — 6370000000 HC RX 637 (ALT 250 FOR IP)

## 2023-07-14 PROCEDURE — 85055 RETICULATED PLATELET ASSAY: CPT

## 2023-07-14 PROCEDURE — 99239 HOSP IP/OBS DSCHRG MGMT >30: CPT | Performed by: INTERNAL MEDICINE

## 2023-07-14 PROCEDURE — 2580000003 HC RX 258

## 2023-07-14 PROCEDURE — 6370000000 HC RX 637 (ALT 250 FOR IP): Performed by: INTERNAL MEDICINE

## 2023-07-14 RX ORDER — TAMSULOSIN HYDROCHLORIDE 0.4 MG/1
CAPSULE ORAL
Qty: 90 CAPSULE | Refills: 1 | Status: SHIPPED | OUTPATIENT
Start: 2023-07-14

## 2023-07-14 RX ORDER — CIPROFLOXACIN 750 MG/1
750 TABLET, FILM COATED ORAL
Qty: 5 TABLET | Refills: 0 | Status: SHIPPED | OUTPATIENT
Start: 2023-07-15 | End: 2023-07-20

## 2023-07-14 RX ADMIN — TIOTROPIUM BROMIDE AND OLODATEROL 2 PUFF: 3.124; 2.736 SPRAY, METERED RESPIRATORY (INHALATION) at 09:08

## 2023-07-14 RX ADMIN — APIXABAN 5 MG: 5 TABLET, FILM COATED ORAL at 08:55

## 2023-07-14 RX ADMIN — DILTIAZEM HYDROCHLORIDE 360 MG: 180 CAPSULE, COATED, EXTENDED RELEASE ORAL at 08:55

## 2023-07-14 RX ADMIN — SODIUM CHLORIDE, PRESERVATIVE FREE 10 ML: 5 INJECTION INTRAVENOUS at 08:07

## 2023-07-14 RX ADMIN — LORAZEPAM 1 MG: 1 TABLET ORAL at 06:07

## 2023-07-14 RX ADMIN — TAMSULOSIN HYDROCHLORIDE 0.4 MG: 0.4 CAPSULE ORAL at 08:55

## 2023-07-14 RX ADMIN — CIPROFLOXACIN 750 MG: 750 TABLET, FILM COATED ORAL at 08:55

## 2023-07-14 RX ADMIN — SODIUM CHLORIDE, PRESERVATIVE FREE 10 ML: 5 INJECTION INTRAVENOUS at 08:06

## 2023-07-14 NOTE — PROGRESS NOTES
[facility]  Internal Medicine Teaching Residency Program  Inpatient Daily Progress Note  ______________________________________________________________________________    Patient: Aga Kill  YOB: 1945   JFZ:[id number]    Acct: [de-identified]     Room: [facility]/0330-01  Admit date: 7/11/2023  Today's date: 07/13/23  Number of days in the hospital: 2    SUBJECTIVE   Admitting Diagnosis: Sepsis ([address])  CC: SOB and generalized weakness  Pt examined at bedside. Chart & results reviewed. Patient is eating ok, sleeping ok, normal bowel/ bladder movements. Patient is able to ambulate. Patient on IV antibiotics (ciprofloxacin)  Patient NC 2L saturating at 96%     07/13: Patient seen and examined at bedside. He reported his generalized weakness has improved. He expressed understanding regarding his pacemaker findings as per Dr Bryn Stanley. Patient's creatinine is trending down and as per the ID recommendations and sensitivity his antibiotic were changed from cefepime to ciprofloxacin. ROS negative except for the findings mentioned above. BRIEF HISTORY   The patient is a pleasant 69 y.o. male with a PMHx significant for   COPD, lung nodule  UTI   Atrial fibrillation, pacemaker  Aortic aneurysm  Insomnia, anxiety  presents with a chief complaint of SOB, fever, generalized weakness. Patient has been experiencing fever, SOB since 2 days, which progressively increased, not relived on medications or sitting up. Patient also has been experiencing generalized weakness for the past 1 week, due to which he was taking the support of furniture to walk. In the ED his temp was high, RR high, HR high and urinalysis was significant for leukocyte esterase and many bacteria. Due to SIRS 4+ patient was started on ceftriaxone in the ED. Later the urine culture was positive for Eco li and patient was started on cefepime based on sensitivity. His EKG was significant for PVC and bigeminy.
[address] [facility]  Internal Medicine Teaching Residency Program  Inpatient Daily Progress Note  ______________________________________________________________________________    Patient: Juliann Espana  YOB: 1945   TIERRA:[id number]    Acct: [de-identified]     Room: [address] [place] [address]-  Admit date: 7/11/2023  Today's date: 07/12/23  Number of days in the hospital: 1    SUBJECTIVE   Admitting Diagnosis: Sepsis ([address])  CC: SOB and generalized weakness  Pt examined at bedside. Chart & results reviewed. Patient is eating ok, sleeping ok, normal bowel/ bladder movements. Patient is able to ambulate. Patient on IV antibiotics (cefepime)  Patient NC 2L saturating at 96%    07/12: Patient was seen and examined at bedside under the supervision of the attending physician. Patient reported improvement in weakness and energy levels. He reported his SOB has improved. He reported increased frequency of micturition. Patient @4am had 2/10 chest pain at left costochondral junction, troponin repeated were close to baseline, Voltaren gel was prescribed. ROS:  Constitutional:  negative for chills, fevers, sweats  Respiratory:  negative for cough, dyspnea on exertion, hemoptysis, shortness of breath, wheezing  Cardiovascular:  negative for chest pain, chest pressure/discomfort, lower extremity edema, palpitations  Gastrointestinal:  negative for abdominal pain, constipation, diarrhea, nausea, vomiting  Neurological:  negative for dizziness, headache    BRIEF HISTORY   The patient is a pleasant 69 y.o. male with a PMHx significant for      COPD, lung nodule  UTI   Atrial fibrillation, pacemaker  Aortic aneurysm  Insomnia, anxiety     presents with a chief complaint of SOB, fever, generalized weakness. Patient has been experiencing fever, SOB since 2 days, which progressively increased, not relived on medications or sitting up.  Patient also has been experiencing generalized weakness for the past
CLINICAL PHARMACY NOTE: MEDS TO BEDS    Total # of Prescriptions Filled: 1   The following medications were delivered to the patient:  ciprofloxacin    Additional Documentation:
[facility] - Initial Consult Note  Today's Date and Time: 7/13/2023, 8:47 AM    Impression :   E. Coli septicemia 7-11-23  UTI  Hypoxia  CLIFTON  COPD  Afib on Eliquis  HLD  Lung nodule  Hx AAA repair in 2020  SSS s/p Pacemaker  PCN allergy    Recommendations:   Discontinue IV cefepime   Cipro 750 mg po q day. Stop date 7-20-23. Renal adjustment  Medical Decision Making/Summary/Discussion:7/13/2023       Infection Control Recommendations   Willits Precautions    Antimicrobial Stewardship Recommendations     Simplification of therapy  Targeted therapy  PK dosing  Coordination of Outpatient Care:   Estimated Length of IV antimicrobials:TBD  Patient will need Midline Catheter Insertion: TBD  Patient will need PICC line Insertion:TBD  Patient will need: Home IV , [address] [facility],  SNF,  LTAC: TBD  Patient will need outpatient wound care:No    Chief complaint/reason for consultation:   E. Coli urosepsis      History of Present Illness:   Marine Uribe is a 69y.o.-year-old male who was initially admitted on 7/11/2023. Patient seen at the request of Dr. Neha Mcconnell:    This patient, with a PCN allergy and hx of COPD, SSS s/p pacemaker and Afib on Eliquis, presented to the ED on 7/11/23 with complaints of shortness of breath. Upon evaluation, he was found to have an SpO2 in the 80s on room air. He also had a fever of 102 F. Lab work was significant for CLIFTON, elevated lactic acid, BNP and troponin. CT chest/abd/pelvis showed chronic pulmonary findings as well as, contracted bladder with mild fat stranding. UA was suspicious for a UTI. Blood cultures were drawn and have resulted positive for 1/2 E. Coli. Urine culture is growing GNR. The patient was initiated on IV Cefepime. Cardiology was consulted and have ordered an echo. They feel the elevated troponin is likely related to demand ischemia in the setting of sepsis.      ID was consulted for
[facility]  Progress Note                   Date:   7/12/2023  Patient name: Alexander Olivo  Date of admission:  7/11/2023  6:27 AM  MRN:   [id number]  YOB: 1945  PCP: Camacho Cabezas MD    Reason for Admission: COPD exacerbation ([address]) [J44.1]  CLIFTON (acute kidney injury) ([address]) [N17.9]  Acute cystitis with hematuria [N30.01]  Sepsis ([address]) [A41.9]  Sepsis, due to unspecified organism, unspecified whether acute organ dysfunction present ([address]) [A41.9]    Subjective:       Clinical Changes /Abnormalities:  Patient seen and examined. Denies chest pain or shortness of breath. Tele/vitals/labs reviewed . Discussed case with RN. Review of Systems    Medications:   Scheduled Meds:   diclofenac sodium  2 g Topical BID    sodium chloride flush  5-40 mL IntraVENous 2 times per day    apixaban  5 mg Oral BID    dilTIAZem  360 mg Oral Daily    famotidine  20 mg Oral Daily    linaclotide  290 mcg Oral QAM AC    tamsulosin  0.4 mg Oral Daily    tiotropium-olodaterol  2 puff Inhalation Daily    sodium chloride flush  5-40 mL IntraVENous 2 times per day    cefepime  2,000 mg IntraVENous Q24H     Continuous Infusions:   sodium chloride      sodium chloride      sodium chloride 75 mL/hr at 07/11/23 2009     CBC:   Recent Labs     07/11/23  0654 07/12/23  0441   WBC 9.0 6.9   HGB 14.7 13.2   PLT See Reflexed IPF Result See Reflexed IPF Result     BMP:    Recent Labs     07/11/23  0654 07/12/23  0441    139   K 4.3 4.2    107   CO2 19* 18*   BUN 27* 28*   CREATININE 2.4* 2.2*   GLUCOSE 143* 107*     Hepatic:  Recent Labs     07/11/23  0654   AST 13   ALT 9   BILITOT 0.8   ALKPHOS 107     Troponin:   Recent Labs     07/11/23  0654 07/11/23  0809 07/12/23  0441   TROPHS 43* 41* 29*     BNP: No results for input(s): BNP in the last 72 hours. Lipids: No results for input(s): CHOL, HDL in the last 72 hours.     Invalid input(s): LDLCALCU  INR: No results for input(s): INR in the last 72
Physical Therapy        Physical Therapy Cancel Note      DATE: 2023    NAME: Marilynn Mesa  MRN: [id number]   : 1945      Patient not seen this date for Physical Therapy due to:    Patient Declined: PT to continue to follow as pt states cardiac procedure pending.        Electronically signed by Rosie May PT on 2023 at 2:54 PM
Report called, pt brought to room via stretcher.
noted.  Punctate nonobstructing right  renal calculi. GI/Bowel: There is no bowel dilatation or wall thickening identified. Diverticulosis. Pelvis: Contracted bladder. Mild fat induration surrounding the bladder and  small bladder diverticulum noted. Peritoneum/Retroperitoneum: Endovascular repair of infrarenal aortic aneurysm  again demonstrated. Aneurysm sac size measures 7.1 x 7.2 cm versus 7.6 x 7.9  cm in 2022. Patency cannot be determined in the absence of contrast.  No  free air. No ascites. No enlarged or suspicious appearing lymph nodes  appreciated. Bones/Soft Tissues: No acute findings. L5 pars defects with grade 1  anterolisthesis again demonstrated. Chronic mild superior endplate deformity  of L1. IMPRESSION:  1. Chronic findings in the lungs with mild progression of chronic lung  disease since 2020 imaging. Basilar fibrosis and long-term stability of  nodular opacities in the left lung base noted. 2.  Contracted bladder with mild surrounding fat stranding, for which  underlying cystitis should be considered. 3.  Additional stable chronic and benign findings, as described. XR CHEST (2 VW)  Order: [id number]  Status: Final result     Visible to patient: Yes (not seen)     Next appt: 08/31/2023 at 09:45 AM in Cardiology Candelaria Davis MD)     0 Result Notes  Details    Reading Physician Reading Date Result Priority   Karlie Schwab MD  [phone] 7/11/2023      Narrative & Impression  EXAMINATION:  TWO XRAY VIEWS OF THE CHEST     7/11/2023 6:56 am     COMPARISON:  09/30/2022, 05/30/2022     HISTORY:  ORDERING SYSTEM PROVIDED HISTORY: fever, copd  TECHNOLOGIST PROVIDED HISTORY:  fever, copd     FINDINGS:  The cardiomediastinal silhouette is unchanged in appearance. Left subclavian  dual chamber pacer in place. Chronic appearing interstitial findings in the  lung bases. No new airspace disease, pneumothorax, or evidence of edema  appreciated.   No effusion is

## 2023-07-14 NOTE — TELEPHONE ENCOUNTER
Last visit: 5/24/23  Last Med refill: 12/27/22  Does patient have enough medication for 72 hours: No:     Next Visit Date:  Future Appointments   Date Time Provider [address]   8/31/2023  9:45 AM Nigel Kruegre MD [facility]   9/26/2023 10:45 AM Jaylene Matute MD [address] Maintenance   Topic Date Due    Pneumococcal 65+ years Vaccine (1 - PCV) Never done    DTaP/Tdap/Td vaccine (2 - Td or Tdap) 07/10/2023    COVID-19 Vaccine (2 - Booster for [place] series) 07/23/2023 (Originally 7/8/2021)    Shingles vaccine (1 of 2) 01/23/2024 (Originally 10/25/1996)    Flu vaccine (1) 08/01/2023    Depression Screen  01/23/2024    Colorectal Cancer Screen  10/25/2024    Hepatitis C screen  Completed    Hepatitis A vaccine  Aged Out    Hib vaccine  Aged Out    Meningococcal (ACWY) vaccine  Aged Out    Diabetic foot exam  Discontinued    A1C test (Diabetic or Prediabetic)  Discontinued    Diabetic Alb to Cr ratio (uACR) test  Discontinued    Lipids  Discontinued       Hemoglobin A1C (%)   Date Value   01/23/2023 5.5   01/10/2022 5.6   07/13/2021 6.1             ( goal A1C is < 7)   Microalb/Crt.  Ratio (mcg/mg creat)   Date Value   11/02/2020 38 (H)     LDL Cholesterol (mg/dL)   Date Value   09/30/2022 116   06/17/2021 134 (H)       (goal LDL is <100)   AST (U/L)   Date Value   07/11/2023 13     ALT (U/L)   Date Value   07/11/2023 9     BUN (mg/dL)   Date Value   07/14/2023 18     BP Readings from Last 3 Encounters:   07/14/23 (!) 154/80   05/24/23 126/60   05/18/23 (!) 147/81          (goal 120/80)    All Future Testing planned in [facility]  Lab Frequency Next Occurrence   Initiate Oxygen Therapy Protocol PRN    Initiate Oxygen Therapy Protocol PRN                Patient Active Problem List:     Back pain     Essential hypertension     COPD (chronic obstructive pulmonary disease) (HCC)     Left lower lobe pulmonary nodule     Procedure refused     Hyperlipidemia     Colonoscopy

## 2023-07-14 NOTE — PLAN OF CARE
Problem: Discharge Planning  Goal: Discharge to home or other facility with appropriate resources  7/14/2023 0624 by Noelle Berumen RN  Outcome: Progressing  7/13/2023 1839 by Juve Moscoso RN  Outcome: Progressing     Problem: Safety - Adult  Goal: Free from fall injury  7/14/2023 0624 by Noelle Berumen RN  Outcome: Progressing  7/13/2023 1839 by Juve Moscoso RN  Outcome: Progressing     Problem: Chronic Conditions and Co-morbidities  Goal: Patient's chronic conditions and co-morbidity symptoms are monitored and maintained or improved  7/14/2023 0624 by Noelle Berumen RN  Outcome: Progressing  7/13/2023 1839 by Juve Moscoso RN  Outcome: Progressing     Problem: Cardiovascular - Adult  Goal: Maintains optimal cardiac output and hemodynamic stability  7/14/2023 0624 by Noelle Berumen RN  Outcome: Progressing  7/13/2023 1839 by Juve Moscoso RN  Outcome: Progressing  Goal: Absence of cardiac dysrhythmias or at baseline  7/14/2023 0624 by Noelle Berumen RN  Outcome: Progressing  7/13/2023 1839 by Juve Moscoso RN  Outcome: Progressing     Problem: Pain  Goal: Verbalizes/displays adequate comfort level or baseline comfort level  7/14/2023 0624 by Noelle Berumen RN  Outcome: Progressing  7/13/2023 1839 by Juve Moscoso RN  Outcome: Progressing     Problem: Respiratory - Adult  Goal: Achieves optimal ventilation and oxygenation  7/14/2023 0624 by Noelle Berumen RN  Outcome: Progressing  7/13/2023 1839 by Juve Moscoso RN  Outcome: Progressing     Problem: ABCDS Injury Assessment  Goal: Absence of physical injury  7/14/2023 0624 by Noelle Berumen RN  Outcome: Progressing  7/13/2023 1839 by Juve Moscoso RN  Outcome: Progressing     Problem: Skin/Tissue Integrity  Goal: Absence of new skin breakdown  Description: 1. Monitor for areas of redness and/or skin breakdown  2. Assess vascular access sites hourly  3. Every 4-6 hours minimum:  Change oxygen saturation probe site  4.

## 2023-07-14 NOTE — DISCHARGE INSTRUCTIONS
You were admitted to the hospital for urinary tract infection which was complicated with spreading of infection to blood which has been treated with antibiotics. You need to continue taking ciprofloxacin for 5 more days. Cardiology was consulted and recommended no change in the pacemaker settings. Continue following up with your cardiologist as needed. Please take all of your medications as prescribed  Please come to the ER if you have any worsening of symptoms.

## 2023-07-14 NOTE — DISCHARGE SUMMARY
AVS and medications reviewed. Pt instructed to complete antibiotics and follow up with cardiologist and PCP (previous standing appointments) Pt had no questions at that time. Transportation and clothing needs supplied. IV's were removed. Pt was wheeled to the lobby by aide and the pt was picked up by [employer].
breath sounds. Abdominal:      General: Bowel sounds are normal.      Palpations: Abdomen is soft. Musculoskeletal:         General: Normal range of motion. Neurological:      General: No focal deficit present. Mental Status: He is alert. Psychiatric:         Mood and Affect: Mood normal.      Procedures/ Significant Interventions:    -    Consults:     Consults:     Final Specialist Recommendations/Findings:   IP CONSULT TO INTERNAL MEDICINE  IP CONSULT TO CARDIOLOGY  IP CONSULT TO CASE MANAGEMENT  IP CONSULT TO INFECTIOUS DISEASES      Any Hospital Acquired Infections: none    Discharge Functional Status:  stable    DISCHARGE PLAN     Disposition: home    Patient Instructions:   Current Discharge Medication List        START taking these medications    Details   ciprofloxacin (CIPRO) 750 MG tablet Take 1 tablet by mouth every 24 hours for 5 doses  Qty: 5 tablet, Refills: 0           CONTINUE these medications which have NOT CHANGED    Details   vitamin D (ERGOCALCIFEROL) 1.25 MG (01302 UT) CAPS capsule take 1 capsule by mouth every week  Qty: 12 capsule, Refills: 1    Associated Diagnoses: Vitamin D deficiency      LORazepam (ATIVAN) 1 MG tablet Take 1 tablet by mouth every 8 hours as needed for Anxiety for up to 60 days.  Max Daily Amount: 3 mg  Qty: 90 tablet, Refills: 1    Associated Diagnoses: Anxiety      linaclotide (LINZESS) 290 MCG CAPS capsule Take 1 capsule by mouth every morning (before breakfast)  Qty: 90 capsule, Refills: 1    Associated Diagnoses: Constipation, unspecified constipation type      apixaban (ELIQUIS) 5 MG TABS tablet Take 1 tablet by mouth 2 times daily  Qty: 180 tablet, Refills: 1    Associated Diagnoses: Atrial fibrillation, chronic (HCC)      tiotropium-olodaterol (STIOLTO RESPIMAT) 2.5-2.5 MCG/ACT AERS inhale 2 puffs by mouth and INTO THE LUNGS once daily  Qty: 1 each, Refills: 5    Associated Diagnoses: Chronic obstructive pulmonary disease with acute exacerbation (HCC)

## 2023-07-14 NOTE — PLAN OF CARE
Problem: Respiratory - Adult  Goal: Achieves optimal ventilation and oxygenation  7/14/2023 0953 by Rosie Bloom RCP  Outcome: Progressing     BRONCHOSPASM/BRONCHOCONSTRICTION     [x]         IMPROVE AERATION/BREATH SOUNDS  [x]   ADMINISTER BRONCHODILATOR THERAPY AS APPROPRIATE  [x]   ASSESS BREATH SOUNDS  []   IMPLEMENT AEROSOL/MDI PROTOCOL  [x]   PATIENT EDUCATION AS NEEDED

## 2023-07-14 NOTE — PLAN OF CARE
Problem: Discharge Planning  Goal: Discharge to home or other facility with appropriate resources  7/14/2023 0944 by Beverly Pulido RN  Outcome: Adequate for Discharge  Flowsheets (Taken 7/14/2023 0944)  Discharge to home or other facility with appropriate resources: Arrange for needed discharge resources and transportation as appropriate  Note: Meds to bed and clothing needs, reaching out to social work  7/14/2023 0624 by Jennifer Blount RN  Outcome: Progressing     Problem: Safety - Adult  Goal: Free from fall injury  7/14/2023 0944 by Beverly Pulido RN  Outcome: Adequate for Discharge  Note: Patient verbalizes the layout of the room; call light within reach; educated to call out when assistance needed   7/14/2023 0624 by Jennifer Blount RN  Outcome: Progressing     Problem: Chronic Conditions and Co-morbidities  Goal: Patient's chronic conditions and co-morbidity symptoms are monitored and maintained or improved  7/14/2023 0944 by Beverly Pulido RN  Outcome: Adequate for Discharge  Flowsheets (Taken 7/14/2023 0944)  Care Plan - Patient's Chronic Conditions and Co-Morbidity Symptoms are Monitored and Maintained or Improved: Monitor and assess patient's chronic conditions and comorbid symptoms for stability, deterioration, or improvement  7/14/2023 0624 by Jennifer Blount RN  Outcome: Progressing     Problem: Cardiovascular - Adult  Goal: Maintains optimal cardiac output and hemodynamic stability  7/14/2023 0944 by Beverly Pulido RN  Outcome: Adequate for Discharge  Flowsheets (Taken 7/14/2023 0944)  Maintains optimal cardiac output and hemodynamic stability: Monitor urine output and notify Licensed Independent Practitioner for values outside of normal range  7/14/2023 0624 by Jennifer Blount RN  Outcome: Progressing  Goal: Absence of cardiac dysrhythmias or at baseline  7/14/2023 0944 by Beverly Pulido RN  Outcome: Adequate for Discharge  7/14/2023 0624 by Jennifer Blount RN  Outcome: Progressing

## 2023-07-14 NOTE — PLAN OF CARE
Problem: Discharge Planning  Goal: Discharge to home or other facility with appropriate resources  7/14/2023 1112 by Agustin Augustine RN  Outcome: Completed  7/14/2023 0944 by Agustin Augustine RN  Outcome: Adequate for Discharge  Flowsheets (Taken 7/14/2023 0944)  Discharge to home or other facility with appropriate resources: Arrange for needed discharge resources and transportation as appropriate  Note: Meds to bed and clothing needs, reaching out to social work  7/14/2023 0624 by Patrice Garcia RN  Outcome: Progressing     Problem: Safety - Adult  Goal: Free from fall injury  7/14/2023 1112 by Agustin Augustine RN  Outcome: Completed  7/14/2023 0944 by Agustin Augustine RN  Outcome: Adequate for Discharge  Note: Patient verbalizes the layout of the room; call light within reach; educated to call out when assistance needed   7/14/2023 0624 by Patrice Garcia RN  Outcome: Progressing     Problem: Chronic Conditions and Co-morbidities  Goal: Patient's chronic conditions and co-morbidity symptoms are monitored and maintained or improved  7/14/2023 1112 by Agustin Augustine RN  Outcome: Completed  7/14/2023 0944 by Agustin Augustine RN  Outcome: Adequate for Discharge  Flowsheets (Taken 7/14/2023 0944)  Care Plan - Patient's Chronic Conditions and Co-Morbidity Symptoms are Monitored and Maintained or Improved: Monitor and assess patient's chronic conditions and comorbid symptoms for stability, deterioration, or improvement  7/14/2023 0624 by Patrice Garcia RN  Outcome: Progressing     Problem: Cardiovascular - Adult  Goal: Maintains optimal cardiac output and hemodynamic stability  7/14/2023 1112 by Agustin Augustine RN  Outcome: Completed  7/14/2023 0944 by Agustin Augustine RN  Outcome: Adequate for Discharge  Flowsheets (Taken 7/14/2023 0944)  Maintains optimal cardiac output and hemodynamic stability: Monitor urine output and notify Licensed Independent Practitioner for values outside of normal range  7/14/2023 0624 by

## 2023-07-16 LAB
MICROORGANISM SPEC CULT: NORMAL
SERVICE CMNT-IMP: NORMAL
SPECIMEN DESCRIPTION: NORMAL

## 2023-07-17 ENCOUNTER — TELEPHONE (OUTPATIENT)
Dept: FAMILY MEDICINE CLINIC | Age: 78
End: 2023-07-17

## 2023-07-17 DIAGNOSIS — H53.9 VISION CHANGES: Primary | ICD-10-CM

## 2023-07-17 NOTE — TELEPHONE ENCOUNTER
TCM Interactive Patient Contact:  Was patient able to fill all prescriptions: yes  Was patient instructed to bring all medications to the follow-up visit: yes  Is patient taking all medications as directed in the discharge summary?  yes  Does patient understand their discharge instructions: yes  Does patient have questions or concerns that need addressed prior to 7-14 day follow up office visit: No

## 2023-07-17 NOTE — TELEPHONE ENCOUNTER
Care Transitions Initial Follow Up Call    Outreach made within 2 business days of discharge: Yes    Patient: Cecily Higginbotham Patient : 1945   MRN: [id number]  Reason for Admission: There are no discharge diagnoses documented for the most recent discharge. Discharge Date: 23       Spoke with: Unable to reach patient, phone keeps ringing. Discharge department/facility: [facility] Interactive Patient Contact:  Was patient able to fill all prescriptions:   Was patient instructed to bring all medications to the follow-up visit:   Is patient taking all medications as directed in the discharge summary?    Does patient understand their discharge instructions:   Does patient have questions or concerns that need addressed prior to 7-14 day follow up office visit:     Scheduled appointment with PCP within 7-14 days    Follow Up  Future Appointments   Date Time Provider [address] [place] [address]   2023  9:45 AM Valdemar Tom MD [facility]   2023 10:45 AM Jaylene Jacobs MD [address] [place] [address]

## 2023-07-17 NOTE — TELEPHONE ENCOUNTER
Pt is requesting FMLA for his son. His son is the one who drives him to all his appts. He son had in the past but let them lapse. His son will bring in the forms once he receives them.

## 2023-08-08 DIAGNOSIS — F41.9 ANXIETY: ICD-10-CM

## 2023-08-08 NOTE — TELEPHONE ENCOUNTER
Last visit: 5/24/23  Last Med refill: 6/14/23  Does patient have enough medication for 72 hours: Yes    Next Visit Date:  Future Appointments   Date Time Provider [address]   8/31/2023  9:45 AM Beatrice Arzate MD [facility]   9/26/2023 10:45 AM Arti Aureliano Moritz, MD [address] Maintenance   Topic Date Due    Pneumococcal 65+ years Vaccine (1 - PCV) Never done    COVID-19 Vaccine (2 - Booster for Marysol series) 07/08/2021    DTaP/Tdap/Td vaccine (2 - Td or Tdap) 07/10/2023    Flu vaccine (1) 08/01/2023    Shingles vaccine (1 of 2) 01/23/2024 (Originally 10/25/1996)    Depression Screen  01/23/2024    Colorectal Cancer Screen  10/25/2024    Hepatitis C screen  Completed    Hepatitis A vaccine  Aged Out    Hib vaccine  Aged Out    Meningococcal (ACWY) vaccine  Aged Out    Diabetic foot exam  Discontinued    A1C test (Diabetic or Prediabetic)  Discontinued    Diabetic Alb to Cr ratio (uACR) test  Discontinued    Lipids  Discontinued       Hemoglobin A1C (%)   Date Value   01/23/2023 5.5   01/10/2022 5.6   07/13/2021 6.1             ( goal A1C is < 7)   No components found for: LABMICR  LDL Cholesterol (mg/dL)   Date Value   09/30/2022 116   06/17/2021 134 (H)       (goal LDL is <100)   AST (U/L)   Date Value   07/11/2023 13     ALT (U/L)   Date Value   07/11/2023 9     BUN (mg/dL)   Date Value   07/14/2023 18     BP Readings from Last 3 Encounters:   07/14/23 (!) 154/80   05/24/23 126/60   05/18/23 (!) 147/81          (goal 120/80)    All Future Testing planned in [facility]  Lab Frequency Next Occurrence               Patient Active Problem List:     Back pain     Essential hypertension     COPD (chronic obstructive pulmonary disease) (HCC)     Left lower lobe pulmonary nodule     Procedure refused     Hyperlipidemia     Colonoscopy refused     Atrial fibrillation, chronic (HCC)     Anxiety     Insomnia     Pacemaker battery depletion     Pacemaker     Bradycardia

## 2023-08-09 RX ORDER — LORAZEPAM 1 MG/1
TABLET ORAL
Qty: 90 TABLET | Refills: 1 | Status: SHIPPED | OUTPATIENT
Start: 2023-08-13 | End: 2023-10-03

## 2023-08-10 PROBLEM — N39.0 UTI (URINARY TRACT INFECTION): Status: RESOLVED | Noted: 2023-07-11 | Resolved: 2023-08-10

## 2023-09-06 NOTE — PROGRESS NOTES
Bree Pinzon, [facility]. Aneurysm of infrarenal abdominal aorta (HCC) [I71.4]  Aneurysm of infrarenal abdominal aorta (HCC) [I71.4]  Aneurysm of infrarenal abdominal aorta (HCC) [I71.4]  Aneurysm of infrarenal abdominal aorta ([address] [I71.4] . Vitals:    05/12/20 0600   BP: 112/78   Pulse: 104   Resp: 19   Temp:    SpO2: 98%   . Patients home meds are   Prior to Admission medications    Medication Sig Start Date End Date Taking?  Authorizing Provider   Acidophilus Lactobacillus CAPS Take 1 capsule by mouth 2 times daily 4/29/19  Yes Historical Provider, MD    MG capsule take 1 capsule by mouth twice a day 4/15/20   CARMITA Singh CNP   dilTIAZem (CARTIA XT) 240 MG extended release capsule take 1 capsule by mouth once daily 4/15/20   CARMITA Lester CNP   vitamin D (ERGOCALCIFEROL) 1.25 MG (13456 UT) CAPS capsule take 1 capsule by mouth every week 3/18/20   CARMITA Singh CNP   candesartan (ATACAND) 32 MG tablet Take 1 tablet by mouth daily 3/17/20   CARMITA Singh CNP   Probiotic Product (RA PROBIOTIC COMPLEX) CAPS take 1 capsule by mouth twice a day 2/19/20   CARMITA Singh CNP   LORazepam (ATIVAN) 1 MG tablet take 1 tablet by mouth every 8 hours if needed for anxiety 2/18/20 5/18/20  CARMITA Lester CNP   warfarin (COUMADIN) 5 MG tablet take 1 tablet by mouth once daily 1/14/20   CARMITA Lester CNP   linaclotide (LINZESS) 290 MCG CAPS capsule Take 1 capsule by mouth every morning (before breakfast) 11/15/19   Jaylene Manley MD   Psyllium (METAMUCIL FREE & NATURAL) 43 % POWD Take 1 Dose by mouth 3 times daily 11/15/19   Jaylene Manley MD   nitroGLYCERIN (NITROSTAT) 0.4 MG SL tablet place 1 tablet under the tongue if needed every 5 minutes for chest pain for 3 doses IF NO RELIEF AFTER FIRST DOSE CALL PRESCRIBER . 9/16/19   Arjun Lima APRN - CNP   albuterol sulfate HFA (VENTOLIN HFA) 108 (90 Base) Tumor Depth: Less than 6mm from granular layer and no invasion beyond the subcutaneous fat

## 2023-09-07 DIAGNOSIS — R12 HEARTBURN: ICD-10-CM

## 2023-09-08 NOTE — TELEPHONE ENCOUNTER
Last visit: 05/24/2023  Last Med refill: 08/08/2023  Does patient have enough medication for 72 hours: No: \    Next Visit Date:  Future Appointments   Date Time Provider [address]   9/26/2023 10:45 AM Jaylene Jacobs MD [facility]   11/2/2023  9:30 AM Valdemar Tom MD [facility]       Health Maintenance   Topic Date Due    Pneumococcal 65+ years Vaccine (1 - PCV) Never done    COVID-19 Vaccine (2 - Booster for Marysol series) 07/08/2021    DTaP/Tdap/Td vaccine (2 - Td or Tdap) 07/10/2023    Flu vaccine (1) 08/01/2023    Shingles vaccine (1 of 2) 01/23/2024 (Originally 10/25/1996)    Depression Screen  01/23/2024    Colorectal Cancer Screen  10/25/2024    Hepatitis C screen  Completed    Hepatitis A vaccine  Aged Out    Hib vaccine  Aged Out    Meningococcal (ACWY) vaccine  Aged Out    Diabetic foot exam  Discontinued    A1C test (Diabetic or Prediabetic)  Discontinued    Diabetic Alb to Cr ratio (uACR) test  Discontinued    Lipids  Discontinued       Hemoglobin A1C (%)   Date Value   01/23/2023 5.5   01/10/2022 5.6   07/13/2021 6.1             ( goal A1C is < 7)   No components found for: LABMICR  LDL Cholesterol (mg/dL)   Date Value   09/30/2022 116   06/17/2021 134 (H)       (goal LDL is <100)   AST (U/L)   Date Value   07/11/2023 13     ALT (U/L)   Date Value   07/11/2023 9     BUN (mg/dL)   Date Value   07/14/2023 18     BP Readings from Last 3 Encounters:   07/14/23 (!) 154/80   05/24/23 126/60   05/18/23 (!) 147/81          (goal 120/80)    All Future Testing planned in [facility]  Lab Frequency Next Occurrence               Patient Active Problem List:     Back pain     Essential hypertension     COPD (chronic obstructive pulmonary disease) (HCC)     Left lower lobe pulmonary nodule     Procedure refused     Hyperlipidemia     Colonoscopy refused     Atrial fibrillation, chronic (HCC)     Anxiety     Insomnia     Pacemaker battery depletion     Pacemaker     Bradycardia

## 2023-09-10 ENCOUNTER — HOSPITAL ENCOUNTER (EMERGENCY)
Age: 78
Discharge: HOME | End: 2023-09-10
Attending: EMERGENCY MEDICINE
Payer: COMMERCIAL

## 2023-09-10 ENCOUNTER — APPOINTMENT (OUTPATIENT)
Dept: CT IMAGING | Age: 78
End: 2023-09-10
Payer: COMMERCIAL

## 2023-09-10 VITALS
OXYGEN SATURATION: 92 % | BODY MASS INDEX: 29.46 KG/M2 | WEIGHT: 211.2 LBS | DIASTOLIC BLOOD PRESSURE: 89 MMHG | SYSTOLIC BLOOD PRESSURE: 172 MMHG | TEMPERATURE: 97.1 F | RESPIRATION RATE: 16 BRPM | HEART RATE: 88 BPM

## 2023-09-10 DIAGNOSIS — K81.9 CHOLECYSTITIS: ICD-10-CM

## 2023-09-10 DIAGNOSIS — R10.13 EPIGASTRIC PAIN: Primary | ICD-10-CM

## 2023-09-10 DIAGNOSIS — N39.0 UTI (URINARY TRACT INFECTION), UNCOMPLICATED: ICD-10-CM

## 2023-09-10 DIAGNOSIS — N41.0 ACUTE PROSTATITIS: ICD-10-CM

## 2023-09-10 LAB
ALBUMIN SERPL-MCNC: 4.1 G/DL (ref 3.5–5.2)
ALBUMIN/GLOB SERPL: 1.3 {RATIO} (ref 1–2.5)
ALP SERPL-CCNC: 145 U/L (ref 40–129)
ALT SERPL-CCNC: 8 U/L (ref 5–41)
ANION GAP SERPL CALCULATED.3IONS-SCNC: 15 MMOL/L (ref 9–17)
AST SERPL-CCNC: 14 U/L
BACTERIA URNS QL MICRO: ABNORMAL
BASOPHILS # BLD: 0.08 K/UL (ref 0–0.2)
BASOPHILS NFR BLD: 1 % (ref 0–2)
BILIRUB SERPL-MCNC: 0.6 MG/DL (ref 0.3–1.2)
BILIRUB UR QL STRIP: NEGATIVE
BODY TEMPERATURE: 37
BUN SERPL-MCNC: 14 MG/DL (ref 8–23)
CALCIUM SERPL-MCNC: 9.6 MG/DL (ref 8.6–10.4)
CASTS #/AREA URNS LPF: ABNORMAL /LPF (ref 0–8)
CHLORIDE SERPL-SCNC: 102 MMOL/L (ref 98–107)
CLARITY UR: ABNORMAL
CO2 SERPL-SCNC: 25 MMOL/L (ref 20–31)
COHGB MFR BLD: 3.3 % (ref 0–5)
COLOR UR: YELLOW
CREAT SERPL-MCNC: 1.4 MG/DL (ref 0.7–1.2)
EOSINOPHIL # BLD: 0.16 K/UL (ref 0–0.4)
EOSINOPHILS RELATIVE PERCENT: 2 % (ref 1–4)
EPI CELLS #/AREA URNS HPF: ABNORMAL /HPF (ref 0–5)
ERYTHROCYTE [DISTWIDTH] IN BLOOD BY AUTOMATED COUNT: 14.3 % (ref 11.8–14.4)
FIO2 ON VENT: ABNORMAL %
GFR SERPL CREATININE-BSD FRML MDRD: 52 ML/MIN/1.73M2
GLUCOSE SERPL-MCNC: 126 MG/DL (ref 70–99)
GLUCOSE UR STRIP-MCNC: NEGATIVE MG/DL
HCO3 VENOUS: 26.5 MMOL/L (ref 24–30)
HCT VFR BLD AUTO: 40.4 % (ref 40.7–50.3)
HGB BLD-MCNC: 13 G/DL (ref 13–17)
HGB UR QL STRIP.AUTO: ABNORMAL
IMM GRANULOCYTES # BLD AUTO: 0 K/UL (ref 0–0.3)
IMM GRANULOCYTES NFR BLD: 0 %
INR PPP: 1.3
KETONES UR STRIP-MCNC: NEGATIVE MG/DL
LACTIC ACID, WHOLE BLOOD: 1.3 MMOL/L (ref 0.7–2.1)
LEUKOCYTE ESTERASE UR QL STRIP: ABNORMAL
LIPASE SERPL-CCNC: 14 U/L (ref 13–60)
LYMPHOCYTES NFR BLD: 0.48 K/UL (ref 1–4.8)
LYMPHOCYTES RELATIVE PERCENT: 6 % (ref 24–44)
MCH RBC QN AUTO: 31.9 PG (ref 25.2–33.5)
MCHC RBC AUTO-ENTMCNC: 32.2 G/DL (ref 28.4–34.8)
MCV RBC AUTO: 99 FL (ref 82.6–102.9)
MONOCYTES NFR BLD: 0.88 K/UL (ref 0.1–0.8)
MONOCYTES NFR BLD: 11 % (ref 1–7)
MORPHOLOGY: NORMAL
NEUTROPHILS NFR BLD: 80 % (ref 36–66)
NEUTS SEG NFR BLD: 6.4 K/UL (ref 1.8–7.7)
NITRITE UR QL STRIP: NEGATIVE
NRBC BLD-RTO: 0 PER 100 WBC
O2 SAT, VEN: 96 % (ref 60–85)
PARTIAL THROMBOPLASTIN TIME: 37.4 SEC (ref 23–36.5)
PCO2, VEN: 41.3 MM HG (ref 39–55)
PH UR STRIP: 7 [PH] (ref 5–8)
PH VENOUS: 7.42 (ref 7.32–7.42)
PLATELET # BLD AUTO: 197 K/UL (ref 138–453)
PMV BLD AUTO: 10.7 FL (ref 8.1–13.5)
PO2, VEN: 83.9 MM HG (ref 30–50)
POSITIVE BASE EXCESS, VEN: 2.4 MMOL/L (ref 0–2)
POTASSIUM SERPL-SCNC: 3.6 MMOL/L (ref 3.7–5.3)
PROT SERPL-MCNC: 7.3 G/DL (ref 6.4–8.3)
PROT UR STRIP-MCNC: ABNORMAL MG/DL
PROTHROMBIN TIME: 15.6 SEC (ref 11.7–14.9)
RBC # BLD AUTO: 4.08 M/UL (ref 4.21–5.77)
RBC #/AREA URNS HPF: ABNORMAL /HPF (ref 0–4)
SODIUM SERPL-SCNC: 142 MMOL/L (ref 135–144)
SP GR UR STRIP: 1.01 (ref 1–1.03)
UROBILINOGEN UR STRIP-ACNC: NORMAL EU/DL (ref 0–1)
WBC #/AREA URNS HPF: ABNORMAL /HPF (ref 0–5)
WBC OTHER # BLD: 8 K/UL (ref 3.5–11.3)

## 2023-09-10 PROCEDURE — 83690 ASSAY OF LIPASE: CPT

## 2023-09-10 PROCEDURE — 96375 TX/PRO/DX INJ NEW DRUG ADDON: CPT

## 2023-09-10 PROCEDURE — 81001 URINALYSIS AUTO W/SCOPE: CPT

## 2023-09-10 PROCEDURE — 93005 ELECTROCARDIOGRAM TRACING: CPT

## 2023-09-10 PROCEDURE — 96374 THER/PROPH/DIAG INJ IV PUSH: CPT

## 2023-09-10 PROCEDURE — 85610 PROTHROMBIN TIME: CPT

## 2023-09-10 PROCEDURE — 6360000004 HC RX CONTRAST MEDICATION

## 2023-09-10 PROCEDURE — 85025 COMPLETE CBC W/AUTO DIFF WBC: CPT

## 2023-09-10 PROCEDURE — 99284 EMERGENCY DEPT VISIT MOD MDM: CPT | Performed by: SURGERY

## 2023-09-10 PROCEDURE — 6360000002 HC RX W HCPCS

## 2023-09-10 PROCEDURE — 80053 COMPREHEN METABOLIC PANEL: CPT

## 2023-09-10 PROCEDURE — 36415 COLL VENOUS BLD VENIPUNCTURE: CPT

## 2023-09-10 PROCEDURE — 87086 URINE CULTURE/COLONY COUNT: CPT

## 2023-09-10 PROCEDURE — 83605 ASSAY OF LACTIC ACID: CPT

## 2023-09-10 PROCEDURE — 85730 THROMBOPLASTIN TIME PARTIAL: CPT

## 2023-09-10 PROCEDURE — 82805 BLOOD GASES W/O2 SATURATION: CPT

## 2023-09-10 PROCEDURE — 6370000000 HC RX 637 (ALT 250 FOR IP)

## 2023-09-10 PROCEDURE — 87077 CULTURE AEROBIC IDENTIFY: CPT

## 2023-09-10 PROCEDURE — 74174 CTA ABD&PLVS W/CONTRAST: CPT

## 2023-09-10 PROCEDURE — 99285 EMERGENCY DEPT VISIT HI MDM: CPT

## 2023-09-10 RX ORDER — CIPROFLOXACIN 500 MG/1
500 TABLET, FILM COATED ORAL ONCE
Status: COMPLETED | OUTPATIENT
Start: 2023-09-10 | End: 2023-09-10

## 2023-09-10 RX ORDER — FENTANYL CITRATE 50 UG/ML
50 INJECTION, SOLUTION INTRAMUSCULAR; INTRAVENOUS ONCE
Status: COMPLETED | OUTPATIENT
Start: 2023-09-10 | End: 2023-09-10

## 2023-09-10 RX ORDER — ONDANSETRON 2 MG/ML
4 INJECTION INTRAMUSCULAR; INTRAVENOUS ONCE
Status: COMPLETED | OUTPATIENT
Start: 2023-09-10 | End: 2023-09-10

## 2023-09-10 RX ORDER — CIPROFLOXACIN 500 MG/1
500 TABLET, FILM COATED ORAL 2 TIMES DAILY
Qty: 56 TABLET | Refills: 0 | Status: SHIPPED | OUTPATIENT
Start: 2023-09-10 | End: 2023-10-08

## 2023-09-10 RX ORDER — CIPROFLOXACIN 500 MG/1
500 TABLET, FILM COATED ORAL 2 TIMES DAILY
Qty: 14 TABLET | Refills: 0 | Status: SHIPPED | OUTPATIENT
Start: 2023-09-10 | End: 2023-09-10 | Stop reason: SDUPTHER

## 2023-09-10 RX ADMIN — IOPAMIDOL 100 ML: 755 INJECTION, SOLUTION INTRAVENOUS at 18:24

## 2023-09-10 RX ADMIN — ONDANSETRON 4 MG: 2 INJECTION INTRAMUSCULAR; INTRAVENOUS at 17:48

## 2023-09-10 RX ADMIN — FENTANYL CITRATE 50 MCG: 50 INJECTION, SOLUTION INTRAMUSCULAR; INTRAVENOUS at 17:49

## 2023-09-10 RX ADMIN — CIPROFLOXACIN HYDROCHLORIDE 500 MG: 500 TABLET, FILM COATED ORAL at 22:00

## 2023-09-10 ASSESSMENT — ENCOUNTER SYMPTOMS
BACK PAIN: 0
CONSTIPATION: 1
ABDOMINAL PAIN: 1
COUGH: 0
RHINORRHEA: 0
NAUSEA: 1
SORE THROAT: 0
VOMITING: 0
SHORTNESS OF BREATH: 0
DIARRHEA: 0

## 2023-09-10 NOTE — ED TRIAGE NOTES
Pt to ED for epigastric/abd pain that started this morning. Pt states he does have a hx of abd issues. Pt states he took something to \"clear\" him out but it did not help. Pt denies chest pain.

## 2023-09-11 RX ORDER — FAMOTIDINE 20 MG/1
TABLET, FILM COATED ORAL
Qty: 60 TABLET | Refills: 3 | Status: SHIPPED | OUTPATIENT
Start: 2023-09-11

## 2023-09-11 NOTE — DISCHARGE INSTRUCTIONS
You have been evaluated in the Emergency Department at [address] 9/10/2023 for abdominal pain    Your evaluation and treatment included extensive laboratory evaluation with diagnostic imaging to include a CT scan. Your recommendations and if necessary prescriptions from today's visit: Follow-up with your primary care provider, take antibiotics as prescribed, schedule an outpatient appointment with associated general surgery team for evaluation for elective cholecystectomy, call and schedule an appointment for evaluation for prostatitis with urology as referred above. If given narcotics (opiates) during this Emergency Department visit, please do not drink, drive or operate any machinery for at least 4 - 6 hours. Avoid eating any spicy food, milk type products or drinks that have caffeine in it. Take all medications as prescribed. For pain use ibuprofen (Motrin) or acetaminophen (Tylenol), unless prescribed medications that have acetaminophen in it. You can take over the counter acetaminophen tablets (1 - 2 tablets of the 500-mg strength every 6 hours) or ibuprofen tablets (2 tablets every 4 hours). PLEASE RETURN TO THE EMERGENCY DEPARTMENT IMMEDIATELY for worsening symptoms, or if you develop any concerning symptoms such as: high fever not relieved by acetaminophen (Tylenol) and/or ibuprofen (Motrin), chills, shortness of breath, chest pain, persistent nausea and/or vomiting, numbness, weakness or tingling in the arms or legs or change in color of the extremities, changes in mental status, persistent headache, blurry vision.     Return within 8 - 12 hours if you have any of the following: worsening of pain in your abdomen, no food sounds good to you, you continue to vomit, pain goes to your back or testicles, have pain in the abdomen when going over a bump in the car or when you jump up and down, inability to urinate, unable to follow up with your physician, or other any other

## 2023-09-11 NOTE — PROGRESS NOTES
[place] [id number] [place] [address]  PROGRESS NOTE    Shift date: 09/10/23  Shift day: Sunday   Shift # 2    Room # 04/04   Name: Cristine Severe                Buddhist:    Place of Orthodoxy:     Referral: Routine Visit    Admit Date & Time: 9/10/2023  4:35 PM    Assessment:  Cristine Severe is a 69 y.o. male in the hospital.       Intervention:  Writer introduced self and title as . Patient did not appear to mind  presence and engaged in conversation. Patient appeared calm and coping when discussing his health and its impact. Patient shared many memories with  including being raised in a [address] [facility] home and attending Buddhist school. Patient currently does not belong to a [place] community.  provided a supportive presence through active listening and words of affirmation. Outcome:  Patient appeared receptive to  visit. Plan:  Chaplains will remain available to offer spiritual and emotional support as needed.       Electronically signed by Ambar Ray on 9/11/2023 at 12:04 AM.  [address] [place] [address]  [phone]

## 2023-09-11 NOTE — ED NOTES
[facility] transportation scheduled.   Trip #: [id number]     [place] Chu, BRITANY  09/10/23 [id number]

## 2023-09-11 NOTE — CONSULTS
General Surgery  Consult    PATIENT NAME: Hosie Shone  AGE: 69 y.o. MEDICAL RECORD NO. [id number]  DATE: 9/10/2023  SURGEON: Ted Oneill  PRIMARY CARE PHYSICIAN: TESFAYE Ritter MD    Patient evaluated at the request of  Dr. Norma Gregg  Reason for evaluation: Concerns for acute cholecystitis on CT scan    Patient information was obtained from patient and past medical records. History/Exam limitations: none. Patient presented to the Emergency Department by private vehicle. IMPRESSION:     Patient Active Problem List   Diagnosis    Back pain    Essential hypertension    COPD (chronic obstructive pulmonary disease) (HCC)    Left lower lobe pulmonary nodule    Procedure refused    Hyperlipidemia    Colonoscopy refused    Atrial fibrillation, chronic (HCC)    Anxiety    Insomnia    Pacemaker battery depletion    Pacemaker    Bradycardia    Constipation    Diet-controlled diabetes mellitus ([address])    Aneurysm of infrarenal abdominal aorta (HCC)    Hyperkalemia    Acute cholecystitis    Penicillin allergy    Lung nodule    E coli bacteremia    Epigastric pain     Nikos Aguilar, 73-year-old male who presents with vague abdominal pain with history of cholecystitis. PLAN:   No acute surgical intervention at this time. Patient denies abdominal pain on evaluation. Due to patient's comorbidities and current anticoagulation use we will have the patient follow-up outpatient to discuss elective cholecystectomy. Patient can follow-up in the general surgery clinic. HISTORY:   History of Chief Complaint:    Hosie Shone is a 69 y.o. male who presents with epigastric pain which radiates to left. Patient states the pain started early this morning and would wax and wane throughout the day. Patient states he has had associated nausea with no episodes of emesis. He has not eaten anything today. Patient states he also feels constipated and has not had a bowel movement since yesterday.   Patient has history of gallbladder problems

## 2023-09-12 LAB
EKG ATRIAL RATE: 150 BPM
EKG Q-T INTERVAL: 424 MS
EKG QRS DURATION: 148 MS
EKG QTC CALCULATION (BAZETT): 546 MS
EKG R AXIS: -3 DEGREES
EKG T AXIS: -60 DEGREES
EKG VENTRICULAR RATE: 100 BPM

## 2023-09-12 PROCEDURE — 93010 ELECTROCARDIOGRAM REPORT: CPT | Performed by: INTERNAL MEDICINE

## 2023-09-17 LAB
MICROORGANISM SPEC CULT: ABNORMAL
MICROORGANISM SPEC CULT: ABNORMAL
SPECIMEN DESCRIPTION: ABNORMAL

## 2023-09-26 ENCOUNTER — OFFICE VISIT (OUTPATIENT)
Dept: FAMILY MEDICINE CLINIC | Age: 78
End: 2023-09-26
Payer: COMMERCIAL

## 2023-09-26 VITALS
BODY MASS INDEX: 29.57 KG/M2 | SYSTOLIC BLOOD PRESSURE: 130 MMHG | WEIGHT: 212 LBS | HEART RATE: 71 BPM | TEMPERATURE: 97.7 F | OXYGEN SATURATION: 97 % | DIASTOLIC BLOOD PRESSURE: 74 MMHG

## 2023-09-26 DIAGNOSIS — E11.9 DIET-CONTROLLED DIABETES MELLITUS (HCC): Primary | ICD-10-CM

## 2023-09-26 DIAGNOSIS — I48.20 ATRIAL FIBRILLATION, CHRONIC (HCC): ICD-10-CM

## 2023-09-26 DIAGNOSIS — Z95.0 PACEMAKER: ICD-10-CM

## 2023-09-26 DIAGNOSIS — K81.9 CHOLECYSTITIS, UNSPECIFIED: ICD-10-CM

## 2023-09-26 DIAGNOSIS — E78.5 DYSLIPIDEMIA DUE TO TYPE 2 DIABETES MELLITUS (HCC): ICD-10-CM

## 2023-09-26 DIAGNOSIS — I10 ESSENTIAL HYPERTENSION: ICD-10-CM

## 2023-09-26 DIAGNOSIS — E11.69 DYSLIPIDEMIA DUE TO TYPE 2 DIABETES MELLITUS (HCC): ICD-10-CM

## 2023-09-26 DIAGNOSIS — J44.9 CHRONIC OBSTRUCTIVE PULMONARY DISEASE, UNSPECIFIED COPD TYPE (HCC): ICD-10-CM

## 2023-09-26 PROBLEM — R78.81 E COLI BACTEREMIA: Status: RESOLVED | Noted: 2023-07-13 | Resolved: 2023-09-26

## 2023-09-26 PROBLEM — B96.20 E COLI BACTEREMIA: Status: RESOLVED | Noted: 2023-07-13 | Resolved: 2023-09-26

## 2023-09-26 LAB — HBA1C MFR BLD: 5.4 %

## 2023-09-26 PROCEDURE — 3023F SPIROM DOC REV: CPT | Performed by: INTERNAL MEDICINE

## 2023-09-26 PROCEDURE — 1123F ACP DISCUSS/DSCN MKR DOCD: CPT | Performed by: INTERNAL MEDICINE

## 2023-09-26 PROCEDURE — G8417 CALC BMI ABV UP PARAM F/U: HCPCS | Performed by: INTERNAL MEDICINE

## 2023-09-26 PROCEDURE — 3044F HG A1C LEVEL LT 7.0%: CPT | Performed by: INTERNAL MEDICINE

## 2023-09-26 PROCEDURE — 3075F SYST BP GE 130 - 139MM HG: CPT | Performed by: INTERNAL MEDICINE

## 2023-09-26 PROCEDURE — G8427 DOCREV CUR MEDS BY ELIG CLIN: HCPCS | Performed by: INTERNAL MEDICINE

## 2023-09-26 PROCEDURE — 99215 OFFICE O/P EST HI 40 MIN: CPT | Performed by: INTERNAL MEDICINE

## 2023-09-26 PROCEDURE — 1036F TOBACCO NON-USER: CPT | Performed by: INTERNAL MEDICINE

## 2023-09-26 PROCEDURE — 83036 HEMOGLOBIN GLYCOSYLATED A1C: CPT | Performed by: INTERNAL MEDICINE

## 2023-09-26 PROCEDURE — 3078F DIAST BP <80 MM HG: CPT | Performed by: INTERNAL MEDICINE

## 2023-09-26 RX ORDER — CANDESARTAN 32 MG/1
32 TABLET ORAL DAILY
Qty: 90 TABLET | Refills: 1 | Status: SHIPPED | OUTPATIENT
Start: 2023-09-26

## 2023-09-26 ASSESSMENT — ENCOUNTER SYMPTOMS
CHEST TIGHTNESS: 0
BLOOD IN STOOL: 0
ABDOMINAL PAIN: 0
CHOKING: 0
VOMITING: 0
SHORTNESS OF BREATH: 0
NAUSEA: 0
COUGH: 0
CONSTIPATION: 0
WHEEZING: 0
DIARRHEA: 0
ANAL BLEEDING: 0

## 2023-09-26 ASSESSMENT — VISUAL ACUITY: OU: 1

## 2023-09-26 NOTE — PROGRESS NOTES
[facility]  [facility] [address]  [address] [facility] [address]  Dept: [phone]  Dept Fax: [phone]      Frida Marie is a 69 y.o. male who presents today for hismedical conditions/complaints as noted below. Frida Marie is c/o of Diabetes (F/u) and COPD (F/u)        Assessment/Plan:     1. Diet-controlled diabetes mellitus ([address])  -     POCT glycosylated hemoglobin (Hb A1C)  2. Atrial fibrillation, chronic ([address])  3. Chronic obstructive pulmonary disease, unspecified COPD type ([address])  4. Essential hypertension  -     candesartan (ATACAND) 32 MG tablet; Take 1 tablet by mouth daily, Disp-90 tablet, R-1Normal  5. Dyslipidemia due to type 2 diabetes mellitus ([address])  6. Pacemaker  7. Cholecystitis, unspecified          No follow-ups on file. HPI     HERE FOR ER f/u   Finished antibiotics yesterday, feeling good   He is frustrated that his gall bladder was not taken out during either of his hospital visits this year - reviewed that he needs to be otherwise healthy with stable vitals, pacemaker eval and cardiac clearance, and free of other infection before surgery can be considered. Diabetes - doing well with current regimen. Denies hypoglycemia, hyperglycemia, fatigue, polyuria, polydipsia, paresthesias, vision changes, dizziness. Eye exam was not done. Not following with podiatry. Patient is taking ACE inhibitor/ARB. Complications of diabetes include dyslipidemia, CAD? Robbert Orlin Hypertension-tolerating current regimen without chest pain, palpitations, dizziness, peripheral edema, dyspnea on exertion, orthopnea, paroxysmal nocturnal dyspnea. Hyperlipidemia-tolerating current regimen without myalgias, dyspepsia, jaundice. Mostly compliant with diet recommendations for low carb diet, not very compliant with exercise recommendations.     Cardiovascular risk factors: advanced age (older than 54 for men, 72 for women), diabetes mellitus, dyslipidemia, hypertension, male gender, and

## 2023-09-26 NOTE — PATIENT INSTRUCTIONS
Call and schedule appointments with the surgeon for the gall bladder and Urologist as referred in your hospital discharge paperwork. Keep your cardiology appointment as scheduled in November. Restart the candesartan - continue diltiazem as well. Call me with your blood pressure readings at home in two weeks.

## 2023-09-26 NOTE — PROGRESS NOTES
Pt is here today for a regular 4 mo f/u    Pt states he has bene in the hospital about 5 times this last year, was told hs \"gall bladder is going out\"     Pt was in the [facility]'s ED on 09/10/2023   Pt was also in the hospital on 07/11/2023     Pt has a referral to urology and elective surgery, pt would like to discuss with doctor before making appts

## 2023-10-02 DIAGNOSIS — F41.9 ANXIETY: ICD-10-CM

## 2023-10-02 NOTE — TELEPHONE ENCOUNTER
Cristine Severe is calling to request a refill on the following medication(s):    Medication Request:  Requested Prescriptions     Pending Prescriptions Disp Refills    LORazepam (ATIVAN) 1 MG tablet [Pharmacy Med Name: LORAZEPAM 1 MG TABLET] 90 tablet      Sig: take 1 tablet by mouth every 8 hours if needed for anxiety       Last Visit Date (If Applicable):  5/89/9176    Next Visit Date:    1/29/2024

## 2023-10-03 RX ORDER — LORAZEPAM 1 MG/1
1 TABLET ORAL EVERY 8 HOURS PRN
Qty: 90 TABLET | Refills: 1 | Status: SHIPPED | OUTPATIENT
Start: 2023-10-10 | End: 2023-12-09

## 2023-11-06 DIAGNOSIS — I48.20 ATRIAL FIBRILLATION, CHRONIC (HCC): ICD-10-CM

## 2023-11-07 RX ORDER — APIXABAN 5 MG/1
5 TABLET, FILM COATED ORAL 2 TIMES DAILY
Qty: 180 TABLET | Refills: 1 | Status: SHIPPED | OUTPATIENT
Start: 2023-11-07

## 2023-11-07 NOTE — TELEPHONE ENCOUNTER
Last visit: 9/26/23  Last Med refill: 5/24/23  Does patient have enough medication for 72 hours: Yes    Next Visit Date:  Future Appointments   Date Time Provider [address]   11/9/2023 11:00 AM Gissel Soriano MD [facility]   1/29/2024  9:30 AM Alivia Colon MD [address] Maintenance   Topic Date Due    Pneumococcal 65+ years Vaccine (1 - PCV) Never done    Hepatitis B vaccine (1 of 3 - Risk 3-dose series) Never done    DTaP/Tdap/Td vaccine (2 - Td or Tdap) 07/10/2023    COVID-19 Vaccine (2 - 2023-24 season) 09/01/2023    Shingles vaccine (1 of 2) 01/23/2024 (Originally 10/25/1996)    Flu vaccine (1) 06/30/2024 (Originally 8/1/2023)    Depression Screen  01/23/2024    Colorectal Cancer Screen  10/25/2024    Hepatitis C screen  Completed    Hepatitis A vaccine  Aged Out    Hib vaccine  Aged Out    Meningococcal (ACWY) vaccine  Aged Out    Diabetic foot exam  Discontinued    A1C test (Diabetic or Prediabetic)  Discontinued    Diabetic Alb to Cr ratio (uACR) test  Discontinued    Lipids  Discontinued       Hemoglobin A1C (%)   Date Value   09/26/2023 5.4   01/23/2023 5.5   01/10/2022 5.6             ( goal A1C is < 7)   No components found for: \"LABMICR\"  LDL Cholesterol (mg/dL)   Date Value   09/30/2022 116   06/17/2021 134 (H)       (goal LDL is <100)   AST (U/L)   Date Value   09/10/2023 14     ALT (U/L)   Date Value   09/10/2023 8     BUN (mg/dL)   Date Value   09/10/2023 14     BP Readings from Last 3 Encounters:   09/26/23 130/74   09/10/23 (!) 172/89   07/14/23 (!) 154/80          (goal 120/80)    All Future Testing planned in [facility]  Lab Frequency Next Occurrence               Patient Active Problem List:     Back pain     Essential hypertension     COPD (chronic obstructive pulmonary disease) (HCC)     Left lower lobe pulmonary nodule     Procedure refused     Hyperlipidemia     Colonoscopy refused     Atrial fibrillation, chronic (HCC)     Anxiety

## 2023-12-06 DIAGNOSIS — F41.9 ANXIETY: ICD-10-CM

## 2023-12-06 RX ORDER — LORAZEPAM 1 MG/1
1 TABLET ORAL EVERY 8 HOURS PRN
Qty: 90 TABLET | Refills: 1 | Status: SHIPPED | OUTPATIENT
Start: 2023-12-06 | End: 2024-02-04

## 2023-12-06 NOTE — TELEPHONE ENCOUNTER
Hyperlipidemia     Colonoscopy refused     Atrial fibrillation, chronic (HCC)     Anxiety     Insomnia     Pacemaker battery depletion     Pacemaker     Bradycardia     Constipation     Diet-controlled diabetes mellitus ([address])     Aneurysm of infrarenal abdominal aorta (HCC)     Hyperkalemia     Acute cholecystitis     Penicillin allergy     Lung nodule     Epigastric pain

## 2023-12-28 DIAGNOSIS — E55.9 VITAMIN D DEFICIENCY: ICD-10-CM

## 2023-12-28 RX ORDER — ERGOCALCIFEROL 1.25 MG/1
CAPSULE ORAL
Qty: 12 CAPSULE | Refills: 1 | Status: SHIPPED | OUTPATIENT
Start: 2023-12-28

## 2023-12-28 NOTE — TELEPHONE ENCOUNTER
Last visit: 09/06/2023  Last Med refill: 10/01/2023  Does patient have enough medication for 72 hours: No:     Next Visit Date:  Future Appointments   Date Time Provider [address]   1/29/2024  9:30 AM MD Yesi Callahan [facility]   2/22/2024  9:00 AM Asim Holt MD [facility]       Health Maintenance   Topic Date Due    Pneumococcal 65+ years Vaccine (1 - PCV) Never done    Respiratory Syncytial Virus (RSV) Pregnant or age 61 yrs+ (1 - 1-dose 60+ series) Never done    DTaP/Tdap/Td vaccine (2 - Td or Tdap) 07/10/2023    COVID-19 Vaccine (2 - 2023-24 season) 09/01/2023    Depression Screen  01/23/2024    Shingles vaccine (1 of 2) 01/23/2024 (Originally 10/25/1996)    Flu vaccine (1) 06/30/2024 (Originally 8/1/2023)    Colorectal Cancer Screen  10/25/2024    Hepatitis C screen  Completed    Hepatitis A vaccine  Aged Out    Hepatitis B vaccine  Aged Out    Hib vaccine  Aged Out    Polio vaccine  Aged Out    Meningococcal (ACWY) vaccine  Aged Out    Diabetic foot exam  Discontinued    A1C test (Diabetic or Prediabetic)  Discontinued    Diabetic Alb to Cr ratio (uACR) test  Discontinued    Lipids  Discontinued       Hemoglobin A1C (%)   Date Value   09/26/2023 5.4   01/23/2023 5.5   01/10/2022 5.6             ( goal A1C is < 7)   No components found for: \"LABMICR\"  LDL Cholesterol (mg/dL)   Date Value   09/30/2022 116   06/17/2021 134 (H)       (goal LDL is <100)   AST (U/L)   Date Value   09/10/2023 14     ALT (U/L)   Date Value   09/10/2023 8     BUN (mg/dL)   Date Value   09/10/2023 14     BP Readings from Last 3 Encounters:   09/26/23 130/74   09/10/23 (!) 172/89   07/14/23 (!) 154/80          (goal 120/80)    All Future Testing planned in [facility]  Lab Frequency Next Occurrence               Patient Active Problem List:     Back pain     Essential hypertension     COPD (chronic obstructive pulmonary disease) (HCC)     Left lower lobe pulmonary nodule     Procedure refused

## 2024-01-01 ENCOUNTER — APPOINTMENT (OUTPATIENT)
Age: 79
DRG: 192 | End: 2024-01-01
Payer: COMMERCIAL

## 2024-01-01 ENCOUNTER — APPOINTMENT (OUTPATIENT)
Dept: CT IMAGING | Age: 79
DRG: 192 | End: 2024-01-01
Payer: COMMERCIAL

## 2024-01-01 ENCOUNTER — APPOINTMENT (OUTPATIENT)
Dept: GENERAL RADIOLOGY | Age: 79
DRG: 192 | End: 2024-01-01
Payer: COMMERCIAL

## 2024-01-01 ENCOUNTER — APPOINTMENT (OUTPATIENT)
Dept: MRI IMAGING | Age: 79
DRG: 192 | End: 2024-01-01
Payer: COMMERCIAL

## 2024-01-01 ENCOUNTER — APPOINTMENT (OUTPATIENT)
Dept: ULTRASOUND IMAGING | Age: 79
DRG: 192 | End: 2024-01-01
Payer: COMMERCIAL

## 2024-01-01 ENCOUNTER — HOSPITAL ENCOUNTER (INPATIENT)
Age: 79
LOS: 3 days | DRG: 192 | End: 2024-06-16
Attending: EMERGENCY MEDICINE | Admitting: INTERNAL MEDICINE
Payer: COMMERCIAL

## 2024-01-01 VITALS
DIASTOLIC BLOOD PRESSURE: 66 MMHG | HEIGHT: 69 IN | BODY MASS INDEX: 33.21 KG/M2 | TEMPERATURE: 94.3 F | RESPIRATION RATE: 17 BRPM | HEART RATE: 70 BPM | OXYGEN SATURATION: 94 % | SYSTOLIC BLOOD PRESSURE: 105 MMHG | WEIGHT: 224.21 LBS

## 2024-01-01 DIAGNOSIS — I46.9 CARDIAC ARREST (HCC): Primary | ICD-10-CM

## 2024-01-01 DIAGNOSIS — I21.4 NSTEMI (NON-ST ELEVATED MYOCARDIAL INFARCTION) (HCC): ICD-10-CM

## 2024-01-01 LAB
ALBUMIN SERPL-MCNC: 2.6 G/DL (ref 3.5–5.2)
ALBUMIN SERPL-MCNC: 2.9 G/DL (ref 3.5–5.2)
ALBUMIN SERPL-MCNC: 3.2 G/DL (ref 3.5–5.2)
ALBUMIN SERPL-MCNC: 3.5 G/DL (ref 3.5–5.2)
ALBUMIN/GLOB SERPL: 1 {RATIO} (ref 1–2.5)
ALLEN TEST: ABNORMAL
ALP SERPL-CCNC: 146 U/L (ref 40–129)
ALP SERPL-CCNC: 149 U/L (ref 40–129)
ALP SERPL-CCNC: 154 U/L (ref 40–129)
ALP SERPL-CCNC: 166 U/L (ref 40–129)
ALT SERPL-CCNC: 1012 U/L (ref 10–50)
ALT SERPL-CCNC: 15 U/L (ref 10–50)
ALT SERPL-CCNC: 288 U/L (ref 10–50)
ALT SERPL-CCNC: 366 U/L (ref 10–50)
AMMONIA PLAS-SCNC: 44 UMOL/L (ref 16–60)
AMMONIA PLAS-SCNC: 62 UMOL/L (ref 16–60)
AMPHET UR QL SCN: NEGATIVE
ANION GAP SERPL CALCULATED.3IONS-SCNC: 14 MMOL/L (ref 9–16)
ANION GAP SERPL CALCULATED.3IONS-SCNC: 16 MMOL/L (ref 9–16)
ANION GAP SERPL CALCULATED.3IONS-SCNC: 17 MMOL/L (ref 9–16)
ANION GAP SERPL CALCULATED.3IONS-SCNC: 17 MMOL/L (ref 9–16)
ANION GAP SERPL CALCULATED.3IONS-SCNC: 19 MMOL/L (ref 9–16)
ANION GAP SERPL CALCULATED.3IONS-SCNC: 20 MMOL/L (ref 9–16)
ANION GAP SERPL CALCULATED.3IONS-SCNC: 21 MMOL/L (ref 9–16)
ANION GAP SERPL CALCULATED.3IONS-SCNC: 23 MMOL/L (ref 9–16)
ANION GAP SERPL CALCULATED.3IONS-SCNC: 25 MMOL/L (ref 9–16)
ANTI-XA UNFRAC HEPARIN: >2 IU/L
APAP SERPL-MCNC: <5 UG/ML (ref 10–30)
AST SERPL-CCNC: 2283 U/L (ref 10–50)
AST SERPL-CCNC: 43 U/L (ref 10–50)
AST SERPL-CCNC: 658 U/L (ref 10–50)
AST SERPL-CCNC: 661 U/L (ref 10–50)
BACTERIA URNS QL MICRO: ABNORMAL
BARBITURATES UR QL SCN: NEGATIVE
BASOPHILS # BLD: 0 K/UL (ref 0–0.2)
BASOPHILS # BLD: 0.06 K/UL (ref 0–0.2)
BASOPHILS # BLD: 0.08 K/UL (ref 0–0.2)
BASOPHILS NFR BLD: 0 % (ref 0–2)
BASOPHILS NFR BLD: 1 % (ref 0–2)
BENZODIAZ UR QL: POSITIVE
BILIRUB SERPL-MCNC: 0.7 MG/DL (ref 0–1.2)
BILIRUB SERPL-MCNC: 2 MG/DL (ref 0–1.2)
BILIRUB SERPL-MCNC: 2.3 MG/DL (ref 0–1.2)
BILIRUB SERPL-MCNC: 3.1 MG/DL (ref 0–1.2)
BILIRUB UR QL STRIP: NEGATIVE
BNP SERPL-MCNC: ABNORMAL PG/ML (ref 0–300)
BUN BLD-MCNC: 14 MG/DL (ref 8–26)
BUN BLD-MCNC: 17 MG/DL (ref 8–26)
BUN BLD-MCNC: 19 MG/DL (ref 8–26)
BUN SERPL-MCNC: 14 MG/DL (ref 8–23)
BUN SERPL-MCNC: 14 MG/DL (ref 8–23)
BUN SERPL-MCNC: 17 MG/DL (ref 8–23)
BUN SERPL-MCNC: 18 MG/DL (ref 8–23)
BUN SERPL-MCNC: 18 MG/DL (ref 8–23)
BUN SERPL-MCNC: 19 MG/DL (ref 8–23)
BUN SERPL-MCNC: 20 MG/DL (ref 8–23)
BUN SERPL-MCNC: 21 MG/DL (ref 8–23)
C3 SERPL-MCNC: 57 MG/DL (ref 90–180)
C4 SERPL-MCNC: 9 MG/DL (ref 10–40)
CA-I BLD-SCNC: 1.02 MMOL/L (ref 1.13–1.33)
CA-I BLD-SCNC: 1.04 MMOL/L (ref 1.13–1.33)
CA-I BLD-SCNC: 1.04 MMOL/L (ref 1.13–1.33)
CA-I BLD-SCNC: 1.05 MMOL/L (ref 1.13–1.33)
CA-I BLD-SCNC: 1.06 MMOL/L (ref 1.13–1.33)
CA-I BLD-SCNC: 1.06 MMOL/L (ref 1.13–1.33)
CA-I BLD-SCNC: 1.08 MMOL/L (ref 1.13–1.33)
CA-I BLD-SCNC: 1.12 MMOL/L (ref 1.13–1.33)
CA-I BLD-SCNC: 1.13 MMOL/L (ref 1.15–1.33)
CA-I BLD-SCNC: 1.14 MMOL/L (ref 1.13–1.33)
CA-I BLD-SCNC: 1.17 MMOL/L (ref 1.13–1.33)
CA-I BLD-SCNC: 1.21 MMOL/L (ref 1.15–1.33)
CA-I BLD-SCNC: 1.23 MMOL/L (ref 1.15–1.33)
CALCIUM SERPL-MCNC: 7.4 MG/DL (ref 8.6–10.4)
CALCIUM SERPL-MCNC: 7.7 MG/DL (ref 8.6–10.4)
CALCIUM SERPL-MCNC: 7.8 MG/DL (ref 8.6–10.4)
CALCIUM SERPL-MCNC: 7.9 MG/DL (ref 8.6–10.4)
CALCIUM SERPL-MCNC: 8 MG/DL (ref 8.6–10.4)
CALCIUM SERPL-MCNC: 8.1 MG/DL (ref 8.6–10.4)
CALCIUM SERPL-MCNC: 8.2 MG/DL (ref 8.6–10.4)
CALCIUM SERPL-MCNC: 8.3 MG/DL (ref 8.6–10.4)
CALCIUM SERPL-MCNC: 8.4 MG/DL (ref 8.6–10.4)
CALCIUM SERPL-MCNC: 8.4 MG/DL (ref 8.6–10.4)
CALCIUM SERPL-MCNC: 8.6 MG/DL (ref 8.6–10.4)
CANNABINOIDS UR QL SCN: NEGATIVE
CASTS #/AREA URNS LPF: ABNORMAL /LPF (ref 0–8)
CHLORIDE BLD-SCNC: 109 MMOL/L (ref 98–107)
CHLORIDE BLD-SCNC: 110 MMOL/L (ref 98–107)
CHLORIDE BLD-SCNC: 113 MMOL/L (ref 98–107)
CHLORIDE SERPL-SCNC: 100 MMOL/L (ref 98–107)
CHLORIDE SERPL-SCNC: 102 MMOL/L (ref 98–107)
CHLORIDE SERPL-SCNC: 103 MMOL/L (ref 98–107)
CHLORIDE SERPL-SCNC: 104 MMOL/L (ref 98–107)
CHLORIDE SERPL-SCNC: 106 MMOL/L (ref 98–107)
CHLORIDE SERPL-SCNC: 107 MMOL/L (ref 98–107)
CHLORIDE SERPL-SCNC: 108 MMOL/L (ref 98–107)
CHLORIDE SERPL-SCNC: 109 MMOL/L (ref 98–107)
CHLORIDE SERPL-SCNC: 92 MMOL/L (ref 98–107)
CHLORIDE SERPL-SCNC: 93 MMOL/L (ref 98–107)
CHLORIDE SERPL-SCNC: 94 MMOL/L (ref 98–107)
CHLORIDE SERPL-SCNC: 95 MMOL/L (ref 98–107)
CHLORIDE SERPL-SCNC: 97 MMOL/L (ref 98–107)
CLARITY UR: ABNORMAL
CO2 BLD CALC-SCNC: 13 MMOL/L (ref 22–30)
CO2 BLD CALC-SCNC: 19 MMOL/L (ref 22–30)
CO2 BLD CALC-SCNC: 23 MMOL/L (ref 22–30)
CO2 SERPL-SCNC: 11 MMOL/L (ref 20–31)
CO2 SERPL-SCNC: 11 MMOL/L (ref 20–31)
CO2 SERPL-SCNC: 12 MMOL/L (ref 20–31)
CO2 SERPL-SCNC: 12 MMOL/L (ref 20–31)
CO2 SERPL-SCNC: 13 MMOL/L (ref 20–31)
CO2 SERPL-SCNC: 14 MMOL/L (ref 20–31)
CO2 SERPL-SCNC: 15 MMOL/L (ref 20–31)
CO2 SERPL-SCNC: 16 MMOL/L (ref 20–31)
CO2 SERPL-SCNC: 17 MMOL/L (ref 20–31)
CO2 SERPL-SCNC: 18 MMOL/L (ref 20–31)
CO2 SERPL-SCNC: 19 MMOL/L (ref 20–31)
COCAINE UR QL SCN: NEGATIVE
COLOR UR: YELLOW
CREAT SERPL-MCNC: 1.7 MG/DL (ref 0.7–1.2)
CREAT SERPL-MCNC: 1.8 MG/DL (ref 0.7–1.2)
CREAT SERPL-MCNC: 1.9 MG/DL (ref 0.7–1.2)
CREAT SERPL-MCNC: 2 MG/DL (ref 0.7–1.2)
CREAT SERPL-MCNC: 2.1 MG/DL (ref 0.7–1.2)
CREAT SERPL-MCNC: 2.2 MG/DL (ref 0.7–1.2)
CREAT SERPL-MCNC: 2.2 MG/DL (ref 0.7–1.2)
CREAT SERPL-MCNC: 2.3 MG/DL (ref 0.7–1.2)
CREAT SERPL-MCNC: 2.3 MG/DL (ref 0.7–1.2)
CREAT SERPL-MCNC: 2.6 MG/DL (ref 0.7–1.2)
CRITICAL ACTION: NORMAL
CRITICAL NOTIFICATION DATE/TIME: NORMAL
CRITICAL NOTIFICATION: NORMAL
CRITICAL VALUE READ BACK: YES
ECHO AO ROOT DIAM: 3.3 CM
ECHO AV AREA PEAK VELOCITY: 2.7 CM2
ECHO AV AREA VTI: 2.6 CM2
ECHO AV MEAN GRADIENT: 3 MMHG
ECHO AV MEAN VELOCITY: 0.8 M/S
ECHO AV PEAK GRADIENT: 6 MMHG
ECHO AV PEAK VELOCITY: 1.3 M/S
ECHO AV VELOCITY RATIO: 0.62
ECHO AV VTI: 15.8 CM
ECHO BSA: 2.11 M2
ECHO EST RA PRESSURE: 10 MMHG
ECHO LA AREA 2C: 24.7 CM2
ECHO LA AREA 4C: 25.9 CM2
ECHO LA DIAMETER: 5.2 CM
ECHO LA MAJOR AXIS: 6.9 CM
ECHO LA MINOR AXIS: 6.1 CM
ECHO LA TO AORTIC ROOT RATIO: 1.58
ECHO LA VOL BP: 85 ML (ref 18–58)
ECHO LA VOL MOD A2C: 82 ML (ref 18–58)
ECHO LA VOL MOD A4C: 79 ML (ref 18–58)
ECHO LV E' LATERAL VELOCITY: 15 CM/S
ECHO LV E' SEPTAL VELOCITY: 4 CM/S
ECHO LV EDV 3D: 187 ML
ECHO LV EDV A2C: 111 ML
ECHO LV EDV A4C: 88 ML
ECHO LV EJECTION FRACTION 3D: 29 %
ECHO LV EJECTION FRACTION A2C: 7 %
ECHO LV EJECTION FRACTION A4C: 21 %
ECHO LV ESV 3D: 133 ML
ECHO LV ESV A2C: 103 ML
ECHO LV ESV A4C: 70 ML
ECHO LV INTERNAL DIMENSION DIASTOLIC: 5.6 CM (ref 4.2–5.9)
ECHO LV IVSD: 1 CM (ref 0.6–1)
ECHO LV MASS 2D: 205.5 G (ref 88–224)
ECHO LV MASS 3D: 192 G
ECHO LV POSTERIOR WALL DIASTOLIC: 0.9 CM (ref 0.6–1)
ECHO LV RELATIVE WALL THICKNESS RATIO: 0.32
ECHO LVOT AREA: 4.2 CM2
ECHO LVOT AV VTI INDEX: 0.61
ECHO LVOT DIAM: 2.3 CM
ECHO LVOT MEAN GRADIENT: 1 MMHG
ECHO LVOT PEAK GRADIENT: 3 MMHG
ECHO LVOT PEAK VELOCITY: 0.8 M/S
ECHO LVOT SV: 40.3 ML
ECHO LVOT VTI: 9.7 CM
ECHO MV A VELOCITY: 0.66 M/S
ECHO MV AREA VTI: 2.2 CM2
ECHO MV E DECELERATION TIME (DT): 277 MS
ECHO MV E VELOCITY: 0.22 M/S
ECHO MV E/A RATIO: 0.33
ECHO MV E/E' LATERAL: 1.47
ECHO MV E/E' RATIO (AVERAGED): 3.48
ECHO MV E/E' SEPTAL: 5.5
ECHO MV LVOT VTI INDEX: 1.92
ECHO MV MAX VELOCITY: 0.7 M/S
ECHO MV MEAN GRADIENT: 1 MMHG
ECHO MV MEAN VELOCITY: 0.4 M/S
ECHO MV PEAK GRADIENT: 2 MMHG
ECHO MV REGURGITANT ALIASING (NYQUIST) VELOCITY: 37 CM/S
ECHO MV REGURGITANT PEAK GRADIENT: 58 MMHG
ECHO MV REGURGITANT PEAK VELOCITY: 3.8 M/S
ECHO MV REGURGITANT RADIUS PISA: 0.7 CM
ECHO MV REGURGITANT VTIA: 115 CM
ECHO MV VTI: 18.6 CM
ECHO PULMONARY ARTERY END DIASTOLIC PRESSURE: 11 MMHG
ECHO PV MAX VELOCITY: 0.9 M/S
ECHO PV PEAK GRADIENT: 4 MMHG
ECHO PV REGURGITANT MAX VELOCITY: 1.6 M/S
ECHO RIGHT VENTRICULAR SYSTOLIC PRESSURE (RVSP): 52 MMHG
ECHO RV BASAL DIMENSION: 4.1 CM
ECHO RV INTERNAL DIMENSION: 4.1 CM
ECHO RV MID DIMENSION: 3.8 CM
ECHO RV TAPSE: 1.6 CM (ref 1.7–?)
ECHO TV REGURGITANT MAX VELOCITY: 3.23 M/S
ECHO TV REGURGITANT PEAK GRADIENT: 42 MMHG
EGFR, POC: 26 ML/MIN/1.73M2
EGFR, POC: 29 ML/MIN/1.73M2
EGFR, POC: 36 ML/MIN/1.73M2
EKG ATRIAL RATE: 54 BPM
EKG ATRIAL RATE: 57 BPM
EKG ATRIAL RATE: 74 BPM
EKG P AXIS: 67 DEGREES
EKG Q-T INTERVAL: 448 MS
EKG Q-T INTERVAL: 494 MS
EKG Q-T INTERVAL: 508 MS
EKG QRS DURATION: 198 MS
EKG QRS DURATION: 198 MS
EKG QRS DURATION: 204 MS
EKG QTC CALCULATION (BAZETT): 515 MS
EKG QTC CALCULATION (BAZETT): 517 MS
EKG QTC CALCULATION (BAZETT): 526 MS
EKG R AXIS: -69 DEGREES
EKG T AXIS: 112 DEGREES
EKG T AXIS: 112 DEGREES
EKG T AXIS: 117 DEGREES
EKG VENTRICULAR RATE: 62 BPM
EKG VENTRICULAR RATE: 66 BPM
EKG VENTRICULAR RATE: 83 BPM
EOSINOPHIL # BLD: 0 K/UL (ref 0–0.4)
EOSINOPHIL # BLD: 0 K/UL (ref 0–0.4)
EOSINOPHIL # BLD: 0.08 K/UL (ref 0–0.4)
EOSINOPHIL # BLD: <0.03 K/UL (ref 0–0.44)
EOSINOPHILS RELATIVE PERCENT: 0 % (ref 1–4)
EOSINOPHILS RELATIVE PERCENT: 1 % (ref 1–4)
EPI CELLS #/AREA URNS HPF: ABNORMAL /HPF (ref 0–5)
ERYTHROCYTE [DISTWIDTH] IN BLOOD BY AUTOMATED COUNT: 15.8 % (ref 11.8–14.4)
ERYTHROCYTE [DISTWIDTH] IN BLOOD BY AUTOMATED COUNT: 15.9 % (ref 11.8–14.4)
ERYTHROCYTE [DISTWIDTH] IN BLOOD BY AUTOMATED COUNT: 15.9 % (ref 11.8–14.4)
ERYTHROCYTE [DISTWIDTH] IN BLOOD BY AUTOMATED COUNT: 16 % (ref 11.8–14.4)
ERYTHROCYTE [DISTWIDTH] IN BLOOD BY AUTOMATED COUNT: 17.1 % (ref 11.8–14.4)
ETHANOL PERCENT: <0.01 %
ETHANOLAMINE SERPL-MCNC: <10 MG/DL (ref 0–0.08)
FENTANYL UR QL: NEGATIVE
FIO2: 100
FIO2: 30
FIO2: 80
FREE KAPPA/LAMBDA RATIO: 1.57 (ref 0.22–1.74)
GFR, ESTIMATED: 25 ML/MIN/1.73M2
GFR, ESTIMATED: 26 ML/MIN/1.73M2
GFR, ESTIMATED: 28 ML/MIN/1.73M2
GFR, ESTIMATED: 29 ML/MIN/1.73M2
GFR, ESTIMATED: 30 ML/MIN/1.73M2
GFR, ESTIMATED: 30 ML/MIN/1.73M2
GFR, ESTIMATED: 33 ML/MIN/1.73M2
GFR, ESTIMATED: 33 ML/MIN/1.73M2
GFR, ESTIMATED: 35 ML/MIN/1.73M2
GFR, ESTIMATED: 35 ML/MIN/1.73M2
GFR, ESTIMATED: 36 ML/MIN/1.73M2
GFR, ESTIMATED: 37 ML/MIN/1.73M2
GFR, ESTIMATED: 38 ML/MIN/1.73M2
GFR, ESTIMATED: 39 ML/MIN/1.73M2
GFR, ESTIMATED: 42 ML/MIN/1.73M2
GLUCOSE BLD-MCNC: 104 MG/DL (ref 75–110)
GLUCOSE BLD-MCNC: 114 MG/DL (ref 75–110)
GLUCOSE BLD-MCNC: 115 MG/DL (ref 75–110)
GLUCOSE BLD-MCNC: 121 MG/DL (ref 74–100)
GLUCOSE BLD-MCNC: 142 MG/DL (ref 75–110)
GLUCOSE BLD-MCNC: 153 MG/DL (ref 75–110)
GLUCOSE BLD-MCNC: 162 MG/DL (ref 74–100)
GLUCOSE BLD-MCNC: 179 MG/DL (ref 74–100)
GLUCOSE BLD-MCNC: 180 MG/DL (ref 74–100)
GLUCOSE BLD-MCNC: 187 MG/DL (ref 74–100)
GLUCOSE BLD-MCNC: 44 MG/DL (ref 74–100)
GLUCOSE BLD-MCNC: 50 MG/DL (ref 75–110)
GLUCOSE BLD-MCNC: 59 MG/DL (ref 75–110)
GLUCOSE BLD-MCNC: 63 MG/DL (ref 75–110)
GLUCOSE BLD-MCNC: 66 MG/DL (ref 75–110)
GLUCOSE BLD-MCNC: 67 MG/DL (ref 75–110)
GLUCOSE BLD-MCNC: 70 MG/DL (ref 74–100)
GLUCOSE BLD-MCNC: 70 MG/DL (ref 75–110)
GLUCOSE BLD-MCNC: 76 MG/DL (ref 75–110)
GLUCOSE BLD-MCNC: 77 MG/DL (ref 75–110)
GLUCOSE BLD-MCNC: 77 MG/DL (ref 75–110)
GLUCOSE BLD-MCNC: 84 MG/DL (ref 75–110)
GLUCOSE BLD-MCNC: 87 MG/DL (ref 75–110)
GLUCOSE BLD-MCNC: 92 MG/DL (ref 75–110)
GLUCOSE BLD-MCNC: 94 MG/DL (ref 74–100)
GLUCOSE BLD-MCNC: 94 MG/DL (ref 74–100)
GLUCOSE BLD-MCNC: 94 MG/DL (ref 75–110)
GLUCOSE SERPL-MCNC: 100 MG/DL (ref 74–99)
GLUCOSE SERPL-MCNC: 117 MG/DL (ref 74–99)
GLUCOSE SERPL-MCNC: 128 MG/DL (ref 74–99)
GLUCOSE SERPL-MCNC: 141 MG/DL (ref 74–99)
GLUCOSE SERPL-MCNC: 171 MG/DL (ref 74–99)
GLUCOSE SERPL-MCNC: 176 MG/DL (ref 74–99)
GLUCOSE SERPL-MCNC: 176 MG/DL (ref 74–99)
GLUCOSE SERPL-MCNC: 181 MG/DL (ref 74–99)
GLUCOSE SERPL-MCNC: 46 MG/DL (ref 74–99)
GLUCOSE SERPL-MCNC: 54 MG/DL (ref 74–99)
GLUCOSE SERPL-MCNC: 57 MG/DL (ref 74–99)
GLUCOSE SERPL-MCNC: 60 MG/DL (ref 74–99)
GLUCOSE SERPL-MCNC: 77 MG/DL (ref 74–99)
GLUCOSE SERPL-MCNC: 77 MG/DL (ref 74–99)
GLUCOSE SERPL-MCNC: 79 MG/DL (ref 74–99)
GLUCOSE SERPL-MCNC: 99 MG/DL (ref 74–99)
GLUCOSE UR STRIP-MCNC: NEGATIVE MG/DL
HAV IGM SERPL QL IA: NONREACTIVE
HBV CORE IGM SERPL QL IA: NONREACTIVE
HBV SURFACE AG SERPL QL IA: NONREACTIVE
HCO3 VENOUS: 21.2 MMOL/L (ref 22–29)
HCT VFR BLD AUTO: 37.6 % (ref 40.7–50.3)
HCT VFR BLD AUTO: 38.4 % (ref 40.7–50.3)
HCT VFR BLD AUTO: 42.2 % (ref 40.7–50.3)
HCT VFR BLD AUTO: 43 % (ref 41–53)
HCT VFR BLD AUTO: 43.4 % (ref 40.7–50.3)
HCT VFR BLD AUTO: 43.6 % (ref 40.7–50.3)
HCT VFR BLD AUTO: 46 % (ref 41–53)
HCT VFR BLD AUTO: 47 % (ref 41–53)
HCV AB SERPL QL IA: NONREACTIVE
HGB BLD-MCNC: 11.7 G/DL (ref 13–17)
HGB BLD-MCNC: 12.4 G/DL (ref 13–17)
HGB BLD-MCNC: 12.9 G/DL (ref 13–17)
HGB BLD-MCNC: 13 G/DL (ref 13–17)
HGB BLD-MCNC: 13.4 G/DL (ref 13–17)
HGB UR QL STRIP.AUTO: ABNORMAL
IMM GRANULOCYTES # BLD AUTO: 0 K/UL (ref 0–0.3)
IMM GRANULOCYTES # BLD AUTO: 0.08 K/UL (ref 0–0.3)
IMM GRANULOCYTES # BLD AUTO: 0.16 K/UL (ref 0–0.3)
IMM GRANULOCYTES # BLD AUTO: 0.24 K/UL (ref 0–0.3)
IMM GRANULOCYTES # BLD AUTO: 0.32 K/UL (ref 0–0.3)
IMM GRANULOCYTES NFR BLD: 0 %
IMM GRANULOCYTES NFR BLD: 0 %
IMM GRANULOCYTES NFR BLD: 1 %
IMM GRANULOCYTES NFR BLD: 2 %
IMM GRANULOCYTES NFR BLD: 4 %
INR PPP: 2.3
INR PPP: 3.4
KAPPA LC FREE SER-MCNC: 66.3 MG/L
KETONES UR STRIP-MCNC: NEGATIVE MG/DL
LACTIC ACID, WHOLE BLOOD: 10.3 MMOL/L (ref 0.7–2.1)
LACTIC ACID, WHOLE BLOOD: 6.1 MMOL/L (ref 0.7–2.1)
LACTIC ACID, WHOLE BLOOD: 7.3 MMOL/L (ref 0.7–2.1)
LACTIC ACID, WHOLE BLOOD: 9.5 MMOL/L (ref 0.7–2.1)
LAMBDA LC FREE SERPL-MCNC: 42.2 MG/L (ref 4.2–27.7)
LEUKOCYTE ESTERASE UR QL STRIP: NEGATIVE
LYMPHOCYTES NFR BLD: 0.47 K/UL (ref 1.1–3.7)
LYMPHOCYTES NFR BLD: 0.59 K/UL (ref 1–4.8)
LYMPHOCYTES NFR BLD: 0.83 K/UL (ref 1–4.8)
LYMPHOCYTES NFR BLD: 0.98 K/UL (ref 1.1–3.7)
LYMPHOCYTES NFR BLD: 2.35 K/UL (ref 1–4.8)
LYMPHOCYTES RELATIVE PERCENT: 29 % (ref 24–44)
LYMPHOCYTES RELATIVE PERCENT: 3 % (ref 24–43)
LYMPHOCYTES RELATIVE PERCENT: 5 % (ref 24–43)
LYMPHOCYTES RELATIVE PERCENT: 5 % (ref 24–44)
LYMPHOCYTES RELATIVE PERCENT: 7 % (ref 24–44)
MAGNESIUM SERPL-MCNC: 1.7 MG/DL (ref 1.6–2.4)
MAGNESIUM SERPL-MCNC: 1.8 MG/DL (ref 1.6–2.4)
MAGNESIUM SERPL-MCNC: 1.9 MG/DL (ref 1.6–2.4)
MAGNESIUM SERPL-MCNC: 2 MG/DL (ref 1.6–2.4)
MAGNESIUM SERPL-MCNC: 2.1 MG/DL (ref 1.6–2.4)
MAGNESIUM SERPL-MCNC: 2.2 MG/DL (ref 1.6–2.4)
MAGNESIUM SERPL-MCNC: 2.2 MG/DL (ref 1.6–2.4)
MAGNESIUM SERPL-MCNC: 2.3 MG/DL (ref 1.6–2.4)
MAGNESIUM SERPL-MCNC: 2.3 MG/DL (ref 1.6–2.4)
MCH RBC QN AUTO: 31.2 PG (ref 25.2–33.5)
MCH RBC QN AUTO: 31.4 PG (ref 25.2–33.5)
MCH RBC QN AUTO: 32.5 PG (ref 25.2–33.5)
MCHC RBC AUTO-ENTMCNC: 30 G/DL (ref 28.4–34.8)
MCHC RBC AUTO-ENTMCNC: 30.6 G/DL (ref 28.4–34.8)
MCHC RBC AUTO-ENTMCNC: 30.7 G/DL (ref 28.4–34.8)
MCHC RBC AUTO-ENTMCNC: 31.1 G/DL (ref 28.4–34.8)
MCHC RBC AUTO-ENTMCNC: 32.3 G/DL (ref 28.4–34.8)
MCV RBC AUTO: 101.4 FL (ref 82.6–102.9)
MCV RBC AUTO: 102.7 FL (ref 82.6–102.9)
MCV RBC AUTO: 104.4 FL (ref 82.6–102.9)
MCV RBC AUTO: 104.8 FL (ref 82.6–102.9)
MCV RBC AUTO: 97.2 FL (ref 82.6–102.9)
METHADONE UR QL: NEGATIVE
MICROORGANISM SPEC CULT: NO GROWTH
MODE: ABNORMAL
MONOCYTES NFR BLD: 0.59 K/UL (ref 0.1–0.8)
MONOCYTES NFR BLD: 0.79 K/UL (ref 0.1–1.2)
MONOCYTES NFR BLD: 0.89 K/UL (ref 0.1–0.8)
MONOCYTES NFR BLD: 1.18 K/UL (ref 0.1–0.8)
MONOCYTES NFR BLD: 1.49 K/UL (ref 0.1–1.2)
MONOCYTES NFR BLD: 10 % (ref 1–7)
MONOCYTES NFR BLD: 11 % (ref 1–7)
MONOCYTES NFR BLD: 5 % (ref 1–7)
MONOCYTES NFR BLD: 5 % (ref 3–12)
MONOCYTES NFR BLD: 7 % (ref 3–12)
MORPHOLOGY: ABNORMAL
MRSA, DNA, NASAL: NEGATIVE
NEGATIVE BASE EXCESS, ART: 12.4 MMOL/L (ref 0–2)
NEGATIVE BASE EXCESS, ART: 12.6 MMOL/L (ref 0–2)
NEGATIVE BASE EXCESS, ART: 14.5 MMOL/L (ref 0–2)
NEGATIVE BASE EXCESS, ART: 15.4 MMOL/L (ref 0–2)
NEGATIVE BASE EXCESS, ART: 16.9 MMOL/L (ref 0–2)
NEGATIVE BASE EXCESS, ART: 17.1 MMOL/L (ref 0–2)
NEGATIVE BASE EXCESS, ART: 4.4 MMOL/L (ref 0–2)
NEGATIVE BASE EXCESS, ART: 6.9 MMOL/L (ref 0–2)
NEGATIVE BASE EXCESS, ART: 7.3 MMOL/L (ref 0–2)
NEGATIVE BASE EXCESS, VEN: 12.1 MMOL/L (ref 0–2)
NEURON SPEC ENOLASE: 42.3 NG/ML
NEUTROPHILS NFR BLD: 54 % (ref 36–66)
NEUTROPHILS NFR BLD: 83 % (ref 36–66)
NEUTROPHILS NFR BLD: 87 % (ref 36–65)
NEUTROPHILS NFR BLD: 88 % (ref 36–66)
NEUTROPHILS NFR BLD: 91 % (ref 36–65)
NEUTS SEG NFR BLD: 10.38 K/UL (ref 1.8–7.7)
NEUTS SEG NFR BLD: 14.28 K/UL (ref 1.5–8.1)
NEUTS SEG NFR BLD: 17.84 K/UL (ref 1.5–8.1)
NEUTS SEG NFR BLD: 4.38 K/UL (ref 1.8–7.7)
NEUTS SEG NFR BLD: 9.79 K/UL (ref 1.8–7.7)
NITRITE UR QL STRIP: NEGATIVE
NRBC BLD-RTO: 0 PER 100 WBC
NRBC BLD-RTO: 0 PER 100 WBC
NRBC BLD-RTO: 0.1 PER 100 WBC
NRBC BLD-RTO: 0.8 PER 100 WBC
NRBC BLD-RTO: 5.2 PER 100 WBC
NUCLEATED RED BLOOD CELLS: 10 PER 100 WBC
O2 DELIVERY DEVICE: ABNORMAL
O2 SAT, VEN: 41.4 % (ref 60–85)
OPIATES UR QL SCN: NEGATIVE
OXYCODONE UR QL SCN: NEGATIVE
P E INTERPRETATION, U: NORMAL
PARTIAL THROMBOPLASTIN TIME: 118.2 SEC (ref 23–36.5)
PARTIAL THROMBOPLASTIN TIME: 39.6 SEC (ref 23–36.5)
PARTIAL THROMBOPLASTIN TIME: >180 SEC (ref 23–36.5)
PATHOLOGIST: NORMAL
PATIENT TEMP: 33.1
PATIENT TEMP: 34.2
PCO2 VENOUS: 84.7 MM HG (ref 41–51)
PCP UR QL SCN: NEGATIVE
PH UR STRIP: 6 [PH] (ref 5–8)
PH VENOUS: 7.01 (ref 7.32–7.43)
PHOSPHATE SERPL-MCNC: 3.6 MG/DL (ref 2.5–4.5)
PHOSPHATE SERPL-MCNC: 3.6 MG/DL (ref 2.5–4.5)
PHOSPHATE SERPL-MCNC: 3.8 MG/DL (ref 2.5–4.5)
PHOSPHATE SERPL-MCNC: 3.8 MG/DL (ref 2.5–4.5)
PHOSPHATE SERPL-MCNC: 3.9 MG/DL (ref 2.5–4.5)
PHOSPHATE SERPL-MCNC: 4.3 MG/DL (ref 2.5–4.5)
PHOSPHATE SERPL-MCNC: 4.4 MG/DL (ref 2.5–4.5)
PHOSPHATE SERPL-MCNC: 4.4 MG/DL (ref 2.5–4.5)
PHOSPHATE SERPL-MCNC: 4.7 MG/DL (ref 2.5–4.5)
PHOSPHATE SERPL-MCNC: 4.8 MG/DL (ref 2.5–4.5)
PHOSPHATE SERPL-MCNC: 4.9 MG/DL (ref 2.5–4.5)
PHOSPHATE SERPL-MCNC: 5.7 MG/DL (ref 2.5–4.5)
PHOSPHATE SERPL-MCNC: 6.4 MG/DL (ref 2.5–4.5)
PLATELET # BLD AUTO: 136 K/UL (ref 138–453)
PLATELET # BLD AUTO: 159 K/UL (ref 138–453)
PLATELET # BLD AUTO: 192 K/UL (ref 138–453)
PLATELET # BLD AUTO: ABNORMAL K/UL (ref 138–453)
PLATELET # BLD AUTO: ABNORMAL K/UL (ref 138–453)
PLATELET, FLUORESCENCE: 138 K/UL (ref 138–453)
PLATELET, FLUORESCENCE: 43 K/UL (ref 138–453)
PLATELETS.RETICULATED NFR BLD AUTO: 12.7 % (ref 1.1–10.3)
PLATELETS.RETICULATED NFR BLD AUTO: 5.6 % (ref 1.1–10.3)
PMV BLD AUTO: 11.1 FL (ref 8.1–13.5)
PMV BLD AUTO: 11.3 FL (ref 8.1–13.5)
PMV BLD AUTO: 12.4 FL (ref 8.1–13.5)
PO2 VENOUS: 35.9 MM HG (ref 30–50)
POC ANION GAP: 13 MMOL/L (ref 7–16)
POC ANION GAP: 17 MMOL/L (ref 7–16)
POC ANION GAP: 21 MMOL/L (ref 7–16)
POC CREATININE: 1.6 MG/DL (ref 0.51–1.19)
POC CREATININE: 1.9 MG/DL (ref 0.51–1.19)
POC CREATININE: 2.5 MG/DL (ref 0.51–1.19)
POC HCO3: 11.6 MMOL/L (ref 21–28)
POC HCO3: 12.5 MMOL/L (ref 21–28)
POC HCO3: 12.7 MMOL/L (ref 21–28)
POC HCO3: 13.7 MMOL/L (ref 21–28)
POC HCO3: 14.5 MMOL/L (ref 21–28)
POC HCO3: 15.5 MMOL/L (ref 21–28)
POC HCO3: 17.6 MMOL/L (ref 21–28)
POC HCO3: 19.5 MMOL/L (ref 21–28)
POC HCO3: 19.6 MMOL/L (ref 21–28)
POC HEMOGLOBIN (CALC): 14.5 G/DL (ref 13.5–17.5)
POC HEMOGLOBIN (CALC): 15.7 G/DL (ref 13.5–17.5)
POC HEMOGLOBIN (CALC): 15.8 G/DL (ref 13.5–17.5)
POC LACTIC ACID: 15.2 MMOL/L (ref 0.56–1.39)
POC LACTIC ACID: 3.6 MMOL/L (ref 0.56–1.39)
POC LACTIC ACID: 6.7 MMOL/L (ref 0.56–1.39)
POC LACTIC ACID: 8.9 MMOL/L (ref 0.56–1.39)
POC O2 SATURATION: 90.8 % (ref 94–98)
POC O2 SATURATION: 93.3 % (ref 94–98)
POC O2 SATURATION: 94 % (ref 94–98)
POC O2 SATURATION: 94.7 % (ref 94–98)
POC O2 SATURATION: 95.1 % (ref 94–98)
POC O2 SATURATION: 95.6 % (ref 94–98)
POC O2 SATURATION: 97.5 % (ref 94–98)
POC O2 SATURATION: 98 % (ref 94–98)
POC O2 SATURATION: 99.5 % (ref 94–98)
POC PCO2 TEMP: 35.1 MM HG
POC PCO2 TEMP: 36.5 MM HG
POC PCO2: 30.5 MM HG (ref 35–48)
POC PCO2: 32.1 MM HG (ref 35–48)
POC PCO2: 33 MM HG (ref 35–48)
POC PCO2: 36.4 MM HG (ref 35–48)
POC PCO2: 39.4 MM HG (ref 35–48)
POC PCO2: 41.2 MM HG (ref 35–48)
POC PCO2: 41.6 MM HG (ref 35–48)
POC PCO2: 41.8 MM HG (ref 35–48)
POC PCO2: 42.3 MM HG (ref 35–48)
POC PH TEMP: 7.23
POC PH TEMP: 7.32
POC PH: 7.08 (ref 7.35–7.45)
POC PH: 7.08 (ref 7.35–7.45)
POC PH: 7.15 (ref 7.35–7.45)
POC PH: 7.18 (ref 7.35–7.45)
POC PH: 7.19 (ref 7.35–7.45)
POC PH: 7.21 (ref 7.35–7.45)
POC PH: 7.28 (ref 7.35–7.45)
POC PH: 7.33 (ref 7.35–7.45)
POC PH: 7.39 (ref 7.35–7.45)
POC PO2 TEMP: 65.7 MM HG
POC PO2 TEMP: 69 MM HG
POC PO2: 101.1 MM HG (ref 83–108)
POC PO2: 102.5 MM HG (ref 83–108)
POC PO2: 104.5 MM HG (ref 83–108)
POC PO2: 115.5 MM HG (ref 83–108)
POC PO2: 171.6 MM HG (ref 83–108)
POC PO2: 73.4 MM HG (ref 83–108)
POC PO2: 82.8 MM HG (ref 83–108)
POC PO2: 84.5 MM HG (ref 83–108)
POC PO2: 96.5 MM HG (ref 83–108)
POTASSIUM BLD-SCNC: 2.9 MMOL/L (ref 3.5–4.5)
POTASSIUM BLD-SCNC: 3.9 MMOL/L (ref 3.5–4.5)
POTASSIUM BLD-SCNC: 3.9 MMOL/L (ref 3.5–4.5)
POTASSIUM SERPL-SCNC: 3 MMOL/L (ref 3.7–5.3)
POTASSIUM SERPL-SCNC: 3.3 MMOL/L (ref 3.7–5.3)
POTASSIUM SERPL-SCNC: 3.8 MMOL/L (ref 3.7–5.3)
POTASSIUM SERPL-SCNC: 3.8 MMOL/L (ref 3.7–5.3)
POTASSIUM SERPL-SCNC: 3.9 MMOL/L (ref 3.7–5.3)
POTASSIUM SERPL-SCNC: 4 MMOL/L (ref 3.7–5.3)
POTASSIUM SERPL-SCNC: 4.1 MMOL/L (ref 3.7–5.3)
POTASSIUM SERPL-SCNC: 4.2 MMOL/L (ref 3.7–5.3)
POTASSIUM SERPL-SCNC: 4.3 MMOL/L (ref 3.7–5.3)
POTASSIUM SERPL-SCNC: 4.6 MMOL/L (ref 3.7–5.3)
POTASSIUM SERPL-SCNC: 4.9 MMOL/L (ref 3.7–5.3)
POTASSIUM SERPL-SCNC: 4.9 MMOL/L (ref 3.7–5.3)
PROT SERPL-MCNC: 4.6 G/DL (ref 6.6–8.7)
PROT SERPL-MCNC: 5.1 G/DL (ref 6.6–8.7)
PROT SERPL-MCNC: 5.7 G/DL (ref 6.6–8.7)
PROT SERPL-MCNC: 6.3 G/DL (ref 6.6–8.7)
PROT UR STRIP-MCNC: ABNORMAL MG/DL
PROTHROMBIN TIME: 24.4 SEC (ref 11.7–14.9)
PROTHROMBIN TIME: 33.3 SEC (ref 11.7–14.9)
RBC # BLD AUTO: 3.6 M/UL (ref 4.21–5.77)
RBC # BLD AUTO: 3.95 M/UL (ref 4.21–5.77)
RBC # BLD AUTO: 4.11 M/UL (ref 4.21–5.77)
RBC # BLD AUTO: 4.14 M/UL (ref 4.21–5.77)
RBC # BLD AUTO: 4.3 M/UL (ref 4.21–5.77)
RBC # BLD: ABNORMAL 10*6/UL
RBC #/AREA URNS HPF: ABNORMAL /HPF (ref 0–4)
SALICYLATES SERPL-MCNC: <0.5 MG/DL (ref 0–10)
SAMPLE SITE: ABNORMAL
SODIUM BLD-SCNC: 143 MMOL/L (ref 138–146)
SODIUM BLD-SCNC: 145 MMOL/L (ref 138–146)
SODIUM BLD-SCNC: 145 MMOL/L (ref 138–146)
SODIUM SERPL-SCNC: 128 MMOL/L (ref 136–145)
SODIUM SERPL-SCNC: 130 MMOL/L (ref 136–145)
SODIUM SERPL-SCNC: 132 MMOL/L (ref 136–145)
SODIUM SERPL-SCNC: 132 MMOL/L (ref 136–145)
SODIUM SERPL-SCNC: 135 MMOL/L (ref 136–145)
SODIUM SERPL-SCNC: 137 MMOL/L (ref 136–145)
SODIUM SERPL-SCNC: 138 MMOL/L (ref 136–145)
SODIUM SERPL-SCNC: 140 MMOL/L (ref 136–145)
SODIUM SERPL-SCNC: 141 MMOL/L (ref 136–145)
SODIUM SERPL-SCNC: 141 MMOL/L (ref 136–145)
SODIUM SERPL-SCNC: 142 MMOL/L (ref 136–145)
SODIUM SERPL-SCNC: 142 MMOL/L (ref 136–145)
SP GR UR STRIP: 1.02 (ref 1–1.03)
SPECIMEN DESCRIPTION: NORMAL
SPECIMEN DESCRIPTION: NORMAL
SPECIMEN TYPE: NORMAL
T4 FREE SERPL-MCNC: 1.1 NG/DL (ref 0.92–1.68)
TEST INFORMATION: ABNORMAL
TROPONIN I SERPL HS-MCNC: 1023 NG/L (ref 0–22)
TROPONIN I SERPL HS-MCNC: 1136 NG/L (ref 0–22)
TROPONIN I SERPL HS-MCNC: 1439 NG/L (ref 0–22)
TROPONIN I SERPL HS-MCNC: 1631 NG/L (ref 0–22)
TROPONIN I SERPL HS-MCNC: 164 NG/L (ref 0–22)
TROPONIN I SERPL HS-MCNC: 1775 NG/L (ref 0–22)
TROPONIN I SERPL HS-MCNC: 53 NG/L (ref 0–22)
TROPONIN I SERPL HS-MCNC: 611 NG/L (ref 0–22)
TROPONIN I SERPL HS-MCNC: 670 NG/L (ref 0–22)
TROPONIN I SERPL HS-MCNC: 720 NG/L (ref 0–22)
TROPONIN I SERPL HS-MCNC: 812 NG/L (ref 0–22)
TROPONIN I SERPL HS-MCNC: 823 NG/L (ref 0–22)
TROPONIN I SERPL HS-MCNC: 903 NG/L (ref 0–22)
TSH SERPL DL<=0.05 MIU/L-ACNC: 6.83 UIU/ML (ref 0.27–4.2)
URINE TOTAL PROTEIN: NORMAL MG/DL
UROBILINOGEN UR STRIP-ACNC: NORMAL EU/DL (ref 0–1)
WBC #/AREA URNS HPF: ABNORMAL /HPF (ref 0–5)
WBC OTHER # BLD: 11.8 K/UL (ref 3.5–11.3)
WBC OTHER # BLD: 11.8 K/UL (ref 3.5–11.3)
WBC OTHER # BLD: 15.7 K/UL (ref 3.5–11.3)
WBC OTHER # BLD: 20.5 K/UL (ref 3.5–11.3)
WBC OTHER # BLD: 8.1 K/UL (ref 3.5–11.3)

## 2024-01-01 PROCEDURE — 84075 ASSAY ALKALINE PHOSPHATASE: CPT

## 2024-01-01 PROCEDURE — 2580000003 HC RX 258

## 2024-01-01 PROCEDURE — 2580000003 HC RX 258: Performed by: INTERNAL MEDICINE

## 2024-01-01 PROCEDURE — 5A1945Z RESPIRATORY VENTILATION, 24-96 CONSECUTIVE HOURS: ICD-10-PCS | Performed by: INTERNAL MEDICINE

## 2024-01-01 PROCEDURE — 6370000000 HC RX 637 (ALT 250 FOR IP)

## 2024-01-01 PROCEDURE — 6360000002 HC RX W HCPCS: Performed by: STUDENT IN AN ORGANIZED HEALTH CARE EDUCATION/TRAINING PROGRAM

## 2024-01-01 PROCEDURE — 82436 ASSAY OF URINE CHLORIDE: CPT

## 2024-01-01 PROCEDURE — 94003 VENT MGMT INPAT SUBQ DAY: CPT

## 2024-01-01 PROCEDURE — 6360000002 HC RX W HCPCS

## 2024-01-01 PROCEDURE — 2000000000 HC ICU R&B

## 2024-01-01 PROCEDURE — 95700 EEG CONT REC W/VID EEG TECH: CPT

## 2024-01-01 PROCEDURE — 36556 INSERT NON-TUNNEL CV CATH: CPT

## 2024-01-01 PROCEDURE — 99233 SBSQ HOSP IP/OBS HIGH 50: CPT | Performed by: INTERNAL MEDICINE

## 2024-01-01 PROCEDURE — 94761 N-INVAS EAR/PLS OXIMETRY MLT: CPT

## 2024-01-01 PROCEDURE — 2500000003 HC RX 250 WO HCPCS

## 2024-01-01 PROCEDURE — 93005 ELECTROCARDIOGRAM TRACING: CPT | Performed by: INTERNAL MEDICINE

## 2024-01-01 PROCEDURE — 84100 ASSAY OF PHOSPHORUS: CPT

## 2024-01-01 PROCEDURE — 06HY33Z INSERTION OF INFUSION DEVICE INTO LOWER VEIN, PERCUTANEOUS APPROACH: ICD-10-PCS | Performed by: INTERNAL MEDICINE

## 2024-01-01 PROCEDURE — 71260 CT THORAX DX C+: CPT

## 2024-01-01 PROCEDURE — 6360000002 HC RX W HCPCS: Performed by: INTERNAL MEDICINE

## 2024-01-01 PROCEDURE — 82040 ASSAY OF SERUM ALBUMIN: CPT

## 2024-01-01 PROCEDURE — 82803 BLOOD GASES ANY COMBINATION: CPT

## 2024-01-01 PROCEDURE — 84520 ASSAY OF UREA NITROGEN: CPT

## 2024-01-01 PROCEDURE — 85055 RETICULATED PLATELET ASSAY: CPT

## 2024-01-01 PROCEDURE — 6370000000 HC RX 637 (ALT 250 FOR IP): Performed by: STUDENT IN AN ORGANIZED HEALTH CARE EDUCATION/TRAINING PROGRAM

## 2024-01-01 PROCEDURE — 83605 ASSAY OF LACTIC ACID: CPT

## 2024-01-01 PROCEDURE — 5A1D90Z PERFORMANCE OF URINARY FILTRATION, CONTINUOUS, GREATER THAN 18 HOURS PER DAY: ICD-10-PCS | Performed by: INTERNAL MEDICINE

## 2024-01-01 PROCEDURE — 86334 IMMUNOFIX E-PHORESIS SERUM: CPT

## 2024-01-01 PROCEDURE — 82947 ASSAY GLUCOSE BLOOD QUANT: CPT

## 2024-01-01 PROCEDURE — 36620 INSERTION CATHETER ARTERY: CPT

## 2024-01-01 PROCEDURE — 80051 ELECTROLYTE PANEL: CPT

## 2024-01-01 PROCEDURE — 84155 ASSAY OF PROTEIN SERUM: CPT

## 2024-01-01 PROCEDURE — 85730 THROMBOPLASTIN TIME PARTIAL: CPT

## 2024-01-01 PROCEDURE — 2700000000 HC OXYGEN THERAPY PER DAY

## 2024-01-01 PROCEDURE — 86316 IMMUNOASSAY TUMOR OTHER: CPT

## 2024-01-01 PROCEDURE — 36415 COLL VENOUS BLD VENIPUNCTURE: CPT

## 2024-01-01 PROCEDURE — 99291 CRITICAL CARE FIRST HOUR: CPT | Performed by: INTERNAL MEDICINE

## 2024-01-01 PROCEDURE — 37799 UNLISTED PX VASCULAR SURGERY: CPT

## 2024-01-01 PROCEDURE — 94640 AIRWAY INHALATION TREATMENT: CPT

## 2024-01-01 PROCEDURE — 95720 EEG PHY/QHP EA INCR W/VEEG: CPT | Performed by: PSYCHIATRY & NEUROLOGY

## 2024-01-01 PROCEDURE — 95714 VEEG EA 12-26 HR UNMNTR: CPT

## 2024-01-01 PROCEDURE — 0BH17EZ INSERTION OF ENDOTRACHEAL AIRWAY INTO TRACHEA, VIA NATURAL OR ARTIFICIAL OPENING: ICD-10-PCS | Performed by: INTERNAL MEDICINE

## 2024-01-01 PROCEDURE — 84443 ASSAY THYROID STIM HORMONE: CPT

## 2024-01-01 PROCEDURE — 85025 COMPLETE CBC W/AUTO DIFF WBC: CPT

## 2024-01-01 PROCEDURE — 84484 ASSAY OF TROPONIN QUANT: CPT

## 2024-01-01 PROCEDURE — 74018 RADEX ABDOMEN 1 VIEW: CPT

## 2024-01-01 PROCEDURE — 2500000003 HC RX 250 WO HCPCS: Performed by: STUDENT IN AN ORGANIZED HEALTH CARE EDUCATION/TRAINING PROGRAM

## 2024-01-01 PROCEDURE — 99231 SBSQ HOSP IP/OBS SF/LOW 25: CPT | Performed by: PSYCHIATRY & NEUROLOGY

## 2024-01-01 PROCEDURE — 84439 ASSAY OF FREE THYROXINE: CPT

## 2024-01-01 PROCEDURE — 80307 DRUG TEST PRSMV CHEM ANLYZR: CPT

## 2024-01-01 PROCEDURE — C1769 GUIDE WIRE: HCPCS | Performed by: INTERNAL MEDICINE

## 2024-01-01 PROCEDURE — 83735 ASSAY OF MAGNESIUM: CPT

## 2024-01-01 PROCEDURE — 2500000003 HC RX 250 WO HCPCS: Performed by: INTERNAL MEDICINE

## 2024-01-01 PROCEDURE — 81001 URINALYSIS AUTO W/SCOPE: CPT

## 2024-01-01 PROCEDURE — 86038 ANTINUCLEAR ANTIBODIES: CPT

## 2024-01-01 PROCEDURE — 80048 BASIC METABOLIC PNL TOTAL CA: CPT

## 2024-01-01 PROCEDURE — 84132 ASSAY OF SERUM POTASSIUM: CPT

## 2024-01-01 PROCEDURE — 84156 ASSAY OF PROTEIN URINE: CPT

## 2024-01-01 PROCEDURE — B2111ZZ FLUOROSCOPY OF MULTIPLE CORONARY ARTERIES USING LOW OSMOLAR CONTRAST: ICD-10-PCS | Performed by: INTERNAL MEDICINE

## 2024-01-01 PROCEDURE — 96374 THER/PROPH/DIAG INJ IV PUSH: CPT

## 2024-01-01 PROCEDURE — 82570 ASSAY OF URINE CREATININE: CPT

## 2024-01-01 PROCEDURE — 84166 PROTEIN E-PHORESIS/URINE/CSF: CPT

## 2024-01-01 PROCEDURE — 70450 CT HEAD/BRAIN W/O DYE: CPT

## 2024-01-01 PROCEDURE — 94002 VENT MGMT INPAT INIT DAY: CPT

## 2024-01-01 PROCEDURE — 82330 ASSAY OF CALCIUM: CPT

## 2024-01-01 PROCEDURE — C1894 INTRO/SHEATH, NON-LASER: HCPCS | Performed by: INTERNAL MEDICINE

## 2024-01-01 PROCEDURE — 99221 1ST HOSP IP/OBS SF/LOW 40: CPT | Performed by: PSYCHIATRY & NEUROLOGY

## 2024-01-01 PROCEDURE — 3E033XZ INTRODUCTION OF VASOPRESSOR INTO PERIPHERAL VEIN, PERCUTANEOUS APPROACH: ICD-10-PCS

## 2024-01-01 PROCEDURE — 93005 ELECTROCARDIOGRAM TRACING: CPT | Performed by: EMERGENCY MEDICINE

## 2024-01-01 PROCEDURE — 93458 L HRT ARTERY/VENTRICLE ANGIO: CPT | Performed by: INTERNAL MEDICINE

## 2024-01-01 PROCEDURE — 87086 URINE CULTURE/COLONY COUNT: CPT

## 2024-01-01 PROCEDURE — 74177 CT ABD & PELVIS W/CONTRAST: CPT

## 2024-01-01 PROCEDURE — 80053 COMPREHEN METABOLIC PANEL: CPT

## 2024-01-01 PROCEDURE — 84300 ASSAY OF URINE SODIUM: CPT

## 2024-01-01 PROCEDURE — 83521 IG LIGHT CHAINS FREE EACH: CPT

## 2024-01-01 PROCEDURE — 2580000003 HC RX 258: Performed by: STUDENT IN AN ORGANIZED HEALTH CARE EDUCATION/TRAINING PROGRAM

## 2024-01-01 PROCEDURE — 31500 INSERT EMERGENCY AIRWAY: CPT

## 2024-01-01 PROCEDURE — 93306 TTE W/DOPPLER COMPLETE: CPT | Performed by: INTERNAL MEDICINE

## 2024-01-01 PROCEDURE — 99223 1ST HOSP IP/OBS HIGH 75: CPT | Performed by: INTERNAL MEDICINE

## 2024-01-01 PROCEDURE — 87641 MR-STAPH DNA AMP PROBE: CPT

## 2024-01-01 PROCEDURE — 82247 BILIRUBIN TOTAL: CPT

## 2024-01-01 PROCEDURE — 71045 X-RAY EXAM CHEST 1 VIEW: CPT

## 2024-01-01 PROCEDURE — 86225 DNA ANTIBODY NATIVE: CPT

## 2024-01-01 PROCEDURE — 80143 DRUG ASSAY ACETAMINOPHEN: CPT

## 2024-01-01 PROCEDURE — 82140 ASSAY OF AMMONIA: CPT

## 2024-01-01 PROCEDURE — 76775 US EXAM ABDO BACK WALL LIM: CPT

## 2024-01-01 PROCEDURE — 83880 ASSAY OF NATRIURETIC PEPTIDE: CPT

## 2024-01-01 PROCEDURE — 84450 TRANSFERASE (AST) (SGOT): CPT

## 2024-01-01 PROCEDURE — 6360000004 HC RX CONTRAST MEDICATION: Performed by: STUDENT IN AN ORGANIZED HEALTH CARE EDUCATION/TRAINING PROGRAM

## 2024-01-01 PROCEDURE — 85520 HEPARIN ASSAY: CPT

## 2024-01-01 PROCEDURE — 85014 HEMATOCRIT: CPT

## 2024-01-01 PROCEDURE — 82565 ASSAY OF CREATININE: CPT

## 2024-01-01 PROCEDURE — 90945 DIALYSIS ONE EVALUATION: CPT

## 2024-01-01 PROCEDURE — B2151ZZ FLUOROSCOPY OF LEFT HEART USING LOW OSMOLAR CONTRAST: ICD-10-PCS | Performed by: INTERNAL MEDICINE

## 2024-01-01 PROCEDURE — 85610 PROTHROMBIN TIME: CPT

## 2024-01-01 PROCEDURE — C9113 INJ PANTOPRAZOLE SODIUM, VIA: HCPCS

## 2024-01-01 PROCEDURE — 90945 DIALYSIS ONE EVALUATION: CPT | Performed by: INTERNAL MEDICINE

## 2024-01-01 PROCEDURE — 93306 TTE W/DOPPLER COMPLETE: CPT

## 2024-01-01 PROCEDURE — 2709999900 HC NON-CHARGEABLE SUPPLY: Performed by: INTERNAL MEDICINE

## 2024-01-01 PROCEDURE — 99291 CRITICAL CARE FIRST HOUR: CPT

## 2024-01-01 PROCEDURE — 6360000004 HC RX CONTRAST MEDICATION: Performed by: INTERNAL MEDICINE

## 2024-01-01 PROCEDURE — 80074 ACUTE HEPATITIS PANEL: CPT

## 2024-01-01 PROCEDURE — 87040 BLOOD CULTURE FOR BACTERIA: CPT

## 2024-01-01 PROCEDURE — 5A12012 PERFORMANCE OF CARDIAC OUTPUT, SINGLE, MANUAL: ICD-10-PCS | Performed by: INTERNAL MEDICINE

## 2024-01-01 PROCEDURE — G0480 DRUG TEST DEF 1-7 CLASSES: HCPCS

## 2024-01-01 PROCEDURE — 86160 COMPLEMENT ANTIGEN: CPT

## 2024-01-01 PROCEDURE — 84460 ALANINE AMINO (ALT) (SGPT): CPT

## 2024-01-01 PROCEDURE — 80179 DRUG ASSAY SALICYLATE: CPT

## 2024-01-01 PROCEDURE — 84165 PROTEIN E-PHORESIS SERUM: CPT

## 2024-01-01 PROCEDURE — 4A023N7 MEASUREMENT OF CARDIAC SAMPLING AND PRESSURE, LEFT HEART, PERCUTANEOUS APPROACH: ICD-10-PCS | Performed by: INTERNAL MEDICINE

## 2024-01-01 RX ORDER — ONDANSETRON 2 MG/ML
4 INJECTION INTRAMUSCULAR; INTRAVENOUS EVERY 6 HOURS PRN
Status: DISCONTINUED | OUTPATIENT
Start: 2024-01-01 | End: 2024-01-01 | Stop reason: HOSPADM

## 2024-01-01 RX ORDER — SODIUM CHLORIDE 9 MG/ML
INJECTION, SOLUTION INTRAVENOUS PRN
Status: DISCONTINUED | OUTPATIENT
Start: 2024-01-01 | End: 2024-01-01 | Stop reason: HOSPADM

## 2024-01-01 RX ORDER — ASPIRIN 81 MG/1
81 TABLET ORAL DAILY
Status: DISCONTINUED | OUTPATIENT
Start: 2024-01-01 | End: 2024-01-01

## 2024-01-01 RX ORDER — ONDANSETRON 4 MG/1
4 TABLET, ORALLY DISINTEGRATING ORAL EVERY 8 HOURS PRN
Status: DISCONTINUED | OUTPATIENT
Start: 2024-01-01 | End: 2024-01-01 | Stop reason: HOSPADM

## 2024-01-01 RX ORDER — MAGNESIUM SULFATE IN WATER 40 MG/ML
2000 INJECTION, SOLUTION INTRAVENOUS PRN
Status: DISCONTINUED | OUTPATIENT
Start: 2024-01-01 | End: 2024-01-01 | Stop reason: HOSPADM

## 2024-01-01 RX ORDER — ASPIRIN 81 MG/1
81 TABLET, CHEWABLE ORAL DAILY
Status: DISCONTINUED | OUTPATIENT
Start: 2024-01-01 | End: 2024-01-01

## 2024-01-01 RX ORDER — SODIUM CHLORIDE 0.9 % (FLUSH) 0.9 %
5-40 SYRINGE (ML) INJECTION EVERY 12 HOURS SCHEDULED
Status: DISCONTINUED | OUTPATIENT
Start: 2024-01-01 | End: 2024-01-01 | Stop reason: HOSPADM

## 2024-01-01 RX ORDER — 0.9 % SODIUM CHLORIDE 0.9 %
30 INTRAVENOUS SOLUTION INTRAVENOUS ONCE
Status: DISCONTINUED | OUTPATIENT
Start: 2024-01-01 | End: 2024-01-01

## 2024-01-01 RX ORDER — FENTANYL CITRATE 50 UG/ML
25 INJECTION, SOLUTION INTRAMUSCULAR; INTRAVENOUS
Status: DISCONTINUED | OUTPATIENT
Start: 2024-01-01 | End: 2024-01-01 | Stop reason: HOSPADM

## 2024-01-01 RX ORDER — ASPIRIN 81 MG/1
324 TABLET, CHEWABLE ORAL ONCE
Status: COMPLETED | OUTPATIENT
Start: 2024-01-01 | End: 2024-01-01

## 2024-01-01 RX ORDER — POTASSIUM CHLORIDE 29.8 MG/ML
20 INJECTION INTRAVENOUS PRN
Status: DISCONTINUED | OUTPATIENT
Start: 2024-01-01 | End: 2024-01-01 | Stop reason: HOSPADM

## 2024-01-01 RX ORDER — GLYCOPYRROLATE 0.2 MG/ML
0.2 INJECTION INTRAMUSCULAR; INTRAVENOUS EVERY 4 HOURS PRN
Status: DISCONTINUED | OUTPATIENT
Start: 2024-01-01 | End: 2024-01-01 | Stop reason: HOSPADM

## 2024-01-01 RX ORDER — LORAZEPAM 2 MG/ML
0.5 INJECTION INTRAMUSCULAR
Status: DISCONTINUED | OUTPATIENT
Start: 2024-01-01 | End: 2024-01-01 | Stop reason: HOSPADM

## 2024-01-01 RX ORDER — ACETAMINOPHEN 650 MG/1
650 SUPPOSITORY RECTAL EVERY 6 HOURS PRN
Status: DISCONTINUED | OUTPATIENT
Start: 2024-01-01 | End: 2024-01-01 | Stop reason: HOSPADM

## 2024-01-01 RX ORDER — HEPARIN SODIUM 1000 [USP'U]/ML
4000 INJECTION, SOLUTION INTRAVENOUS; SUBCUTANEOUS PRN
Status: DISCONTINUED | OUTPATIENT
Start: 2024-01-01 | End: 2024-01-01

## 2024-01-01 RX ORDER — POTASSIUM CHLORIDE 7.45 MG/ML
10 INJECTION INTRAVENOUS PRN
Status: DISCONTINUED | OUTPATIENT
Start: 2024-01-01 | End: 2024-01-01 | Stop reason: HOSPADM

## 2024-01-01 RX ORDER — DEXTROSE MONOHYDRATE 100 MG/ML
INJECTION, SOLUTION INTRAVENOUS CONTINUOUS PRN
Status: DISCONTINUED | OUTPATIENT
Start: 2024-01-01 | End: 2024-01-01 | Stop reason: HOSPADM

## 2024-01-01 RX ORDER — SODIUM CHLORIDE 0.9 % (FLUSH) 0.9 %
5-40 SYRINGE (ML) INJECTION EVERY 12 HOURS SCHEDULED
Status: CANCELLED | OUTPATIENT
Start: 2024-01-01

## 2024-01-01 RX ORDER — SODIUM CHLORIDE 0.9 % (FLUSH) 0.9 %
5-40 SYRINGE (ML) INJECTION PRN
Status: DISCONTINUED | OUTPATIENT
Start: 2024-01-01 | End: 2024-01-01 | Stop reason: HOSPADM

## 2024-01-01 RX ORDER — NOREPINEPHRINE BITARTRATE 0.06 MG/ML
1-100 INJECTION, SOLUTION INTRAVENOUS CONTINUOUS
Status: DISCONTINUED | OUTPATIENT
Start: 2024-01-01 | End: 2024-01-01 | Stop reason: HOSPADM

## 2024-01-01 RX ORDER — HEPARIN SODIUM 10000 [USP'U]/100ML
5-30 INJECTION, SOLUTION INTRAVENOUS CONTINUOUS
Status: DISCONTINUED | OUTPATIENT
Start: 2024-01-01 | End: 2024-01-01

## 2024-01-01 RX ORDER — HEPARIN SODIUM 1000 [USP'U]/ML
4000 INJECTION, SOLUTION INTRAVENOUS; SUBCUTANEOUS ONCE
Status: DISCONTINUED | OUTPATIENT
Start: 2024-01-01 | End: 2024-01-01

## 2024-01-01 RX ORDER — POLYETHYLENE GLYCOL 3350 17 G/17G
17 POWDER, FOR SOLUTION ORAL DAILY PRN
Status: DISCONTINUED | OUTPATIENT
Start: 2024-01-01 | End: 2024-01-01 | Stop reason: HOSPADM

## 2024-01-01 RX ORDER — MAGNESIUM SULFATE IN WATER 40 MG/ML
2000 INJECTION, SOLUTION INTRAVENOUS ONCE
Status: COMPLETED | OUTPATIENT
Start: 2024-01-01 | End: 2024-01-01

## 2024-01-01 RX ORDER — SODIUM CHLORIDE 9 MG/ML
INJECTION, SOLUTION INTRAVENOUS PRN
Status: CANCELLED | OUTPATIENT
Start: 2024-01-01

## 2024-01-01 RX ORDER — DEXTROSE MONOHYDRATE 100 MG/ML
INJECTION, SOLUTION INTRAVENOUS CONTINUOUS
Status: DISCONTINUED | OUTPATIENT
Start: 2024-01-01 | End: 2024-01-01 | Stop reason: HOSPADM

## 2024-01-01 RX ORDER — SODIUM CHLORIDE 0.9 % (FLUSH) 0.9 %
5-40 SYRINGE (ML) INJECTION PRN
Status: CANCELLED | OUTPATIENT
Start: 2024-01-01

## 2024-01-01 RX ORDER — HEPARIN SODIUM 1000 [USP'U]/ML
1600 INJECTION, SOLUTION INTRAVENOUS; SUBCUTANEOUS PRN
Status: DISCONTINUED | OUTPATIENT
Start: 2024-01-01 | End: 2024-01-01

## 2024-01-01 RX ORDER — MIDAZOLAM HYDROCHLORIDE 1 MG/ML
1-10 INJECTION, SOLUTION INTRAVENOUS CONTINUOUS
Status: DISCONTINUED | OUTPATIENT
Start: 2024-01-01 | End: 2024-01-01 | Stop reason: HOSPADM

## 2024-01-01 RX ORDER — CALCIUM GLUCONATE 20 MG/ML
2000 INJECTION, SOLUTION INTRAVENOUS PRN
Status: DISCONTINUED | OUTPATIENT
Start: 2024-01-01 | End: 2024-01-01 | Stop reason: HOSPADM

## 2024-01-01 RX ORDER — HEPARIN SODIUM 1000 [USP'U]/ML
4000 INJECTION, SOLUTION INTRAVENOUS; SUBCUTANEOUS ONCE
Status: COMPLETED | OUTPATIENT
Start: 2024-01-01 | End: 2024-01-01

## 2024-01-01 RX ORDER — CHLORHEXIDINE GLUCONATE ORAL RINSE 1.2 MG/ML
15 SOLUTION DENTAL 2 TIMES DAILY
Status: DISCONTINUED | OUTPATIENT
Start: 2024-01-01 | End: 2024-01-01 | Stop reason: HOSPADM

## 2024-01-01 RX ORDER — GLUCAGON 1 MG/ML
1 KIT INJECTION PRN
Status: DISCONTINUED | OUTPATIENT
Start: 2024-01-01 | End: 2024-01-01 | Stop reason: HOSPADM

## 2024-01-01 RX ORDER — ALBUTEROL SULFATE 2.5 MG/3ML
2.5 SOLUTION RESPIRATORY (INHALATION) EVERY 4 HOURS PRN
Status: DISCONTINUED | OUTPATIENT
Start: 2024-01-01 | End: 2024-01-01 | Stop reason: HOSPADM

## 2024-01-01 RX ORDER — HEPARIN SODIUM 1000 [USP'U]/ML
1900 INJECTION, SOLUTION INTRAVENOUS; SUBCUTANEOUS PRN
Status: DISCONTINUED | OUTPATIENT
Start: 2024-01-01 | End: 2024-01-01

## 2024-01-01 RX ORDER — PHENYLEPHRINE HCL IN 0.9% NACL 50MG/250ML
10-300 PLASTIC BAG, INJECTION (ML) INTRAVENOUS CONTINUOUS
Status: CANCELLED | OUTPATIENT
Start: 2024-01-01

## 2024-01-01 RX ORDER — CALCIUM CHLORIDE, MAGNESIUM CHLORIDE, DEXTROSE MONOHYDRATE, LACTIC ACID, SODIUM CHLORIDE, SODIUM BICARBONATE AND POTASSIUM CHLORIDE 5.15; 2.03; 22; 5.4; 6.46; 3.09; .157 G/L; G/L; G/L; G/L; G/L; G/L; G/L
INJECTION INTRAVENOUS CONTINUOUS
Status: DISCONTINUED | OUTPATIENT
Start: 2024-01-01 | End: 2024-01-01 | Stop reason: HOSPADM

## 2024-01-01 RX ORDER — ENOXAPARIN SODIUM 100 MG/ML
40 INJECTION SUBCUTANEOUS DAILY
Status: DISCONTINUED | OUTPATIENT
Start: 2024-01-01 | End: 2024-01-01

## 2024-01-01 RX ORDER — 0.9 % SODIUM CHLORIDE 0.9 %
1000 INTRAVENOUS SOLUTION INTRAVENOUS ONCE
Status: COMPLETED | OUTPATIENT
Start: 2024-01-01 | End: 2024-01-01

## 2024-01-01 RX ORDER — PHENYLEPHRINE HCL IN 0.9% NACL 50MG/250ML
10-300 PLASTIC BAG, INJECTION (ML) INTRAVENOUS CONTINUOUS
Status: DISCONTINUED | OUTPATIENT
Start: 2024-01-01 | End: 2024-01-01 | Stop reason: HOSPADM

## 2024-01-01 RX ORDER — CALCIUM GLUCONATE 20 MG/ML
1000 INJECTION, SOLUTION INTRAVENOUS PRN
Status: DISCONTINUED | OUTPATIENT
Start: 2024-01-01 | End: 2024-01-01 | Stop reason: HOSPADM

## 2024-01-01 RX ORDER — IPRATROPIUM BROMIDE AND ALBUTEROL SULFATE 2.5; .5 MG/3ML; MG/3ML
1 SOLUTION RESPIRATORY (INHALATION) EVERY 6 HOURS
Status: DISCONTINUED | OUTPATIENT
Start: 2024-01-01 | End: 2024-01-01

## 2024-01-01 RX ORDER — MINERAL OIL AND WHITE PETROLATUM 150; 830 MG/G; MG/G
OINTMENT OPHTHALMIC PRN
Status: DISCONTINUED | OUTPATIENT
Start: 2024-01-01 | End: 2024-01-01 | Stop reason: RX

## 2024-01-01 RX ORDER — ACETAMINOPHEN 325 MG/1
650 TABLET ORAL EVERY 6 HOURS PRN
Status: DISCONTINUED | OUTPATIENT
Start: 2024-01-01 | End: 2024-01-01 | Stop reason: HOSPADM

## 2024-01-01 RX ORDER — ASPIRIN 81 MG/1
81 TABLET, CHEWABLE ORAL DAILY
Status: DISCONTINUED | OUTPATIENT
Start: 2024-01-01 | End: 2024-01-01 | Stop reason: HOSPADM

## 2024-01-01 RX ORDER — HEPARIN SODIUM 1000 [USP'U]/ML
2000 INJECTION, SOLUTION INTRAVENOUS; SUBCUTANEOUS PRN
Status: DISCONTINUED | OUTPATIENT
Start: 2024-01-01 | End: 2024-01-01

## 2024-01-01 RX ORDER — SODIUM CHLORIDE 9 MG/ML
INJECTION, SOLUTION INTRAVENOUS CONTINUOUS
Status: DISCONTINUED | OUTPATIENT
Start: 2024-01-01 | End: 2024-01-01

## 2024-01-01 RX ORDER — ACETAMINOPHEN 325 MG/1
650 TABLET ORAL EVERY 4 HOURS PRN
Status: CANCELLED | OUTPATIENT
Start: 2024-01-01

## 2024-01-01 RX ORDER — MORPHINE SULFATE 2 MG/ML
2 INJECTION, SOLUTION INTRAMUSCULAR; INTRAVENOUS
Status: DISCONTINUED | OUTPATIENT
Start: 2024-01-01 | End: 2024-01-01 | Stop reason: HOSPADM

## 2024-01-01 RX ADMIN — CALCIUM CHLORIDE, MAGNESIUM CHLORIDE, DEXTROSE MONOHYDRATE, LACTIC ACID, SODIUM CHLORIDE, SODIUM BICARBONATE AND POTASSIUM CHLORIDE: 5.15; 2.03; 22; 5.4; 6.46; 3.09; .157 INJECTION INTRAVENOUS at 12:52

## 2024-01-01 RX ADMIN — CALCIUM GLUCONATE 1000 MG: 20 INJECTION, SOLUTION INTRAVENOUS at 05:10

## 2024-01-01 RX ADMIN — Medication 50 MCG/HR: at 17:03

## 2024-01-01 RX ADMIN — EPINEPHRINE 20 MCG/MIN: 1 INJECTION INTRAMUSCULAR; INTRAVENOUS; SUBCUTANEOUS at 04:21

## 2024-01-01 RX ADMIN — SODIUM BICARBONATE: 84 INJECTION, SOLUTION INTRAVENOUS at 01:25

## 2024-01-01 RX ADMIN — DEXTROSE MONOHYDRATE 125 ML: 100 INJECTION, SOLUTION INTRAVENOUS at 06:54

## 2024-01-01 RX ADMIN — Medication 0.5 MG/MIN: at 13:55

## 2024-01-01 RX ADMIN — HYPROMELLOSE: 0 GEL OPHTHALMIC at 13:59

## 2024-01-01 RX ADMIN — Medication 0.5 MG/MIN: at 20:34

## 2024-01-01 RX ADMIN — EPINEPHRINE 20 MCG/MIN: 1 INJECTION INTRAMUSCULAR; INTRAVENOUS; SUBCUTANEOUS at 18:27

## 2024-01-01 RX ADMIN — CALCIUM CHLORIDE, MAGNESIUM CHLORIDE, DEXTROSE MONOHYDRATE, LACTIC ACID, SODIUM CHLORIDE, SODIUM BICARBONATE AND POTASSIUM CHLORIDE: 5.15; 2.03; 22; 5.4; 6.46; 3.09; .157 INJECTION INTRAVENOUS at 22:41

## 2024-01-01 RX ADMIN — Medication 100 MCG/MIN: at 07:46

## 2024-01-01 RX ADMIN — IPRATROPIUM BROMIDE AND ALBUTEROL SULFATE 1 DOSE: .5; 3 SOLUTION RESPIRATORY (INHALATION) at 20:36

## 2024-01-01 RX ADMIN — SODIUM CHLORIDE, PRESERVATIVE FREE 10 ML: 5 INJECTION INTRAVENOUS at 09:08

## 2024-01-01 RX ADMIN — Medication 100 MCG/MIN: at 02:23

## 2024-01-01 RX ADMIN — EPINEPHRINE 30 MCG/MIN: 1 INJECTION INTRAMUSCULAR; INTRAVENOUS; SUBCUTANEOUS at 09:14

## 2024-01-01 RX ADMIN — SODIUM CHLORIDE: 9 INJECTION, SOLUTION INTRAVENOUS at 03:19

## 2024-01-01 RX ADMIN — EPINEPHRINE 16 MCG/MIN: 1 INJECTION INTRAMUSCULAR; INTRAVENOUS; SUBCUTANEOUS at 04:30

## 2024-01-01 RX ADMIN — EPINEPHRINE 10 MCG/MIN: 1 INJECTION INTRAMUSCULAR; INTRAVENOUS; SUBCUTANEOUS at 19:18

## 2024-01-01 RX ADMIN — CALCIUM CHLORIDE, MAGNESIUM CHLORIDE, DEXTROSE MONOHYDRATE, LACTIC ACID, SODIUM CHLORIDE, SODIUM BICARBONATE AND POTASSIUM CHLORIDE: 5.15; 2.03; 22; 5.4; 6.46; 3.09; .157 INJECTION INTRAVENOUS at 17:47

## 2024-01-01 RX ADMIN — EPINEPHRINE 20 MCG/MIN: 1 INJECTION INTRAMUSCULAR; INTRAVENOUS; SUBCUTANEOUS at 14:05

## 2024-01-01 RX ADMIN — SODIUM CHLORIDE: 9 INJECTION, SOLUTION INTRAVENOUS at 05:01

## 2024-01-01 RX ADMIN — CALCIUM CHLORIDE, MAGNESIUM CHLORIDE, DEXTROSE MONOHYDRATE, LACTIC ACID, SODIUM CHLORIDE, SODIUM BICARBONATE AND POTASSIUM CHLORIDE: 5.15; 2.03; 22; 5.4; 6.46; 3.09; .157 INJECTION INTRAVENOUS at 08:20

## 2024-01-01 RX ADMIN — Medication 20 MCG/MIN: at 21:46

## 2024-01-01 RX ADMIN — CALCIUM CHLORIDE, MAGNESIUM CHLORIDE, DEXTROSE MONOHYDRATE, LACTIC ACID, SODIUM CHLORIDE, SODIUM BICARBONATE AND POTASSIUM CHLORIDE: 5.15; 2.03; 22; 5.4; 6.46; 3.09; .157 INJECTION INTRAVENOUS at 08:21

## 2024-01-01 RX ADMIN — Medication 45 MCG/MIN: at 13:58

## 2024-01-01 RX ADMIN — SODIUM BICARBONATE: 84 INJECTION, SOLUTION INTRAVENOUS at 23:08

## 2024-01-01 RX ADMIN — CALCIUM CHLORIDE, MAGNESIUM CHLORIDE, DEXTROSE MONOHYDRATE, LACTIC ACID, SODIUM CHLORIDE, SODIUM BICARBONATE AND POTASSIUM CHLORIDE: 5.15; 2.03; 22; 5.4; 6.46; 3.09; .157 INJECTION INTRAVENOUS at 22:59

## 2024-01-01 RX ADMIN — DEXTROSE MONOHYDRATE 125 ML: 100 INJECTION, SOLUTION INTRAVENOUS at 20:17

## 2024-01-01 RX ADMIN — POTASSIUM CHLORIDE 20 MEQ: 29.8 INJECTION, SOLUTION INTRAVENOUS at 03:44

## 2024-01-01 RX ADMIN — IPRATROPIUM BROMIDE AND ALBUTEROL SULFATE 1 DOSE: .5; 3 SOLUTION RESPIRATORY (INHALATION) at 02:05

## 2024-01-01 RX ADMIN — CALCIUM CHLORIDE, MAGNESIUM CHLORIDE, DEXTROSE MONOHYDRATE, LACTIC ACID, SODIUM CHLORIDE, SODIUM BICARBONATE AND POTASSIUM CHLORIDE: 5.15; 2.03; 22; 5.4; 6.46; 3.09; .157 INJECTION INTRAVENOUS at 03:26

## 2024-01-01 RX ADMIN — VASOPRESSIN 0.03 UNITS/MIN: 0.2 INJECTION INTRAVENOUS at 00:37

## 2024-01-01 RX ADMIN — DEXTROSE MONOHYDRATE 125 ML: 100 INJECTION, SOLUTION INTRAVENOUS at 18:06

## 2024-01-01 RX ADMIN — CALCIUM CHLORIDE, MAGNESIUM CHLORIDE, DEXTROSE MONOHYDRATE, LACTIC ACID, SODIUM CHLORIDE, SODIUM BICARBONATE AND POTASSIUM CHLORIDE: 5.15; 2.03; 22; 5.4; 6.46; 3.09; .157 INJECTION INTRAVENOUS at 07:33

## 2024-01-01 RX ADMIN — SODIUM CHLORIDE 2721 ML: 9 INJECTION, SOLUTION INTRAVENOUS at 00:37

## 2024-01-01 RX ADMIN — VASOPRESSIN 0.04 UNITS/MIN: 0.2 INJECTION INTRAVENOUS at 07:56

## 2024-01-01 RX ADMIN — IOPAMIDOL 75 ML: 755 INJECTION, SOLUTION INTRAVENOUS at 00:45

## 2024-01-01 RX ADMIN — SODIUM CHLORIDE 40 MG: 9 INJECTION INTRAMUSCULAR; INTRAVENOUS; SUBCUTANEOUS at 08:02

## 2024-01-01 RX ADMIN — SODIUM BICARBONATE: 84 INJECTION, SOLUTION INTRAVENOUS at 09:07

## 2024-01-01 RX ADMIN — DEXTROSE MONOHYDRATE 125 ML: 100 INJECTION, SOLUTION INTRAVENOUS at 04:13

## 2024-01-01 RX ADMIN — CALCIUM GLUCONATE 1000 MG: 20 INJECTION, SOLUTION INTRAVENOUS at 22:35

## 2024-01-01 RX ADMIN — SODIUM CHLORIDE, PRESERVATIVE FREE 5 ML: 5 INJECTION INTRAVENOUS at 08:39

## 2024-01-01 RX ADMIN — HEPARIN SODIUM 1600 UNITS: 1000 INJECTION INTRAVENOUS; SUBCUTANEOUS at 20:30

## 2024-01-01 RX ADMIN — SODIUM CHLORIDE 1000 ML: 9 INJECTION, SOLUTION INTRAVENOUS at 06:12

## 2024-01-01 RX ADMIN — CALCIUM CHLORIDE, MAGNESIUM CHLORIDE, DEXTROSE MONOHYDRATE, LACTIC ACID, SODIUM CHLORIDE, SODIUM BICARBONATE AND POTASSIUM CHLORIDE: 5.15; 2.03; 22; 5.4; 6.46; 3.09; .157 INJECTION INTRAVENOUS at 12:53

## 2024-01-01 RX ADMIN — DEXTROSE MONOHYDRATE: 100 INJECTION, SOLUTION INTRAVENOUS at 04:30

## 2024-01-01 RX ADMIN — SODIUM CHLORIDE: 9 INJECTION, SOLUTION INTRAVENOUS at 00:07

## 2024-01-01 RX ADMIN — EPINEPHRINE 20 MCG/MIN: 1 INJECTION INTRAMUSCULAR; INTRAVENOUS; SUBCUTANEOUS at 08:24

## 2024-01-01 RX ADMIN — DEXTROSE MONOHYDRATE: 100 INJECTION, SOLUTION INTRAVENOUS at 13:59

## 2024-01-01 RX ADMIN — ASPIRIN 81 MG 81 MG: 81 TABLET ORAL at 08:02

## 2024-01-01 RX ADMIN — CHLORHEXIDINE GLUCONATE 15 ML: 1.2 SOLUTION ORAL at 09:09

## 2024-01-01 RX ADMIN — POTASSIUM CHLORIDE 20 MEQ: 29.8 INJECTION, SOLUTION INTRAVENOUS at 12:46

## 2024-01-01 RX ADMIN — CALCIUM CHLORIDE, MAGNESIUM CHLORIDE, DEXTROSE MONOHYDRATE, LACTIC ACID, SODIUM CHLORIDE, SODIUM BICARBONATE AND POTASSIUM CHLORIDE: 5.15; 2.03; 22; 5.4; 6.46; 3.09; .157 INJECTION INTRAVENOUS at 03:27

## 2024-01-01 RX ADMIN — IPRATROPIUM BROMIDE AND ALBUTEROL SULFATE 1 DOSE: .5; 3 SOLUTION RESPIRATORY (INHALATION) at 15:48

## 2024-01-01 RX ADMIN — SODIUM BICARBONATE: 84 INJECTION, SOLUTION INTRAVENOUS at 08:36

## 2024-01-01 RX ADMIN — EPINEPHRINE 20 MCG/MIN: 1 INJECTION INTRAMUSCULAR; INTRAVENOUS; SUBCUTANEOUS at 23:10

## 2024-01-01 RX ADMIN — SODIUM BICARBONATE 100 MEQ: 84 INJECTION, SOLUTION INTRAVENOUS at 08:31

## 2024-01-01 RX ADMIN — EPINEPHRINE 10 MCG/MIN: 1 INJECTION INTRAMUSCULAR; INTRAVENOUS; SUBCUTANEOUS at 02:05

## 2024-01-01 RX ADMIN — CALCIUM CHLORIDE, MAGNESIUM CHLORIDE, DEXTROSE MONOHYDRATE, LACTIC ACID, SODIUM CHLORIDE, SODIUM BICARBONATE AND POTASSIUM CHLORIDE: 5.15; 2.03; 22; 5.4; 6.46; 3.09; .157 INJECTION INTRAVENOUS at 22:40

## 2024-01-01 RX ADMIN — EPINEPHRINE 20 MCG/MIN: 1 INJECTION INTRAMUSCULAR; INTRAVENOUS; SUBCUTANEOUS at 09:31

## 2024-01-01 RX ADMIN — Medication 2 MCG/MIN: at 04:58

## 2024-01-01 RX ADMIN — IPRATROPIUM BROMIDE AND ALBUTEROL SULFATE 1 DOSE: .5; 3 SOLUTION RESPIRATORY (INHALATION) at 08:44

## 2024-01-01 RX ADMIN — DEXTROSE MONOHYDRATE 125 ML: 100 INJECTION, SOLUTION INTRAVENOUS at 19:57

## 2024-01-01 RX ADMIN — MAGNESIUM SULFATE HEPTAHYDRATE 2000 MG: 40 INJECTION, SOLUTION INTRAVENOUS at 17:31

## 2024-01-01 RX ADMIN — SODIUM BICARBONATE: 84 INJECTION, SOLUTION INTRAVENOUS at 10:26

## 2024-01-01 RX ADMIN — SODIUM CHLORIDE: 9 INJECTION, SOLUTION INTRAVENOUS at 03:22

## 2024-01-01 RX ADMIN — Medication 8 MCG/MIN: at 07:26

## 2024-01-01 RX ADMIN — CALCIUM GLUCONATE 1000 MG: 20 INJECTION, SOLUTION INTRAVENOUS at 10:42

## 2024-01-01 RX ADMIN — CHLORHEXIDINE GLUCONATE 15 ML: 1.2 SOLUTION ORAL at 08:03

## 2024-01-01 RX ADMIN — SODIUM CHLORIDE: 9 INJECTION, SOLUTION INTRAVENOUS at 02:48

## 2024-01-01 RX ADMIN — SODIUM CHLORIDE 1000 ML: 9 INJECTION, SOLUTION INTRAVENOUS at 01:08

## 2024-01-01 RX ADMIN — CALCIUM CHLORIDE, MAGNESIUM CHLORIDE, DEXTROSE MONOHYDRATE, LACTIC ACID, SODIUM CHLORIDE, SODIUM BICARBONATE AND POTASSIUM CHLORIDE: 5.15; 2.03; 22; 5.4; 6.46; 3.09; .157 INJECTION INTRAVENOUS at 03:28

## 2024-01-01 RX ADMIN — HEPARIN SODIUM 11 UNITS/KG/HR: 10000 INJECTION, SOLUTION INTRAVENOUS at 07:29

## 2024-01-01 RX ADMIN — AMIODARONE HYDROCHLORIDE 1 MG/MIN: 50 INJECTION, SOLUTION INTRAVENOUS at 00:13

## 2024-01-01 RX ADMIN — IPRATROPIUM BROMIDE AND ALBUTEROL SULFATE 1 DOSE: .5; 3 SOLUTION RESPIRATORY (INHALATION) at 07:53

## 2024-01-01 RX ADMIN — CALCIUM CHLORIDE, MAGNESIUM CHLORIDE, DEXTROSE MONOHYDRATE, LACTIC ACID, SODIUM CHLORIDE, SODIUM BICARBONATE AND POTASSIUM CHLORIDE: 5.15; 2.03; 22; 5.4; 6.46; 3.09; .157 INJECTION INTRAVENOUS at 07:34

## 2024-01-01 RX ADMIN — Medication 30 MCG/MIN: at 15:46

## 2024-01-01 RX ADMIN — CALCIUM GLUCONATE 1000 MG: 20 INJECTION, SOLUTION INTRAVENOUS at 21:51

## 2024-01-01 RX ADMIN — ASPIRIN 81 MG 324 MG: 81 TABLET ORAL at 03:20

## 2024-01-01 RX ADMIN — Medication 30 MCG/MIN: at 07:29

## 2024-01-01 RX ADMIN — POTASSIUM CHLORIDE 20 MEQ: 29.8 INJECTION, SOLUTION INTRAVENOUS at 05:56

## 2024-01-01 RX ADMIN — CALCIUM CHLORIDE, MAGNESIUM CHLORIDE, DEXTROSE MONOHYDRATE, LACTIC ACID, SODIUM CHLORIDE, SODIUM BICARBONATE AND POTASSIUM CHLORIDE: 5.15; 2.03; 22; 5.4; 6.46; 3.09; .157 INJECTION INTRAVENOUS at 22:42

## 2024-01-01 RX ADMIN — CALCIUM CHLORIDE, MAGNESIUM CHLORIDE, DEXTROSE MONOHYDRATE, LACTIC ACID, SODIUM CHLORIDE, SODIUM BICARBONATE AND POTASSIUM CHLORIDE: 5.15; 2.03; 22; 5.4; 6.46; 3.09; .157 INJECTION INTRAVENOUS at 23:01

## 2024-01-01 RX ADMIN — POTASSIUM CHLORIDE 20 MEQ: 29.8 INJECTION, SOLUTION INTRAVENOUS at 04:54

## 2024-01-01 RX ADMIN — SODIUM BICARBONATE: 84 INJECTION, SOLUTION INTRAVENOUS at 04:37

## 2024-01-01 RX ADMIN — CALCIUM GLUCONATE 1000 MG: 20 INJECTION, SOLUTION INTRAVENOUS at 04:20

## 2024-01-01 RX ADMIN — SODIUM BICARBONATE: 84 INJECTION, SOLUTION INTRAVENOUS at 01:15

## 2024-01-01 RX ADMIN — DEXTROSE MONOHYDRATE 125 ML: 100 INJECTION, SOLUTION INTRAVENOUS at 03:40

## 2024-01-01 RX ADMIN — CALCIUM CHLORIDE, MAGNESIUM CHLORIDE, DEXTROSE MONOHYDRATE, LACTIC ACID, SODIUM CHLORIDE, SODIUM BICARBONATE AND POTASSIUM CHLORIDE: 5.15; 2.03; 22; 5.4; 6.46; 3.09; .157 INJECTION INTRAVENOUS at 08:22

## 2024-01-01 RX ADMIN — CALCIUM CHLORIDE, MAGNESIUM CHLORIDE, DEXTROSE MONOHYDRATE, LACTIC ACID, SODIUM CHLORIDE, SODIUM BICARBONATE AND POTASSIUM CHLORIDE: 5.15; 2.03; 22; 5.4; 6.46; 3.09; .157 INJECTION INTRAVENOUS at 17:46

## 2024-01-01 RX ADMIN — CALCIUM GLUCONATE 1000 MG: 20 INJECTION, SOLUTION INTRAVENOUS at 22:08

## 2024-01-01 RX ADMIN — CALCIUM GLUCONATE 1000 MG: 20 INJECTION, SOLUTION INTRAVENOUS at 17:04

## 2024-01-01 RX ADMIN — CALCIUM GLUCONATE 1000 MG: 20 INJECTION, SOLUTION INTRAVENOUS at 16:22

## 2024-01-01 RX ADMIN — DEXTROSE MONOHYDRATE: 100 INJECTION, SOLUTION INTRAVENOUS at 23:39

## 2024-01-01 RX ADMIN — SODIUM BICARBONATE: 84 INJECTION, SOLUTION INTRAVENOUS at 00:05

## 2024-01-01 RX ADMIN — HEPARIN SODIUM 1900 UNITS: 1000 INJECTION INTRAVENOUS; SUBCUTANEOUS at 20:30

## 2024-01-01 RX ADMIN — HYPROMELLOSE: 0 GEL OPHTHALMIC at 03:31

## 2024-01-01 RX ADMIN — HYPROMELLOSE: 0 GEL OPHTHALMIC at 23:09

## 2024-01-01 RX ADMIN — CALCIUM CHLORIDE, MAGNESIUM CHLORIDE, DEXTROSE MONOHYDRATE, LACTIC ACID, SODIUM CHLORIDE, SODIUM BICARBONATE AND POTASSIUM CHLORIDE: 5.15; 2.03; 22; 5.4; 6.46; 3.09; .157 INJECTION INTRAVENOUS at 23:00

## 2024-01-01 RX ADMIN — SODIUM BICARBONATE: 84 INJECTION, SOLUTION INTRAVENOUS at 17:06

## 2024-01-01 RX ADMIN — Medication 0.5 MG/MIN: at 05:13

## 2024-01-01 RX ADMIN — Medication 0.5 MG/MIN: at 21:49

## 2024-01-01 RX ADMIN — DEXTROSE MONOHYDRATE 125 ML: 100 INJECTION, SOLUTION INTRAVENOUS at 15:51

## 2024-01-01 RX ADMIN — Medication 100 MCG/MIN: at 05:16

## 2024-01-01 RX ADMIN — CALCIUM GLUCONATE 1000 MG: 20 INJECTION, SOLUTION INTRAVENOUS at 11:06

## 2024-01-01 RX ADMIN — IPRATROPIUM BROMIDE AND ALBUTEROL SULFATE 1 DOSE: .5; 3 SOLUTION RESPIRATORY (INHALATION) at 07:50

## 2024-01-01 RX ADMIN — SODIUM BICARBONATE: 84 INJECTION, SOLUTION INTRAVENOUS at 09:49

## 2024-01-01 RX ADMIN — POTASSIUM CHLORIDE 20 MEQ: 29.8 INJECTION, SOLUTION INTRAVENOUS at 11:42

## 2024-01-01 RX ADMIN — Medication 0.5 MG/MIN: at 06:40

## 2024-01-01 RX ADMIN — ASPIRIN 81 MG 81 MG: 81 TABLET ORAL at 08:56

## 2024-01-01 RX ADMIN — CALCIUM CHLORIDE, MAGNESIUM CHLORIDE, DEXTROSE MONOHYDRATE, LACTIC ACID, SODIUM CHLORIDE, SODIUM BICARBONATE AND POTASSIUM CHLORIDE: 5.15; 2.03; 22; 5.4; 6.46; 3.09; .157 INJECTION INTRAVENOUS at 07:32

## 2024-01-01 RX ADMIN — IPRATROPIUM BROMIDE AND ALBUTEROL SULFATE 1 DOSE: .5; 3 SOLUTION RESPIRATORY (INHALATION) at 20:51

## 2024-01-01 RX ADMIN — Medication 35 MCG/MIN: at 08:02

## 2024-01-01 RX ADMIN — Medication 100 MEQ: at 08:31

## 2024-01-01 RX ADMIN — Medication 100 MCG/MIN: at 23:32

## 2024-01-01 RX ADMIN — EPINEPHRINE 16 MCG/MIN: 1 INJECTION INTRAMUSCULAR; INTRAVENOUS; SUBCUTANEOUS at 02:24

## 2024-01-01 RX ADMIN — DEXTROSE MONOHYDRATE 125 ML: 100 INJECTION, SOLUTION INTRAVENOUS at 16:51

## 2024-01-01 RX ADMIN — CALCIUM GLUCONATE 1000 MG: 20 INJECTION, SOLUTION INTRAVENOUS at 03:36

## 2024-01-01 RX ADMIN — HEPARIN SODIUM 4000 UNITS: 1000 INJECTION INTRAVENOUS; SUBCUTANEOUS at 03:04

## 2024-01-01 RX ADMIN — CALCIUM CHLORIDE, MAGNESIUM CHLORIDE, DEXTROSE MONOHYDRATE, LACTIC ACID, SODIUM CHLORIDE, SODIUM BICARBONATE AND POTASSIUM CHLORIDE: 5.15; 2.03; 22; 5.4; 6.46; 3.09; .157 INJECTION INTRAVENOUS at 12:54

## 2024-01-01 RX ADMIN — Medication 2 MG/HR: at 03:17

## 2024-01-01 RX ADMIN — CALCIUM CHLORIDE, MAGNESIUM CHLORIDE, DEXTROSE MONOHYDRATE, LACTIC ACID, SODIUM CHLORIDE, SODIUM BICARBONATE AND POTASSIUM CHLORIDE: 5.15; 2.03; 22; 5.4; 6.46; 3.09; .157 INJECTION INTRAVENOUS at 17:45

## 2024-01-01 RX ADMIN — EPINEPHRINE 19 MCG/MIN: 1 INJECTION INTRAMUSCULAR; INTRAVENOUS; SUBCUTANEOUS at 13:36

## 2024-01-01 RX ADMIN — Medication 30 MCG/MIN: at 07:43

## 2024-01-01 RX ADMIN — SODIUM BICARBONATE: 84 INJECTION, SOLUTION INTRAVENOUS at 17:08

## 2024-01-01 RX ADMIN — EPINEPHRINE 26 MCG/MIN: 1 INJECTION INTRAMUSCULAR; INTRAVENOUS; SUBCUTANEOUS at 06:15

## 2024-01-01 RX ADMIN — Medication 50 MCG/MIN: at 19:32

## 2024-01-01 RX ADMIN — Medication 35 MCG/MIN: at 00:32

## 2024-01-01 RX ADMIN — DEXTROSE MONOHYDRATE 125 ML: 100 INJECTION, SOLUTION INTRAVENOUS at 23:37

## 2024-01-01 RX ADMIN — EPINEPHRINE 10 MCG/MIN: 1 INJECTION INTRAMUSCULAR; INTRAVENOUS; SUBCUTANEOUS at 10:15

## 2024-01-01 RX ADMIN — EPINEPHRINE 15 MCG/MIN: 1 INJECTION INTRAMUSCULAR; INTRAVENOUS; SUBCUTANEOUS at 17:54

## 2024-01-01 RX ADMIN — HEPARIN SODIUM 11 UNITS/KG/HR: 10000 INJECTION, SOLUTION INTRAVENOUS at 03:16

## 2024-01-01 RX ADMIN — SODIUM CHLORIDE 40 MG: 9 INJECTION INTRAMUSCULAR; INTRAVENOUS; SUBCUTANEOUS at 08:33

## 2024-01-01 RX ADMIN — ASPIRIN 81 MG: 81 TABLET, COATED ORAL at 09:09

## 2024-01-01 ASSESSMENT — PULMONARY FUNCTION TESTS
PIF_VALUE: 26
PIF_VALUE: 15
PIF_VALUE: 28
PIF_VALUE: 11
PIF_VALUE: 14
PIF_VALUE: 25
PIF_VALUE: 15
PIF_VALUE: 26
PIF_VALUE: 25
PIF_VALUE: 20
PIF_VALUE: 30
PIF_VALUE: 23
PIF_VALUE: 26
PIF_VALUE: 23
PIF_VALUE: 25
PIF_VALUE: 10
PIF_VALUE: 12
PIF_VALUE: 17
PIF_VALUE: 15
PIF_VALUE: 31

## 2024-01-02 DIAGNOSIS — R12 HEARTBURN: ICD-10-CM

## 2024-01-03 RX ORDER — FAMOTIDINE 20 MG/1
TABLET, FILM COATED ORAL
Qty: 60 TABLET | Refills: 3 | Status: SHIPPED | OUTPATIENT
Start: 2024-01-03

## 2024-01-03 NOTE — TELEPHONE ENCOUNTER
Last visit: 09/26/2023  Last Med refill: 12/06/2023  Does patient have enough medication for 72 hours: No:     Next Visit Date:  Future Appointments   Date Time Provider Department Center   1/29/2024  9:30 AM Jaylene Goodwin MD [facility]   2/22/2024  9:00 AM Jose Eduardo Prado MD [facility]       Health Maintenance   Topic Date Due    Pneumococcal 65+ years Vaccine (1 - PCV) Never done    Respiratory Syncytial Virus (RSV) Pregnant or age 60 yrs+ (1 - 1-dose 60+ series) Never done    DTaP/Tdap/Td vaccine (2 - Td or Tdap) 07/10/2023    COVID-19 Vaccine (2 - 2023-24 season) 09/01/2023    Depression Screen  01/23/2024    Shingles vaccine (1 of 2) 01/23/2024 (Originally 10/25/1996)    Flu vaccine (1) 06/30/2024 (Originally 8/1/2023)    Colorectal Cancer Screen  10/25/2024    Hepatitis C screen  Completed    Hepatitis A vaccine  Aged Out    Hepatitis B vaccine  Aged Out    Hib vaccine  Aged Out    Polio vaccine  Aged Out    Meningococcal (ACWY) vaccine  Aged Out    Diabetic foot exam  Discontinued    A1C test (Diabetic or Prediabetic)  Discontinued    Diabetic Alb to Cr ratio (uACR) test  Discontinued    Lipids  Discontinued       Hemoglobin A1C (%)   Date Value   09/26/2023 5.4   01/23/2023 5.5   01/10/2022 5.6             ( goal A1C is < 7)   No components found for: \"LABMICR\"  LDL Cholesterol (mg/dL)   Date Value   09/30/2022 116   06/17/2021 134 (H)       (goal LDL is <100)   AST (U/L)   Date Value   09/10/2023 14     ALT (U/L)   Date Value   09/10/2023 8     BUN (mg/dL)   Date Value   09/10/2023 14     BP Readings from Last 3 Encounters:   09/26/23 130/74   09/10/23 (!) 172/89   07/14/23 (!) 154/80          (goal 120/80)    All Future Testing planned in [facility]  Lab Frequency Next Occurrence               Patient Active Problem List:     Back pain     Essential hypertension     COPD (chronic obstructive pulmonary disease) (HCC)     Left lower lobe pulmonary nodule     Procedure refused

## 2024-01-29 DIAGNOSIS — F41.9 ANXIETY: ICD-10-CM

## 2024-01-29 RX ORDER — LORAZEPAM 1 MG/1
1 TABLET ORAL EVERY 8 HOURS PRN
Qty: 90 TABLET | Refills: 0 | Status: SHIPPED | OUTPATIENT
Start: 2024-02-01 | End: 2024-03-02

## 2024-01-29 RX ORDER — LORAZEPAM 1 MG/1
TABLET ORAL
Qty: 90 TABLET | OUTPATIENT
Start: 2024-01-29

## 2024-01-29 NOTE — TELEPHONE ENCOUNTER
Last appt: 9/26/2023  Next appt: 2/13/12024    Patient was scheduled this morning but is having transportation issues due to his son working nights.

## 2024-02-13 ENCOUNTER — HOSPITAL ENCOUNTER (OUTPATIENT)
Age: 79
Setting detail: SPECIMEN
Discharge: HOME | End: 2024-02-13

## 2024-02-13 ENCOUNTER — OFFICE VISIT (OUTPATIENT)
Dept: FAMILY MEDICINE CLINIC | Age: 79
End: 2024-02-13
Payer: COMMERCIAL

## 2024-02-13 VITALS
SYSTOLIC BLOOD PRESSURE: 116 MMHG | TEMPERATURE: 97.3 F | WEIGHT: 200 LBS | OXYGEN SATURATION: 97 % | HEART RATE: 79 BPM | BODY MASS INDEX: 27.89 KG/M2 | DIASTOLIC BLOOD PRESSURE: 68 MMHG

## 2024-02-13 DIAGNOSIS — K59.00 CONSTIPATION, UNSPECIFIED CONSTIPATION TYPE: ICD-10-CM

## 2024-02-13 DIAGNOSIS — E78.5 DYSLIPIDEMIA ASSOCIATED WITH TYPE 2 DIABETES MELLITUS (HCC): ICD-10-CM

## 2024-02-13 DIAGNOSIS — I10 ESSENTIAL HYPERTENSION: ICD-10-CM

## 2024-02-13 DIAGNOSIS — J44.9 CHRONIC OBSTRUCTIVE PULMONARY DISEASE, UNSPECIFIED COPD TYPE (HCC): ICD-10-CM

## 2024-02-13 DIAGNOSIS — D69.6 THROMBOCYTOPENIA (HCC): ICD-10-CM

## 2024-02-13 DIAGNOSIS — R35.1 BENIGN PROSTATIC HYPERPLASIA WITH NOCTURIA: ICD-10-CM

## 2024-02-13 DIAGNOSIS — Z12.5 ENCOUNTER FOR SCREENING FOR MALIGNANT NEOPLASM OF PROSTATE: ICD-10-CM

## 2024-02-13 DIAGNOSIS — N40.1 BENIGN PROSTATIC HYPERPLASIA WITH NOCTURIA: ICD-10-CM

## 2024-02-13 DIAGNOSIS — E11.9 DIET-CONTROLLED DIABETES MELLITUS (HCC): Primary | ICD-10-CM

## 2024-02-13 DIAGNOSIS — E11.69 DYSLIPIDEMIA ASSOCIATED WITH TYPE 2 DIABETES MELLITUS (HCC): ICD-10-CM

## 2024-02-13 DIAGNOSIS — Z23 NEED FOR PNEUMOCOCCAL VACCINATION: ICD-10-CM

## 2024-02-13 DIAGNOSIS — R07.9 CHEST PAIN, UNSPECIFIED TYPE: ICD-10-CM

## 2024-02-13 DIAGNOSIS — I48.20 ATRIAL FIBRILLATION, CHRONIC (HCC): ICD-10-CM

## 2024-02-13 LAB
ALBUMIN SERPL-MCNC: 4.4 G/DL (ref 3.5–5.2)
ALBUMIN/GLOB SERPL: 1.3 {RATIO} (ref 1–2.5)
ALP SERPL-CCNC: 149 U/L (ref 40–129)
ALT SERPL-CCNC: 8 U/L (ref 5–41)
ANION GAP SERPL CALCULATED.3IONS-SCNC: 13 MMOL/L (ref 9–17)
AST SERPL-CCNC: 12 U/L
BASOPHILS # BLD: 0.06 K/UL (ref 0–0.2)
BASOPHILS NFR BLD: 1 % (ref 0–2)
BILIRUB SERPL-MCNC: 0.6 MG/DL (ref 0.3–1.2)
BUN SERPL-MCNC: 29 MG/DL (ref 8–23)
CALCIUM SERPL-MCNC: 9.8 MG/DL (ref 8.6–10.4)
CHLORIDE SERPL-SCNC: 105 MMOL/L (ref 98–107)
CHOLEST SERPL-MCNC: 164 MG/DL
CHOLESTEROL/HDL RATIO: 3.3
CO2 SERPL-SCNC: 23 MMOL/L (ref 20–31)
CREAT SERPL-MCNC: 2.1 MG/DL (ref 0.7–1.2)
EOSINOPHIL # BLD: 0.21 K/UL (ref 0–0.44)
EOSINOPHILS RELATIVE PERCENT: 3 % (ref 1–4)
ERYTHROCYTE [DISTWIDTH] IN BLOOD BY AUTOMATED COUNT: 13.9 % (ref 11.8–14.4)
GFR SERPL CREATININE-BSD FRML MDRD: 32 ML/MIN/1.73M2
GLUCOSE SERPL-MCNC: 103 MG/DL (ref 70–99)
HBA1C MFR BLD: 5.6 %
HCT VFR BLD AUTO: 45.1 % (ref 40.7–50.3)
HDLC SERPL-MCNC: 49 MG/DL
HGB BLD-MCNC: 14.7 G/DL (ref 13–17)
IMM GRANULOCYTES # BLD AUTO: <0.03 K/UL (ref 0–0.3)
IMM GRANULOCYTES NFR BLD: 0 %
LDLC SERPL CALC-MCNC: 100 MG/DL (ref 0–130)
LYMPHOCYTES NFR BLD: 1.36 K/UL (ref 1.1–3.7)
LYMPHOCYTES RELATIVE PERCENT: 21 % (ref 24–43)
MCH RBC QN AUTO: 32.5 PG (ref 25.2–33.5)
MCHC RBC AUTO-ENTMCNC: 32.6 G/DL (ref 28.4–34.8)
MCV RBC AUTO: 99.6 FL (ref 82.6–102.9)
MONOCYTES NFR BLD: 0.75 K/UL (ref 0.1–1.2)
MONOCYTES NFR BLD: 11 % (ref 3–12)
NEUTROPHILS NFR BLD: 64 % (ref 36–65)
NEUTS SEG NFR BLD: 4.23 K/UL (ref 1.5–8.1)
NRBC BLD-RTO: 0 PER 100 WBC
PLATELET # BLD AUTO: 214 K/UL (ref 138–453)
PMV BLD AUTO: 11.8 FL (ref 8.1–13.5)
POTASSIUM SERPL-SCNC: 3.9 MMOL/L (ref 3.7–5.3)
PROT SERPL-MCNC: 7.7 G/DL (ref 6.4–8.3)
PSA SERPL-MCNC: 0.62 NG/ML
RBC # BLD AUTO: 4.53 M/UL (ref 4.21–5.77)
SODIUM SERPL-SCNC: 141 MMOL/L (ref 135–144)
TRIGL SERPL-MCNC: 75 MG/DL
WBC OTHER # BLD: 6.6 K/UL (ref 3.5–11.3)

## 2024-02-13 PROCEDURE — 3078F DIAST BP <80 MM HG: CPT | Performed by: INTERNAL MEDICINE

## 2024-02-13 PROCEDURE — 3074F SYST BP LT 130 MM HG: CPT | Performed by: INTERNAL MEDICINE

## 2024-02-13 PROCEDURE — G8484 FLU IMMUNIZE NO ADMIN: HCPCS | Performed by: INTERNAL MEDICINE

## 2024-02-13 PROCEDURE — 3044F HG A1C LEVEL LT 7.0%: CPT | Performed by: INTERNAL MEDICINE

## 2024-02-13 PROCEDURE — 90471 IMMUNIZATION ADMIN: CPT | Performed by: INTERNAL MEDICINE

## 2024-02-13 PROCEDURE — 83036 HEMOGLOBIN GLYCOSYLATED A1C: CPT | Performed by: INTERNAL MEDICINE

## 2024-02-13 PROCEDURE — 3023F SPIROM DOC REV: CPT | Performed by: INTERNAL MEDICINE

## 2024-02-13 PROCEDURE — 1123F ACP DISCUSS/DSCN MKR DOCD: CPT | Performed by: INTERNAL MEDICINE

## 2024-02-13 PROCEDURE — 1036F TOBACCO NON-USER: CPT | Performed by: INTERNAL MEDICINE

## 2024-02-13 PROCEDURE — G8417 CALC BMI ABV UP PARAM F/U: HCPCS | Performed by: INTERNAL MEDICINE

## 2024-02-13 PROCEDURE — 99214 OFFICE O/P EST MOD 30 MIN: CPT | Performed by: INTERNAL MEDICINE

## 2024-02-13 PROCEDURE — G8427 DOCREV CUR MEDS BY ELIG CLIN: HCPCS | Performed by: INTERNAL MEDICINE

## 2024-02-13 PROCEDURE — 90677 PCV20 VACCINE IM: CPT | Performed by: INTERNAL MEDICINE

## 2024-02-13 RX ORDER — LINACLOTIDE 290 UG/1
290 CAPSULE, GELATIN COATED ORAL
Qty: 90 CAPSULE | Refills: 1 | Status: SHIPPED | OUTPATIENT
Start: 2024-02-13

## 2024-02-13 RX ORDER — TAMSULOSIN HYDROCHLORIDE 0.4 MG/1
0.4 CAPSULE ORAL DAILY
Qty: 90 CAPSULE | Refills: 1 | Status: SHIPPED | OUTPATIENT
Start: 2024-02-13

## 2024-02-13 RX ORDER — CANDESARTAN 32 MG/1
32 TABLET ORAL DAILY
Qty: 90 TABLET | Refills: 1 | Status: SHIPPED | OUTPATIENT
Start: 2024-02-13

## 2024-02-13 RX ORDER — DILTIAZEM HYDROCHLORIDE 360 MG/1
360 CAPSULE, EXTENDED RELEASE ORAL DAILY
Qty: 90 CAPSULE | Refills: 1 | Status: SHIPPED | OUTPATIENT
Start: 2024-02-13

## 2024-02-13 RX ORDER — NITROGLYCERIN 0.4 MG/1
TABLET SUBLINGUAL
Qty: 25 TABLET | Refills: 1 | Status: SHIPPED | OUTPATIENT
Start: 2024-02-13

## 2024-02-27 DIAGNOSIS — F41.9 ANXIETY: ICD-10-CM

## 2024-02-27 RX ORDER — LORAZEPAM 1 MG/1
1 TABLET ORAL EVERY 8 HOURS PRN
Qty: 90 TABLET | Refills: 1 | Status: SHIPPED | OUTPATIENT
Start: 2024-02-27 | End: 2024-04-27

## 2024-03-11 ENCOUNTER — TELEPHONE (OUTPATIENT)
Dept: FAMILY MEDICINE CLINIC | Age: 79
End: 2024-03-11

## 2024-03-11 DIAGNOSIS — B37.0 ORAL THRUSH: ICD-10-CM

## 2024-03-13 NOTE — TELEPHONE ENCOUNTER
Informed patient. He stated he is going to talk to his son and see if he can help him with his mychart or if he is able to take him to an appt. States he will let us know.

## 2024-03-13 NOTE — TELEPHONE ENCOUNTER
Patient called back stating that he no longer thinks he has Thrush. He said he's had congestion, sore throat, and shallow breathing for the past week.  He is asking if an antibiotic can be called in.         Please advise.

## 2024-03-14 ENCOUNTER — OFFICE VISIT (OUTPATIENT)
Dept: FAMILY MEDICINE CLINIC | Age: 79
End: 2024-03-14
Payer: COMMERCIAL

## 2024-03-14 VITALS
DIASTOLIC BLOOD PRESSURE: 74 MMHG | TEMPERATURE: 97.8 F | WEIGHT: 211.2 LBS | HEART RATE: 93 BPM | SYSTOLIC BLOOD PRESSURE: 156 MMHG | BODY MASS INDEX: 29.46 KG/M2 | OXYGEN SATURATION: 85 %

## 2024-03-14 DIAGNOSIS — J20.9 ACUTE BRONCHITIS, UNSPECIFIED ORGANISM: Primary | ICD-10-CM

## 2024-03-14 PROCEDURE — 1036F TOBACCO NON-USER: CPT | Performed by: INTERNAL MEDICINE

## 2024-03-14 PROCEDURE — G8427 DOCREV CUR MEDS BY ELIG CLIN: HCPCS | Performed by: INTERNAL MEDICINE

## 2024-03-14 PROCEDURE — 1123F ACP DISCUSS/DSCN MKR DOCD: CPT | Performed by: INTERNAL MEDICINE

## 2024-03-14 PROCEDURE — 99214 OFFICE O/P EST MOD 30 MIN: CPT | Performed by: INTERNAL MEDICINE

## 2024-03-14 PROCEDURE — 3077F SYST BP >= 140 MM HG: CPT | Performed by: INTERNAL MEDICINE

## 2024-03-14 PROCEDURE — G8417 CALC BMI ABV UP PARAM F/U: HCPCS | Performed by: INTERNAL MEDICINE

## 2024-03-14 PROCEDURE — 3079F DIAST BP 80-89 MM HG: CPT | Performed by: INTERNAL MEDICINE

## 2024-03-14 PROCEDURE — G8484 FLU IMMUNIZE NO ADMIN: HCPCS | Performed by: INTERNAL MEDICINE

## 2024-03-14 RX ORDER — DOXYCYCLINE HYCLATE 100 MG
100 TABLET ORAL 2 TIMES DAILY
Qty: 14 TABLET | Refills: 0 | Status: SHIPPED | OUTPATIENT
Start: 2024-03-14 | End: 2024-03-21

## 2024-03-14 RX ORDER — PREDNISONE 10 MG/1
TABLET ORAL
Qty: 30 TABLET | Refills: 0 | Status: SHIPPED | OUTPATIENT
Start: 2024-03-14

## 2024-03-14 ASSESSMENT — ENCOUNTER SYMPTOMS
VISUAL CHANGE: 0
CHEST TIGHTNESS: 0
DIARRHEA: 0
COUGH: 1
WHEEZING: 0
VOMITING: 0
ABDOMINAL PAIN: 0
SHORTNESS OF BREATH: 0
NAUSEA: 0
CHANGE IN BOWEL HABIT: 0
ANAL BLEEDING: 0
SWOLLEN GLANDS: 0
BLOOD IN STOOL: 0
CHOKING: 0
CONSTIPATION: 0
SORE THROAT: 1

## 2024-03-14 ASSESSMENT — VISUAL ACUITY: OU: 1

## 2024-03-14 NOTE — PROGRESS NOTES
ill-appearing.   Eyes:      General: Lids are normal. Vision grossly intact.   Cardiovascular:      Rate and Rhythm: Normal rate and regular rhythm.      Heart sounds: Normal heart sounds, S1 normal and S2 normal. No murmur heard.     No friction rub. No gallop.   Pulmonary:      Effort: Pulmonary effort is normal. No respiratory distress.      Breath sounds: Decreased air movement present. Decreased breath sounds and wheezing present.   Abdominal:      General: Bowel sounds are normal.      Palpations: Abdomen is soft. There is no mass.      Tenderness: There is no abdominal tenderness. There is no guarding.   Musculoskeletal:         General: Normal range of motion.   Skin:     General: Skin is warm and dry.      Capillary Refill: Capillary refill takes less than 2 seconds.   Neurological:      General: No focal deficit present.      Mental Status: He is alert and oriented to person, place, and time.           Data Review       Health Maintenance Due   Topic Date Due    Shingles vaccine (1 of 2) Never done    Respiratory Syncytial Virus (RSV) Pregnant or age 60 yrs+ (1 - 1-dose 60+ series) Never done    DTaP/Tdap/Td vaccine (2 - Td or Tdap) 07/10/2023    COVID-19 Vaccine (2 - 2023-24 season) 09/01/2023           Patient given educational materials- see patient instructions.  Discussed use, benefit, and side effects of prescribedmedications.  All patient questions answered.  Pt voiced understanding. Reviewedhealth maintenance.  Instructed to continue current medications, diet and exercise.Patient agreed with treatment plan. Follow up as directed.     Electronically signedby TESFAYE WINTERS MD on 3/14/2024

## 2024-03-27 ENCOUNTER — TELEPHONE (OUTPATIENT)
Dept: FAMILY MEDICINE CLINIC | Age: 79
End: 2024-03-27

## 2024-03-27 NOTE — TELEPHONE ENCOUNTER
Patient contacted office and stated he is slightly better but still having congestion. He took the medication as prescribed.  He is asking what else he should do/take

## 2024-03-27 NOTE — TELEPHONE ENCOUNTER
Allow the healing process to continue, no additional medication needed unless he has persistent shortness of breath or new symptoms. Recovery time from bronchitis is abotu 6-8 weeks

## 2024-04-23 DIAGNOSIS — R12 HEARTBURN: ICD-10-CM

## 2024-04-23 DIAGNOSIS — F41.9 ANXIETY: ICD-10-CM

## 2024-04-23 RX ORDER — FAMOTIDINE 20 MG/1
TABLET, FILM COATED ORAL
Qty: 60 TABLET | Refills: 3 | Status: SHIPPED | OUTPATIENT
Start: 2024-04-23

## 2024-04-23 RX ORDER — LORAZEPAM 1 MG/1
1 TABLET ORAL EVERY 8 HOURS PRN
Qty: 90 TABLET | Refills: 1 | Status: SHIPPED | OUTPATIENT
Start: 2024-04-23 | End: 2024-06-22

## 2024-05-06 ENCOUNTER — TELEPHONE (OUTPATIENT)
Dept: FAMILY MEDICINE CLINIC | Age: 79
End: 2024-05-06

## 2024-05-06 DIAGNOSIS — J44.9 CHRONIC OBSTRUCTIVE PULMONARY DISEASE, UNSPECIFIED COPD TYPE (HCC): Primary | ICD-10-CM

## 2024-05-06 NOTE — TELEPHONE ENCOUNTER
Patient contacted office asking for new referral to pulmonary. He tried to schedule and they advised he needs a new referral

## 2024-05-10 ENCOUNTER — TELEPHONE (OUTPATIENT)
Dept: PULMONOLOGY | Age: 79
End: 2024-05-10

## 2024-05-10 ENCOUNTER — OFFICE VISIT (OUTPATIENT)
Dept: PULMONOLOGY | Age: 79
End: 2024-05-10
Payer: COMMERCIAL

## 2024-05-10 VITALS
DIASTOLIC BLOOD PRESSURE: 66 MMHG | WEIGHT: 204.6 LBS | RESPIRATION RATE: 20 BRPM | HEART RATE: 52 BPM | BODY MASS INDEX: 28.64 KG/M2 | OXYGEN SATURATION: 95 % | SYSTOLIC BLOOD PRESSURE: 116 MMHG | HEIGHT: 71 IN

## 2024-05-10 DIAGNOSIS — J84.10 PULMONARY EMPHYSEMA WITH FIBROSIS OF LUNG (HCC): ICD-10-CM

## 2024-05-10 DIAGNOSIS — J43.9 PULMONARY EMPHYSEMA WITH FIBROSIS OF LUNG (HCC): ICD-10-CM

## 2024-05-10 DIAGNOSIS — J84.10 PULMONARY EMPHYSEMA WITH FIBROSIS OF LUNG (HCC): Primary | ICD-10-CM

## 2024-05-10 DIAGNOSIS — J43.9 PULMONARY EMPHYSEMA WITH FIBROSIS OF LUNG (HCC): Primary | ICD-10-CM

## 2024-05-10 PROCEDURE — 1036F TOBACCO NON-USER: CPT | Performed by: INTERNAL MEDICINE

## 2024-05-10 PROCEDURE — 3078F DIAST BP <80 MM HG: CPT | Performed by: INTERNAL MEDICINE

## 2024-05-10 PROCEDURE — 3023F SPIROM DOC REV: CPT | Performed by: INTERNAL MEDICINE

## 2024-05-10 PROCEDURE — 99204 OFFICE O/P NEW MOD 45 MIN: CPT | Performed by: INTERNAL MEDICINE

## 2024-05-10 PROCEDURE — G8417 CALC BMI ABV UP PARAM F/U: HCPCS | Performed by: INTERNAL MEDICINE

## 2024-05-10 PROCEDURE — 94375 RESPIRATORY FLOW VOLUME LOOP: CPT | Performed by: INTERNAL MEDICINE

## 2024-05-10 PROCEDURE — 94618 PULMONARY STRESS TESTING: CPT | Performed by: INTERNAL MEDICINE

## 2024-05-10 PROCEDURE — 1123F ACP DISCUSS/DSCN MKR DOCD: CPT | Performed by: INTERNAL MEDICINE

## 2024-05-10 PROCEDURE — G8427 DOCREV CUR MEDS BY ELIG CLIN: HCPCS | Performed by: INTERNAL MEDICINE

## 2024-05-10 PROCEDURE — 3074F SYST BP LT 130 MM HG: CPT | Performed by: INTERNAL MEDICINE

## 2024-05-10 PROCEDURE — 94729 DIFFUSING CAPACITY: CPT | Performed by: INTERNAL MEDICINE

## 2024-05-10 RX ORDER — LEVOFLOXACIN 500 MG/1
500 TABLET, FILM COATED ORAL DAILY
Qty: 10 TABLET | Refills: 0 | Status: SHIPPED | OUTPATIENT
Start: 2024-05-10 | End: 2024-05-20

## 2024-05-10 RX ORDER — TIOTROPIUM BROMIDE AND OLODATEROL 3.124; 2.736 UG/1; UG/1
2 SPRAY, METERED RESPIRATORY (INHALATION) DAILY
COMMUNITY
Start: 2024-04-23

## 2024-05-10 NOTE — TELEPHONE ENCOUNTER
Other Case ID: [id number]      Payer: [employer] Patient's Payer    [phone]   Electronic prior authorization not required for NDC. If requesting a quantity above allowable limits, please submit via other method.   View History

## 2024-05-17 ENCOUNTER — HOSPITAL ENCOUNTER (OUTPATIENT)
Dept: CT IMAGING | Age: 79
End: 2024-05-17
Attending: INTERNAL MEDICINE
Payer: COMMERCIAL

## 2024-05-17 DIAGNOSIS — J84.10 PULMONARY EMPHYSEMA WITH FIBROSIS OF LUNG (HCC): ICD-10-CM

## 2024-05-17 DIAGNOSIS — J43.9 PULMONARY EMPHYSEMA WITH FIBROSIS OF LUNG (HCC): ICD-10-CM

## 2024-05-17 PROCEDURE — 71250 CT THORAX DX C-: CPT

## 2024-05-22 DIAGNOSIS — J44.1 CHRONIC OBSTRUCTIVE PULMONARY DISEASE WITH ACUTE EXACERBATION (HCC): ICD-10-CM

## 2024-05-22 RX ORDER — ALBUTEROL SULFATE 90 UG/1
AEROSOL, METERED RESPIRATORY (INHALATION)
Qty: 18 G | Refills: 5 | Status: SHIPPED | OUTPATIENT
Start: 2024-05-22

## 2024-05-22 NOTE — TELEPHONE ENCOUNTER
Last visit: 3/14/24  Last Med refill: 5/24/23  Does patient have enough medication for 72 hours: No:     Next Visit Date:  Future Appointments   Date Time Provider Department Center   6/19/2024  1:00 PM Jaylene Goodwin MD [facility]   6/28/2024 10:15 AM Preston Beltran MD Resp Spec [facility]   9/5/2024  1:30 PM Jose Eduardo Prado MD [facility]       Health Maintenance   Topic Date Due    Shingles vaccine (1 of 2) Never done    Respiratory Syncytial Virus (RSV) Pregnant or age 60 yrs+ (1 - 1-dose 60+ series) Never done    DTaP/Tdap/Td vaccine (2 - Td or Tdap) 07/10/2023    COVID-19 Vaccine (2 - 2023-24 season) 09/01/2023    Flu vaccine (Season Ended) 08/01/2024    Colorectal Cancer Screen  10/25/2024    Depression Screen  02/13/2025    Pneumococcal 65+ years Vaccine  Completed    Hepatitis C screen  Completed    Hepatitis A vaccine  Aged Out    Hepatitis B vaccine  Aged Out    Hib vaccine  Aged Out    Polio vaccine  Aged Out    Meningococcal (ACWY) vaccine  Aged Out    Diabetic foot exam  Discontinued    A1C test (Diabetic or Prediabetic)  Discontinued    Diabetic Alb to Cr ratio (uACR) test  Discontinued    Lipids  Discontinued       Hemoglobin A1C (%)   Date Value   02/13/2024 5.6   09/26/2023 5.4   01/23/2023 5.5             ( goal A1C is < 7)   No components found for: \"LABMICR\"  No components found for: \"LDLCHOLESTEROL\", \"LDLCALC\"    (goal LDL is <100)   AST (U/L)   Date Value   02/13/2024 12     ALT (U/L)   Date Value   02/13/2024 8     BUN (mg/dL)   Date Value   02/13/2024 29 (H)     BP Readings from Last 3 Encounters:   05/10/24 116/66   03/14/24 (!) 156/74   02/22/24 (!) 146/88          (goal 120/80)    All Future Testing planned in [facility]  Lab Frequency Next Occurrence   6 Minute Walk Test Once 05/10/2024               Patient Active Problem List:     Back pain     Benign essential HTN     COPD (chronic obstructive pulmonary disease) (HCC)     Left lower lobe pulmonary nodule

## 2024-06-13 PROBLEM — N17.9 AKI (ACUTE KIDNEY INJURY) (HCC): Status: ACTIVE | Noted: 2024-01-01

## 2024-06-13 PROBLEM — I21.4 NSTEMI (NON-ST ELEVATED MYOCARDIAL INFARCTION) (HCC): Status: ACTIVE | Noted: 2024-01-01

## 2024-06-13 PROBLEM — I46.9 CARDIAC ARREST (HCC): Status: ACTIVE | Noted: 2024-01-01

## 2024-06-13 PROBLEM — E87.20 METABOLIC ACIDOSIS: Status: ACTIVE | Noted: 2024-01-01

## 2024-06-13 PROBLEM — E87.6 HYPOKALEMIA: Status: ACTIVE | Noted: 2024-01-01

## 2024-06-13 PROBLEM — I95.9 ARTERIAL HYPOTENSION: Status: ACTIVE | Noted: 2024-01-01

## 2024-06-13 NOTE — FLOWSHEET NOTE
Writer and Bree WEST with Dr. Lai called son of patient (Graham) to explain risks vs. Benefits of cardiac cath. Family is aware of increased risk of dialysis but still wants to continue with cardiac cath. All questions answered.    Electronically signed by ZULEIKA LANE RN on 6/13/2024 at 2:34 PM

## 2024-06-13 NOTE — SIGNIFICANT EVENT
I had a long discussion with the patient's family in person, in presence of the RN taking care of the patient. Patient's current clinical condition, laboratory and radiographic findings as well as recommendations of physicians consulted on the case were discussed with the patient's family in detail in simple English. All questions and concerns of the family were addressed, and appropriate emotional support was provided. After understanding patient's current medical condition, family decided that they wanted to continue the ongoing treatment but in case patient's heart stops (cardiac arrest) or the patient stops breathing (respiratory arrest), they do not want any chest compressions, insertion of a breathing tube down patient's throat for respiratory support or other similar  resuscitative measures to be performed on the patient. They requested that if such a condition arises, the patient should be made comfortable and nature should be allowed to take its course. They asked that patient's code status should be changed to DNRCCA (no intubation), and signed the DNRCCA order form in my presence, which was witnessed by RN.     Will honor family's wishes, and will change patient's code status to DNRCCA (no intubation).      Isael Herrera MD  Internal Medicine Resident, PGY-2  [facility]; [place], [place]  6/13/2024, 10:54 AM

## 2024-06-13 NOTE — H&P
hours.  S. Lactic Acid: No results for input(s): \"LACTA\" in the last 72 hours.  Cardiac enzymes:No results for input(s): \"CKTOTAL\", \"CKMB\", \"CKMBINDEX\", \"TROPONINI\" in the last 72 hours.  BNP:No results for input(s): \"BNP\" in the last 72 hours.  Lipid profile: No results for input(s): \"CHOL\", \"TRIG\", \"HDL\", \"LDL\" in the last 72 hours.    Invalid input(s): \"LDLCALC\"  Blood Gases: No results found for: \"PH\", \"PCO2\", \"PO2\", \"HCO3\", \"O2SAT\"  Thyroid functions:   Lab Results   Component Value Date/Time    TSH 6.83 06/13/2024 12:35 AM        Urinalysis:     Microbiology:  Cultures during this admission:     Blood cultures:                 [] None drawn      [] Negative             []  Positive (Details:  )  Urine Culture:                   [] None drawn      [] Negative             []  Positive (Details:  )  Sputum Culture:               [] None drawn       [] Negative             []  Positive (Details:  )   Endotracheal aspirate:     [] None drawn       [] Negative             []  Positive (Details:  )        Assessment and Plan     Patient Active Problem List   Diagnosis    Cardiac arrest (HCC)         Additional assessment:  NSTEMI      Plan:  Neuro:  Unresponsive  Not sedated    Resp:  Intubated   Vent settings: PRVC 100% FiO2, RR 16, Vt 570, PEEP 5  Hx COPD  Bilateral pleural effusions    Vent Information  Ventilator ID: Critz  Ventilator Safety Check Performed Pre-Use: Yes  Ventilator Initiate: Yes  Vent Mode: AC/PRVC    CV:  Cardiac arrest  Hx Afib on eliquis at home  Hx pacemaker  NSTEMI  Given ASA, started on Heparin gtt, Trend troponin, Hypothermia protocol  Cardiology consulted  F/u Echo    GI/Nutrition:  NPO  OG    /Fluids/Electrolytes:  NS @125  Cr 1.8, Baseline seems to be 1.5-2 on chart review    Heme:  Hgb 13    ID:  WBC 8.1  F/u cultures    Prophylaxis:  DVT: Hep gtt  GI: glycolax    Dispo:  Admit to ICU for further management        Mary Carbajal, DO   Critical Care Resident  Intensive Care

## 2024-06-13 NOTE — PROCEDURES
PROCEDURE NOTE  Date: 6/12/2024   Name: Isrrael Unk Xxsaylsvitlana  YOB: 1879    Intubation    Date/Time: 6/12/2024 11:40 PM    Performed by: Ba Roberts DO  Authorized by: Aguila Carlson DO    Consent:     Consent obtained:  Emergent situation  Pre-procedure details:     Indications: cardio/pulmonary arrest      Patient status:  Unresponsive    Look externally: no concerns      Obstruction: none      Neck mobility: normal      Pharmacologic strategy: RSI      Induction agents:  None    Paralytics:  None  Procedure details:     Preoxygenation:  Supraglottic device    CPR in progress: yes      Number of attempts:  1  Successful intubation attempt details:     Intubation method:  Oral    Intubation technique: video assisted      Laryngoscope blade:  Hypercurved    Bougie used: no      Grade view: III      Tube size (mm):  8.0    Tube type:  Cuffed    Tube visualized through cords: yes    Placement assessment:     ETT at teeth/gumline (cm):  23    Tube secured with:  ETT harman    Breath sounds:  Equal    Placement verification: colorimetric ETCO2, direct visualization and waveform ETCO2    Post-procedure details:     Procedure completion:  Tolerated

## 2024-06-13 NOTE — CONSENT
Patient was discussed with Nephrology and they cleared the patient for left heart cath. Discussed with jarrett Stevenson and Kartik in the presence of RN Bree and Kal. They agreed to proceed with left heart cath and balloon pump.They also agreed to suspend the DNR-CCA during the procedure.      Shayy Robles MD.  Cardiovascular Fellow,   [facility], [place], [place].

## 2024-06-13 NOTE — ED NOTES
Epi given per verbal order   Working Diabetes Report     Modifier Changed to 3 months   Pt Already has Diabetic labs Scheduled     Dunia PALMER LPN Care Coordinator  Care Coordination Department  [facility]  [phone]

## 2024-06-13 NOTE — PLAN OF CARE
Problem: Discharge Planning  Goal: Discharge to home or other facility with appropriate resources  6/13/2024 1313 by Kal Gupta RN  Outcome: Progressing  6/13/2024 0610 by Shonda Gaines RN  Outcome: Progressing     Problem: Pain  Goal: Verbalizes/displays adequate comfort level or baseline comfort level  6/13/2024 1313 by Kal Gupta RN  Outcome: Progressing  6/13/2024 0610 by Shonda Gaines RN  Outcome: Progressing     Problem: Skin/Tissue Integrity  Goal: Absence of new skin breakdown  Description: 1.  Monitor for areas of redness and/or skin breakdown  2.  Assess vascular access sites hourly  3.  Every 4-6 hours minimum:  Change oxygen saturation probe site  4.  Every 4-6 hours:  If on nasal continuous positive airway pressure, respiratory therapy assess nares and determine need for appliance change or resting period.  6/13/2024 1313 by Kal uGpta RN  Outcome: Progressing  6/13/2024 0610 by Shonda Gaines RN  Outcome: Progressing     Problem: Safety - Adult  Goal: Free from fall injury  6/13/2024 1313 by Kal Gupta RN  Outcome: Progressing  6/13/2024 0610 by Shonda Gaines RN  Outcome: Progressing     Problem: ABCDS Injury Assessment  Goal: Absence of physical injury  6/13/2024 1313 by Kal Gupta RN  Outcome: Progressing  6/13/2024 0610 by Shonda Gaines RN  Outcome: Progressing

## 2024-06-13 NOTE — ED PROVIDER NOTES
[facility]     Emergency Department     Faculty Attestation    I performed a history and physical examination of the patient and discussed management with the resident. I have reviewed and agree with the resident’s findings including all diagnostic interpretations, and treatment plans as written. Any areas of disagreement are noted on the chart. I was personally present for the key portions of any procedures. I have documented in the chart those procedures where I was not present during the key portions. I have reviewed the emergency nurses triage note. I agree with the chief complaint, past medical history, past surgical history, allergies, medications, social and family history as documented unless otherwise noted below. Documentation of the HPI, Physical Exam and Medical Decision Making performed by mireya is based on my personal performance of the HPI, PE and MDM. For Physician Assistant/ Nurse Practitioner cases/documentation I have personally evaluated this patient and have completed at least one if not all key elements of the E/M (history, physical exam, and MDM). Additional findings are as noted.    Note Started: 11:56 PM EDT     79 yo M post arrest, epi / amio / defib per ems, I gel per ems,   Pulse obtained with epi, I gel removed /// patient intubated with VL  -Epi drip, amnio drip,     interventional cardiology sent twelve-lead, request cooling,   L femoral Quattro cooling cath placed per sterile technique     EKG Interpretation    Interpreted by me      CRITICAL CARE: There was a high probability of clinically significant/life threatening deterioration in this patient's condition which required my urgent intervention.  Total critical care time was 30 minutes.  This excludes any time for separately reportable procedures.       Aguila Domingo DO  06/13/24 0006       Aguila Carlson DO  06/13/24 0715    
    Financial Resource Strain: Not on file   Food Insecurity: Not on file   Transportation Needs: Not on file   Physical Activity: Not on file   Stress: Not on file   Social Connections: Not on file   Intimate Partner Violence: Not on file   Housing Stability: Not on file       No family history on file.    Allergies:  Patient has no allergy information on record.    Home Medications:  Prior to Admission medications    Not on File       REVIEW OF SYSTEMS       Review of Systems   Unable to perform ROS: Acuity of condition       PHYSICAL EXAM      INITIAL VITALS:   BP 99/63   Pulse 66   Temp 97.5 °F (36.4 °C)   Resp 20   Wt 90.7 kg (200 lb)   SpO2 96%     Physical Exam  Vitals reviewed.   Constitutional:       General: He is in acute distress.      Appearance: He is ill-appearing and toxic-appearing.      Comments: Ill, toxic appearing, active CPR ongoing   HENT:      Head: Normocephalic and atraumatic.      Right Ear: External ear normal.      Left Ear: External ear normal.      Nose: Nose normal.      Mouth/Throat:      Comments: I gel in place  Eyes:      Comments: Pupils 2 mm and nonreactive bilaterally   Cardiovascular:      Comments: CPR in progress via Chinedu device.  Abrasion over chest wall from Chinedu device  Pulmonary:      Comments: Being bagged via Igel  Abdominal:      General: There is no distension.      Palpations: Abdomen is soft.      Tenderness: There is no abdominal tenderness.   Musculoskeletal:      Comments: Extremities appear atraumatic.  IO in left tibia   Skin:     General: Skin is dry.   Neurological:      Comments: GCS 3           DDX/DIAGNOSTIC RESULTS / EMERGENCY DEPARTMENT COURSE / MDM     Medical Decision Making  DDx: Cardiac arrest, ACS, arrhythmia, electro abnormality, dehydration, PE, intracranial abnormality, other    78-year-old male presents with EMS for cardiac arrest.  Reportedly had a cardiac arrest at home, approximate downtime of 5 minutes.  EMS was called, Corrine,

## 2024-06-13 NOTE — CARE COORDINATION
Case Management Assessment  Initial Evaluation    Date/Time of Evaluation: 6/13/2024 2:38 PM  Assessment Completed by: Lizbeth Yadav    If patient is discharged prior to next notation, then this note serves as note for discharge by case management.    Patient Name: Dami Franklin                   YOB: 1945  Diagnosis: Cardiac arrest (HCC) [I46.9]                   Date / Time: 6/12/2024 11:25 PM    Patient Admission Status: Inpatient   Readmission Risk (Low < 19, Mod (19-27), High > 27): Readmission Risk Score: 11.4    Current PCP: Jaylene Goodwin MD  PCP verified by CM? (P) Yes    Chart Reviewed: Yes      History Provided by: Child/Family (Graham)  Patient Orientation: Sedated, Other (see comment) (Intubated/sedated)    Patient Cognition: Other (see comment) (Intubated/sedated)    Hospitalization in the last 30 days (Readmission):  No    If yes, Readmission Assessment in  Navigator will be completed.    Advance Directives:      Code Status: DNR-CCA   Patient's Primary Decision Maker is: (P) Legal Next of Kin      Discharge Planning:    Patient lives with: (P) Children (Lives with Dami) Type of Home: (P) House  Primary Care Giver: (P) Self  Patient Support Systems include: (P) Children   Current Financial resources: (P) Other (Comment) ([facility]  Current community resources:    Current services prior to admission: (P) None            Current DME:              Type of Home Care services:  (P) None    ADLS  Prior functional level: (P) Independent in ADLs/IADLs  Current functional level: (P)  (Intubated/Sedated)    PT AM-PAC:   /24  OT AM-PAC:   /24    Family can provide assistance at DC:    Would you like Case Management to discuss the discharge plan with any other family members/significant others, and if so, who? (P) Yes (Children Graham and Dami)  Plans to Return to Present Housing: (P) Unknown at present  Other Identified Issues/Barriers to RETURNING to current housing: Currently

## 2024-06-13 NOTE — ED NOTES
Pt arrived without ROSC  Unwitnessed cardiac arrest   CPR-v-fib prior to arrival  Shock x 4  300 amio given by EMS  2mg versed given by EMS  Etomidate given by EMS  Vec given by EMS

## 2024-06-13 NOTE — PROCEDURES
[facility]        Date:   6/13/2024  Patient name: Dami Franklin  Date of admission:  6/12/2024 11:25 PM  MRN:   [id number]  YOB: 1945    CARDIAC CATHETERIZATION    Operators:  Primary: Laury Portillo MD.  Assistant:     Indications for cath: Cardiac arrest, NSTEMI    Procedure performed: Cardiac cath.    Access: Left Radial artery      Procedure: After informed consent was obtained with explanation of the risks and benefits, patient was brought to the cath lab. The Left wrist was prepped and draped in sterile fashion. 1% lidocaine was used for local block. The Radial artery was cannulated with 6  Fr sheath with brisk arterial blood return. The side port was frequently flushed and aspirated with normal saline.    EBL is 5 mL    Dominance is right    Findings:    Left main: Normal with 0% stenosis.    LAD: 40% mid stenosis    LCX: Normal with 0% stenosis.    RCA: Normal with 0% stenosis.    The LV gram was performed in the SANCHEZ 30 position.   LVEF: 20%. LV Wall Motion: Severe global hypokinesis.    Conclusions:  Minimal CAD  Severely reduced LV systolic function  Nonischemic cardiomyopathy    Recommendation:  Medical treatments.  Risk factors modifications.  Intra-aortic balloon pump was deferred due to the presence of previous AAA repair with stenting extending into the iliac artery        Electronically signed by Laury Portillo MD on 6/13/2024 at 4:04 PM      [facility]  [phone]

## 2024-06-14 PROBLEM — Z99.2 ENCOUNTER FOR CONTINUOUS VENOVENOUS HEMODIAFILTRATION (CVVHD) (HCC): Status: ACTIVE | Noted: 2024-01-01

## 2024-06-14 NOTE — PROCEDURES
PROCEDURE NOTE - GABRIELA CENTRAL VENOUS LINE PLACEMENT    PATIENT NAME: Dami Franklin  MEDICAL RECORD NO. [id number]  DATE: 6/14/2024  ATTENDING PHYSICIAN:     PREOPERATIVE DIAGNOSIS:  Dialysis access.   POSTOPERATIVE DIAGNOSIS:  Same  PROCEDURE PERFORMED:  Right Femoral Vein Central Line Insertion  PERFORMING PHYSICIAN: Sotero Trevizo MD  ANESTHESIA:  Local utilizing 1% lidocaine  ESTIMATED BLOOD LOSS:  Less than 25 ml  COMPLICATIONS:  None immediately appreciated.     DISCUSSION:  Dami Franklin is a 78 y.o.-year-old male who requires central IV access for Dialysis. The history and physical examination were reviewed and confirmed.      CONSENT: Unable to be obtained due to the emergent nature of this procedure.     PROCEDURE:  A timeout was initiated by the bedside nurse and was confirmed by those present.  The patient was placed in a supine position. The skin overlying the Right Femoral Vein was prepped with chlorhexadine and draped in sterile fashion. The skin was infiltrated with local anesthetic. The vessel and surrounding anatomy was visualized using ultrasound. Through the anesthetized region, the introducer needle was inserted into the femoral vein returning dark red non pulsatile blood. A guidewire was placed through the center of the needle with no resistance. Ultrasound confirmed presence of wire in the vein. A small incision made in the skin with a #11 scalpel blade. The dilator was inserted into the skin and vein over guidewire using Seldinger technique. The dilator was then removed and second dilator was passed and then afterwards, 14F 15cm catheter was placed in the vein over the guidewire using Seldinger technique. The guidewire was then removed and all ports aspirated and flushed appropriately. The catheter then secured using silk suture and a temporary sterile dressing was applied.  No immediate complication was evident.  All sponge, instrument and needle counts were correct at the

## 2024-06-14 NOTE — PLAN OF CARE
Problem: Skin/Tissue Integrity  Goal: Absence of new skin breakdown  Description: 1.  Monitor for areas of redness and/or skin breakdown  2.  Assess vascular access sites hourly  3.  Every 4-6 hours minimum:  Change oxygen saturation probe site  4.  Every 4-6 hours:  If on nasal continuous positive airway pressure, respiratory therapy assess nares and determine need for appliance change or resting period.  6/14/2024 1121 by Frank Lam, RN  Outcome: Progressing     Problem: Safety - Adult  Goal: Free from fall injury  6/14/2024 1121 by Frank Lam RN  Outcome: Progressing     Problem: ABCDS Injury Assessment  Goal: Absence of physical injury  6/14/2024 1121 by Frank Lam RN  Outcome: Progressing     Problem: Nutrition Deficit:  Goal: Optimize nutritional status  Outcome: Progressing     Problem: Discharge Planning  Goal: Discharge to home or other facility with appropriate resources  6/14/2024 1121 by Frank Lam, RN  Outcome: Not Progressing  6/13/2024 2357 by Angela Marte RN  Outcome: Progressing     Problem: Pain  Goal: Verbalizes/displays adequate comfort level or baseline comfort level  6/14/2024 1121 by Frank Lam RN  Outcome: Not Progressing  6/13/2024 2357 by Angela Marte RN  Outcome: Progressing

## 2024-06-14 NOTE — PLAN OF CARE
Problem: Discharge Planning  Goal: Discharge to home or other facility with appropriate resources  6/13/2024 2357 by Angela Marte RN  Outcome: Progressing     Problem: Pain  Goal: Verbalizes/displays adequate comfort level or baseline comfort level  6/13/2024 2357 by Angela Marte RN  Outcome: Progressing     Problem: Skin/Tissue Integrity  Goal: Absence of new skin breakdown  Description: 1.  Monitor for areas of redness and/or skin breakdown  2.  Assess vascular access sites hourly  3.  Every 4-6 hours minimum:  Change oxygen saturation probe site  4.  Every 4-6 hours:  If on nasal continuous positive airway pressure, respiratory therapy assess nares and determine need for appliance change or resting period.  6/13/2024 2357 by Angela Marte RN  Outcome: Progressing     Problem: Safety - Adult  Goal: Free from fall injury  6/13/2024 2357 by Angela Marte RN  Outcome: Progressing  Flowsheets (Taken 6/13/2024 2355)  Free From Fall Injury: Instruct family/caregiver on patient safety     Problem: ABCDS Injury Assessment  Goal: Absence of physical injury  6/13/2024 2357 by Angela Marte RN  Outcome: Progressing  Flowsheets (Taken 6/13/2024 2355)  Absence of Physical Injury: Implement safety measures based on patient assessment

## 2024-06-15 NOTE — PLAN OF CARE
Problem: Skin/Tissue Integrity  Goal: Absence of new skin breakdown  Description: 1.  Monitor for areas of redness and/or skin breakdown  2.  Assess vascular access sites hourly  3.  Every 4-6 hours minimum:  Change oxygen saturation probe site  4.  Every 4-6 hours:  If on nasal continuous positive airway pressure, respiratory therapy assess nares and determine need for appliance change or resting period.  6/15/2024 1812 by Frank Lam, RN  Outcome: Progressing     Problem: Safety - Adult  Goal: Free from fall injury  Outcome: Progressing     Problem: ABCDS Injury Assessment  Goal: Absence of physical injury  Outcome: Progressing     Problem: Nutrition Deficit:  Goal: Optimize nutritional status  Outcome: Progressing     Problem: Respiratory - Adult  Goal: Achieves optimal ventilation and oxygenation  6/15/2024 1812 by Frank Lam, RN  Outcome: Progressing     Problem: Discharge Planning  Goal: Discharge to home or other facility with appropriate resources  Outcome: Not Progressing     Problem: Pain  Goal: Verbalizes/displays adequate comfort level or baseline comfort level  Outcome: Not Progressing

## 2024-06-15 NOTE — PROCEDURES
PROCEDURE NOTE  Date: 6/15/2024   Name: Dami Franklin  YOB: 1945    Procedures          Referring physician: Dr. Beltran  Date: 6/15/2024  Start Time:6/14/2024 @ 1355  End Time: 6/15/2024 @ 1355    Indication  Patient with encephalopathy, EEG done to rule out subclinical seizures.       Introduction  This continuous video-EEG was acquired using a [employer] workstation at 256 samples/s. Electrodes were placed according to the International 10-20 system. Automated spike and seizure detection algorithms were applied. Video was recorded during this study.    Description  The back ground consistent of minimally reactive monomorphic theta at 6 Hz.  No consistent focal slowing or interhemispheric asymmetry was noted. Normal sleep structures were not observed. There were no interictal epileptiform discharges or electrographic seizures.    Events  No events reported or recorded.       Impression  Abnormal continuous vEEG recording, the slowing mentioned above suggests moderate non specific encephalopathy.  Slightly more continuous.     No epileptiform discharges were identified.  Please note the absence of   such activity in this record cannot conclusively rule out an epileptic   disorder.  If such is still clinically suspected, a repeat study with   prolonged sampling may be helpful.    Andrea Valladares MD  Epilepsy Board Certified.  Neurology Board Certified.    Electronically Signed

## 2024-06-15 NOTE — PLAN OF CARE
Problem: Discharge Planning  Goal: Discharge to home or other facility with appropriate resources  6/15/2024 0041 by Angela Marte RN  Outcome: Progressing  Flowsheets (Taken 6/14/2024 2000)  Discharge to home or other facility with appropriate resources: Identify barriers to discharge with patient and caregiver     Problem: Pain  Goal: Verbalizes/displays adequate comfort level or baseline comfort level  6/15/2024 0041 by Angela Marte RN  Outcome: Progressing     Problem: Skin/Tissue Integrity  Goal: Absence of new skin breakdown  Description: 1.  Monitor for areas of redness and/or skin breakdown  2.  Assess vascular access sites hourly  3.  Every 4-6 hours minimum:  Change oxygen saturation probe site  4.  Every 4-6 hours:  If on nasal continuous positive airway pressure, respiratory therapy assess nares and determine need for appliance change or resting period.  6/15/2024 0041 by Angela Marte RN  Outcome: Progressing     Problem: Safety - Adult  Goal: Free from fall injury  6/15/2024 0041 by Angela Marte RN  Outcome: Progressing     Problem: Discharge Planning  Goal: Discharge to home or other facility with appropriate resources  6/15/2024 0041 by Angela Marte RN  Outcome: Progressing  Flowsheets (Taken 6/14/2024 2000)  Discharge to home or other facility with appropriate resources: Identify barriers to discharge with patient and caregiver  6/14/2024 1121 by Frank Lam RN  Outcome: Not Progressing     Problem: Pain  Goal: Verbalizes/displays adequate comfort level or baseline comfort level  6/15/2024 0041 by Angela Marte RN  Outcome: Progressing  6/14/2024 1121 by Frank Lam RN  Outcome: Not Progressing

## 2024-06-15 NOTE — PLAN OF CARE
Problem: Skin/Tissue Integrity  Goal: Absence of new skin breakdown  Description: 1.  Monitor for areas of redness and/or skin breakdown  2.  Assess vascular access sites hourly  3.  Every 4-6 hours minimum:  Change oxygen saturation probe site  4.  Every 4-6 hours:  If on nasal continuous positive airway pressure, respiratory therapy assess nares and determine need for appliance change or resting period.  6/15/2024 0849 by María Clemente, RCEMILI  Outcome: Progressing     Problem: Respiratory - Adult  Goal: Achieves optimal ventilation and oxygenation  Outcome: Progressing

## 2024-06-15 NOTE — CONSULTS
Nutrition Note    Consulted for Tube Feedings.  Pt remains intubated and on pressors.  RN reports pt on CRRT.  Suggest starting of Renal TF at 20 mL/hr with advancement to goal 45 mL/hr.     Electronically signed by Elizabeth Fish RD, LD on 6/14/24 at 2:43 PM EDT    Contact: [phone] / [phone]  
Renal Consult Note    Patient :  Dami Franklin; 78 y.o. MRN# [id number]  Location:  3008/3008-01  Attending:  Preston Beltran MD  Admit Date:  6/12/2024   Hospital Day: 0    Reason for Consult:     Asked by Preston Garner MD to see for CLIFTON/Elevated Creatinine and will be requiring cardiac catheterization    History Obtained From:     Electronic medical record, patient's nurse.    History of Present Illness:     Dami Franklin; 78 y.o. male with past medical history of Hypertension, CKD 3B likely due to nephrosclerosis and baseline creatinine seems to be around 1.7-2.1 mg/DL as per available lab results, COPD, A-fib, history of PPM, history of abdominal aortic aneurysm repair presented to the hospital with the chief complaint of unresponsiveness.  From history it was reported the patient's son was talking to him in the next room and then realized that the patient stopped responding and hence EMS was called.  Son reported patient having some shortness of breath and coughing since last couple of weeks.  Upon EMS arrival he was found to be in V-fib cardiac arrest required CPR and then rearrested and route just prior to arrival to the ED and was found to be in PEA cardiac arrest.  Patient has been initiated on hypothermia protocol.  Cardiology on board.  BMP results from admission showed sodium 140, potassium 4.0, chloride 106, bicarb 17, calcium 8.2, BUN 14, creatinine 1.8 mg/dl.  Creatinine today morning was 2.2 mg/dl.  Urine tox was positive for benzodiazepine.  Patient did receive CT chest abdomen pelvis with IV contrast on 6/13/2024 which was negative for PE-or complete report please see radiology section in the chart.  Patient is currently on sodium bicarbonate 150 mEq at 75 cc an hour.  Cardiology is recommending getting cardiac catheterization done.  Nephrology is consulted due to elevated creatinine and cardiac cath clearance.    Past History/Allergies?Social History:     Past Medical History:   Diagnosis Date    
[facility]    Consult               Today's Date: 6/13/2024  Patient Name: Dami Franklin  Date of admission: 6/12/2024 11:25 PM  Patient's age: 78 y.o., 1945  Admission Dx: Cardiac arrest (HCC) [I46.9]    Requesting Physician: Preston Beltran MD    Cardiac Evaluation Reason: Elevated troponins    History Obtained From: patient and chart review     History of Present Illness:    This patient 78 y.o. years old with past medical history given below.  Patient was brought in to the hospital as cardiac arrest.  Apparently patient was at home with his son when he passed out.  Approximate downtime before CPR was started was about 5 minutes.  On arrival of the EMS patient was in V-fib cardiac arrest and started CPR with multiple shocks and IV amiodarone 300 and  150 mg.  ROSC was achieved after 20 minutes and arrested again en route to the hospital.  Patient was shocked again and small IV amiodarone was given.  ROSC was achieved again 10 minutes and patient was started on epinephrine drip.  Patient also had few episodes of bradycardia's.  EKG was sent to interventional cardiology and no emergent intervention was recommended.  EKG is paced rhythm.  Cardiology consulted for elevated troponins.  On my evaluation patient is intubated, currently in paced rhythm, on IV heparin, IV amiodarone and pressor support with levo 02 and epi 16.    Past Medical History:   has no past medical history on file.    Past Surgical History:   has no past surgical history on file.     Home Medications:    Prior to Admission medications    Not on File       Allergies:  Patient has no allergy information on record.    Social History:        Family History: family history is not on file.     REVIEW OF SYSTEMS:    Review of systems cannot be obtained as patient is intubated    PHYSICAL EXAM:      BP (!) 150/96   Pulse 67   Temp (!) 91.4 °F (33 °C)   Resp 22   Wt 90.7 kg (200 lb)   SpO2 95%    Constitutional and General 
[facility]  Inpatient Cardiology Consult             Date:   6/15/2024  Patient name: Dami Franklin  Date of admission:  6/12/2024 11:25 PM  MRN:   [id number]  YOB: 1945      Reason for consultation:  s/p VF arrest    CHIEF COMPLAINT:  Collapsed at home     History Obtained From:   Patient and medical record    HISTORY OF PRESENT ILLNESS:      The patient is a 78 y.o gentleman with h/o COPD, HTN, HLP, non-occlusive CAD, CM, chronic AF and PPM, who was admitted after out-of-hospital VF arrest on 9/12/24.      The patient had been home with his son who was close by when patient was heard to collapse and was found on the floor unresponsive and pulseless.  CPR was initiated and EMS was activated, arriving to find him in VF requiring multiple shocks and two amiodarone boluses before ROSC was obtained after 20 minutes.  Pt required one additional shock en route to the hospital, with brief PEA noted.  Immediate cardiac catheterization showed non-occlusive CAD with LVEF 20% with global LV hypokinesis.  He has been stable on IV amiodarone and pressors and has just completed the hypothermic protocol, with monitor showing AF with demand ventricular pacing.  Review of [employer] evaluation of the Marjorie XT DR pacemaker ( revised in May 2023) showed initiation of VF with a PVC just after 2230 on 6/12/24.  The RV lead showed stable, elevated pacing RV pacing threshold with intermittent oversensing of noise artifact, corrected with reprogramming of the sensitivity threshold.    Past Medical History:   has a past medical history of A-fib ([facility]), COPD (chronic obstructive pulmonary disease) ([facility]), and Hypertension.    Past Surgical History:   has a past surgical history that includes Abdominal aortic aneurysm repair and pacemaker placement.     Home Medications:    Prior to Admission medications    Not on File       Allergies:  Gabapentin, Pcn [penicillins], Statins, and 
time    Medical management per MICU and cardiology    We will continue to follow along.     For any changes in exam or patient status please contact Neuro Critical Care.      CARMITA Solo - CNP  Neuro [facility] Care  Pager [phone]  6/13/2024     11:26 AM

## 2024-06-16 NOTE — SIGNIFICANT EVENT
I had a long discussion with the patient's family in person, in presence of the RN taking care of the patient. Patient's current clinical condition, laboratory and radiographic findings as well as recommendations of physicians consulted on the case were discussed with the patient's family in detail. All questions and concerns of family were addressed, and appropriate emotional support was provided. After understanding patient's current medical condition, family decided that did not want any lab draws or treatments aimed at prolonging the life of patient, at the cost of patient's overall comfort. They expressed their wishes to make the patient comfortable, stop all lab draws and not to do any resuscitative procedures on the patient. They requested patient's code status to be changed to DNRCC, and signed the DNRCC order form in my presence, which was witnessed by RNJacquelyn. Will honor family's wishes, and will change patient's code status to DNRCC.        Keven De Leon MD, M.D.  Department of Internal Medicine,  [facility], [place] ([place])             6/16/2024, 10:02 AM

## 2024-06-16 NOTE — PROGRESS NOTES
Critical Care Team - Daily Progress Note      Date and time: 6/14/2024 8:56 AM  Patient's name:  Dami Franklin  Medical Record Number: [id number]  Patient's account/billing number: [id number]  Patient's YOB: 1945  Age: 78 y.o.  Date of Admission: 6/12/2024 11:25 PM  Length of stay during current admission: 1      Primary Care Physician: Jaylene Goodwin MD  ICU Attending Physician:      Code Status: DNR-CCA    Reason for ICU admission: Cardiac Arrest      SUBJECTIVE:       Patient seen and examined at bedside.  Chart and labs reviewed.  No acute events reported overnight.  Patient underwent cardiac cath yesterday, nonobstructive CAD.  No significant intervention performed.  However patient had low ejection fraction 20 to 30%.  Started on CRRT last evening.  he continues to be intubated and sedated.  Opens eyes.  Nonpurposeful movements.  Upward gaze.  He is on 30 mcg/min of levo and 20 mcg/min of epi.  Amiodarone running 8.5 and heparin drip still continuing.  PRVC/16/570/8/100   7.1 5/40/100/14  NPO.  On CRRT . Running bicarb 200cc.   Off antibiotics . Wbc improving  Hb stable.   Glucose on lower side.   On heparin drip for dvt prophylaxis.    Plan for today:  Tube feeds  Protonix  Heparin drip to sc    History Of Present Illness:   History was obtained from child and chart review.       Dami Franklin is a 78 y.o. past medical history of COPD, A-fib bradycardia, pacemaker on Eliquis, hypertension, diabetes.  Son reports that he was talking to him and was in the next room when he realized that he was not responding, reports this could be up to 5 minutes that he was unresponsive before he realized.  Son also reports that he has been reporting shortness of breath and coughing for the last couple of weeks, there had been discussion with his doctors about possibly putting him on oxygen.  EMS found the patient to be in V-fib, patient was resuscitated and ROSC was achieved after about 20 minutes, 
   Critical Care Team - Daily Progress Note      Date and time: 6/15/2024 7:29 AM  Patient's name:  Dami Franklin  Medical Record Number: [id number]  Patient's account/billing number: [id number]  Patient's YOB: 1945  Age: 78 y.o.  Date of Admission: 6/12/2024 11:25 PM  Length of stay during current admission: 2      Primary Care Physician: Jaylene Goodwin MD  ICU Attending Physician:      Code Status: DNR-CCA    Reason for ICU admission: Cardiac Arrest      SUBJECTIVE:     History Of Present Illness:   History was obtained from child and chart review.       Dami Franklin is a 78 y.o. past medical history of COPD, A-fib bradycardia, pacemaker on Eliquis, hypertension, diabetes.  Son reports that he was talking to him and was in the next room when he realized that he was not responding, reports this could be up to 5 minutes that he was unresponsive before he realized.  Son also reports that he has been reporting shortness of breath and coughing for the last couple of weeks, there had been discussion with his doctors about possibly putting him on oxygen.  EMS found the patient to be in V-fib, patient was resuscitated and ROSC was achieved after about 20 minutes, patient rearrested en route just prior to arrival to the ED and ROSC was achieved and around 10 minutes, during this episode the patient was found to be in PEA.     Interventional cardiology recommended patient undergo hypothermia protocol for V-fib arrest.  Troponins increasing from 53 to 164.     6/13= patient admitted after prolonged cardiac arrest to medical ICU.  Patient was evaluated by cardiology, recommended the patient undergo cardiac cath due to elevated uptrending troponins and due to the patient being on a paced rhythm.  Cardiac cath negative.  Also had elevated uptrending creatinine and BUN and significant acidosis and patient was started on dialysis after cardiac cath.  CRRT.  Neurocritical care evaluated the patient 
   Daily Progress Note  Neuro Critical Care    Patient Name: Dami Franklin  Patient : 1945  Room/Bed: 3008/3008-01  Code Status: Full  Allergies:   Allergies   Allergen Reactions    Gabapentin      \"Took 3 nights and felt awful\"    Pcn [Penicillins] Swelling    Statins      Leg pains, tried pravachol, crestor, lipitor, simvastatin     Lisinopril-Hydrochlorothiazide Rash and Cough       CHIEF COMPLAINT:      Cardiac arrest      INTERVAL HISTORY    The patient is a 78 y.o. male with a history of A-fib (on Eliquis), type II diabetes mellitus, hypertension, COPD, who presented to the emergency department following a witnessed cardiac arrest at home.  Per medical record patient's son was talking to him when the patient stopped responding, patient's son called EMS on their arrival patient was found to be in V-fib, CPR was started at that time, patient was defibrillated and ROSC was achieved after approximately 20 minutes of resuscitative efforts.  And route to the emergency department patient reportedly rearrested, PEA rhythm, with additional 10 minutes of CPR prior to ROSC.  Patient was then admitted to the medical ICU and started on targeted temperature management.  Neurocritical care was consulted for neuro prognostication.     Overnight events  EEG findings suggestive of severe nonspecific encephalopathy, no seizure activity    CURRENT MEDICATIONS:  SCHEDULED MEDICATIONS:   pantoprazole (PROTONIX) 40 mg in sodium chloride (PF) 0.9 % 10 mL injection  40 mg IntraVENous Daily    aspirin  81 mg Orogastric Daily    sodium chloride flush  5-40 mL IntraVENous 2 times per day    ipratropium 0.5 mg-albuterol 2.5 mg  1 Dose Inhalation Q6H    chlorhexidine  15 mL Mouth/Throat BID     CONTINUOUS INFUSIONS:   dextrose      sodium chloride 10 mL/hr at 24 1221    midazolam Stopped (24 0817)    EPINEPHrine 5 mg in sodium chloride 0.9 % 250 mL infusion 19 mcg/min (24 1221)    heparin (PORCINE) Infusion 11 
   Daily Progress Note  Neuro Critical Care    Patient Name: Dami Franklin  Patient : 1945  Room/Bed: 3008/3008-01  Code Status: Full  Allergies:   Allergies   Allergen Reactions    Gabapentin      \"Took 3 nights and felt awful\"    Pcn [Penicillins] Swelling    Statins      Leg pains, tried pravachol, crestor, lipitor, simvastatin     Lisinopril-Hydrochlorothiazide Rash and Cough       CHIEF COMPLAINT:      Cardiac arrest      INTERVAL HISTORY    The patient is a 78 y.o. male with a history of A-fib (on Eliquis), type II diabetes mellitus, hypertension, COPD, who presented to the emergency department following a witnessed cardiac arrest at home.  Per medical record patient's son was talking to him when the patient stopped responding, patient's son called EMS on their arrival patient was found to be in V-fib, CPR was started at that time, patient was defibrillated and ROSC was achieved after approximately 20 minutes of resuscitative efforts.  And route to the emergency department patient reportedly rearrested, PEA rhythm, with additional 10 minutes of CPR prior to ROSC.  Patient was then admitted to the medical ICU and started on targeted temperature management.  Neurocritical care was consulted for neuro prognostication.     Overnight events  EEG findings suggestive of severe nonspecific encephalopathy, no seizure activity    CURRENT MEDICATIONS:  SCHEDULED MEDICATIONS:   pantoprazole (PROTONIX) 40 mg in sodium chloride (PF) 0.9 % 10 mL injection  40 mg IntraVENous Daily    aspirin  81 mg Orogastric Daily    sodium chloride flush  5-40 mL IntraVENous 2 times per day    ipratropium 0.5 mg-albuterol 2.5 mg  1 Dose Inhalation Q6H    chlorhexidine  15 mL Mouth/Throat BID     CONTINUOUS INFUSIONS:   dextrose 50 mL/hr at 06/15/24 06    dextrose      sodium chloride Stopped (06/15/24 0510)    midazolam Stopped (24 0817)    EPINEPHrine 5 mg in sodium chloride 0.9 % 250 mL infusion 10 mcg/min (06/15/24 06) 
  [facility] [place]    PATIENT NAME: Isrrael Lyle Xxylsvitlana    Shift date: 06/12/2024  Shift day: Wednesday   Shift # 2    Room # 12/12   Name: Dami Franklin 1945         Age: 78 y.o.  Gender: male          Caodaism: Pentecostalism   Place of Mandaeism:     Trauma/Incident type:  Ed Emergency Cardiac Arrest  Admit Date & Time: 6/12/2024 11:25 PM  TRAUMA NAME: cherry    ADVANCE DIRECTIVES IN CHART?  No    NAME OF DECISION MAKER: N/A    RELATIONSHIP OF DECISION MAKER TO PATIENT: N/A    PATIENT/EVENT DESCRIPTION:  Isrrael Britton is a 78 y.o. male who arrived via [facility] as a cardiac arrest. From scene. Pt to be admitted to 12/12.         SPIRITUAL ASSESSMENT-INTERVENTION-OUTCOME:   provided a supportive presence to staff.  spoke with EMS, gathered patient information, and communicated to appropriate departments.  met patient's son Graham in ED private waiting room. Son appeared anxious and coping.  provided a supportive presence allowing space for feelings and emotions.  informed medical staff of family arrival.     PATIENT BELONGINGS:  Writer did not handle patient belongings    ANY BELONGINGS OF SIGNIFICANT VALUE NOTED:  N/A    REGISTRATION STAFF NOTIFIED?  Yes      WHAT IS YOUR SPIRITUAL CARE PLAN FOR THIS PATIENT?:   Chaplains will remain available to offer spiritual and emotional support as needed.    Electronically signed by Chaplain Lisa, on 6/13/2024 at 12:11 AM.  [facility]  [phone]    
  Critical care team - Resident sign-out to medicine service      Date and time: 6/13/2024 3:20 PM  Patient's name:  Dami Franklin  Medical Record Number: [id number]  Patient's account/billing number: [id number]  Patient's YOB: 1945  Age: 78 y.o.  Date of Admission: 6/12/2024 11:25 PM  Length of stay during current admission: 0    Primary Care Physician: Jaylene Goodwin MD    Code Status: DNR-CCA    Mode of physician to physician communication:        [] Via telephone   [] In person     Date and time of sign-out: 6/13/2024 3:20 PM    Accepting Internal Medicine resident: Dr. macedo    Accepting Medicine team: IM Team 1    Accepting team's attending: Dr. romero    Patient's current ICU Bed:  3008     Patient's assigned bed on floor:  1020        [] Med-Surg Monitored [] Step-down       [] Psychiatry ICU       [] Psych floor     Reason for ICU admission:     Cardiac arrest    ICU course summary:     78-year-old male with past medical history of COPD, A-fib on Eliquis, hypertension, diabetes, bradycardia s/p pacemaker presented to the ED after a V-fib cardiac arrest.  Per send the patient was found to be unresponsive and EMS was called who found the patient to be in V-fib, patient was resuscitated and ROSC was achieved after about 20 minutes.  Patient rearrested en route just prior to arrival to the ED and ROSC was achieved in around 10 minutes.    On arrival, patient was found to have CLIFTON with creatinine of 2.0, anion gap 17,Troponin 64, proBNP 29,000, WBC 8.1, hemoglobin 13, platelet 138.  Initial INR was 2.3.  Initial ABG showed pH of 7.006, pCO2 54, pO2 of 35, bicarb 17.  Patient was admitted to medical ICU for V-fib cardiac arrest.    During his stay in the ICU, patient's troponin went from 164-1775.  Echo was done showing reduced EF of 29% with global hypokinesis.  Cardiology was consulted recommended cardiac cath with balloon pump placement.  Nephrology was also consulted for cardiac cath.  
Assessment: Patient was intubated and unresponsive when  visited. Family was present at the time. Per family, patient was raised Alevism and a [facility] [employer]. Patient received sacrament of anointing of the sick at family request.   Intervention:  provided ministry of presence, offered support and prayed with family at patient's bedside.  Outcome: Family expressed gratitude for the anointing and blessing they received.   Plan: Follow up visits recommended for ongoing assessment of patient's condition and for more spiritual and emotional support.   
Comprehensive Nutrition Assessment    Type and Reason for Visit:  Initial, Positive Nutrition Screen    Nutrition Recommendations/Plan:   If TF is desired, recommend to start Peptide-based formula (Vital AF) at 35 ml/hr and advance by 10 ml/hr every 4 hours to goal rate of 75 ml/hr.      Malnutrition Assessment:  Malnutrition Status:  Insufficient data (06/13/24 1530)    Context:  Acute Illness     Findings of the 6 clinical characteristics of malnutrition:  Energy Intake:  Unable to assess  Weight Loss:  Unable to assess     Body Fat Loss:  Unable to assess     Muscle Mass Loss:  Unable to assess    Fluid Accumulation:  No significant fluid accumulation     Strength:  Not Performed    Nutrition Assessment:    Pt admitted with Cardiac Arrest with Hx DM, COPD, HTN and CMP.  Pt currently intubated and sedated with OGT in place.  No Propofol is running at time of visit, though Pt is receiving D5 in IVF currently providing an additional 98 kcal/day total.  No weight or diet history available.  Pt would benefit from enteral nutrition support when stable.  Current weight is estimated, so that estimated nutritional needs may need to be recalculated when accurate weight is obtained.    Nutrition Related Findings:    Meds/Labs reviewed.  K+ 3.3 Wound Type: None       Current Nutrition Intake & Therapies:    Average Meal Intake: NPO  Average Supplements Intake: NPO  Diet NPO  Additional Calorie Sources:  D5 Amiodarone @ 16.7 ml/hr = 68 kcal/day.  D5 Heparin @ 7.3 ml/hr = 30 kcal/day.  Total = 98 kcal/day    Anthropometric Measures:  Height: 175.3 cm (5' 9.02\")  Ideal Body Weight (IBW): 160 lbs (73 kg)       Current Body Weight: 90.7 kg (199 lb 15.3 oz), 125 % IBW. Weight Source: Other (Comment) (estimated)  Current BMI (kg/m2): 29.5     BMI Categories: Overweight (BMI 25.0-29.9)    Estimated Daily Nutrient Needs:  Energy Requirements Based On: Kcal/kg  Weight Used for Energy Requirements: Current  Energy (kcal/day): 
Date: 6/12/2024  Time: 2322  Patient identity confirmed:  Yes  Indications: cardiac arrest  Preoxygenation: yes    Laryngoscope size and type Glidescope  Airway introducer used: No  Evac: No  ETT size:an 8.0 cuffed  Number of attempts:1   Cords visualized:  [x] Clearly  [] Poorly  Breath sounds present bilaterally: Yes   ETCO2   [x] Positive   ETT secured at  24 teeth    ETT secured with maite  Chest x-ray ordered: Yes     Difficult airway:    No       If yes, was red tape placed around ETT:   No    Was this a Code Situation:    No       If yes, was the rescue pod used:    No      BP: (!) 121/101        Procedure performed by: Dr. Kimberlyn Sanchez RCP  11:51 PM                  
Insert Arterial Line  Date/Time:  06/13/24, 3:01 AM  Performed by: Anju Varner RCP    Patient identity confirmed: arm band and provided demographic data   Time out: Immediately prior to procedure a \"time out\" was called to verify the correct patient, procedure, equipment, support staff.    Preparation: Patient was prepped and draped in the usual sterile fashion.    Location:right radial    Sukhi's test normal: yes  Needle gauge: 20     Number of attempts: 1  Post-procedure: transparent dressing applied and line secured    Patient tolerance: well  
[facility]   Progress Note                   Date:   6/14/2024  Patient name: Dami Franklin  Date of admission:  6/12/2024 11:25 PM  MRN:   [id number]  YOB: 1945  PCP: Jaylene Goodwin MD    Reason for Admission: cardiac arrest    Subjective:       Clinical Changes / Abnormalities:seen and examined. S/p LHC yesterday. On epi and levo. On CRRT.       Medications:   Scheduled Meds:   pantoprazole (PROTONIX) 40 mg in sodium chloride (PF) 0.9 % 10 mL injection  40 mg IntraVENous Daily    aspirin  81 mg Orogastric Daily    sodium chloride flush  5-40 mL IntraVENous 2 times per day    ipratropium 0.5 mg-albuterol 2.5 mg  1 Dose Inhalation Q6H    chlorhexidine  15 mL Mouth/Throat BID     Continuous Infusions:   dextrose      sodium chloride Stopped (06/14/24 0654)    midazolam Stopped (06/13/24 0817)    EPINEPHrine 5 mg in sodium chloride 0.9 % 250 mL infusion 20 mcg/min (06/14/24 0824)    heparin (PORCINE) Infusion 11 Units/kg/hr (06/14/24 0729)    norepinephrine 30 mcg/min (06/14/24 0729)    sodium bicarbonate 150 mEq in sterile water 1,000 mL infusion 200 mL/hr at 06/14/24 1026    fentaNYL      prismaSol BGK 2/3.5 1,500 mL/hr at 06/14/24 0822    amiodarone 0.5 mg/min (06/14/24 0700)     CBC:   Recent Labs     06/12/24 2342 06/13/24  0613 06/14/24  0547   WBC 8.1 20.5* 15.7*   HGB 13.0 13.4 12.9*   PLT See Reflexed IPF Result 192 159     BMP:    Recent Labs     06/14/24  0312 06/14/24  0547 06/14/24  0805 06/14/24  1017    137  --  138   K 4.1 4.1 4.1 4.1    102  --  100   CO2 12* 14*  --  15*   BUN 20 20  --  19   CREATININE 2.2* 2.1*  --  2.0*   GLUCOSE 60* 100*  --  79     Hepatic:   Recent Labs     06/12/24  2342 06/14/24  0547   AST 43 658*   ALT 15 288*   BILITOT 0.7 2.0*   ALKPHOS 154* 149*     Troponin: No results for input(s): \"TROPONINI\" in the last 72 hours.  BNP: No results for input(s): \"BNP\" in the last 72 hours.  Lipids: No results for input(s): \"CHOL\", \"HDL\" 
Patient admitted on Mechanical Ventilator Protocol. Patients height measured at 69 for an IBW 70.7    Patient placed on the ventilator on settings as charted on flowsheeet.         Ventilator Bronchodilator assessment    Breath sounds: clear/ diminished  Inspiratory Pressure: 26  Plateau Pressure: 22    Patient assessed at level 2          [x]    Bronchodilator Assessment    BRONCHODILATOR ASSESSMENT SCORE  Score 0 (Home) 1 2 3 4   Breath Sounds   []  Chronic Ventilator: Patient at baseline [x]  Mild Wheezes/ Clear []  Intermittent wheezes with good air entry []  Bilateral/unilateral wheezing with diminished air entry []  Insp/Exp wheeze and/or poor aeration   Ventilator Pressures   []  Chronic Ventilator []  Insp. Pressure less than 25 cm H20 [x]  Insp. Pressure less than 25 cm H20 [x]  Insp. Pressure exceeds 25 cm H20 []  Insp. Pressure exceeds 30 cm H20   Plateau Pressure []  NA   [x]  Plateau Pressure less than 4  [x]  Plateau Pressure less than or equal to 5 []  Plateau Pressure greater than or equal to 6 []  Plateau Pressure greater than or equal to 8       Anju LEIGH Varner  2:20 AM  
Patient extubated per physician order. Patient extubated in usual fashion. Patient placed on  room air.     María Clemente RCP   10:11 AM  
Pt arrived to ED with an I-gel airway in place. Airway exchanged to an 8.0 ett secured at 24 at the teeth.  
Renal Progress Note    Patient :  Dami Franklin; 78 y.o. MRN# [id number]  Location:  3008/3008-01  Attending:  Preston Beltran MD  Admit Date:  6/12/2024   Hospital Day: 2    Subjective:     Patient was seen and examined on CVVHD.  Tolerating the procedure well.  CHG was initiated 6/14/2024.    From CVVHD standpoint currently on bicarb drip 150 mEq at 150 cc an hour.  1.5 L/h, 200 blood flow rate, UF of +ve 50 cc an hour on 2K potassium bath.  Currently on pressor support x 2.  Patient also is on mechanical ventilation, sedated.    Patient had cardiac catheterization done on 6/13/2024 found to have minimal coronary artery disease with low EF of 20%, severe global hypokinesis.    BMP from today morning reviewed sodium 135, potassium 4.3, chloride 97, bicarb 18, calcium 8.6, BUN 20, creatinine 1.8 mg/dl.  Magnesium 2.1.  Phosphorus 3.8.  Urine output documented as about 5 cc in the last 24 hours.    Renal ultrasound 6/15/2024 right kidney 8.4 cm and left kidney not medically seen for evaluation.  Impression: Left kidney not adequately seen for evaluation.  Right kidney is noted for several simple cysts which do not require imaging follow-up.  Renal cortical echogenicity appears mildly increased as with medical renal disease.  Mild perinephritic fluid is noted.  Bilateral pleural effusions, ascites, and cholelithiasis.    Outpatient Medications:     No medications prior to admission.    Current Medications:     Scheduled Meds:    pantoprazole (PROTONIX) 40 mg in sodium chloride (PF) 0.9 % 10 mL injection  40 mg IntraVENous Daily    aspirin  81 mg Orogastric Daily    sodium chloride flush  5-40 mL IntraVENous 2 times per day    ipratropium 0.5 mg-albuterol 2.5 mg  1 Dose Inhalation Q6H    chlorhexidine  15 mL Mouth/Throat BID     Continuous Infusions:    dextrose 50 mL/hr at 06/15/24 0822    dextrose      sodium chloride 10 mL/hr at 06/15/24 0822    midazolam Stopped (06/13/24 0817)    EPINEPHrine 5 mg in sodium chloride 
Renal Progress Note    Patient :  Dami Franklin; 78 y.o. MRN# [id number]  Location:  3008/3008-01  Attending:  Preston Beltran MD  Admit Date:  6/12/2024   Hospital Day: 1    Subjective:     Patient was seen and examined on CVVHD.  Tolerating the procedure well.  Overnight patient had to be started on dialysis-CVVHD.  From CVVHD standpoint currently on bicarb drip 150 mEq at 200 cc an hour.  1.5 L/h, 200 blood flow rate, UF of +100 cc an hour on 2K potassium bath.  Currently on pressor support x 2.  Patient also is on mechanical ventilation, sedated.  Patient had cardiac catheterization done on 6/13/2024 found to have minimal coronary artery disease with low EF of 20%, severe global hypokinesis.    BMP from today morning reviewed sodium 138, potassium 4.1, chloride 100, bicarb 15, calcium 8.4, BUN 19, creatinine 2.2 mg/dl.  Magnesium 1.8.  Phosphorus 4.9.  Urine output documented as about 28 cc in the last 24 hours.    Outpatient Medications:     No medications prior to admission.    Current Medications:     Scheduled Meds:    pantoprazole (PROTONIX) 40 mg in sodium chloride (PF) 0.9 % 10 mL injection  40 mg IntraVENous Daily    aspirin  81 mg Orogastric Daily    sodium chloride flush  5-40 mL IntraVENous 2 times per day    ipratropium 0.5 mg-albuterol 2.5 mg  1 Dose Inhalation Q6H    chlorhexidine  15 mL Mouth/Throat BID     Continuous Infusions:    dextrose      sodium chloride 10 mL/hr at 06/14/24 1529    midazolam Stopped (06/13/24 0817)    EPINEPHrine 5 mg in sodium chloride 0.9 % 250 mL infusion 17 mcg/min (06/14/24 1529)    norepinephrine 30 mcg/min (06/14/24 1546)    sodium bicarbonate 150 mEq in sterile water 1,000 mL infusion 150 mL/hr at 06/14/24 1529    fentaNYL      prismaSol BGK 2/3.5 1,500 mL/hr at 06/14/24 0822    amiodarone 0.5 mg/min (06/14/24 1529)     PRN Meds:  glucose, dextrose bolus **OR** dextrose bolus, glucagon (rDNA), dextrose, sodium chloride flush, sodium chloride, potassium chloride **OR** 
[facility] [place]   Patient Death Note  DEATH   Shift date: 2024    Shift day:   Shift #: 1                 Room # 3008/3008-01   Name: Dami Franklin            Age: 78 y.o.  Gender: male          Jain: Mandaeism      Place of Mandaen: N/A  Admit Date & Time: 2024 11:25 PM     Referral: Nurse   Actual date of death: 2024   TOD: 10/17       SITUATION AT DEATH:  Patient went into cardiac arrest and .  was notified to come and offer spiritual support for the family who was present in the room with the  patient. Family expected the death to occur from a long standing illness.    IS THIS A 'S CASE?  No    SPIRITUAL/EMOTIONAL INTERVENTION:  Family was calm and tearful because they knew death soon would be upon him. They had arrangements already made where the body is to be taken.  prayed for comfort and peace. Received an address to send a grief packet and the family was grateful and express their gratitude.      Family Received Grief Packet?  No       NAME AND PHONE NUMBER OF DOCTOR SIGNING DEATH CERTIFICATE:  (full name) Dr. Keven Shay are now contacting the  home after a patient death.  spoke to/left message for [facility] ( home) indicating family's choice for their services.  called  home on 2024 (date) at 10:50 (time).      Copy of COMPLETED Release of Body Form Received?  Yes    Patient's belongings: Family took belongings     HOME:  Name: [facility]  City: [place]  Phone Number: [phone]    NEXT OF KIN:  Name: Graham Degrootlaurita  Relationship: Son  Street Address: [address]  City: [place]  State: [place]  Zip code: [id number]   Phone Number: [phone]    ANY FOLLOW-UP NEEDED?  No    IF SO, WHAT?    Electronically signed by PRINCESS NARANJO Chaplain Intern, on 2024 at 12:27 PM.  [facility]  [phone]    24 1220   Encounter Summary 
[facility] [place]    Shift date: 6/12/24  Shift day: Wednesday   Shift # 3    Room # 12/12   Name: Isrrael Britton                Nondenominational:    Place of Rastafarian:     Referral: Follow UP    Admit Date & Time: 6/12/2024 11:25 PM    Assessment:  Isrrael Britton is a 78 y.o. male in the hospital because of Cardia Arrest.       Intervention:  Writer introduced self and title as  to patient's sons.  Family members received medical update from Dr. Arias. Family members escorted to bedside ED 12.  Outcome:  Family expressed gratitude.    Plan:  Chaplains are available 24/7 via [employer].    Electronically signed by Chaplain Shad, on 6/13/2024 at 1:25 AM.  [facility]  [phone]     
Ventilator Bronchodilator assessment    Breath sounds: cl/dim  Inspiratory Pressure: 25  Plateau Pressure: 25    Patient assessed at level 1    [x]    Bronchodilator Assessment    BRONCHODILATOR ASSESSMENT SCORE  Score 0 (Home) 1 2 3 4   Breath Sounds   []  Chronic Ventilator: Patient at baseline [x]  Mild Wheezes/ Clear []  Intermittent wheezes with good air entry []  Bilateral/unilateral wheezing with diminished air entry []  Insp/Exp wheeze and/or poor aeration   Ventilator Pressures   []  Chronic Ventilator [x]  Insp. Pressure less than 25 cm H20 []  Insp. Pressure less than 25 cm H20 []  Insp. Pressure exceeds 25 cm H20 []  Insp. Pressure exceeds 30 cm H20   Plateau Pressure []  NA   [x]  Plateau Pressure less than 4  []  Plateau Pressure less than or equal to 5 []  Plateau Pressure greater than or equal to 6 []  Plateau Pressure greater than or equal to 8       María Clemente RCP  9:35 AM  
Writer confirmed with [employer] this patient is not MRI compatible due to having abbott leads and a medtronic generator. Also, one of his leads are fractured. Will update ordering DR and RN and will D/C order.  
  BILITOT 0.7 2.0* 2.3*   ALKPHOS 154* 149* 146*       Troponin: No results for input(s): \"TROPONINI\" in the last 72 hours.  BNP: No results for input(s): \"BNP\" in the last 72 hours.  Lipids: No results for input(s): \"CHOL\", \"HDL\" in the last 72 hours.    Invalid input(s): \"LDLCALCU\"  INR:   Recent Labs     06/13/24  0303 06/13/24  1027   INR 2.3 3.4         Objective:   Vitals: BP (!) 150/51   Pulse 71   Temp 98.2 °F (36.8 °C)   Resp 25   Ht 1.753 m (5' 9.02\")   Wt 101.9 kg (224 lb 10.4 oz)   SpO2 99%   BMI 33.16 kg/m²   General appearance: intubated  HEENT: Head: Normocephalic, no lesions, without obvious abnormality.  Neck: no adenopathy, no carotid bruit, no JVD, supple, symmetrical, trachea midline and thyroid not enlarged, symmetric, no tenderness/mass/nodules  Lungs: clear to auscultation bilaterally  Heart: regular rate and rhythm, S1, S2 normal, no murmur, click, rub or gallop  Abdomen: soft, non-tender; bowel sounds normal; no masses,  no organomegaly  Extremities: extremities normal, atraumatic, no cyanosis or edema  Neurologic: intubated    DATA:    EKG:   Ventricular paced rhythm     Echo 5/2020  Left ventricle is normal in size. Global left ventricular systolic function  is moderately reduced. Calculated ejection fraction is 33 % by heart Model .  Evidence of diastolic dysfunction.  Left atrium is moderately dilated. Right atrium is severely dilated .  Severely dilated right ventricular cavity. Normal right ventricular  function.  Pacemaker / ICD lead seen in right ventricle.  Mild mitral regurgitation.  Moderate tricuspid regurgitation.  Mild pulmonary hypertension. Estimated right ventricular systolic pressure  is 45mmHg.  Mild pulmonic insufficiency.     Cardiac Angiography:  6/13/2024  Left main: Normal with 0% stenosis.     LAD: 40% mid stenosis     LCX: Normal with 0% stenosis.     RCA: Normal with 0% stenosis.     The LV gram was performed in the SANCHEZ 30 position.   LVEF: 20%. LV Wall 
malnutrition  []16-16.99 Moderate malnutrition  []17-18.49 Mild malnutrition  []18.5-24.9 Normal  []25-29.9 Overweight (not obese)  []30-34.9 Obese class 1 (Low Risk)  []35-39.9 Obese class 2 (Moderate Risk)  []?40 Obese class 3 (High Risk)    RECENT LABS:   Lab Results   Component Value Date    WBC 11.8 (H) 06/16/2024    HGB 11.7 (L) 06/16/2024    HCT 37.6 (L) 06/16/2024    PLT See Reflexed IPF Result 06/16/2024    ALT 1,012 (H) 06/16/2024    AST 2,283 (H) 06/16/2024     (L) 06/16/2024    K 4.9 06/16/2024    CL 92 (L) 06/16/2024    CREATININE 1.7 (H) 06/16/2024    BUN 14 06/16/2024    CO2 11 (L) 06/16/2024    TSH 6.83 (H) 06/13/2024    INR 3.4 06/13/2024     24 HOUR INTAKE/OUTPUT:  Intake/Output Summary (Last 24 hours) at 6/16/2024 0942  Last data filed at 6/16/2024 0900  Gross per 24 hour   Intake 9060.06 ml   Output 7481 ml   Net 1579.06 ml       IMAGING:    Place summary of recent imaging here.    Labs and Images reviewed with:  [] Dr. Lilliana Banks  [x] Dr. Benito Escalona  [] Dr. Quintin Weaver  [] There are no new interval images to review.     PHYSICAL EXAM       Not completed  DRAINS:  [] There are no drains for Neuro Critical Care to monitor at this time.     ASSESSMENT AND PLAN:       Patient is a 78-year-old male with a history of A-fib (on Eliquis), type II diabetes mellitus, hypertension, COPD, who presented to the emergency department following a witnessed cardiac arrest at home.  Initial V-fib arrest, underwent 20 minutes of resuscitative efforts prior to ROSC per EMS and experienced an additional PEA arrest and route to the emergency department with additional 10 minutes of CPR.  Neurocritical care consulted for neuro prognostication.     NEUROLOGIC:    Patient hemodynamical instability worsening. With increased support requirement.    Code status changed to DNR CC        Neurocritical care will sign off      DIDIER K Ashouri, MD  Neuro Critical Care  6/16/2024     9:42 AM      
multiple shocks and amiodarone   Hx of severe cardiomyopathy with EF 33 % on Echo from 2020  Hx of sick sinus syndrome s/p PPM in-situ,   Hx of chronic AF; on Eliquis at home   Essential HTN,  HLP  = underwent cardiac catheter shows nonobstructive CAD.  No EF 20 to 30%.  Intra-aortic balloon pump was deferred due to the presence of previous AAA repair with stenting extending into the iliac artery     Pulmonary:  Maintain oxygen sats >92%  Pulmonary toilet  Acute hypoxic respiratory failure s/p cardiac arrest.    GI/Nutrition  Ulcer Prophylaxis: PPI  Diet:Diet NPO  ADULT TUBE FEEDING; Orogastric; Renal Formula; Continuous; 20; Yes; 10; Q 4 hours; 45; 50; Q 4 hours    Renal/Fluid/Electrolyte  CLIFTON on CKD  Anion gap metabolic acidosis  I/O: In: 65634.4 [I.V.:9315.6; NG/GT:680]  Out: 8060 [Urine:20]  On CRRT as per nephrology    ID  WBC:   Lab Results   Component Value Date    WBC 11.8 (H) 2024     Tmax: Temp (24hrs), Av.7 °F (36.5 °C), Min:94.3 °F (34.6 °C), Max:98.6 °F (37 °C)    Antimicrobials: not indicated     Hematology:  Recent Labs     24  0547 06/15/24  0403 24  0321   HGB 12.9* 12.4* 11.7*    stable    Endocrine:   glucose controlled - most recent BGL is   Recent Labs     06/15/24  1555 06/15/24  2150 24  0321   GLUCOSE 57* 77 141*     DVT Prophylaxis  On heparin drip.       Keven De Leon MD  Internal Medicine Resident, PGY-2  [facility]; [place], [place]  2024, 7:24 AM

## 2024-06-16 NOTE — PLAN OF CARE
Problem: Discharge Planning  Goal: Discharge to home or other facility with appropriate resources  6/16/2024 0947 by Nichole Sharp RN  Outcome: Progressing  Flowsheets (Taken 6/16/2024 0800)  Discharge to home or other facility with appropriate resources: Identify barriers to discharge with patient and caregiver  6/15/2024 2317 by Angela Marte RN  Outcome: Progressing  Flowsheets (Taken 6/15/2024 2000)  Discharge to home or other facility with appropriate resources: Identify barriers to discharge with patient and caregiver     Problem: Pain  Goal: Verbalizes/displays adequate comfort level or baseline comfort level  6/16/2024 0947 by Nichole Sharp RN  Outcome: Progressing  6/15/2024 2317 by Angela Marte RN  Outcome: Progressing     Problem: Skin/Tissue Integrity  Goal: Absence of new skin breakdown  Description: 1.  Monitor for areas of redness and/or skin breakdown  2.  Assess vascular access sites hourly  3.  Every 4-6 hours minimum:  Change oxygen saturation probe site  4.  Every 4-6 hours:  If on nasal continuous positive airway pressure, respiratory therapy assess nares and determine need for appliance change or resting period.  6/16/2024 0947 by Nichole Sharp RN  Outcome: Progressing  6/16/2024 0838 by María Clemente RCP  Outcome: Progressing  6/15/2024 2317 by Angela Marte RN  Outcome: Progressing     Problem: Safety - Adult  Goal: Free from fall injury  6/16/2024 0947 by Nichole Sharp RN  Outcome: Progressing  6/15/2024 2317 by Angela Marte RN  Outcome: Progressing     Problem: ABCDS Injury Assessment  Goal: Absence of physical injury  6/16/2024 0947 by Nichole Sharp RN  Outcome: Progressing  6/15/2024 2317 by Angela Marte RN  Outcome: Progressing  Flowsheets (Taken 6/15/2024 2316)  Absence of Physical Injury: Implement safety measures based on patient assessment     Problem: Nutrition Deficit:  Goal: Optimize nutritional status  Outcome: Progressing     Problem:

## 2024-06-16 NOTE — PROCEDURES
PROCEDURE NOTE  Date: 6/16/2024   Name: Dami Franklin  YOB: 1945    Procedures          Referring physician: Dr. Beltran  Date: 6/16/2024  Start Time:6/15/2024 @ 1355  End Time: 6/16/2024 @ 1100    Indication  Patient with encephalopathy, EEG done to rule out subclinical seizures.       Introduction  This continuous video-EEG was acquired using a [employer] workstation at 256 samples/s. Electrodes were placed according to the International 10-20 system. Automated spike and seizure detection algorithms were applied. Video was recorded during this study.    Description  The back ground consistent of complete suppression.  No consistent focal slowing or interhemispheric asymmetry was noted. Normal sleep structures were not observed. There were no interictal epileptiform discharges or electrographic seizures.    Events  No events reported or recorded.       Impression  Abnormal continuous vEEG recording, the slowing mentioned above suggests severe non specific encephalopathy.  More suppressed. Over all worse tracing.     No epileptiform discharges were identified.  Please note the absence of   such activity in this record cannot conclusively rule out an epileptic   disorder.  If such is still clinically suspected, a repeat study with   prolonged sampling may be helpful.    Andrea Valladares MD  Epilepsy Board Certified.  Neurology Board Certified.    Electronically Signed

## 2024-06-16 NOTE — PLAN OF CARE
Problem: Skin/Tissue Integrity  Goal: Absence of new skin breakdown  Description: 1.  Monitor for areas of redness and/or skin breakdown  2.  Assess vascular access sites hourly  3.  Every 4-6 hours minimum:  Change oxygen saturation probe site  4.  Every 4-6 hours:  If on nasal continuous positive airway pressure, respiratory therapy assess nares and determine need for appliance change or resting period.  6/16/2024 0838 by María Clemente, RCP  Outcome: Progressing     Problem: Respiratory - Adult  Goal: Achieves optimal ventilation and oxygenation  Outcome: Progressing

## 2024-06-16 NOTE — PLAN OF CARE
Problem: Discharge Planning  Goal: Discharge to home or other facility with appropriate resources  6/15/2024 2317 by Angela Marte RN  Outcome: Progressing  Flowsheets (Taken 6/15/2024 2000)  Discharge to home or other facility with appropriate resources: Identify barriers to discharge with patient and caregiver     Problem: Pain  Goal: Verbalizes/displays adequate comfort level or baseline comfort level  6/15/2024 2317 by Angela Marte RN  Outcome: Progressing     Problem: Skin/Tissue Integrity  Goal: Absence of new skin breakdown  Description: 1.  Monitor for areas of redness and/or skin breakdown  2.  Assess vascular access sites hourly  3.  Every 4-6 hours minimum:  Change oxygen saturation probe site  4.  Every 4-6 hours:  If on nasal continuous positive airway pressure, respiratory therapy assess nares and determine need for appliance change or resting period.  6/15/2024 2317 by Angela Marte RN  Outcome: Progressing     Problem: Safety - Adult  Goal: Free from fall injury  6/15/2024 2317 by Angela Marte RN  Outcome: Progressing     Problem: ABCDS Injury Assessment  Goal: Absence of physical injury  6/15/2024 2317 by Angela Marte RN  Outcome: Progressing  Flowsheets (Taken 6/15/2024 2316)  Absence of Physical Injury: Implement safety measures based on patient assessment     Problem: Discharge Planning  Goal: Discharge to home or other facility with appropriate resources  6/15/2024 2317 by Angela Marte RN  Outcome: Progressing  Flowsheets (Taken 6/15/2024 2000)  Discharge to home or other facility with appropriate resources: Identify barriers to discharge with patient and caregiver  6/15/2024 1812 by Frank Lam RN  Outcome: Not Progressing     Problem: Pain  Goal: Verbalizes/displays adequate comfort level or baseline comfort level  6/15/2024 2317 by Angela Marte RN  Outcome: Progressing  6/15/2024 1812 by Frank Lam RN  Outcome: Not Progressing

## 2024-06-16 NOTE — SIGNIFICANT EVENT
DEATH NOTE    PATIENT NAME: Dami Franklin  YOB: 1945  MEDICAL RECORD NO. [id number]  DATE: 6/16/2024  PRIMARY CARE PHYSICIAN: Jaylene Goodwin MD    DIAGNOSIS OF DEATH     I have confirmed the death of this patient in accordance with accepted medical standards.  The patient is dead as evidenced by cardiac death or cessation of brain function:    Cardiac Death (check all that apply):     [x]  Absence of respiratory effort by observation     [x]  Absence of pulse by palpation     []  Absence of blood pressure by sphygmomanometry     [x]  Absence of sustainable cardiac rhythm by monitor    Death by Cessation of Brain Function (check all that apply):     [x]  Absence of Cerebral Function with no motor response     [x]  Absence of brain stem function by systemic physical exam     [x]  Failure to respond with respiratory drive by apnea test     []  Cerebral Electrical silence as interpreted by qualified reader        OR     []  Absence of brain blood flow by radiologic technique      CERTIFICATION OF DEATH     I have pronounced the patient dead on:     Date: 6/16/2024 at 10:17 AM     NOTIFICATIONS     Attending physician that will sign Death Certificate: Dr Beltran                                                           Family notified: Name and/or Relationship: Son                                                          []  Per Nursing     notified (Name):                                                           []  Per Nursing      Keven De Leon MD  6/16/2024, 10:18 AM

## 2024-06-17 LAB
ALBUMIN PERCENT: 56 % (ref 45–65)
ALBUMIN SERPL-MCNC: 2.7 G/DL (ref 3.2–5.2)
ALPHA 2 PERCENT: 11 % (ref 6–13)
ALPHA1 GLOB SERPL ELPH-MCNC: 0.3 G/DL (ref 0.1–0.4)
ALPHA1 GLOB SERPL ELPH-MCNC: 6 % (ref 3–6)
ALPHA2 GLOB SERPL ELPH-MCNC: 0.5 G/DL (ref 0.5–0.9)
B-GLOBULIN SERPL ELPH-MCNC: 0.6 G/DL (ref 0.5–1.1)
B-GLOBULIN SERPL ELPH-MCNC: 13 % (ref 11–19)
GAMMA GLOB SERPL ELPH-MCNC: 0.7 G/DL (ref 0.5–1.5)
GAMMA GLOBULIN %: 15 % (ref 9–20)
ITYP INTERPRETATION: NORMAL
NEURON SPEC ENOLASE: 71.2 NG/ML
PATH REV: NORMAL
PATHOLOGIST: ABNORMAL
PROT PATTERN SERPL ELPH-IMP: ABNORMAL
PROT SERPL-MCNC: 4.9 G/DL (ref 6.6–8.7)
TOTAL PROT. SUM,%: 101 % (ref 98–102)
TOTAL PROT. SUM: 4.8 G/DL (ref 6.3–8.2)

## 2024-06-18 LAB
ANA SER QL IA: NEGATIVE
DSDNA IGG SER QL IA: <0.5 IU/ML
MICROORGANISM SPEC CULT: NORMAL
MICROORGANISM SPEC CULT: NORMAL
NUCLEAR IGG SER IA-RTO: <0.1 U/ML
SERVICE CMNT-IMP: NORMAL
SERVICE CMNT-IMP: NORMAL
SPECIMEN DESCRIPTION: NORMAL
SPECIMEN DESCRIPTION: NORMAL

## 2024-06-19 LAB — NEURON SPEC ENOLASE: 149.6 NG/ML

## 2024-06-20 NOTE — DISCHARGE SUMMARY
[facility]     Department of Internal Medicine - Critical Care Service    INPATIENT DEATH SUMMARY      PATIENT IDENTIFICATION:  NAME:  Dami Franklin   :   1945  MRN:    [id number]     Acct:    [id number]   Admit Date:  2024  Discharge date:  2024 10:17 AM   Attending Provider: No att. providers found                                     Principal Problem:    Cardiac arrest (HCC)  Active Problems:    NSTEMI (non-ST elevated myocardial infarction) (HCC)    CLIFTON (acute kidney injury) (HCC)    Hypokalemia    Metabolic acidosis    Arterial hypotension    Encounter for continuous venovenous hemodiafiltration (CVVHD) (HCC)  Resolved Problems:    * No resolved hospital problems. *       REASON FOR HOSPITALIZATION:   Chief Complaint   Patient presents with    Cardiac Arrest          Hospital Course  78-year-old male with history of COPD, A-fib, pacemaker on Eliquis, hypertension, diabetes presented to the ED via EMS after he was found unresponsive by his son.  On EMS arrival, patient was found to be in V-fib, patient was resuscitated and ROSC was achieved after about 20 minutes.  Patient rearrested en route to the hospital and ROSC was achieved in about 10 minutes during which the patient was found to be in PEA.  Patient was admitted to medical ICU for further postcardiac arrest.  Echo was done which showed reduced LVEF of 29%, patient underwent left heart cath which showed minimal CAD.  Neuro critical care was consulted for neuro prognostication.  In the meantime, patient had CLIFTON on CKD, nephrology was consulted and patient was started on CRRT.  Patient overnight on  deteriorated significantly.  Patient was persistently hypotensive, was maxed out on 4 pressors.  Patient was not following any commands.  He was also having bloody NG output.  Patient's medical status was discussed with the family who decided to pursue DNR CC.  Patient was terminally extubated and passed on  at

## (undated) DEVICE — BAND COMPR L24CM REG CLR PLAS HEMSTAT EXT HK AND LOOP RETEN

## (undated) DEVICE — SURGICAL PROCEDURE TRAY CRD CATH SVMMC

## (undated) DEVICE — ANGIOGRAPHIC CATHETER: Brand: EXPO™

## (undated) DEVICE — GUIDEWIRE 35/260/FC/PTFE/3J: Brand: GUIDEWIRE

## (undated) DEVICE — STRAP ARMBRD W1.5XL32IN FOAM STR YET SFT W/ HK AND LOOP

## (undated) DEVICE — INTRODUCER SHTH 6FR L11CM 0.038IN STD SIDEPRT EXTN 3 W

## (undated) DEVICE — GLIDESHEATH SLENDER STAINLESS STEEL KIT: Brand: GLIDESHEATH SLENDER